# Patient Record
Sex: FEMALE | Race: WHITE | NOT HISPANIC OR LATINO | Employment: OTHER | URBAN - METROPOLITAN AREA
[De-identification: names, ages, dates, MRNs, and addresses within clinical notes are randomized per-mention and may not be internally consistent; named-entity substitution may affect disease eponyms.]

---

## 2018-03-28 ENCOUNTER — HOSPITAL ENCOUNTER (EMERGENCY)
Facility: MEDICAL CENTER | Age: 68
End: 2018-03-29
Attending: EMERGENCY MEDICINE
Payer: MEDICARE

## 2018-03-28 DIAGNOSIS — R11.2 NON-INTRACTABLE VOMITING WITH NAUSEA, UNSPECIFIED VOMITING TYPE: ICD-10-CM

## 2018-03-28 DIAGNOSIS — R10.84 GENERALIZED ABDOMINAL PAIN: ICD-10-CM

## 2018-03-28 DIAGNOSIS — R51.9 ACUTE NONINTRACTABLE HEADACHE, UNSPECIFIED HEADACHE TYPE: ICD-10-CM

## 2018-03-28 PROCEDURE — 99285 EMERGENCY DEPT VISIT HI MDM: CPT

## 2018-03-29 ENCOUNTER — APPOINTMENT (OUTPATIENT)
Dept: RADIOLOGY | Facility: MEDICAL CENTER | Age: 68
End: 2018-03-29
Attending: EMERGENCY MEDICINE
Payer: MEDICARE

## 2018-03-29 VITALS
HEART RATE: 78 BPM | SYSTOLIC BLOOD PRESSURE: 144 MMHG | HEIGHT: 62 IN | OXYGEN SATURATION: 96 % | WEIGHT: 165 LBS | RESPIRATION RATE: 18 BRPM | BODY MASS INDEX: 30.36 KG/M2 | DIASTOLIC BLOOD PRESSURE: 76 MMHG | TEMPERATURE: 97.1 F

## 2018-03-29 LAB
ALBUMIN SERPL BCP-MCNC: 3.5 G/DL (ref 3.2–4.9)
ALBUMIN/GLOB SERPL: 1.5 G/DL
ALP SERPL-CCNC: 92 U/L (ref 30–99)
ALT SERPL-CCNC: 19 U/L (ref 2–50)
ANION GAP SERPL CALC-SCNC: 7 MMOL/L (ref 0–11.9)
APPEARANCE UR: CLEAR
AST SERPL-CCNC: 21 U/L (ref 12–45)
BACTERIA #/AREA URNS HPF: NEGATIVE /HPF
BASOPHILS # BLD AUTO: 0.3 % (ref 0–1.8)
BASOPHILS # BLD: 0.01 K/UL (ref 0–0.12)
BILIRUB SERPL-MCNC: 0.4 MG/DL (ref 0.1–1.5)
BILIRUB UR QL STRIP.AUTO: NEGATIVE
BUN SERPL-MCNC: 15 MG/DL (ref 8–22)
CALCIUM SERPL-MCNC: 8.5 MG/DL (ref 8.5–10.5)
CHLORIDE SERPL-SCNC: 109 MMOL/L (ref 96–112)
CO2 SERPL-SCNC: 23 MMOL/L (ref 20–33)
COLOR UR: YELLOW
CREAT SERPL-MCNC: 0.52 MG/DL (ref 0.5–1.4)
CULTURE IF INDICATED INDCX: YES UA CULTURE
EKG IMPRESSION: NORMAL
EOSINOPHIL # BLD AUTO: 0.04 K/UL (ref 0–0.51)
EOSINOPHIL NFR BLD: 1 % (ref 0–6.9)
EPI CELLS #/AREA URNS HPF: ABNORMAL /HPF
ERYTHROCYTE [DISTWIDTH] IN BLOOD BY AUTOMATED COUNT: 42.6 FL (ref 35.9–50)
ERYTHROCYTE [SEDIMENTATION RATE] IN BLOOD BY WESTERGREN METHOD: 12 MM/HOUR (ref 0–30)
GLOBULIN SER CALC-MCNC: 2.4 G/DL (ref 1.9–3.5)
GLUCOSE SERPL-MCNC: 117 MG/DL (ref 65–99)
GLUCOSE UR STRIP.AUTO-MCNC: NEGATIVE MG/DL
HCT VFR BLD AUTO: 39.6 % (ref 37–47)
HGB BLD-MCNC: 13.1 G/DL (ref 12–16)
HYALINE CASTS #/AREA URNS LPF: ABNORMAL /LPF
IMM GRANULOCYTES # BLD AUTO: 0.01 K/UL (ref 0–0.11)
IMM GRANULOCYTES NFR BLD AUTO: 0.3 % (ref 0–0.9)
KETONES UR STRIP.AUTO-MCNC: ABNORMAL MG/DL
LEUKOCYTE ESTERASE UR QL STRIP.AUTO: ABNORMAL
LIPASE SERPL-CCNC: 10 U/L (ref 11–82)
LYMPHOCYTES # BLD AUTO: 0.49 K/UL (ref 1–4.8)
LYMPHOCYTES NFR BLD: 12.6 % (ref 22–41)
MCH RBC QN AUTO: 30.5 PG (ref 27–33)
MCHC RBC AUTO-ENTMCNC: 33.1 G/DL (ref 33.6–35)
MCV RBC AUTO: 92.1 FL (ref 81.4–97.8)
MICRO URNS: ABNORMAL
MONOCYTES # BLD AUTO: 0.33 K/UL (ref 0–0.85)
MONOCYTES NFR BLD AUTO: 8.5 % (ref 0–13.4)
NEUTROPHILS # BLD AUTO: 3.02 K/UL (ref 2–7.15)
NEUTROPHILS NFR BLD: 77.3 % (ref 44–72)
NITRITE UR QL STRIP.AUTO: NEGATIVE
NRBC # BLD AUTO: 0 K/UL
NRBC BLD-RTO: 0 /100 WBC
PH UR STRIP.AUTO: 7 [PH]
PLATELET # BLD AUTO: 187 K/UL (ref 164–446)
PMV BLD AUTO: 9.9 FL (ref 9–12.9)
POTASSIUM SERPL-SCNC: 3 MMOL/L (ref 3.6–5.5)
PROT SERPL-MCNC: 5.9 G/DL (ref 6–8.2)
PROT UR QL STRIP: NEGATIVE MG/DL
RBC # BLD AUTO: 4.3 M/UL (ref 4.2–5.4)
RBC # URNS HPF: ABNORMAL /HPF
RBC UR QL AUTO: NEGATIVE
SODIUM SERPL-SCNC: 139 MMOL/L (ref 135–145)
SP GR UR STRIP.AUTO: 1.03
TROPONIN I SERPL-MCNC: <0.01 NG/ML (ref 0–0.04)
UROBILINOGEN UR STRIP.AUTO-MCNC: 1 MG/DL
WBC # BLD AUTO: 3.9 K/UL (ref 4.8–10.8)
WBC #/AREA URNS HPF: ABNORMAL /HPF

## 2018-03-29 PROCEDURE — 700111 HCHG RX REV CODE 636 W/ 250 OVERRIDE (IP): Performed by: EMERGENCY MEDICINE

## 2018-03-29 PROCEDURE — 93005 ELECTROCARDIOGRAM TRACING: CPT | Performed by: EMERGENCY MEDICINE

## 2018-03-29 PROCEDURE — 83690 ASSAY OF LIPASE: CPT

## 2018-03-29 PROCEDURE — 70450 CT HEAD/BRAIN W/O DYE: CPT

## 2018-03-29 PROCEDURE — 96374 THER/PROPH/DIAG INJ IV PUSH: CPT | Mod: XU

## 2018-03-29 PROCEDURE — 96375 TX/PRO/DX INJ NEW DRUG ADDON: CPT

## 2018-03-29 PROCEDURE — 81001 URINALYSIS AUTO W/SCOPE: CPT

## 2018-03-29 PROCEDURE — 74177 CT ABD & PELVIS W/CONTRAST: CPT

## 2018-03-29 PROCEDURE — 84484 ASSAY OF TROPONIN QUANT: CPT

## 2018-03-29 PROCEDURE — 85025 COMPLETE CBC W/AUTO DIFF WBC: CPT

## 2018-03-29 PROCEDURE — 85652 RBC SED RATE AUTOMATED: CPT

## 2018-03-29 PROCEDURE — 700117 HCHG RX CONTRAST REV CODE 255: Performed by: EMERGENCY MEDICINE

## 2018-03-29 PROCEDURE — 80053 COMPREHEN METABOLIC PANEL: CPT

## 2018-03-29 PROCEDURE — 87086 URINE CULTURE/COLONY COUNT: CPT

## 2018-03-29 RX ORDER — ONDANSETRON 4 MG/1
4 TABLET, ORALLY DISINTEGRATING ORAL EVERY 6 HOURS PRN
Qty: 10 TAB | Refills: 0 | Status: SHIPPED | OUTPATIENT
Start: 2018-03-29 | End: 2018-05-11

## 2018-03-29 RX ORDER — MORPHINE SULFATE 10 MG/ML
5 INJECTION, SOLUTION INTRAMUSCULAR; INTRAVENOUS ONCE
Status: COMPLETED | OUTPATIENT
Start: 2018-03-29 | End: 2018-03-29

## 2018-03-29 RX ORDER — ONDANSETRON 2 MG/ML
4 INJECTION INTRAMUSCULAR; INTRAVENOUS ONCE
Status: COMPLETED | OUTPATIENT
Start: 2018-03-29 | End: 2018-03-29

## 2018-03-29 RX ORDER — SUMATRIPTAN 25 MG/1
25-100 TABLET, FILM COATED ORAL
Qty: 10 TAB | Refills: 0 | Status: SHIPPED | OUTPATIENT
Start: 2018-03-29 | End: 2018-05-11

## 2018-03-29 RX ADMIN — IOHEXOL 100 ML: 350 INJECTION, SOLUTION INTRAVENOUS at 01:34

## 2018-03-29 RX ADMIN — MORPHINE SULFATE 5 MG: 10 INJECTION INTRAVENOUS at 00:37

## 2018-03-29 RX ADMIN — ONDANSETRON 4 MG: 2 INJECTION, SOLUTION INTRAMUSCULAR; INTRAVENOUS at 00:37

## 2018-03-29 ASSESSMENT — PAIN SCALES - GENERAL: PAINLEVEL_OUTOF10: 7

## 2018-03-29 NOTE — ED PROVIDER NOTES
"ED Provider Note    CHIEF COMPLAINT  Chief Complaint   Patient presents with   • Abdominal Pain   • N/V   • Jaw Pain   • Head Ache        HPI    Primary care provider: No primary care provider on file.   History obtained from: Patient and her family  History limited by: None     Denia Deluca is a 67 y.o. female who presents to the ED by EMS complaining of severe sharp right sided headache for about a day and a half. She reports history of severe migraines in the past but has not had any since menopause. She reports that the headache is also causing right ear pain and jaw pain. She also reports some sensitivity to light. She reports pain across the middle portion of her abdomen and vomits after eating. She describes her emesis as \"a little bit of bile.\" She has tried taking Aleve and ibuprofen without any improvement. She denies any fever/diarrhea/dysuria or pain anywhere else. No focal weakness/visual change/sensory change. She reports that she has been a little bit constipated because they moved here from [place] and drove 16 hours.      REVIEW OF SYSTEMS  Please see HPI for pertinent positives/negatives.  All other systems reviewed and are negative.     PAST MEDICAL HISTORY  No past medical history on file.     SURGICAL HISTORY  No past surgical history on file.     SOCIAL HISTORY  Social History     Social History Main Topics   • Smoking status: Not on file   • Smokeless tobacco: Not on file   • Alcohol use Not on file   • Drug use: Unknown   • Sexual activity: Not on file        FAMILY HISTORY  No family history on file.     CURRENT MEDICATIONS  Home Medications    **Home medications have not yet been reviewed for this encounter**          ALLERGIES  No Known Allergies     PHYSICAL EXAM  VITAL SIGNS: /76   Pulse 78   Temp 36.2 °C (97.1 °F)   Resp 18   Ht 1.575 m (5' 2\")   Wt 74.8 kg (165 lb)   SpO2 96%   BMI 30.18 kg/m²  @HILLARY[[id number]::@     Pulse ox interpretation: 96% I interpret this pulse ox " as normal     Constitutional: Well developed, well nourished, alert in mild discomfort, nontoxic appearance    HENT: No external signs of trauma, normocephalic, bilateral external ears normal, unable to visualize right TM due to cerumen, left EAC with small amount of cerumen, oropharynx moist and clear, nose normal    Eyes: PERRL, EOMI, vision and visual fields are grossly intact, conjunctiva without erythema, no discharge, no icterus    Neck: Soft and supple, trachea midline, no stridor, no tenderness, no LAD, no JVD, good ROM    Cardiovascular: Regular rate and rhythm, no murmurs/rubs/gallops, strong distal pulses and good perfusion    Thorax & Lungs: No respiratory distress, CTAB  Abdomen: Soft, diffuse tenderness to palpation with guarding, nondistended, no rebound, normal BS    Back: No CVAT    Extremities: No cyanosis, no edema, no gross deformity, good ROM, no tenderness, intact distal pulses with brisk cap refill    Skin: Warm, dry, no pallor/cyanosis, no rash noted    Lymphatic: No lymphadenopathy noted    Neuro: Alert and oriented to person, place, and time.  GCS 15.  CN II-XII grossly intact.  Normal speech.  Equal strength bilateral UE/LE.  Sensation intact to touch.  No cerebellar signs.  Psychiatric: Cooperative, flat affect, normal judgement, appropriate for clinical situation          DIAGNOSTIC STUDIES / PROCEDURES    EKG  12 Lead EKG obtained at 0015 and interpreted by me:   Rate: 91   Rhythm: Sinus rhythm   Ectopy: None  Intervals: Normal   Axis: Normal   Q Waves: None   ST segments: Normal  T Waves: Normal    Clinical Impression: Sinus rhythm without acute ST-T wave changes       LABS  All labs reviewed by me.     Results for orders placed or performed during the hospital encounter of 03/28/18   Troponin   Result Value Ref Range    Troponin I <0.01 0.00 - 0.04 ng/mL   CBC with Differential   Result Value Ref Range    WBC 3.9 (L) 4.8 - 10.8 K/uL    RBC 4.30 4.20 - 5.40 M/uL    Hemoglobin 13.1 12.0  - 16.0 g/dL    Hematocrit 39.6 37.0 - 47.0 %    MCV 92.1 81.4 - 97.8 fL    MCH 30.5 27.0 - 33.0 pg    MCHC 33.1 (L) 33.6 - 35.0 g/dL    RDW 42.6 35.9 - 50.0 fL    Platelet Count 187 164 - 446 K/uL    MPV 9.9 9.0 - 12.9 fL    Neutrophils-Polys 77.30 (H) 44.00 - 72.00 %    Lymphocytes 12.60 (L) 22.00 - 41.00 %    Monocytes 8.50 0.00 - 13.40 %    Eosinophils 1.00 0.00 - 6.90 %    Basophils 0.30 0.00 - 1.80 %    Immature Granulocytes 0.30 0.00 - 0.90 %    Nucleated RBC 0.00 /100 WBC    Neutrophils (Absolute) 3.02 2.00 - 7.15 K/uL    Lymphs (Absolute) 0.49 (L) 1.00 - 4.80 K/uL    Monos (Absolute) 0.33 0.00 - 0.85 K/uL    Eos (Absolute) 0.04 0.00 - 0.51 K/uL    Baso (Absolute) 0.01 0.00 - 0.12 K/uL    Immature Granulocytes (abs) 0.01 0.00 - 0.11 K/uL    NRBC (Absolute) 0.00 K/uL   Complete Metabolic Panel (CMP)   Result Value Ref Range    Sodium 139 135 - 145 mmol/L    Potassium 3.0 (L) 3.6 - 5.5 mmol/L    Chloride 109 96 - 112 mmol/L    Co2 23 20 - 33 mmol/L    Anion Gap 7.0 0.0 - 11.9    Glucose 117 (H) 65 - 99 mg/dL    Bun 15 8 - 22 mg/dL    Creatinine 0.52 0.50 - 1.40 mg/dL    Calcium 8.5 8.5 - 10.5 mg/dL    AST(SGOT) 21 12 - 45 U/L    ALT(SGPT) 19 2 - 50 U/L    Alkaline Phosphatase 92 30 - 99 U/L    Total Bilirubin 0.4 0.1 - 1.5 mg/dL    Albumin 3.5 3.2 - 4.9 g/dL    Total Protein 5.9 (L) 6.0 - 8.2 g/dL    Globulin 2.4 1.9 - 3.5 g/dL    A-G Ratio 1.5 g/dL   Lipase   Result Value Ref Range    Lipase 10 (L) 11 - 82 U/L   WESTERGREN SED RATE   Result Value Ref Range    Sed Rate Westergren 12 0 - 30 mm/hour   ESTIMATED GFR   Result Value Ref Range    GFR If African American >60 >60 mL/min/1.73 m 2    GFR If Non African American >60 >60 mL/min/1.73 m 2   URINALYSIS CULTURE, IF INDICATED   Result Value Ref Range    Color Yellow     Character Clear     Specific Gravity 1.035 <1.035    Ph 7.0 5.0 - 8.0    Glucose Negative Negative mg/dL    Ketones Trace (A) Negative mg/dL    Protein Negative Negative mg/dL    Bilirubin  Negative Negative    Urobilinogen, Urine 1.0 Negative    Nitrite Negative Negative    Leukocyte Esterase Trace (A) Negative    Occult Blood Negative Negative    Micro Urine Req Microscopic     Culture Indicated Yes UA Culture   URINE MICROSCOPIC (W/UA)   Result Value Ref Range    WBC 2-5 /hpf    RBC 2-5 (A) /hpf    Bacteria Negative None /hpf    Epithelial Cells Few /hpf    Hyaline Cast 0-2 /lpf   EKG (ER)   Result Value Ref Range    Report       [facility] Emergency Dept.    Test Date:  2018  Pt Name:    GILBERTO MÉNDEZ             Department: ER  MRN:        [id number]                      Room:        04  Gender:     Female                       Technician: [id number]  :        1950                   Requested By:ER TRIAGE PROTOCOL  Order #:    [id number]                    Reading MD:    Measurements  Intervals                                Axis  Rate:       91                           P:          21  TN:         160                          QRS:        -1  QRSD:       100                          T:          24  QT:         380  QTc:        468    Interpretive Statements  SINUS RHYTHM  RSR' IN V1 OR V2, RIGHT VCD OR RVH  BASELINE WANDER IN LEAD(S) V6  No previous ECG available for comparison          RADIOLOGY  The radiologist's interpretation of all radiological studies have been reviewed by me.     CT-ABDOMEN-PELVIS WITH   Final Result      1.  Dilation of pancreatic duct, of uncertain etiology.  No gross evidence for pancreatic mass or pancreatitis.   2.  Normal appendix.   3.  Increased colonic stool.   4.  No mesenteric inflammatory process.   5.  Mildly distended fluid-filled esophagus, possibly indicating reflux.      CT-HEAD W/O   Final Result      1.  Mild atrophy and white matter changes.   2.  No acute intracranial hemorrhage or territorial infarct.                COURSE & MEDICAL DECISION MAKING  Nursing notes, VS, PMSFHx reviewed in chart.       Differential  diagnoses considered include but are not limited to: Tension/migraine/cluster HA, intracranial hemorrhage/SAH, aneurysm, dissection, temporal arteritis, intracranial HTN, central venous thrombosis, tumor/cancer, appendicitis, AMI, AAA, bowel perforation/obstruction, ileus, cholecystitis/ascending cholangitis, gallstone/biliary colic, diverticulitis, mesenteric ischemia, pancreatitis, PUD, gastritis, GERD, KS/renal colic, UTI/pyelo, gastroenteritis, colitis, constipation, hernia       Patient presents with her family to the ED with above complaint. EKG without evidence of acute ischemic changes. CT head and CT abdomen/pelvis with findings as above. Labs were fairly unremarkable except for mild leukopenia and hypokalemia. Patient was given Zofran and morphine for her symptoms. Findings discussed with the patient and her . Patient reports feeling better and is noted now in no acute distress and nontoxic in appearance. No signs of acute abdomen on recheck. No focal neurological findings. She tolerated oral fluids without any further vomiting. Patient reports that she used to get severe migraines about twice a week and was previously on Imitrex but hasn't had one since menopause. I will prescribe Imitrex for her as well as Zofran to use as needed. Patient has upcoming appointment with her new doctor in [facility]. She was given return to ED precautions. She was noted to have mildly elevated blood pressure and will need outpatient follow-up for further management. Patient and her  verbalized understanding and agreed with plan of care with no further questions or concerns.      The patient is referred to a primary physician for blood pressure management, diabetic screening, and for all other preventative health concerns.       FINAL IMPRESSION  1. Acute nonintractable headache, unspecified headache type    2. Non-intractable vomiting with nausea, unspecified vomiting type    3. Generalized abdominal pain            DISPOSITION  Patient will be discharged home in stable condition.       FOLLOW UP  Please follow up with your doctor    Schedule an appointment as soon as possible for a visit today      [facility], Emergency Dept  [address]  [place] [address] [phone]    If symptoms worsen         OUTPATIENT MEDICATIONS  Discharge Medication List as of 3/29/2018  3:44 AM      START taking these medications    Details   ondansetron (ZOFRAN ODT) 4 MG TABLET DISPERSIBLE Take 1 Tab by mouth every 6 hours as needed., Disp-10 Tab, R-0, Print Rx Paper      SUMAtriptan (IMITREX) 25 MG Tab tablet Take 1-4 Tabs by mouth Once PRN for Migraine for up to 1 dose., Disp-10 Tab, R-0, Print Rx Paper                Electronically signed by: Marshall Powers, 3/29/2018 12:20 AM      Portions of this record were made with voice recognition software.  Despite my review, spelling/grammar/context errors may still remain.  Interpretation of this chart should be taken in this context.

## 2018-03-29 NOTE — ED NOTES
Pt educated on discharge instructions and rx. Pt verbalized understanding. Pt given glass of water to go. Pt and  ambulated to lobby with steady gait.

## 2018-03-29 NOTE — ED NOTES
Pt notified of needing urine sample. Pt states unable to urinate at this time. Pt to CT at this time.

## 2018-03-29 NOTE — DISCHARGE INSTRUCTIONS
Abdominal Pain (Nonspecific)  Your exam might not show the exact reason you have abdominal pain. Since there are many different causes of abdominal pain, another checkup and more tests may be needed. It is very important to follow up for lasting (persistent) or worsening symptoms. A possible cause of abdominal pain in any person who still has his or her appendix is acute appendicitis. Appendicitis is often hard to diagnose. Normal blood tests, urine tests, ultrasound, and CT scans do not completely rule out early appendicitis or other causes of abdominal pain. Sometimes, only the changes that happen over time will allow appendicitis and other causes of abdominal pain to be determined. Other potential problems that may require surgery may also take time to become more apparent. Because of this, it is important that you follow all of the instructions below.  HOME CARE INSTRUCTIONS   · Rest as much as possible.   · Do not eat solid food until your pain is gone.   · While adults or children have pain: A diet of water, weak decaffeinated tea, broth or bouillon, gelatin, oral rehydration solutions (ORS), frozen ice pops, or ice chips may be helpful.   · When pain is gone in adults or children: Start a light diet (dry toast, crackers, applesauce, or white rice). Increase the diet slowly as long as it does not bother you. Eat no dairy products (including cheese and eggs) and no spicy, fatty, fried, or high-fiber foods.   · Use no alcohol, caffeine, or cigarettes.   · Take your regular medicines unless your caregiver told you not to.   · Take any prescribed medicine as directed.   · Only take over-the-counter or prescription medicines for pain, discomfort, or fever as directed by your caregiver. Do not give aspirin to children.   If your caregiver has given you a follow-up appointment, it is very important to keep that appointment. Not keeping the appointment could result in a permanent injury and/or lasting (chronic) pain  and/or disability. If there is any problem keeping the appointment, you must call to reschedule.   SEEK IMMEDIATE MEDICAL CARE IF:   · Your pain is not gone in 24 hours.   · Your pain becomes worse, changes location, or feels different.   · You or your child has an oral temperature above 102° F (38.9° C), not controlled by medicine.   · Your baby is older than 3 months with a rectal temperature of 102° F (38.9° C) or higher.   · Your baby is 3 months old or younger with a rectal temperature of 100.4° F (38° C) or higher.   · You have shaking chills.   · You keep throwing up (vomiting) or cannot drink liquids.   · There is blood in your vomit or you see blood in your bowel movements.   · Your bowel movements become dark or black.   · You have frequent bowel movements.   · Your bowel movements stop (become blocked) or you cannot pass gas.   · You have bloody, frequent, or painful urination.   · You have yellow discoloration in the skin or whites of the eyes.   · Your stomach becomes bloated or bigger.   · You have dizziness or fainting.   · You have chest or back pain.   MAKE SURE YOU:   · Understand these instructions.   · Will watch your condition.   · Will get help right away if you are not doing well or get worse.   Document Released: 12/18/2006 Document Revised: 03/11/2013 Document Reviewed: 11/15/2010  ExitCare® Patient Information ©2013 [employer].    General Headache Without Cause  A headache is pain or discomfort felt around the head or neck area. The specific cause of a headache may not be found. There are many causes and types of headaches. A few common ones are:  · Tension headaches.  · Migraine headaches.  · Cluster headaches.  · Chronic daily headaches.  Follow these instructions at home:  Watch your condition for any changes. Take these steps to help with your condition:  Managing pain  · Take over-the-counter and prescription medicines only as told by your health care provider.  · Lie down in a  dark, quiet room when you have a headache.  · If directed, apply ice to the head and neck area:  ¨ Put ice in a plastic bag.  ¨ Place a towel between your skin and the bag.  ¨ Leave the ice on for 20 minutes, 2-3 times per day.  · Use a heating pad or hot shower to apply heat to the head and neck area as told by your health care provider.  · Keep lights dim if bright lights bother you or make your headaches worse.  Eating and drinking  · Eat meals on a regular schedule.  · Limit alcohol use.  · Decrease the amount of caffeine you drink, or stop drinking caffeine.  General instructions  · Keep all follow-up visits as told by your health care provider. This is important.  · Keep a headache journal to help find out what may trigger your headaches. For example, write down:  ¨ What you eat and drink.  ¨ How much sleep you get.  ¨ Any change to your diet or medicines.  · Try massage or other relaxation techniques.  · Limit stress.  · Sit up straight, and do not tense your muscles.  · Do not use tobacco products, including cigarettes, chewing tobacco, or e-cigarettes. If you need help quitting, ask your health care provider.  · Exercise regularly as told by your health care provider.  · Sleep on a regular schedule. Get 7-9 hours of sleep, or the amount recommended by your health care provider.  Contact a health care provider if:  · Your symptoms are not helped by medicine.  · You have a headache that is different from the usual headache.  · You have nausea or you vomit.  · You have a fever.  Get help right away if:  · Your headache becomes severe.  · You have repeated vomiting.  · You have a stiff neck.  · You have a loss of vision.  · You have problems with speech.  · You have pain in the eye or ear.  · You have muscular weakness or loss of muscle control.  · You lose your balance or have trouble walking.  · You feel faint or pass out.  · You have confusion.  This information is not intended to replace advice given to you  by your health care provider. Make sure you discuss any questions you have with your health care provider.  Document Released: 12/18/2006 Document Revised: 05/25/2017 Document Reviewed: 04/11/2016  Elsevier Interactive Patient Education © 2017 [employer]

## 2018-03-31 LAB
BACTERIA UR CULT: NORMAL
SIGNIFICANT IND 70042: NORMAL
SITE SITE: NORMAL
SOURCE SOURCE: NORMAL

## 2018-04-16 ENCOUNTER — TELEPHONE (OUTPATIENT)
Dept: MEDICAL GROUP | Facility: PHYSICIAN GROUP | Age: 68
End: 2018-04-16

## 2018-04-16 NOTE — TELEPHONE ENCOUNTER
Patient called stating you were recommended by Jaye Campbell and is wondering if you would accept her as a patient?

## 2018-04-17 NOTE — TELEPHONE ENCOUNTER
Phone Number Called: [phone] (home)     Message: LVM informing patient  will accept her as a patient and she may call us back to schedule a new patient appointment.     Left Message for patient to call back: yes

## 2018-05-11 ENCOUNTER — OFFICE VISIT (OUTPATIENT)
Dept: MEDICAL GROUP | Facility: PHYSICIAN GROUP | Age: 68
End: 2018-05-11
Payer: MEDICARE

## 2018-05-11 VITALS
BODY MASS INDEX: 28.53 KG/M2 | SYSTOLIC BLOOD PRESSURE: 124 MMHG | HEART RATE: 75 BPM | HEIGHT: 63 IN | RESPIRATION RATE: 16 BRPM | TEMPERATURE: 98.6 F | OXYGEN SATURATION: 96 % | DIASTOLIC BLOOD PRESSURE: 70 MMHG | WEIGHT: 161 LBS

## 2018-05-11 DIAGNOSIS — F90.1 ATTENTION DEFICIT HYPERACTIVITY DISORDER (ADHD), PREDOMINANTLY HYPERACTIVE TYPE: ICD-10-CM

## 2018-05-11 DIAGNOSIS — N39.46 MIXED STRESS AND URGE URINARY INCONTINENCE: ICD-10-CM

## 2018-05-11 DIAGNOSIS — Z91.030 BEE ALLERGY STATUS: ICD-10-CM

## 2018-05-11 DIAGNOSIS — Z13.1 SCREENING FOR DIABETES MELLITUS: ICD-10-CM

## 2018-05-11 DIAGNOSIS — J45.20 MILD INTERMITTENT ASTHMA WITHOUT COMPLICATION: ICD-10-CM

## 2018-05-11 DIAGNOSIS — Z13.220 SCREENING FOR LIPID DISORDERS: ICD-10-CM

## 2018-05-11 PROCEDURE — 99204 OFFICE O/P NEW MOD 45 MIN: CPT | Performed by: FAMILY MEDICINE

## 2018-05-11 RX ORDER — ALBUTEROL SULFATE 90 UG/1
2 AEROSOL, METERED RESPIRATORY (INHALATION) EVERY 6 HOURS PRN
Qty: 8.5 G | Refills: 3 | Status: SHIPPED
Start: 2018-05-11 | End: 2020-02-03 | Stop reason: SDUPTHER

## 2018-05-11 RX ORDER — OXYBUTYNIN CHLORIDE 5 MG/1
5 TABLET ORAL 3 TIMES DAILY
Qty: 90 TAB | Refills: 3 | Status: SHIPPED | OUTPATIENT
Start: 2018-05-11 | End: 2019-06-09 | Stop reason: SDUPTHER

## 2018-05-11 RX ORDER — ATOMOXETINE 60 MG/1
60 CAPSULE ORAL DAILY
COMMUNITY
End: 2018-05-11 | Stop reason: SDUPTHER

## 2018-05-11 RX ORDER — OXYBUTYNIN CHLORIDE 5 MG/1
5 TABLET ORAL 3 TIMES DAILY
COMMUNITY
End: 2018-05-11 | Stop reason: SDUPTHER

## 2018-05-11 RX ORDER — ATOMOXETINE 60 MG/1
60 CAPSULE ORAL DAILY
Qty: 90 CAP | Refills: 3 | Status: SHIPPED | OUTPATIENT
Start: 2018-05-11 | End: 2018-09-26

## 2018-05-11 RX ORDER — ESCITALOPRAM OXALATE 10 MG/1
10 TABLET ORAL DAILY
COMMUNITY
End: 2018-12-14

## 2018-05-11 ASSESSMENT — PATIENT HEALTH QUESTIONNAIRE - PHQ9: CLINICAL INTERPRETATION OF PHQ2 SCORE: 0

## 2018-05-11 NOTE — LETTER
[facility]  Sharifa Gayle M.D.  [address]  [place] [address]  Fax: [phone]   Authorization for Release/Disclosure of   Protected Health Information   Name: DENIA MÉNDEZ : 1950 SSN: [id number]   [facility] [place] [address] Phone:    [phone] (home)    I authorize the entity listed below to release/disclose the PHI below to:   [facility]/Sharifa Gayle M.D. and Sharifa Gayle M.D.   Provider or Entity Name:  Raymundo Solano    Address   City, State, Zip   Phone:      Fax:[phone]     Reason for request: continuity of care   Information to be released:    [  ] LAST COLONOSCOPY,  including any PATH REPORT and follow-up  [  ] LAST FIT/COLOGUARD RESULT [  ] LAST DEXA  [  ] LAST MAMMOGRAM  [  ] LAST PAP  [  ] LAST LABS [  ] RETINA EXAM REPORT  [  ] IMMUNIZATION RECORDS  [  x] Release all info      [  ] Check here and initial the line next to each item to release ALL health information INCLUDING  _____ Care and treatment for drug and / or alcohol abuse  _____ HIV testing, infection status, or AIDS  _____ Genetic Testing    DATES OF SERVICE OR TIME PERIOD TO BE DISCLOSED: _____________  I understand and acknowledge that:  * This Authorization may be revoked at any time by you in writing, except if your health information has already been used or disclosed.  * Your health information that will be used or disclosed as a result of you signing this authorization could be re-disclosed by the recipient. If this occurs, your re-disclosed health information may no longer be protected by State or Federal laws.  * You may refuse to sign this Authorization. Your refusal will not affect your ability to obtain treatment.  * This Authorization becomes effective upon signing and will  on (date) __________.      If no date is indicated, this Authorization will  one (1) year from the signature date.    Name: Denia Méndez    Signature:   Date:     2018            PLEASE FAX REQUESTED RECORDS BACK TO: [phone]

## 2018-05-11 NOTE — ASSESSMENT & PLAN NOTE
This is a chronic and stable issue for the patient. She has had ADHD for most of her life. She is currently taking Strattera 60mg 5x/week and Lexapro 10mg 2x/week. This combination has worked well for her. She tells me that her focus and concentration are improved. She denies adverse effects. Has seen psychiatry in the past.

## 2018-05-11 NOTE — LETTER
[facility]  Sharifa Gayle M.D.  [address]  [place] [address]  Fax: [phone]   Authorization for Release/Disclosure of   Protected Health Information   Name: DENIA MÉNDEZ : 1950 SSN: [id number]   Address: [place] [address] Phone:    [phone] (home)    I authorize the entity listed below to release/disclose the PHI below to:   [facility]/Sharifa Gayle M.D. and Sharifa Gayle M.D.   Provider or Entity Name:  Dr. Maria Reyes    Address   City, State, Zip   Phone:      Fax:  [phone]   Reason for request: continuity of care   Information to be released:    [  ] LAST COLONOSCOPY,  including any PATH REPORT and follow-up  [  ] LAST FIT/COLOGUARD RESULT [  ] LAST DEXA  [  ] LAST MAMMOGRAM  [  ] LAST PAP  [  ] LAST LABS [  ] RETINA EXAM REPORT  [  ] IMMUNIZATION RECORDS  [  ] Release all info      [  ] Check here and initial the line next to each item to release ALL health information INCLUDING  _____ Care and treatment for drug and / or alcohol abuse  _____ HIV testing, infection status, or AIDS  _____ Genetic Testing    DATES OF SERVICE OR TIME PERIOD TO BE DISCLOSED: _____________  I understand and acknowledge that:  * This Authorization may be revoked at any time by you in writing, except if your health information has already been used or disclosed.  * Your health information that will be used or disclosed as a result of you signing this authorization could be re-disclosed by the recipient. If this occurs, your re-disclosed health information may no longer be protected by State or Federal laws.  * You may refuse to sign this Authorization. Your refusal will not affect your ability to obtain treatment.  * This Authorization becomes effective upon signing and will  on (date) __________.      If no date is indicated, this Authorization will  one (1) year from the signature date.    Name: Denia Méndez    Signature:   Date:     2018       PLEASE FAX  REQUESTED RECORDS BACK TO: [phone]

## 2018-05-12 NOTE — ASSESSMENT & PLAN NOTE
Patient recently had a severe reaction after being stung by a bee. She had difficulty breathing and throat swelling. Has seen an allergist and has an Epi-Pen to use as needed.

## 2018-05-12 NOTE — ASSESSMENT & PLAN NOTE
Patient has had asthma since childhood. She tells me that she hates to admit she has asthma. She has never needed to take a daily inhaled medication. She is using a rescue inhaler 1-2 a week as needed. Denies cough, shortness of breath or wheezing.

## 2018-05-12 NOTE — PROGRESS NOTES
Denia Deluca is a 67 y.o. female here to establish care and discuss ADHD and asthma.    HPI:  Denia is a pleasant 67-year-old female here to establish care. She and her  recently moved here from [place]. 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    Attention deficit hyperactivity disorder (ADHD), predominantly hyperactive type  This is a chronic and stable issue for the patient. She has had ADHD for most of her life. She is currently taking Strattera 60mg 5x/week and Lexapro 10mg 2x/week. This combination has worked well for her. She tells me that her focus and concentration are improved. She denies adverse effects. Has seen psychiatry in the past.    Mild intermittent asthma without complication  Patient has had asthma since childhood. She tells me that she hates to admit she has asthma. She has never needed to take a daily inhaled medication. She is using a rescue inhaler 1-2 a week as needed. Denies cough, shortness of breath or wheezing.    Mixed stress and urge urinary incontinence  Chronic in nature. Taking oxybutynin 5mg daily. No adverse effects.    Bee allergy status  Patient recently had a severe reaction after being stung by a bee. She had difficulty breathing and throat swelling. Has seen an allergist and has an Epi-Pen to use as needed.    Current medicines (including changes today)  Current Outpatient Prescriptions   Medication Sig Dispense Refill   • escitalopram (LEXAPRO) 10 MG Tab Take 10 mg by mouth every day.     • atomoxetine (STRATTERA) 60 MG capsule Take 1 Cap by mouth every day. 90 Cap 3   • oxybutynin (DITROPAN) 5 MG Tab Take 1 Tab by mouth 3 times a day. 90 Tab 3   • albuterol 108 (90 Base) MCG/ACT Aero Soln inhalation aerosol Inhale 2 Puffs by mouth every 6 hours as needed for Shortness of Breath. 8.5 g 3     No current facility-administered medications for this visit.      She  has a past medical history of ADHD; Basal cell carcinoma (BCC); Depression; and Fracture.  She  has a past surgical  "history that includes open reduction and abdominal hysterectomy total.  Social History   Substance Use Topics   • Smoking status: Never Smoker   • Smokeless tobacco: Never Used   • Alcohol use Yes      Comment: \"maybe 3 drinks a year\"     Social History     Social History Narrative   • No narrative on file     Family History   Problem Relation Age of Onset   • Cancer Sister      Breast cancer     Family Status   Relation Status   • Sister Alive     ROS  Constitutional: Negative for fever, chills and malaise/fatigue.   HENT: Negative for congestion.    Eyes: Negative for pain.   Respiratory: Negative for cough and shortness of breath.    Cardiovascular: Negative for leg swelling.   Gastrointestinal: Negative for nausea, vomiting, abdominal pain and diarrhea.   Genitourinary: Negative for dysuria and hematuria.   Skin: Negative for rash.   Neurological: Negative for dizziness, focal weakness and headaches.   Endo/Heme/Allergies: Does not bruise/bleed easily.   Psychiatric/Behavioral: Negative for depression.  The patient is not nervous/anxious.       Objective:     Physical Exam:  Blood pressure 124/70, pulse 75, temperature 37 °C (98.6 °F), resp. rate 16, height 1.6 m (5' 3\"), weight 73 kg (161 lb), SpO2 96 %, not currently breastfeeding. Body mass index is 28.52 kg/m².  Constitutional: Alert, no distress.  Skin: Warm, dry, good turgor, no rashes in visible areas.  Eye: Equal, round and reactive, conjunctiva clear, lids normal.  ENMT: Lips without lesions, good dentition, oropharynx clear.  Neck: Trachea midline, no masses, no thyromegaly. No cervical or supraclavicular lymphadenopathy.  Respiratory: Unlabored respiratory effort, lungs clear to auscultation, no wheezes, no ronchi.  Cardiovascular: Normal S1, S2, no murmur, no edema.  Abdomen: Soft, non-tender, no masses, no hepatosplenomegaly.  Psych: Alert and oriented x3, normal affect and mood.    Assessment and Plan:     1. Attention deficit hyperactivity " disorder (ADHD), predominantly hyperactive type  Focus and concentration improved with current medications. Patient denies adverse effects. Will continue current regimen and monitor.  - atomoxetine (STRATTERA) 60 MG capsule; Take 1 Cap by mouth every day.  Dispense: 90 Cap; Refill: 3    2. Mixed stress and urge urinary incontinence  Chronic and stable. Continue current medication and monitor.  - oxybutynin (DITROPAN) 5 MG Tab; Take 1 Tab by mouth 3 times a day.  Dispense: 90 Tab; Refill: 3    3. Mild intermittent asthma without complication  Chronic and stable. Continue use of rescue inhaler as needed and monitor.  - albuterol 108 (90 Base) MCG/ACT Aero Soln inhalation aerosol; Inhale 2 Puffs by mouth every 6 hours as needed for Shortness of Breath.  Dispense: 8.5 g; Refill: 3    4. Bee allergy status  Stable. Patient has Epi-Pen for emergencies.  - albuterol 108 (90 Base) MCG/ACT Aero Soln inhalation aerosol; Inhale 2 Puffs by mouth every 6 hours as needed for Shortness of Breath.  Dispense: 8.5 g; Refill: 3    5. Screening for diabetes mellitus  Fasting glucose ordered.  - COMP METABOLIC PANEL; Future    6. Screening for lipid disorders  Fasting cholesterol ordered.  - LIPID PROFILE; Future    Records requested from previous PCP.  Followup: Return in about 6 months (around 11/11/2018) for Medicare AW with health .         PLEASE NOTE: This dictation was created using voice recognition software. I have made every reasonable attempt to correct obvious errors, but I expect that there are errors of grammar and possibly content that I did not discover before finalizing the note.

## 2018-08-28 ENCOUNTER — OFFICE VISIT (OUTPATIENT)
Dept: MEDICAL GROUP | Facility: PHYSICIAN GROUP | Age: 68
End: 2018-08-28
Payer: MEDICARE

## 2018-08-28 ENCOUNTER — HOSPITAL ENCOUNTER (OUTPATIENT)
Facility: MEDICAL CENTER | Age: 68
End: 2018-08-28
Attending: FAMILY MEDICINE
Payer: MEDICARE

## 2018-08-28 VITALS
OXYGEN SATURATION: 94 % | BODY MASS INDEX: 29.14 KG/M2 | HEIGHT: 63 IN | TEMPERATURE: 97.7 F | SYSTOLIC BLOOD PRESSURE: 120 MMHG | DIASTOLIC BLOOD PRESSURE: 70 MMHG | WEIGHT: 164.46 LBS | HEART RATE: 104 BPM

## 2018-08-28 DIAGNOSIS — N39.0 URINARY TRACT INFECTION WITH HEMATURIA, SITE UNSPECIFIED: ICD-10-CM

## 2018-08-28 DIAGNOSIS — R31.9 URINARY TRACT INFECTION WITH HEMATURIA, SITE UNSPECIFIED: ICD-10-CM

## 2018-08-28 DIAGNOSIS — R00.0 TACHYCARDIA: ICD-10-CM

## 2018-08-28 DIAGNOSIS — F90.1 ATTENTION DEFICIT HYPERACTIVITY DISORDER (ADHD), PREDOMINANTLY HYPERACTIVE TYPE: ICD-10-CM

## 2018-08-28 LAB
APPEARANCE UR: NORMAL
BILIRUB UR STRIP-MCNC: NORMAL MG/DL
COLOR UR AUTO: NORMAL
GLUCOSE UR STRIP.AUTO-MCNC: NORMAL MG/DL
KETONES UR STRIP.AUTO-MCNC: NORMAL MG/DL
LEUKOCYTE ESTERASE UR QL STRIP.AUTO: NORMAL
NITRITE UR QL STRIP.AUTO: NORMAL
PH UR STRIP.AUTO: 7 [PH] (ref 5–8)
PROT UR QL STRIP: 30 MG/DL
RBC UR QL AUTO: NORMAL
SP GR UR STRIP.AUTO: 1.01
UROBILINOGEN UR STRIP-MCNC: NORMAL MG/DL

## 2018-08-28 PROCEDURE — 81002 URINALYSIS NONAUTO W/O SCOPE: CPT | Performed by: FAMILY MEDICINE

## 2018-08-28 PROCEDURE — 99214 OFFICE O/P EST MOD 30 MIN: CPT | Performed by: FAMILY MEDICINE

## 2018-08-28 PROCEDURE — 87086 URINE CULTURE/COLONY COUNT: CPT

## 2018-08-28 PROCEDURE — 99000 SPECIMEN HANDLING OFFICE-LAB: CPT | Performed by: FAMILY MEDICINE

## 2018-08-28 RX ORDER — CEFDINIR 300 MG/1
300 CAPSULE ORAL 2 TIMES DAILY
Qty: 10 CAP | Refills: 0 | Status: SHIPPED | OUTPATIENT
Start: 2018-08-28 | End: 2018-08-31

## 2018-08-28 RX ORDER — ATOMOXETINE 40 MG/1
40 CAPSULE ORAL DAILY
Qty: 30 CAP | Refills: 0 | Status: SHIPPED | OUTPATIENT
Start: 2018-08-28 | End: 2018-09-26 | Stop reason: SDUPTHER

## 2018-08-30 ENCOUNTER — TELEPHONE (OUTPATIENT)
Dept: MEDICAL GROUP | Facility: PHYSICIAN GROUP | Age: 68
End: 2018-08-30

## 2018-08-30 LAB
BACTERIA UR CULT: NORMAL
SIGNIFICANT IND 70042: NORMAL
SITE SITE: NORMAL
SOURCE SOURCE: NORMAL

## 2018-08-30 NOTE — TELEPHONE ENCOUNTER
VOICEMAIL  1. Caller Name: Denia                     Call Back Number: [phone] (home)     2. Message: Patient LVM requesting results of urine culture.  States she is having increased vaginal pain and does not feel the prescribed medication is working.  Patient seen by  on 8/28/18.      3. Patient approves office to leave a detailed voicemail/Utterzhart message: yes      Routing to  for results.  Any recommendations for continued pain?

## 2018-08-30 NOTE — TELEPHONE ENCOUNTER
Spoke with patient and let them know Amelia Arriaga M.D.'s message.  Appointment scheduled with PCP for tomorrow 8/31/18 @ 8:20 AM for further work up.  Patient will take OTC pain reliever today and discuss with PCP tomorrow.

## 2018-08-31 ENCOUNTER — OFFICE VISIT (OUTPATIENT)
Dept: MEDICAL GROUP | Facility: PHYSICIAN GROUP | Age: 68
End: 2018-08-31
Payer: MEDICARE

## 2018-08-31 VITALS
DIASTOLIC BLOOD PRESSURE: 80 MMHG | OXYGEN SATURATION: 95 % | HEART RATE: 100 BPM | RESPIRATION RATE: 16 BRPM | SYSTOLIC BLOOD PRESSURE: 118 MMHG | TEMPERATURE: 98.6 F | BODY MASS INDEX: 28.87 KG/M2 | WEIGHT: 163 LBS

## 2018-08-31 DIAGNOSIS — R10.84 GENERALIZED ABDOMINAL PAIN: ICD-10-CM

## 2018-08-31 DIAGNOSIS — R01.1 MURMUR: ICD-10-CM

## 2018-08-31 DIAGNOSIS — R00.0 TACHYCARDIA: ICD-10-CM

## 2018-08-31 DIAGNOSIS — Z90.710 S/P HYSTERECTOMY: ICD-10-CM

## 2018-08-31 DIAGNOSIS — R10.2 PELVIC PAIN: ICD-10-CM

## 2018-08-31 PROCEDURE — 99214 OFFICE O/P EST MOD 30 MIN: CPT | Performed by: FAMILY MEDICINE

## 2018-08-31 ASSESSMENT — PAIN SCALES - GENERAL: PAINLEVEL: NO PAIN

## 2018-08-31 NOTE — PROGRESS NOTES
"Subjective:   Denia Deluca is a 67 y.o. female here today for abdominal pain.    For the last 2 months, patient has had intermittent episodes of lower abdominal pain.  At first, she says that the pain started down near her bladder.  She is also noticed that she will have pain that seems to start in her upper right quadrant, the upper middle portion of her abdomen and this will then spread down to her lower abdomen.  She describes it as a \"sharp\" sensation.  A couple of weeks ago, she was straining to have a bowel movement.  She described her stool being \"sticky\" and \"pasty.\"  She suddenly felt a sharp pain in the \"front of her vagina.\"  The abdominal pain is associated with nausea.  She denies any fevers, chills, blood in urine or stool.  She has had a workup for hematuria in [place] in the past.  Her pain is worsening and the episodes are now occurring more frequently.    She was seen by another physician in our office and was prescribed antibiotics for a possible urinary tract infection.  The culture returned and was negative.  Her symptoms have not been improving.    For her ADHD, she tells me that her concentration and focus have improved with Strattera.  Her dose was reduced from 60-40 mg at her last visit due to tachycardia.  Her pulse is 100 today.  She denies any chest pain, palpitations or shortness of breath.  She says that her pulse has been running high for some time now.  She wonders if this is just her.  She has not had any cardiac testing.    Current medicines (including changes today)  Current Outpatient Prescriptions   Medication Sig Dispense Refill   • atomoxetine (STRATTERA) 40 MG capsule Take 1 Cap by mouth every day. 30 Cap 0   • cefdinir (OMNICEF) 300 MG Cap Take 1 Cap by mouth 2 times a day. 10 Cap 0   • escitalopram (LEXAPRO) 10 MG Tab Take 10 mg by mouth every day.     • atomoxetine (STRATTERA) 60 MG capsule Take 1 Cap by mouth every day. 90 Cap 3   • oxybutynin (DITROPAN) 5 MG Tab " Take 1 Tab by mouth 3 times a day. 90 Tab 3   • albuterol 108 (90 Base) MCG/ACT Aero Soln inhalation aerosol Inhale 2 Puffs by mouth every 6 hours as needed for Shortness of Breath. 8.5 g 3     No current facility-administered medications for this visit.      She  has a past medical history of ADHD; Basal cell carcinoma (BCC); Depression; and Fracture.    ROS   No chest pain, no shortness of breath, no abdominal pain.     Objective:     Physical Exam:  Blood pressure 118/80, pulse 100, temperature 37 °C (98.6 °F), resp. rate 16, weight 73.9 kg (163 lb), SpO2 95 %, not currently breastfeeding. Body mass index is 28.87 kg/m².   Constitutional: Alert, no distress, non-toxic appearance.  Skin: Warm, dry, good turgor, no rashes in visible areas.  Eye: Equal, round and reactive, conjunctiva clear, lids normal.  ENMT: Lips without lesions, good dentition, moist mucous membranes.  Neck: Trachea midline, no masses, no thyromegaly. No cervical or supraclavicular lymphadenopathy.  Respiratory: Unlabored respiratory effort, lungs clear to auscultation, no wheezes, no rhonchi.  Cardiovascular: Normal S1, S2, soft systolic murmur, no edema.  Abdomen: Soft, tender to light and deep palpation in RUQ, epigastric area and lower abdomen, no masses, non-distended, no hepatosplenomegaly.  No pain with hip maneuvers or heel tapping.  Psych: Alert and oriented x3, normal affect and mood.    Assessment and Plan:     1. Generalized abdominal pain  2. Pelvic pain  3. S/P hysterectomy  Worsening pain with increased frequency.  Etiology is unclear at this time.  I am concerned that patient may have peptic ulcer disease, gallbladder disease, ovarian pathology.  Will order CT scan to further evaluate.  Patient was also encouraged to keep a food journal and to eliminate any triggers.  Ear precautions reviewed.  She will have close follow-up.  - CT-ABDOMEN WITH & W/O, PELVIS WITH; Future  - CBC WITH DIFFERENTIAL; Future  - COMP METABOLIC PANEL;  Future    4. Tachycardia  5. Murmur  No significant change with reduction of Strattera dose.  Patient has a soft systolic murmur on exam which is new.  Labwork and echo ordered.  - ECHOCARDIOGRAM COMP W/O CONT; Future  - TSH; Future    Followup: Return in about 3 weeks (around 9/21/2018).         PLEASE NOTE: This dictation was created using voice recognition software. I have made every reasonable attempt to correct obvious errors, but I expect that there are errors of grammar and possibly content that I did not discover before finalizing the note.

## 2018-09-05 ENCOUNTER — HOSPITAL ENCOUNTER (OUTPATIENT)
Dept: LAB | Facility: MEDICAL CENTER | Age: 68
End: 2018-09-05
Attending: FAMILY MEDICINE
Payer: MEDICARE

## 2018-09-05 DIAGNOSIS — R10.84 GENERALIZED ABDOMINAL PAIN: ICD-10-CM

## 2018-09-05 DIAGNOSIS — R10.2 PELVIC PAIN: ICD-10-CM

## 2018-09-05 DIAGNOSIS — R00.0 TACHYCARDIA: ICD-10-CM

## 2018-09-05 LAB
ALBUMIN SERPL BCP-MCNC: 4.3 G/DL (ref 3.2–4.9)
ALBUMIN/GLOB SERPL: 1.6 G/DL
ALP SERPL-CCNC: 111 U/L (ref 30–99)
ALT SERPL-CCNC: 15 U/L (ref 2–50)
ANION GAP SERPL CALC-SCNC: 6 MMOL/L (ref 0–11.9)
AST SERPL-CCNC: 18 U/L (ref 12–45)
BASOPHILS # BLD AUTO: 0.9 % (ref 0–1.8)
BASOPHILS # BLD: 0.04 K/UL (ref 0–0.12)
BILIRUB SERPL-MCNC: 0.6 MG/DL (ref 0.1–1.5)
BUN SERPL-MCNC: 17 MG/DL (ref 8–22)
CALCIUM SERPL-MCNC: 10.2 MG/DL (ref 8.5–10.5)
CHLORIDE SERPL-SCNC: 104 MMOL/L (ref 96–112)
CO2 SERPL-SCNC: 29 MMOL/L (ref 20–33)
CREAT SERPL-MCNC: 0.82 MG/DL (ref 0.5–1.4)
EOSINOPHIL # BLD AUTO: 0.18 K/UL (ref 0–0.51)
EOSINOPHIL NFR BLD: 3.9 % (ref 0–6.9)
ERYTHROCYTE [DISTWIDTH] IN BLOOD BY AUTOMATED COUNT: 45.3 FL (ref 35.9–50)
GLOBULIN SER CALC-MCNC: 2.7 G/DL (ref 1.9–3.5)
GLUCOSE SERPL-MCNC: 88 MG/DL (ref 65–99)
HCT VFR BLD AUTO: 43.8 % (ref 37–47)
HGB BLD-MCNC: 13.8 G/DL (ref 12–16)
IMM GRANULOCYTES # BLD AUTO: 0.01 K/UL (ref 0–0.11)
IMM GRANULOCYTES NFR BLD AUTO: 0.2 % (ref 0–0.9)
LYMPHOCYTES # BLD AUTO: 1.82 K/UL (ref 1–4.8)
LYMPHOCYTES NFR BLD: 39.1 % (ref 22–41)
MCH RBC QN AUTO: 29.6 PG (ref 27–33)
MCHC RBC AUTO-ENTMCNC: 31.5 G/DL (ref 33.6–35)
MCV RBC AUTO: 94 FL (ref 81.4–97.8)
MONOCYTES # BLD AUTO: 0.41 K/UL (ref 0–0.85)
MONOCYTES NFR BLD AUTO: 8.8 % (ref 0–13.4)
NEUTROPHILS # BLD AUTO: 2.19 K/UL (ref 2–7.15)
NEUTROPHILS NFR BLD: 47.1 % (ref 44–72)
NRBC # BLD AUTO: 0 K/UL
NRBC BLD-RTO: 0 /100 WBC
PLATELET # BLD AUTO: 250 K/UL (ref 164–446)
PMV BLD AUTO: 10.8 FL (ref 9–12.9)
POTASSIUM SERPL-SCNC: 3.4 MMOL/L (ref 3.6–5.5)
PROT SERPL-MCNC: 7 G/DL (ref 6–8.2)
RBC # BLD AUTO: 4.66 M/UL (ref 4.2–5.4)
SODIUM SERPL-SCNC: 139 MMOL/L (ref 135–145)
TSH SERPL DL<=0.005 MIU/L-ACNC: 2.24 UIU/ML (ref 0.38–5.33)
WBC # BLD AUTO: 4.7 K/UL (ref 4.8–10.8)

## 2018-09-05 PROCEDURE — 84443 ASSAY THYROID STIM HORMONE: CPT

## 2018-09-05 PROCEDURE — 36415 COLL VENOUS BLD VENIPUNCTURE: CPT

## 2018-09-05 PROCEDURE — 85025 COMPLETE CBC W/AUTO DIFF WBC: CPT

## 2018-09-05 PROCEDURE — 80053 COMPREHEN METABOLIC PANEL: CPT

## 2018-09-10 ENCOUNTER — HOSPITAL ENCOUNTER (OUTPATIENT)
Dept: RADIOLOGY | Facility: MEDICAL CENTER | Age: 68
End: 2018-09-10
Attending: FAMILY MEDICINE
Payer: MEDICARE

## 2018-09-10 DIAGNOSIS — R10.84 GENERALIZED ABDOMINAL PAIN: ICD-10-CM

## 2018-09-10 DIAGNOSIS — R10.2 PELVIC PAIN: ICD-10-CM

## 2018-09-10 DIAGNOSIS — Z90.710 S/P HYSTERECTOMY: ICD-10-CM

## 2018-09-10 PROCEDURE — 74177 CT ABD & PELVIS W/CONTRAST: CPT

## 2018-09-10 PROCEDURE — 700117 HCHG RX CONTRAST REV CODE 255: Performed by: FAMILY MEDICINE

## 2018-09-10 RX ADMIN — IOHEXOL 50 ML: 240 INJECTION, SOLUTION INTRATHECAL; INTRAVASCULAR; INTRAVENOUS; ORAL at 14:51

## 2018-09-10 RX ADMIN — IOHEXOL 100 ML: 350 INJECTION, SOLUTION INTRAVENOUS at 14:50

## 2018-09-12 ENCOUNTER — HOSPITAL ENCOUNTER (OUTPATIENT)
Dept: CARDIOLOGY | Facility: MEDICAL CENTER | Age: 68
End: 2018-09-12
Attending: FAMILY MEDICINE
Payer: MEDICARE

## 2018-09-12 DIAGNOSIS — R01.1 MURMUR: ICD-10-CM

## 2018-09-12 DIAGNOSIS — R00.0 TACHYCARDIA: ICD-10-CM

## 2018-09-12 LAB
LV EJECT FRACT  99904: 60
LV EJECT FRACT MOD 2C 99903: 63.3
LV EJECT FRACT MOD 4C 99902: 61.2
LV EJECT FRACT MOD BP 99901: 62.68

## 2018-09-12 PROCEDURE — 93306 TTE W/DOPPLER COMPLETE: CPT

## 2018-09-26 ENCOUNTER — OFFICE VISIT (OUTPATIENT)
Dept: MEDICAL GROUP | Facility: PHYSICIAN GROUP | Age: 68
End: 2018-09-26
Payer: MEDICARE

## 2018-09-26 VITALS
WEIGHT: 163 LBS | BODY MASS INDEX: 28.88 KG/M2 | HEART RATE: 105 BPM | SYSTOLIC BLOOD PRESSURE: 130 MMHG | TEMPERATURE: 99.1 F | HEIGHT: 63 IN | RESPIRATION RATE: 16 BRPM | OXYGEN SATURATION: 96 % | DIASTOLIC BLOOD PRESSURE: 82 MMHG

## 2018-09-26 DIAGNOSIS — F90.1 ATTENTION DEFICIT HYPERACTIVITY DISORDER (ADHD), PREDOMINANTLY HYPERACTIVE TYPE: ICD-10-CM

## 2018-09-26 DIAGNOSIS — R00.0 TACHYCARDIA: ICD-10-CM

## 2018-09-26 DIAGNOSIS — I51.7 RIGHT VENTRICULAR HYPERTROPHY: ICD-10-CM

## 2018-09-26 DIAGNOSIS — N20.0 NEPHROLITHIASIS: ICD-10-CM

## 2018-09-26 DIAGNOSIS — E78.00 ELEVATED LDL CHOLESTEROL LEVEL: ICD-10-CM

## 2018-09-26 PROCEDURE — 99214 OFFICE O/P EST MOD 30 MIN: CPT | Performed by: FAMILY MEDICINE

## 2018-09-26 RX ORDER — ATOMOXETINE 40 MG/1
40 CAPSULE ORAL DAILY
Qty: 90 CAP | Refills: 1 | Status: SHIPPED | OUTPATIENT
Start: 2018-09-26 | End: 2019-11-02 | Stop reason: SDUPTHER

## 2018-09-26 RX ORDER — ATOMOXETINE 40 MG/1
40 CAPSULE ORAL DAILY
Qty: 30 CAP | Refills: 2 | Status: SHIPPED | OUTPATIENT
Start: 2018-09-26 | End: 2018-09-26 | Stop reason: SDUPTHER

## 2018-09-26 NOTE — ASSESSMENT & PLAN NOTE
Patient tells me that her abdominal pain has changed.  Currently, she has some discomfort in her bladder area.  She feels that she has to urinate more frequently.  We did review her CT scan which showed 3 kidney stones.  She believes that she may have passed 1 or 2 recently.  She does have some blood in her urine.  She denies any fevers, nausea, vomiting or flank pain.  She wants to know what she can do to prevent kidney stones.

## 2018-09-26 NOTE — PATIENT INSTRUCTIONS
Dietary Guidelines to Help Prevent Kidney Stones  Your risk of kidney stones can be decreased by adjusting the foods you eat. The most important thing you can do is drink enough fluid. You should drink enough fluid to keep your urine clear or pale yellow. The following guidelines provide specific information for the type of kidney stone you have had.  GUIDELINES ACCORDING TO TYPE OF KIDNEY STONE  Calcium Oxalate Kidney Stones  · Reduce the amount of salt you eat. Foods that have a lot of salt cause your body to release excess calcium into your urine. The excess calcium can combine with a substance called oxalate to form kidney stones.  · Reduce the amount of animal protein you eat if the amount you eat is excessive. Animal protein causes your body to release excess calcium into your urine. Ask your dietitian how much protein from animal sources you should be eating.  · Avoid foods that are high in oxalates. If you take vitamins, they should have less than 500 mg of vitamin C. Your body turns vitamin C into oxalates. You do not need to avoid fruits and vegetables high in vitamin C.  Calcium Phosphate Kidney Stones  · Reduce the amount of salt you eat to help prevent the release of excess calcium into your urine.  · Reduce the amount of animal protein you eat if the amount you eat is excessive. Animal protein causes your body to release excess calcium into your urine. Ask your dietitian how much protein from animal sources you should be eating.  · Get enough calcium from food or take a calcium supplement (ask your dietitian for recommendations). Food sources of calcium that do not increase your risk of kidney stones include:  ¨ Broccoli.  ¨ Dairy products, such as cheese and yogurt.  ¨ Pudding.  Uric Acid Kidney Stones  · Do not have more than 6 oz of animal protein per day.  FOOD SOURCES  Animal Protein Sources  · Meat (all types).  · Poultry.  · Eggs.  · Fish, seafood.  Foods High in Salt  · Salt seasonings.  · Soy  sauce.  · Teriyaki sauce.  · Cured and processed meats.  · Salted crackers and snack foods.  · Fast food.  · Canned soups and most canned foods.  Foods High in Oxalates  · Grains:  ¨ Amaranth.  ¨ Barley.  ¨ Grits.  ¨ Wheat germ.  ¨ Bran.  ¨ Buckwheat flour.  ¨ All bran cereals.  ¨ Pretzels.  ¨ Whole wheat bread.  · Vegetables:  ¨ Beans (wax).  ¨ Beets and beet greens.  ¨ Clive greens.  ¨ Eggplant.  ¨ Escarole.  ¨ Leeks.  ¨ Okra.  ¨ Parsley.  ¨ Rutabagas.  ¨ Spinach.  ¨ Swiss chard.  ¨ Tomato paste.  ¨ Fried potatoes.  ¨ Sweet potatoes.  · Fruits:  ¨ Red currants.  ¨ Figs.  ¨ Kiwi.  ¨ Rhubarb.  · Meat and Other Protein Sources:  ¨ Beans (dried).  ¨ Soy burgers and other soybean products.  ¨ Miso.  ¨ Nuts (peanuts, almonds, pecans, cashews, hazelnuts).  ¨ Nut butters.  ¨ Sesame seeds and tahini (paste made of sesame seeds).  ¨ Poppy seeds.  · Beverages:  ¨ Chocolate drink mixes.  ¨ Soy milk.  ¨ Instant iced tea.  ¨ Juices made from high-oxalate fruits or vegetables.  · Other:  ¨ Carob.  ¨ Chocolate.  ¨ Fruitcake.  ¨ Marmalades.     This information is not intended to replace advice given to you by your health care provider. Make sure you discuss any questions you have with your health care provider.     Document Released: 04/13/2012 Document Revised: 12/23/2014 Document Reviewed: 11/14/2014  Elsevier Interactive Patient Education ©2016 [employer]

## 2018-09-26 NOTE — PROGRESS NOTES
"Subjective:   Denia Deluca is a 68 y.o. female here today for follow-up tachycardia, abdominal pain, ADHD and results.  She is accompanied by her , Hamzah.    Tachycardia  Patient continues to be tachycardic today.  She denies heart palpitations, chest pain or shortness of breath.  We reviewed her echocardiogram which did show mild right ventricular hypertrophy as well as increased right ventricular pressures.  When I asked her if she had any concerns for sleep apnea, she told me that she \"gets up often at night to pee.\"  Her  does report that she snores, but they recently bought a bed that elevates her head to help with this.  He has not noticed any apneic episodes.    Yovani tells me that she did see a cardiologist approximately 15 years ago and had a thorough workup including stress test which was unremarkable.    Attention deficit hyperactivity disorder (ADHD), predominantly hyperactive type  Patient reports that her ADHD symptoms have worsened with the decrease in Strattera from 60 mg to 40 mg daily.  Her  tells me that she will often forget to take the medication.  She continues to take Lexapro 10 mg 2 times a week.  In the past, the combination of Strattera 60 mg and Lexapro 10 mg has worked well for her.  However, due to her persistent tachycardia, the medication was reduced to 40mg    She tells me that she \"cannot stop talking.\"  Her  states that her ADHD has worsened since the change.     She has seen a psychiatrist in the past.  She has struggled with difficulty focusing and concentrating for most of her life.    Nephrolithiasis  Patient tells me that her abdominal pain has changed.  Currently, she has some discomfort in her bladder area.  She feels that she has to urinate more frequently.  We did review her CT scan which showed 3 kidney stones.  She believes that she may have passed 1 or 2 recently.  She does have some blood in her urine.  She denies any fevers, nausea, " "vomiting or flank pain.  She wants to know what she can do to prevent kidney stones.     Current medicines (including changes today)  Current Outpatient Prescriptions   Medication Sig Dispense Refill   • atomoxetine (STRATTERA) 40 MG capsule Take 1 Cap by mouth every day. 90 Cap 1   • escitalopram (LEXAPRO) 10 MG Tab Take 10 mg by mouth every day.     • oxybutynin (DITROPAN) 5 MG Tab Take 1 Tab by mouth 3 times a day. 90 Tab 3   • albuterol 108 (90 Base) MCG/ACT Aero Soln inhalation aerosol Inhale 2 Puffs by mouth every 6 hours as needed for Shortness of Breath. 8.5 g 3     No current facility-administered medications for this visit.      She  has a past medical history of ADHD; Basal cell carcinoma (BCC); Depression; and Fracture.    ROS   No fevers, no chills, no nausea, no chest pain, no shortness of breath.     Objective:     Physical Exam:  Blood pressure 130/82, pulse (!) 105, temperature 37.3 °C (99.1 °F), temperature source Temporal, resp. rate 16, height 1.6 m (5' 3\"), weight 73.9 kg (163 lb), SpO2 96 %, not currently breastfeeding. Body mass index is 28.87 kg/m².   Constitutional: Alert, non-toxic appearance.  Skin: Warm, dry, good turgor, no rashes in visible areas.  Eye: Equal, round and reactive, conjunctiva clear, lids normal.  ENMT: Lips without lesions, good dentition, moist mucous membranes.  Neck: Trachea midline, no masses, no thyromegaly. No cervical or supraclavicular lymphadenopathy.  Respiratory: Unlabored respiratory effort, lungs clear to auscultation, no wheezes, no rhonchi.  Cardiovascular: Tachycardic, no edema.  Abdomen: Soft, tender to palpation in suprapubic area, no masses, no CVAT.  Psych: Alert and oriented x3, normal affect and mood.    Assessment and Plan:     1. Tachycardia  2. Right ventricular hypertrophy  Stable.  Echocardiogram does show right ventricular hypertrophy and elevated right ventricular pressures.  I am concerned for obstructive sleep apnea and will refer her for a " sleep study.  Other than her Strattera and possible sleep apnea, I am not certain what the cause of her tachycardia may be.  Thyroid studies normal.  Will refer to cardiology for evaluation.  - REFERRAL TO CARDIOLOGY  - REFERRAL TO SLEEP STUDIES    3. Attention deficit hyperactivity disorder (ADHD), predominantly hyperactive type  Worsening with decrease in Strattera dose.  Will keep at the same dose while we investigate her tachycardia further.  Patient may need to see psychiatry for medication management.  - atomoxetine (STRATTERA) 40 MG capsule; Take 1 Cap by mouth every day.  Dispense: 90 Cap; Refill: 1    4. Nephrolithiasis  Noted on CT scan.  Recommend supportive care and increase hydration.  Patient will attempt to collect a stone for analysis.  - KIDNEY STONE ANALYSIS; Future    5. Elevated LDL cholesterol level  Lipid panel ordered.  - LIPID PROFILE; Future    Followup: Return in about 2 months (around 11/26/2018) for f/u cardiology appt, adhd, short.         PLEASE NOTE: This dictation was created using voice recognition software. I have made every reasonable attempt to correct obvious errors, but I expect that there are errors of grammar and possibly content that I did not discover before finalizing the note.

## 2018-09-26 NOTE — ASSESSMENT & PLAN NOTE
"Patient continues to be tachycardic today.  She denies heart palpitations, chest pain or shortness of breath.  We reviewed her echocardiogram which did show mild right ventricular hypertrophy as well as increased right ventricular pressures.  When I asked her if she had any concerns for sleep apnea, she told me that she \"gets up often at night to pee.\"  Her  does report that she snores, but they recently bought a bed that elevates her head to help with this.  He has not noticed any apneic episodes.    Yovani tells me that she did see a cardiologist approximately 15 years ago and had a thorough workup including stress test which was unremarkable.  "

## 2018-09-26 NOTE — ASSESSMENT & PLAN NOTE
"Patient reports that her ADHD symptoms have worsened with the decrease in Strattera from 60 mg to 40 mg daily.  Her  tells me that she will often forget to take the medication.  She continues to take Lexapro 10 mg 2 times a week.  In the past, the combination of Strattera 60 mg and Lexapro 10 mg has worked well for her.  However, due to her persistent tachycardia, the medication was reduced to 40mg    She tells me that she \"cannot stop talking.\"  Her  states that her ADHD has worsened since the change.     She has seen a psychiatrist in the past.  She has struggled with difficulty focusing and concentrating for most of her life.  "

## 2018-10-01 ENCOUNTER — TELEPHONE (OUTPATIENT)
Dept: MEDICAL GROUP | Facility: PHYSICIAN GROUP | Age: 68
End: 2018-10-01

## 2018-10-01 DIAGNOSIS — N20.0 NEPHROLITHIASIS: ICD-10-CM

## 2018-10-01 RX ORDER — CYCLOBENZAPRINE HCL 5 MG
5-10 TABLET ORAL 3 TIMES DAILY PRN
Qty: 30 TAB | Refills: 0 | Status: SHIPPED | OUTPATIENT
Start: 2018-10-01 | End: 2018-11-09

## 2018-10-01 RX ORDER — TAMSULOSIN HYDROCHLORIDE 0.4 MG/1
0.4 CAPSULE ORAL DAILY
Qty: 20 CAP | Refills: 0 | Status: SHIPPED | OUTPATIENT
Start: 2018-10-01 | End: 2018-11-09

## 2018-10-01 NOTE — TELEPHONE ENCOUNTER
VOICEMAIL  1. Caller Name: Denia Deluca                        Call Back Number: [phone] (home)       2. Message: Pt was seen last week for possible kidney stones.  told pt to call if she needed pain meds. Pt is asking for them at this time.     3. Patient approves office to leave a detailed voicemail/MyChart message: yes

## 2018-10-02 NOTE — TELEPHONE ENCOUNTER
Phone Number Called: [phone] (home)     Message: LVM to call back.     Left Message for patient to call back: yes

## 2018-10-02 NOTE — TELEPHONE ENCOUNTER
I will send pt tamsulosin, only until stone pass, and muscle relaxant. If not helping she needs to call for reevaluation, or emergency room, when they can do CT scan or give her stronger pain meds  Thank you,  Sapphire Encinas M.D.

## 2018-10-19 ENCOUNTER — NON-PROVIDER VISIT (OUTPATIENT)
Dept: MEDICAL GROUP | Facility: PHYSICIAN GROUP | Age: 68
End: 2018-10-19
Payer: MEDICARE

## 2018-10-19 DIAGNOSIS — Z23 NEED FOR VACCINATION: ICD-10-CM

## 2018-10-19 PROCEDURE — G0008 ADMIN INFLUENZA VIRUS VAC: HCPCS | Performed by: FAMILY MEDICINE

## 2018-10-19 PROCEDURE — 90662 IIV NO PRSV INCREASED AG IM: CPT | Performed by: FAMILY MEDICINE

## 2018-10-19 NOTE — PROGRESS NOTES
"Denia Deluca is a 68 y.o. female here for a non-provider visit for:   FLU    Reason for immunization: Annual Flu Vaccine  Immunization records indicate need for vaccine: Yes, confirmed with Epic  Minimum interval has been met for this vaccine: Yes  ABN completed: Yes    Order and dose verified by:   VIS Dated   was given to patient: Yes  All IAC Questionnaire questions were answered \"No.\"    Patient tolerated injection and no adverse effects were observed or reported: Yes    Pt scheduled for next dose in series: No  "

## 2018-10-29 ENCOUNTER — TELEPHONE (OUTPATIENT)
Dept: MEDICAL GROUP | Facility: PHYSICIAN GROUP | Age: 68
End: 2018-10-29

## 2018-10-29 ENCOUNTER — HOSPITAL ENCOUNTER (OUTPATIENT)
Dept: LAB | Facility: MEDICAL CENTER | Age: 68
End: 2018-10-29
Attending: FAMILY MEDICINE
Payer: MEDICARE

## 2018-10-29 DIAGNOSIS — E78.00 ELEVATED LDL CHOLESTEROL LEVEL: ICD-10-CM

## 2018-10-29 LAB
CHOLEST SERPL-MCNC: 211 MG/DL (ref 100–199)
FASTING STATUS PATIENT QL REPORTED: NORMAL
HDLC SERPL-MCNC: 58 MG/DL
LDLC SERPL CALC-MCNC: 128 MG/DL
TRIGL SERPL-MCNC: 125 MG/DL (ref 0–149)

## 2018-10-29 PROCEDURE — 80061 LIPID PANEL: CPT

## 2018-10-29 PROCEDURE — 36415 COLL VENOUS BLD VENIPUNCTURE: CPT

## 2018-10-29 NOTE — TELEPHONE ENCOUNTER
Left message for patient to call back regarding pre-visit planning. Please transfer call to Brunilda at [phone].      Spoke with pt she stated that she would be at her AWV. Pt was short on the phone so did not push her to do the PVP portion.

## 2018-10-31 ENCOUNTER — HOSPITAL ENCOUNTER (OUTPATIENT)
Dept: RADIOLOGY | Facility: MEDICAL CENTER | Age: 68
End: 2018-10-31
Attending: FAMILY MEDICINE
Payer: MEDICARE

## 2018-10-31 DIAGNOSIS — Z12.31 SCREENING MAMMOGRAM, ENCOUNTER FOR: ICD-10-CM

## 2018-10-31 PROCEDURE — 77067 SCR MAMMO BI INCL CAD: CPT

## 2018-11-09 ENCOUNTER — OFFICE VISIT (OUTPATIENT)
Dept: MEDICAL GROUP | Facility: PHYSICIAN GROUP | Age: 68
End: 2018-11-09
Payer: MEDICARE

## 2018-11-09 VITALS
DIASTOLIC BLOOD PRESSURE: 72 MMHG | WEIGHT: 163 LBS | OXYGEN SATURATION: 97 % | SYSTOLIC BLOOD PRESSURE: 142 MMHG | HEIGHT: 63 IN | TEMPERATURE: 98.6 F | BODY MASS INDEX: 28.88 KG/M2 | RESPIRATION RATE: 16 BRPM | HEART RATE: 96 BPM

## 2018-11-09 DIAGNOSIS — Z91.030 BEE ALLERGY STATUS: ICD-10-CM

## 2018-11-09 DIAGNOSIS — I51.7 RIGHT VENTRICULAR HYPERTROPHY: ICD-10-CM

## 2018-11-09 DIAGNOSIS — E78.00 PURE HYPERCHOLESTEROLEMIA: ICD-10-CM

## 2018-11-09 DIAGNOSIS — R00.0 TACHYCARDIA: ICD-10-CM

## 2018-11-09 DIAGNOSIS — J45.20 MILD INTERMITTENT ASTHMA WITHOUT COMPLICATION: ICD-10-CM

## 2018-11-09 DIAGNOSIS — F90.1 ATTENTION DEFICIT HYPERACTIVITY DISORDER (ADHD), PREDOMINANTLY HYPERACTIVE TYPE: ICD-10-CM

## 2018-11-09 DIAGNOSIS — N39.46 MIXED STRESS AND URGE URINARY INCONTINENCE: ICD-10-CM

## 2018-11-09 DIAGNOSIS — Z00.00 MEDICARE ANNUAL WELLNESS VISIT, INITIAL: ICD-10-CM

## 2018-11-09 PROBLEM — R10.2 PELVIC PAIN: Status: RESOLVED | Noted: 2018-08-31 | Resolved: 2018-11-09

## 2018-11-09 PROBLEM — N20.0 NEPHROLITHIASIS: Status: RESOLVED | Noted: 2018-09-26 | Resolved: 2018-11-09

## 2018-11-09 PROCEDURE — G0438 PPPS, INITIAL VISIT: HCPCS | Performed by: FAMILY MEDICINE

## 2018-11-09 ASSESSMENT — PATIENT HEALTH QUESTIONNAIRE - PHQ9
5. POOR APPETITE OR OVEREATING: 0 - NOT AT ALL
CLINICAL INTERPRETATION OF PHQ2 SCORE: 3
SUM OF ALL RESPONSES TO PHQ QUESTIONS 1-9: 9

## 2018-11-09 ASSESSMENT — PAIN SCALES - GENERAL: PAINLEVEL: NO PAIN

## 2018-11-09 ASSESSMENT — ACTIVITIES OF DAILY LIVING (ADL): BATHING_REQUIRES_ASSISTANCE: 0

## 2018-11-09 ASSESSMENT — ENCOUNTER SYMPTOMS: GENERAL WELL-BEING: GOOD

## 2018-11-09 NOTE — PROGRESS NOTES
"Chief Complaint   Patient presents with   • Annual Wellness Visit     HPI:  Denia \"Yovani\" is a 68 y.o. here for Medicare Annual Wellness Visit.  She is accompanied by her , Hamzah.    Yovani will be seeing a cardiologist in December.  Her  has noticed a change in her attention span and mood since her Strattera was lowered.  Yovani has forgotten to take the medication on a few days.  Yovani tells me that this time of the year is hard for her as the holidays make her miss her  daughter even more.    Patient Active Problem List    Diagnosis Date Noted   • Pure hypercholesterolemia 2018   • Tachycardia 2018   • Right ventricular hypertrophy 2018   • Attention deficit hyperactivity disorder (ADHD), predominantly hyperactive type 2018   • Bee allergy status 2018   • Mixed stress and urge urinary incontinence 2018   • Mild intermittent asthma without complication 2018     Current Outpatient Prescriptions   Medication Sig Dispense Refill   • atomoxetine (STRATTERA) 40 MG capsule Take 1 Cap by mouth every day. 90 Cap 1   • cyclobenzaprine (FLEXERIL) 5 MG tablet Take 1-2 Tabs by mouth 3 times a day as needed. (Patient not taking: Reported on 2018) 30 Tab 0   • tamsulosin (FLOMAX) 0.4 MG capsule Take 1 Cap by mouth every day. (Patient not taking: Reported on 2018) 20 Cap 0   • escitalopram (LEXAPRO) 10 MG Tab Take 10 mg by mouth every day.     • oxybutynin (DITROPAN) 5 MG Tab Take 1 Tab by mouth 3 times a day. 90 Tab 3   • albuterol 108 (90 Base) MCG/ACT Aero Soln inhalation aerosol Inhale 2 Puffs by mouth every 6 hours as needed for Shortness of Breath. 8.5 g 3     No current facility-administered medications for this visit.       Patient is taking medications as noted in medication list.  Current supplements as per medication list.     Allergies: Bee venom    Current social contact/activities: Spends time with , daughter, and grandson, quilt group, " volunteering twice a week.      Is patient current with immunizations? No, due for SHINGRIX (Shingles). Patient is interested in receiving NONE today.    She  reports that she has never smoked. She has never used smokeless tobacco. She reports that she drinks alcohol. She reports that she does not use drugs.  Counseling given: Not Answered    DPA/Advanced directive: Patient does not have an Advanced Directive.  A packet and workshop information was given on Advanced Directives.    ROS:    Gait: Uses no assistive device   Ostomy: No   Other tubes: No   Amputations: No   Chronic oxygen use No   Last eye exam 2016   Wears hearing aids: No   : Reports urinary leakage during the last 6 months that has somewhat interfered with their daily activities or sleep.    Depression Screening  Little interest or pleasure in doing things?  0 - not at all  Feeling down, depressed, or hopeless? 3 - nearly every day  Trouble falling or staying asleep, or sleeping too much?  3 - nearly every day  Feeling tired or having little energy?  0 - not at all  Poor appetite or overeating?  0 - not at all  Feeling bad about yourself - or that you are a failure or have let yourself or your family down? 2 - more than half the days  Trouble concentrating on things, such as reading the newspaper or watching television? 0 - not at all  Moving or speaking so slowly that other people could have noticed.  Or the opposite - being so fidgety or restless that you have been moving around a lot more than usual?  0 - not at all  Thoughts that you would be better off dead, or of hurting yourself?  1 - several days  Patient Health Questionnaire Score: 9    If depressive symptoms identified deferred to follow up visit unless specifically addressed in assessment and plan.    Interpretation of PHQ-9 Total Score   Score Severity   1-4 No Depression   5-9 Mild Depression   10-14 Moderate Depression   15-19 Moderately Severe Depression   20-27 Severe  Depression    Screening for Cognitive Impairment  Three Minute Recall (leader, season, table)  1/3    Draw clock face with all 12 numbers and set the hands to show 10 past 11.  Yes 5/5  If cognitive concerns identified, deferred for follow up unless specifically addressed in assessment and plan.    Fall Risk Assessment  Has the patient had two or more falls in the last year or any fall with injury in the last year?  No  If fall risk identified, deferred for follow up unless specifically addressed in assessment and plan.    Safety Assessment  Throw rugs on floor.  No  Handrails on all stairs.  Yes  Good lighting in all hallways.  Yes  Difficulty hearing.  No  Patient counseled about all safety risks that were identified.    Functional Assessment ADLs  Are there any barriers preventing you from cooking for yourself or meeting nutritional needs?  No.    Are there any barriers preventing you from driving safely or obtaining transportation?  No.    Are there any barriers preventing you from using a telephone or calling for help?  No.    Are there any barriers preventing you from shopping?  No.    Are there any barriers preventing you from taking care of your own finances?  No.    Are there any barriers preventing you from managing your medications?    No.    Are there any barriers preventing you from showering, bathing or dressing yourself?  No.    Are you currently engaging in any exercise or physical activity?  Yes.  Gym 2 days a week   What is your perception of your health?  Good.    Health Maintenance Summary                Annual Wellness Visit Overdue 1950     BONE DENSITY Overdue 9/7/2015     IMM ZOSTER VACCINES Postponed 11/18/2018 Originally 9/7/2000. System: vaccine not available, other system reasons    PAP SMEAR Postponed 9/5/2019 Originally 9/7/1971. Patient Refused    COLONOSCOPY Postponed 9/11/2019 Originally 9/7/2000. Patient Refused    MAMMOGRAM Next Due 10/31/2019      Done 10/31/2018 MA-SCREENING  "MAMMO BILAT W/TOMOSYNTHESIS W/CAD    IMM DTaP/Tdap/Td Vaccine Next Due 1/1/2021      Done 1/1/2011 Imm Admin: Tdap Vaccine        Patient Care Team:  Sharifa Gayle M.D. as PCP - General (Family Medicine)  Maria R Reyes, M.D. as Consulting Physician (Allergy)    Social History   Substance Use Topics   • Smoking status: Never Smoker   • Smokeless tobacco: Never Used   • Alcohol use Yes      Comment: \"maybe 3 drinks a year\"     Family History   Problem Relation Age of Onset   • Cancer Sister         Breast cancer     She  has a past medical history of ADHD; Basal cell carcinoma (BCC); Depression; and Fracture.   Past Surgical History:   Procedure Laterality Date   • ABDOMINAL HYSTERECTOMY TOTAL      age 38 for \"pre-cancer\", ovaries not removed   • OPEN REDUCTION       Exam:   Blood pressure 142/72, pulse 96, temperature 37 °C (98.6 °F), temperature source Temporal, resp. rate 16, height 1.6 m (5' 3\"), weight 73.9 kg (163 lb), SpO2 97 %, not currently breastfeeding. Body mass index is 28.87 kg/m².    Hearing good.    Dentition good.  Alert, oriented in no acute distress.  Eye contact is good, speech goal directed, affect calm. Patient gets upset with  at times during the visit.    Assessment and Plan. The following treatment and monitoring plan is recommended:    1. Medicare annual wellness visit, initial  HRA reviewed.  Cognitive testing is abnormal.  Unclear if this is secondary to the change in Strattera, depression or early dementia.  Will continue to monitor closely on a clinical basis.   2. Tachycardia  Stable.  Patient will be establishing with cardiology soon.   3. Right ventricular hypertrophy  See above.   4. Pure hypercholesterolemia  Worsening on recent labs.  Her 10-year ASCVD risk is elevated at 7.9%.  Discussed statin, but she would like a trial of lifestyle modifications.  Will plan to recheck in 6 months.   5. Attention deficit hyperactivity disorder (ADHD), predominantly hyperactive type  " Stable.  Will keep dose of Strattera the same while evaluating tachycardia further.   6. Mild intermittent asthma without complication  Stable.  Continue use of rescue inhaler as needed.   7. Mixed stress and urge urinary incontinence  Stable and well-controlled.  Continue oxybutynin and monitor.   8. Bee allergy status  Improving.  Following with allergy for desensitization therapy.     Services suggested: No services needed at this time  Health Care Screening recommendations as per orders if indicated.  Referrals offered: PT/OT/Nutrition counseling/Behavioral Health/Smoking cessation as per orders if indicated.    Discussion today about general wellness and lifestyle habits:    · Prevent falls and reduce trip hazards; Cautioned about securing or removing rugs.  · Have a working fire alarm and carbon monoxide detector;   · Engage in regular physical activity and social activities     Follow-up: Return in about 2 months (around 1/9/2019) for f/u from cardiologist visit, short.

## 2018-11-15 ENCOUNTER — HOSPITAL ENCOUNTER (OUTPATIENT)
Dept: RADIOLOGY | Facility: MEDICAL CENTER | Age: 68
End: 2018-11-15

## 2018-12-05 ENCOUNTER — TELEPHONE (OUTPATIENT)
Dept: CARDIOLOGY | Facility: MEDICAL CENTER | Age: 68
End: 2018-12-05

## 2018-12-05 NOTE — TELEPHONE ENCOUNTER
Spoke to patient regarding records for NP appointment w/. Patient reports she hasn't been seen by a cardiologist before, recent records in chart.

## 2018-12-14 ENCOUNTER — OFFICE VISIT (OUTPATIENT)
Dept: CARDIOLOGY | Facility: MEDICAL CENTER | Age: 68
End: 2018-12-14
Payer: MEDICARE

## 2018-12-14 VITALS
WEIGHT: 163 LBS | HEIGHT: 63 IN | DIASTOLIC BLOOD PRESSURE: 76 MMHG | SYSTOLIC BLOOD PRESSURE: 110 MMHG | HEART RATE: 104 BPM | OXYGEN SATURATION: 94 % | BODY MASS INDEX: 28.88 KG/M2

## 2018-12-14 DIAGNOSIS — R00.0 TACHYCARDIA: ICD-10-CM

## 2018-12-14 PROBLEM — I51.7 RIGHT VENTRICULAR HYPERTROPHY: Status: RESOLVED | Noted: 2018-09-26 | Resolved: 2018-12-14

## 2018-12-14 LAB — EKG IMPRESSION: NORMAL

## 2018-12-14 PROCEDURE — 93000 ELECTROCARDIOGRAM COMPLETE: CPT | Performed by: INTERNAL MEDICINE

## 2018-12-14 PROCEDURE — 99205 OFFICE O/P NEW HI 60 MIN: CPT | Performed by: INTERNAL MEDICINE

## 2018-12-14 ASSESSMENT — ENCOUNTER SYMPTOMS
SHORTNESS OF BREATH: 0
ORTHOPNEA: 0
DEPRESSION: 0
FALLS: 0
LOSS OF CONSCIOUSNESS: 0
PND: 0
ABDOMINAL PAIN: 0
PALPITATIONS: 1
DIZZINESS: 1

## 2018-12-14 NOTE — PROGRESS NOTES
"Chief Complaint   Patient presents with   • Tachycardia     NP       Subjective:   Denia Deluca is a 68 y.o. female referred to cardiology clinic for management of tachycardia and right ventricular hypertrophy.    Patient has been very healthy for most of her life except ADHD for which she has been on a stable regimen for about 20 years.    She was previously seen in [facility] where she was told that her heart rate is always elevated.  She was never put on any medications.  She denies excessive caffeine intake.  No alcohol intake.    She was referred for an echocardiogram by her primary care physician which showed concern for right ventricular dilatation.    Patient has been very healthy all her life.  Denies any anginal symptoms.  Occasionally feels palpitations associated with dizziness which can occur without any clear triggers, sometimes at rest or sometimes with exertion.  Symptoms usually resolve after she sits down and relaxes.  Sometimes she can have palpitations on a daily basis.  Denies any syncopal episodes.    Referring physician: Sharifa Gayle M.D.    Past Medical History:   Diagnosis Date   • ADHD    • Basal cell carcinoma (BCC)    • Depression    • Fracture     Multiple fractures     Past Surgical History:   Procedure Laterality Date   • ABDOMINAL HYSTERECTOMY TOTAL      age 38 for \"pre-cancer\", ovaries not removed   • OPEN REDUCTION       Family History   Problem Relation Age of Onset   • Cancer Sister         Breast cancer     Social History     Social History   • Marital status:      Spouse name: N/A   • Number of children: N/A   • Years of education: N/A     Occupational History   • Not on file.     Social History Main Topics   • Smoking status: Never Smoker   • Smokeless tobacco: Never Used   • Alcohol use Yes      Comment: \"maybe 3 drinks a year\"   • Drug use: No   • Sexual activity: Not on file     Other Topics Concern   • Not on file     Social History Narrative   • No " "narrative on file     Allergies   Allergen Reactions   • Bee Venom Anaphylaxis     Will stop heart      Outpatient Encounter Prescriptions as of 12/14/2018   Medication Sig Dispense Refill   • atomoxetine (STRATTERA) 40 MG capsule Take 1 Cap by mouth every day. 90 Cap 1   • oxybutynin (DITROPAN) 5 MG Tab Take 1 Tab by mouth 3 times a day. 90 Tab 3   • albuterol 108 (90 Base) MCG/ACT Aero Soln inhalation aerosol Inhale 2 Puffs by mouth every 6 hours as needed for Shortness of Breath. 8.5 g 3   • [DISCONTINUED] escitalopram (LEXAPRO) 10 MG Tab Take 10 mg by mouth every day.       No facility-administered encounter medications on file as of 12/14/2018.      Review of Systems   Constitutional: Negative for malaise/fatigue.   Respiratory: Negative for shortness of breath.    Cardiovascular: Positive for palpitations. Negative for chest pain, orthopnea, leg swelling and PND.   Gastrointestinal: Negative for abdominal pain.   Musculoskeletal: Negative for falls.   Neurological: Positive for dizziness. Negative for loss of consciousness.   Psychiatric/Behavioral: Negative for depression.   All other systems reviewed and are negative.       Objective:   /76 (BP Location: Left arm, Patient Position: Sitting, BP Cuff Size: Adult)   Pulse (!) 104   Ht 1.6 m (5' 3\")   Wt 73.9 kg (163 lb)   SpO2 94%   BMI 28.87 kg/m²     Physical Exam   Constitutional: She is oriented to person, place, and time. She appears well-developed and well-nourished. No distress.   HENT:   Head: Normocephalic and atraumatic.   Eyes: Conjunctivae are normal. No scleral icterus.   Neck: Normal range of motion. Neck supple.   Cardiovascular: Normal rate, regular rhythm and normal heart sounds.  Exam reveals no gallop and no friction rub.    No murmur heard.  Pulmonary/Chest: Effort normal and breath sounds normal. No respiratory distress. She has no wheezes. She has no rales.   Abdominal: Soft. She exhibits no distension. There is no tenderness. "   Musculoskeletal: She exhibits no edema.   Neurological: She is alert and oriented to person, place, and time.   Skin: Skin is warm and dry. She is not diaphoretic.   Psychiatric: She has a normal mood and affect. Her behavior is normal.   Nursing note and vitals reviewed.    Labs performed in September 2018 were reviewed and showed normal hemoglobin.  Normal creatinine.  .    EKG performed today was personally reviewed and per my interpretation shows sinus rhythm at 89 bpm.  Normal EKG.    Echocardiogram performed September 2018 was personally reviewed and per my interpretation shows normal LV systolic function.  No major valvular pathology noted.  RVSP 23 mmHg.  Mild dilatation of the right ventricle, mostly localized to the mid segment of the free wall.  Normal systolic function.    Assessment:     1. Tachycardia  EKG    Holter Monitor / Event Recorder       Medical Decision Making:  Today's Assessment / Status / Plan:     Tachycardia: Suspect likely sinus tachycardia and possibly secondary to her ADHD medication.  I would refer her for a Ziopatch monitor for further evaluation.  No changes in medications for now.  Thyroid levels were normal.    Concern for right ventricular dilatation: I personally reviewed the echocardiogram.  Patient has a mild focal dilatation in the mid segment of the free wall.  Her RV function is normal.  There is no evidence of RV wall thinning.  Focal dilatation could raise concern for ARVD, however it is unlikely to be first diagnosed in a patient in her late 60s.  Also she has no family members with history of sudden death or arrhythmias.  Most of her family members lived into their 90s without any cardiac history.  I do not see a clear indication for proceeding with a cardiac MRI at this time.  Her RV function and right-sided pressures are completely normal.  I would recommend following up with a Ziopatch monitor.  If she has any arrhythmias, especially ventricular, will  consider a cardiac MRI.  Otherwise no further workup for now.    Return to clinic in 3 months or earlier if needed.    Thank you for allowing me to participate in the care of this patient. Please do not hesitate to contact me with any questions.    Tawnya Cee MD  Cardiologist  [facility]      PLEASE NOTE: This dictation was created using voice recognition software.

## 2018-12-14 NOTE — LETTER
"     [facility]   [address] [place] [address] [address]  [place] [address]  Phone: [phone]  Fax: [phone]              Denia Deluca  1950    Encounter Date: 12/14/2018    Tawnya Cee M.D.          PROGRESS NOTE:  Chief Complaint   Patient presents with   • Tachycardia     NP       Subjective:   Denia Deluca is a 68 y.o. female referred to cardiology clinic for management of tachycardia and right ventricular hypertrophy.    Patient has been very healthy for most of her life except ADHD for which she has been on a stable regimen for about 20 years.    She was previously seen in [facility] where she was told that her heart rate is always elevated.  She was never put on any medications.  She denies excessive caffeine intake.  No alcohol intake.    She was referred for an echocardiogram by her primary care physician which showed concern for right ventricular dilatation.    Patient has been very healthy all her life.  Denies any anginal symptoms.  Occasionally feels palpitations associated with dizziness which can occur without any clear triggers, sometimes at rest or sometimes with exertion.  Symptoms usually resolve after she sits down and relaxes.  Sometimes she can have palpitations on a daily basis.  Denies any syncopal episodes.    Referring physician: Sharifa Gayle M.D.    Past Medical History:   Diagnosis Date   • ADHD    • Basal cell carcinoma (BCC)    • Depression    • Fracture     Multiple fractures     Past Surgical History:   Procedure Laterality Date   • ABDOMINAL HYSTERECTOMY TOTAL      age 38 for \"pre-cancer\", ovaries not removed   • OPEN REDUCTION       Family History   Problem Relation Age of Onset   • Cancer Sister         Breast cancer     Social History     Social History   • Marital status:      Spouse name: N/A   • Number of children: N/A   • Years of education: N/A     Occupational History   • Not on file.     Social History " "Main Topics   • Smoking status: Never Smoker   • Smokeless tobacco: Never Used   • Alcohol use Yes      Comment: \"maybe 3 drinks a year\"   • Drug use: No   • Sexual activity: Not on file     Other Topics Concern   • Not on file     Social History Narrative   • No narrative on file     Allergies   Allergen Reactions   • Bee Venom Anaphylaxis     Will stop heart      Outpatient Encounter Prescriptions as of 12/14/2018   Medication Sig Dispense Refill   • atomoxetine (STRATTERA) 40 MG capsule Take 1 Cap by mouth every day. 90 Cap 1   • oxybutynin (DITROPAN) 5 MG Tab Take 1 Tab by mouth 3 times a day. 90 Tab 3   • albuterol 108 (90 Base) MCG/ACT Aero Soln inhalation aerosol Inhale 2 Puffs by mouth every 6 hours as needed for Shortness of Breath. 8.5 g 3   • [DISCONTINUED] escitalopram (LEXAPRO) 10 MG Tab Take 10 mg by mouth every day.       No facility-administered encounter medications on file as of 12/14/2018.      Review of Systems   Constitutional: Negative for malaise/fatigue.   Respiratory: Negative for shortness of breath.    Cardiovascular: Positive for palpitations. Negative for chest pain, orthopnea, leg swelling and PND.   Gastrointestinal: Negative for abdominal pain.   Musculoskeletal: Negative for falls.   Neurological: Positive for dizziness. Negative for loss of consciousness.   Psychiatric/Behavioral: Negative for depression.   All other systems reviewed and are negative.       Objective:   /76 (BP Location: Left arm, Patient Position: Sitting, BP Cuff Size: Adult)   Pulse (!) 104   Ht 1.6 m (5' 3\")   Wt 73.9 kg (163 lb)   SpO2 94%   BMI 28.87 kg/m²      Physical Exam   Constitutional: She is oriented to person, place, and time. She appears well-developed and well-nourished. No distress.   HENT:   Head: Normocephalic and atraumatic.   Eyes: Conjunctivae are normal. No scleral icterus.   Neck: Normal range of motion. Neck supple.   Cardiovascular: Normal rate, regular rhythm and normal heart " sounds.  Exam reveals no gallop and no friction rub.    No murmur heard.  Pulmonary/Chest: Effort normal and breath sounds normal. No respiratory distress. She has no wheezes. She has no rales.   Abdominal: Soft. She exhibits no distension. There is no tenderness.   Musculoskeletal: She exhibits no edema.   Neurological: She is alert and oriented to person, place, and time.   Skin: Skin is warm and dry. She is not diaphoretic.   Psychiatric: She has a normal mood and affect. Her behavior is normal.   Nursing note and vitals reviewed.    Labs performed in September 2018 were reviewed and showed normal hemoglobin.  Normal creatinine.  .    EKG performed today was personally reviewed and per my interpretation shows sinus rhythm at 89 bpm.  Normal EKG.    Echocardiogram performed September 2018 was personally reviewed and per my interpretation shows normal LV systolic function.  No major valvular pathology noted.  RVSP 23 mmHg.  Mild dilatation of the right ventricle, mostly localized to the mid segment of the free wall.  Normal systolic function.    Assessment:     1. Tachycardia  EKG    Holter Monitor / Event Recorder       Medical Decision Making:  Today's Assessment / Status / Plan:     Tachycardia: Suspect likely sinus tachycardia and possibly secondary to her ADHD medication.  I would refer her for a Ziopatch monitor for further evaluation.  No changes in medications for now.  Thyroid levels were normal.    Concern for right ventricular dilatation: I personally reviewed the echocardiogram.  Patient has a mild focal dilatation in the mid segment of the free wall.  Her RV function is normal.  There is no evidence of RV wall thinning.  Focal dilatation could raise concern for ARVD, however it is unlikely to be first diagnosed in a patient in her late 60s.  Also she has no family members with history of sudden death or arrhythmias.  Most of her family members lived into their 90s without any cardiac history.  I  do not see a clear indication for proceeding with a cardiac MRI at this time.  Her RV function and right-sided pressures are completely normal.  I would recommend following up with a Ziopatch monitor.  If she has any arrhythmias, especially ventricular, will consider a cardiac MRI.  Otherwise no further workup for now.    Return to clinic in 3 months or earlier if needed.    Thank you for allowing me to participate in the care of this patient. Please do not hesitate to contact me with any questions.    Tawnya Cee MD  Cardiologist  [facility]      PLEASE NOTE: This dictation was created using voice recognition software.           Sharifa Gayle M.D.  [address]  [place] [id number]  VIA In Basket

## 2018-12-19 ENCOUNTER — TELEPHONE (OUTPATIENT)
Dept: CARDIOLOGY | Facility: MEDICAL CENTER | Age: 68
End: 2018-12-19

## 2018-12-19 ENCOUNTER — NON-PROVIDER VISIT (OUTPATIENT)
Dept: CARDIOLOGY | Facility: MEDICAL CENTER | Age: 68
End: 2018-12-19
Payer: MEDICARE

## 2018-12-19 DIAGNOSIS — R00.0 TACHYCARDIA: ICD-10-CM

## 2019-01-02 ENCOUNTER — PATIENT MESSAGE (OUTPATIENT)
Dept: MEDICAL GROUP | Facility: PHYSICIAN GROUP | Age: 69
End: 2019-01-02

## 2019-01-02 DIAGNOSIS — M25.512 ACUTE PAIN OF LEFT SHOULDER: ICD-10-CM

## 2019-01-08 ENCOUNTER — TELEPHONE (OUTPATIENT)
Dept: MEDICAL GROUP | Facility: PHYSICIAN GROUP | Age: 69
End: 2019-01-08

## 2019-01-08 DIAGNOSIS — R00.0 TACHYCARDIA: ICD-10-CM

## 2019-01-08 NOTE — TELEPHONE ENCOUNTER
ESTABLISHED PATIENT PRE-VISIT PLANNING     Patient was NOT contacted to complete PVP.     Note: Patient will not be contacted if there is no indication to call.     1.  Reviewed notes from the last few office visits within the medical group: Yes    2.  If any orders were placed at last visit or intended to be done for this visit (i.e. 6 mos follow-up), do we have Results/Consult Notes?        •  Labs - Labs were not ordered at last office visit.   Note: If patient appointment is for lab review and patient did not complete labs, check with provider if OK to reschedule patient until labs completed.       •  Imaging - Imaging ordered, completed and results are in chart.       •  Referrals - Referral ordered, patient has NOT been seen.    3. Is this appointment scheduled as a Hospital Follow-Up? No    4.  Immunizations were updated in Epic using WebIZ?: Epic matches WebIZ       •  Web Iz Recommendations: TD and SHINGRIX (Shingles)    5.  Patient is due for the following Health Maintenance Topics:   Health Maintenance Due   Topic Date Due   • IMM ZOSTER VACCINES (1 of 2) 09/07/2000   • BONE DENSITY  09/07/2015       - Patient has completed FLU, PNEUMOVAX (PPSV23), PREVNAR (PCV13)  and TDAP Immunization(s) per WebIZ. Chart has been updated.    6. Orders for overdue Health Maintenance topics pended in Pre-Charting? YES    7.  AHA (MDX) form printed for Provider? YES    8.  Patient was informed to arrive 15 min prior to their scheduled appointment and bring in their medication bottles.

## 2019-01-09 ENCOUNTER — OFFICE VISIT (OUTPATIENT)
Dept: MEDICAL GROUP | Facility: PHYSICIAN GROUP | Age: 69
End: 2019-01-09
Payer: MEDICARE

## 2019-01-09 VITALS
HEIGHT: 63 IN | TEMPERATURE: 96.9 F | RESPIRATION RATE: 14 BRPM | WEIGHT: 167 LBS | DIASTOLIC BLOOD PRESSURE: 70 MMHG | SYSTOLIC BLOOD PRESSURE: 112 MMHG | HEART RATE: 92 BPM | OXYGEN SATURATION: 95 % | BODY MASS INDEX: 29.59 KG/M2

## 2019-01-09 DIAGNOSIS — R00.0 TACHYCARDIA: ICD-10-CM

## 2019-01-09 DIAGNOSIS — Z85.828 HISTORY OF BASAL CELL CARCINOMA OF SKIN: ICD-10-CM

## 2019-01-09 DIAGNOSIS — L82.0 SEBORRHEIC KERATOSES, INFLAMED: ICD-10-CM

## 2019-01-09 DIAGNOSIS — F90.1 ATTENTION DEFICIT HYPERACTIVITY DISORDER (ADHD), PREDOMINANTLY HYPERACTIVE TYPE: ICD-10-CM

## 2019-01-09 DIAGNOSIS — Z12.11 SCREENING FOR COLON CANCER: ICD-10-CM

## 2019-01-09 DIAGNOSIS — L57.0 ACTINIC KERATOSIS: ICD-10-CM

## 2019-01-09 DIAGNOSIS — Z78.0 POSTMENOPAUSAL: ICD-10-CM

## 2019-01-09 PROCEDURE — 17110 DESTRUCTION B9 LES UP TO 14: CPT | Performed by: FAMILY MEDICINE

## 2019-01-09 PROCEDURE — 17000 DESTRUCT PREMALG LESION: CPT | Mod: 59 | Performed by: FAMILY MEDICINE

## 2019-01-09 PROCEDURE — 99213 OFFICE O/P EST LOW 20 MIN: CPT | Mod: 25 | Performed by: FAMILY MEDICINE

## 2019-01-09 PROCEDURE — 8041 PR SCP AHA: Performed by: FAMILY MEDICINE

## 2019-01-09 RX ORDER — ATOMOXETINE 40 MG/1
CAPSULE ORAL
COMMUNITY
Start: 2018-10-22 | End: 2019-01-09

## 2019-01-09 ASSESSMENT — PATIENT HEALTH QUESTIONNAIRE - PHQ9: CLINICAL INTERPRETATION OF PHQ2 SCORE: 0

## 2019-01-10 NOTE — PROGRESS NOTES
"Subjective:   Denia Deluca is a 68 y.o. female here today for follow-up tachycardia, ADHD and skin concerns.  She is unaccompanied for today's visit.    Patient reports that she is feeling \"well\" today.  She states that she has adjusted to the decreased dose of Strattera.  We decreased her dose from 60mg to 40mg several months ago due to persistent tachycardia.  Patient remains asymptomatic.  She does admit that she doesn't feel her heart \"thumping\" as much as she did last year.  Denies lightheadedness, dizziness, shortness of breath or palpitations.  She had a Ziopatch recently and will see Sleep Medicine next month.    She would like me to check a few moles.  They feel \"scratchy.\"  She has had a basal cell carcinoma removed from her face in the past.  She also asks me to look at her back as she cannot see it.    We reviewed her healthcare maintenance and she is due for colon cancer screening and a DEXA.    Annual Health Assessment Questions:    1.  Are you currently engaging in any exercise or physical activity? Yes    2.  How would you describe your mood or emotional well-being today? good    3.  Have you had any falls in the last year? No    4.  Have you noticed any problems with your balance or had difficulty walking? No    5.  In the last six months have you experienced any leakage of urine? No    6. DPA/Advanced Directive: Patient does not have an Advanced Directive.  A packet and workshop information was given on Advanced Directives.    Current medicines (including changes today)  Current Outpatient Prescriptions   Medication Sig Dispense Refill   • atomoxetine (STRATTERA) 40 MG capsule Take 1 Cap by mouth every day. 90 Cap 1   • oxybutynin (DITROPAN) 5 MG Tab Take 1 Tab by mouth 3 times a day. 90 Tab 3   • albuterol 108 (90 Base) MCG/ACT Aero Soln inhalation aerosol Inhale 2 Puffs by mouth every 6 hours as needed for Shortness of Breath. (Patient not taking: Reported on 1/9/2019) 8.5 g 3     No " "current facility-administered medications for this visit.      She  has a past medical history of ADHD; Basal cell carcinoma (BCC); Depression; and Fracture.    ROS   No chest pain, no shortness of breath, no abdominal pain.     Objective:     Physical Exam:  Blood pressure 112/70, pulse 92, temperature 36.1 °C (96.9 °F), resp. rate 14, height 1.6 m (5' 3\"), weight 75.8 kg (167 lb), SpO2 95 %, not currently breastfeeding. Body mass index is 29.58 kg/m².   Constitutional: Alert, no distress.  Skin: Warm, dry, good turgor, no rashes in visible areas. There is a small elevated, hypopigmented and hyperkeratotic papule on patient's right forehead.  On her back, there are two elevated hyperpigmented and hyperkeratotic papules.  There are also two skin tags.  Eye: Equal, round and reactive, conjunctiva clear, lids normal.  ENMT: Lips without lesions, good dentition, oropharynx clear.  Neck: Trachea midline, no masses, no thyromegaly. No cervical or supraclavicular lymphadenopathy.  Respiratory: Unlabored respiratory effort, lungs clear to auscultation, no wheezes, no rhonchi.  Cardiovascular: Tachycardic, no murmur, no edema.  Abdomen: Soft, non-tender, no masses, no hepatosplenomegaly.  Psych: Alert and oriented x3, normal affect and mood.    Assessment and Plan:     1. Tachycardia  Slightly improved.  We reviewed the preliminary data from her Ziopatch which is overall reassuring.  She has close follow-up with cardiology to review the results in depth.  Appointment with Sleep Medicine is next month.    2. Attention deficit hyperactivity disorder (ADHD), predominantly hyperactive type  Stable on lower-dose of Strattera.  Will continue to monitor.    3. History of basal cell carcinoma of skin  4. Actinic keratosis  5. Seborrheic keratoses, inflamed  Skin lesion on forehead is consistent with actinic keratosis.  Those on her back are consistent with seborrheic keratoses.  The lesions are bothersome to her.  Treated with " cryotherapy today.  See procedure note below.    CRYOTHERAPY:  Discussed the pre-cancerous and benign nature of these lesions.  After discussing risks and benefits, verbal consent was obtained and cryotherapy performed using liquid nitrogen, freeze-thaw-freeze technique x 3.  Patient tolerated the procedure well.  Aftercare as well as potential for blistering was discussed.  I explained that the procedure may need to be repeated if there is not complete resolution.  May also need to consider referral to dermatology.    6. Postmenopausal  DEXA ordered.  - DS-BONE DENSITY STUDY (DEXA); Future    7. Screening for colon cancer  FIT ordered.  - OCCULT BLOOD FECES IMMUNOASSAY; Future    Followup: Return in about 4 months (around 5/9/2019) for f/u ADHD, short.         PLEASE NOTE: This dictation was created using voice recognition software. I have made every reasonable attempt to correct obvious errors, but I expect that there are errors of grammar and possibly content that I did not discover before finalizing the note.

## 2019-01-11 ENCOUNTER — HOSPITAL ENCOUNTER (OUTPATIENT)
Facility: MEDICAL CENTER | Age: 69
End: 2019-01-11
Attending: FAMILY MEDICINE
Payer: MEDICARE

## 2019-01-11 ENCOUNTER — HOSPITAL ENCOUNTER (OUTPATIENT)
Dept: RADIOLOGY | Facility: MEDICAL CENTER | Age: 69
End: 2019-01-11
Attending: FAMILY MEDICINE
Payer: MEDICARE

## 2019-01-11 ENCOUNTER — TELEPHONE (OUTPATIENT)
Dept: MEDICAL GROUP | Facility: PHYSICIAN GROUP | Age: 69
End: 2019-01-11

## 2019-01-11 DIAGNOSIS — Z78.0 POSTMENOPAUSAL: ICD-10-CM

## 2019-01-11 DIAGNOSIS — M25.512 ACUTE PAIN OF LEFT SHOULDER: ICD-10-CM

## 2019-01-11 PROCEDURE — 0296T PR EXT ECG > 48HR TO 21 DAY RCRD W/CONECT INTL RCRD: CPT | Performed by: INTERNAL MEDICINE

## 2019-01-11 PROCEDURE — 77080 DXA BONE DENSITY AXIAL: CPT

## 2019-01-11 PROCEDURE — 82274 ASSAY TEST FOR BLOOD FECAL: CPT

## 2019-01-11 PROCEDURE — 0298T PR EXT ECG > 48HR TO 21 DAY REVIEW AND INTERPRETATN: CPT | Performed by: INTERNAL MEDICINE

## 2019-01-16 DIAGNOSIS — Z12.11 SCREENING FOR COLON CANCER: ICD-10-CM

## 2019-01-17 ENCOUNTER — TELEPHONE (OUTPATIENT)
Dept: CARDIOLOGY | Facility: MEDICAL CENTER | Age: 69
End: 2019-01-17

## 2019-01-17 LAB — HEMOCCULT STL QL IA: NEGATIVE

## 2019-01-17 NOTE — TELEPHONE ENCOUNTER
Records request sent to [facility] in [place] fax#: [phone] P#:[phone]. Release of information  Form sent scan.

## 2019-02-05 ENCOUNTER — SLEEP CENTER VISIT (OUTPATIENT)
Dept: SLEEP MEDICINE | Facility: MEDICAL CENTER | Age: 69
End: 2019-02-05
Payer: MEDICARE

## 2019-02-05 VITALS
SYSTOLIC BLOOD PRESSURE: 128 MMHG | WEIGHT: 167 LBS | OXYGEN SATURATION: 94 % | DIASTOLIC BLOOD PRESSURE: 80 MMHG | BODY MASS INDEX: 29.59 KG/M2 | HEIGHT: 63 IN | RESPIRATION RATE: 16 BRPM | HEART RATE: 86 BPM

## 2019-02-05 DIAGNOSIS — R06.83 SNORING: ICD-10-CM

## 2019-02-05 DIAGNOSIS — G47.30 SLEEP APNEA, UNSPECIFIED TYPE: ICD-10-CM

## 2019-02-05 DIAGNOSIS — J45.20 MILD INTERMITTENT ASTHMA WITHOUT COMPLICATION: ICD-10-CM

## 2019-02-05 PROCEDURE — 99204 OFFICE O/P NEW MOD 45 MIN: CPT | Performed by: INTERNAL MEDICINE

## 2019-02-07 ENCOUNTER — HOME STUDY (OUTPATIENT)
Dept: SLEEP MEDICINE | Facility: MEDICAL CENTER | Age: 69
End: 2019-02-07
Attending: INTERNAL MEDICINE
Payer: MEDICARE

## 2019-02-07 DIAGNOSIS — G47.30 SLEEP APNEA, UNSPECIFIED TYPE: ICD-10-CM

## 2019-02-07 PROCEDURE — G0399 HOME SLEEP TEST/TYPE 3 PORTA: HCPCS | Performed by: INTERNAL MEDICINE

## 2019-02-08 ENCOUNTER — SLEEP CENTER VISIT (OUTPATIENT)
Dept: SLEEP MEDICINE | Facility: MEDICAL CENTER | Age: 69
End: 2019-02-08
Payer: MEDICARE

## 2019-02-08 VITALS
HEART RATE: 99 BPM | OXYGEN SATURATION: 90 % | BODY MASS INDEX: 29.77 KG/M2 | SYSTOLIC BLOOD PRESSURE: 132 MMHG | WEIGHT: 168 LBS | RESPIRATION RATE: 16 BRPM | HEIGHT: 63 IN | DIASTOLIC BLOOD PRESSURE: 80 MMHG

## 2019-02-08 DIAGNOSIS — R06.83 SNORING: ICD-10-CM

## 2019-02-08 DIAGNOSIS — G47.33 OBSTRUCTIVE SLEEP APNEA SYNDROME: ICD-10-CM

## 2019-02-08 DIAGNOSIS — J45.20 MILD INTERMITTENT ASTHMA WITHOUT COMPLICATION: ICD-10-CM

## 2019-02-08 PROCEDURE — 99214 OFFICE O/P EST MOD 30 MIN: CPT | Performed by: INTERNAL MEDICINE

## 2019-02-08 RX ORDER — EPINEPHRINE 0.3 MG/.3ML
INJECTION SUBCUTANEOUS
COMMUNITY
Start: 2019-01-30 | End: 2021-01-26 | Stop reason: SDUPTHER

## 2019-02-08 NOTE — PROCEDURES
Interpretation:    Ms. Deluca presented with snoring, witnessed nocturnal apnea and daytime fatigue.  She does not have medical or sleep diagnoses that would contraindicate a home sleep test.    600 minutes of data are available for review and the information appears to be of good quality for analysis.    The respiratory event index is 28 events per hour, primarily consisting of obstructive apnea with some hypopnea episodes and a few central apneas as well.  The lowest arterial oxygen saturation is 79% on room air with an oxygen desaturation index of 32.3 events per hour.  The heart rate varies from  bpm.  Significant snoring is noted.    Assessment:  Moderate to severe obstructive sleep apnea hypopnea with a respiratory event index of 28 events per hour and a lowest arterial oxygen saturation of 79% on room air.    Recommendations:  CPAP therapy, either guided by a titration polysomnogram or initiated through the use of auto titrating CPAP. Alternative treatments for sleep-disordered breathing include reconstructive otolaryngologic surgery, dental appliances and weight loss. If any these latter treatment options are selected, a follow-up polysomnogram is suggested to assess the efficacy of therapy. Behavioral measures including avoidance of sedatives, alcohol and supine body position may be of additional benefit. Patients with severe sleep apnea are typically advised not to drive a motor vehicle or operate hazardous machinery until their daytime somnolence has been corrected.

## 2019-02-08 NOTE — PROGRESS NOTES
CC: Snoring and witnessed nocturnal apnea, suspected obstructive sleep apnea hypopnea syndrome.    HPI:   Ms. Deluca is a 68-year-old woman referred by Dr. Sharifa Gayle to assist in the evaluation and management of suspected sleep disordered breathing.    She was evaluated recently for persistent tachycardia.  An echocardiogram on September 12, 2018 demonstrated preserved left ventricular systolic function but mild right ventricular enlargement with normal systolic function with an estimated ejection fraction of 60% and an estimated right ventricular systolic pressure of 25 mmHg.  In the course of her evaluation, symptoms suggesting sleep disordered breathing were identified, prompting the current consultation.    She has a regular sleep schedule, as confirmed by the sleep diary, with bedtime at about 9:30 PM and she falls asleep within about 5 minutes.  She awakens 2-4 times on an average night and arises at about 8 AM without fatigue, grogginess or regular morning headaches.  Her  reports her loud snoring and also soft short pauses in breathing during sleep.  She is fatigued during the day.  She does not regularly doses off and appropriately but does nap when the opportunity presents.  Her [place] sleepiness score is 3 points but does not seem to capture the degree of fatigue that she describes.  She drinks carbonated beverages moderately and does not use substances to induce sleep or to maintain wakefulness.  She does not have symptoms suggesting parasomnia or restless leg syndrome.  She has not previously been evaluated for sleep issues.  She notes that her sister does have sleep apnea treated with nocturnal CPAP.    She does have a history of attention deficit hyperactivity disorder, treated with Strattera.  She also has a history of mild intermittent asthma without current symptoms.  She has an albuterol metered-dose inhaler which she uses rarely.        Patient Active Problem List    Diagnosis Date  "Noted   • Pure hypercholesterolemia 11/09/2018   • Tachycardia 09/26/2018   • Attention deficit hyperactivity disorder (ADHD), predominantly hyperactive type 05/11/2018   • Bee allergy status 05/11/2018   • Mixed stress and urge urinary incontinence 05/11/2018   • Mild intermittent asthma without complication 05/11/2018       Past Medical History:   Diagnosis Date   • ADHD    • Allergic rhinitis    • Asthma    • Back pain    • Basal cell carcinoma (BCC)    • Chickenpox    • Depression    • Fracture     Multiple fractures   • Influenza    • Kidney stone    • Nasal drainage    • Pneumonia        Past Surgical History:   Procedure Laterality Date   • ABDOMINAL HYSTERECTOMY TOTAL      age 38 for \"pre-cancer\", ovaries not removed   • CARPAL TUNNEL RELEASE     • HYSTERECTOMY LAPAROSCOPY     • OPEN REDUCTION     • PB REMV 2ND CATARACT,CORN-SCLER SECTN         Family History   Problem Relation Age of Onset   • Cancer Sister         Breast cancer   • Cancer Mother    • Cancer Father        Social History     Social History   • Marital status:      Spouse name: N/A   • Number of children: N/A   • Years of education: N/A     Occupational History   • Not on file.     Social History Main Topics   • Smoking status: Never Smoker   • Smokeless tobacco: Never Used   • Alcohol use Yes      Comment: \"maybe 3 drinks a year\"   • Drug use: No   • Sexual activity: Not on file     Other Topics Concern   • Not on file     Social History Narrative   • No narrative on file       Current Outpatient Prescriptions   Medication Sig Dispense Refill   • atomoxetine (STRATTERA) 40 MG capsule Take 1 Cap by mouth every day. 90 Cap 1   • oxybutynin (DITROPAN) 5 MG Tab Take 1 Tab by mouth 3 times a day. 90 Tab 3   • albuterol 108 (90 Base) MCG/ACT Aero Soln inhalation aerosol Inhale 2 Puffs by mouth every 6 hours as needed for Shortness of Breath. (Patient not taking: Reported on 1/9/2019) 8.5 g 3     No current facility-administered medications " "for this visit.     \"CURRENT RX\"      Allergies: Bee venom      ROS  Positive for the sleep and respiratory issues reviewed above.  She reports some visual loss without diplopia.  She has no ongoing problems with tinnitus or rhinitis, chest pain or palpitations, cough or dyspnea.  She has occasional heartburn without abdominal pain or melena.  All other aspects of the CPT review of systems process are negative as documented in the attached self history form.      Physical Exam:   /80 (BP Location: Right arm, Patient Position: Sitting, BP Cuff Size: Large adult)   Pulse 86   Resp 16   Ht 1.6 m (5' 3\")   Wt 75.8 kg (167 lb)   SpO2 94%   BMI 29.58 kg/m²    Head and neck examination demonstrates no mucosal lesion, purulent drainage or evident polyps. The pharynx is benign with a Mallampati III presentation. The neck is supple without thyromegaly. On chest examination there are symmetrical bilateral breath sounds without rales, wheezing or consolidation. On cardiac examination, the apical impulse and heart sounds are normal and the rhythm is regular. There is no murmur, gallop or rub and no jugular venous distention. The abdomen is soft with active bowel sounds and no palpable hepatosplenomegaly, mass, guarding or rebound. The extremities show no clubbing, cyanosis or edema and no signs of deep venous thrombosis. There is no warmth, redness, tenderness or palpable venous cord in the calves. The skin is clear, warm and dry. There is no unusual peripheral lymphadenopathy. Peripheral pulses are palpable in all 4 extremities. On neurologic examination, cranial nerve function is intact, motor tone is symmetrical, and the patient is alert, oriented and responsive.       Problems:  1. Sleep apnea, unspecified type  She presents with snoring, witnessed nocturnal apnea and significant daytime fatigue.  I suspect that any daytime somnolence she might have reduced with her use of Strattera for ADHD.  She has a crowded " posterior pharyngeal airway.  The clinical probability of sleep apnea hypopnea syndrome is significant. Accurate diagnosis and effective treatment are required not only to improve levels of daytime alertness but also to reduce the cardiac and neurologic risks associated with untreated sleep apnea. We have discussed diagnostic options including in-laboratory, attended polysomnography and home sleep testing. We have also discussed treatment options including airway pressurization, reconstructive otolaryngologic surgery, dental appliances and weight management.    2. Snoring    3. Mild intermittent asthma without complication  Currently asymptomatic with only rare use of the rescue inhaler.  A controller agent does not seem required at this point.      Plan:   Home sleep test.    Return visit afterwards to discuss test results and treatment options.    We appreciate the opportunity to assist in her care.

## 2019-02-09 NOTE — PROGRESS NOTES
CC: Snoring and witnessed nocturnal apnea, suspected obstructive sleep apnea hypopnea syndrome.     HPI:   Ms. Deluca is a 68-year-old woman referred by Dr. Sharifa Gayle to assist in the evaluation and management of suspected sleep disordered breathing.  She was initially evaluated on February 5, 2019 returns now to review the results of a sleep test.     She was evaluated recently for persistent tachycardia.  An echocardiogram on September 12, 2018 demonstrated preserved left ventricular systolic function but mild right ventricular enlargement with normal systolic function with an estimated ejection fraction of 60% and an estimated right ventricular systolic pressure of 25 mmHg.  In the course of her evaluation, symptoms suggesting sleep disordered breathing were identified, prompting the current consultation.     She has a regular sleep schedule, as confirmed by the sleep diary, with bedtime at about 9:30 PM and she falls asleep within about 5 minutes.  She awakens 2-4 times on an average night and arises at about 8 AM without fatigue, grogginess or regular morning headaches.  Her  reports her loud snoring and also soft short pauses in breathing during sleep.  She is fatigued during the day.  She does not regularly doses off and appropriately but does nap when the opportunity presents.  Her [place] sleepiness score is 3 points but does not seem to capture the degree of fatigue that she describes.  She drinks carbonated beverages moderately and does not use substances to induce sleep or to maintain wakefulness.  She does not have symptoms suggesting parasomnia or restless leg syndrome.  She has not previously been evaluated for sleep issues.  She notes that her sister does have sleep apnea treated with nocturnal CPAP.     She does have a history of attention deficit hyperactivity disorder, treated with Strattera.  She also has a history of mild intermittent asthma without current symptoms.  She has an  "albuterol metered-dose inhaler which she uses rarely.    The home sleep test dated February 7, 2019 is reviewed with the patient.  It demonstrates an apnea hypopnea index of 28 events per hour, including obstructive apnea events with some hypopnea and occasional central apneas as well.  The lowest arterial oxygen saturation was 79% on room air she spent 101 minutes with a saturation below 90%.        Patient Active Problem List    Diagnosis Date Noted   • Pure hypercholesterolemia 11/09/2018   • Tachycardia 09/26/2018   • Attention deficit hyperactivity disorder (ADHD), predominantly hyperactive type 05/11/2018   • Bee allergy status 05/11/2018   • Mixed stress and urge urinary incontinence 05/11/2018   • Mild intermittent asthma without complication 05/11/2018       Past Medical History:   Diagnosis Date   • ADHD    • Allergic rhinitis    • Asthma    • Back pain    • Basal cell carcinoma (BCC)    • Chickenpox    • Depression    • Fracture     Multiple fractures   • Influenza    • Kidney stone    • Nasal drainage    • Pneumonia        Past Surgical History:   Procedure Laterality Date   • ABDOMINAL HYSTERECTOMY TOTAL      age 38 for \"pre-cancer\", ovaries not removed   • CARPAL TUNNEL RELEASE     • HYSTERECTOMY LAPAROSCOPY     • OPEN REDUCTION     • PB REMV 2ND CATARACT,CORN-SCLER SECTN         Family History   Problem Relation Age of Onset   • Cancer Sister         Breast cancer   • Cancer Mother    • Cancer Father        Social History     Social History   • Marital status:      Spouse name: N/A   • Number of children: N/A   • Years of education: N/A     Occupational History   • Not on file.     Social History Main Topics   • Smoking status: Never Smoker   • Smokeless tobacco: Never Used   • Alcohol use Yes      Comment: \"maybe 3 drinks a year\"   • Drug use: No   • Sexual activity: Not on file     Other Topics Concern   • Not on file     Social History Narrative   • No narrative on file       Current " "Outpatient Prescriptions   Medication Sig Dispense Refill   • atomoxetine (STRATTERA) 40 MG capsule Take 1 Cap by mouth every day. 90 Cap 1   • oxybutynin (DITROPAN) 5 MG Tab Take 1 Tab by mouth 3 times a day. 90 Tab 3   • EPINEPHrine (EPIPEN) 0.3 MG/0.3ML Solution Auto-injector solution for injection      • albuterol 108 (90 Base) MCG/ACT Aero Soln inhalation aerosol Inhale 2 Puffs by mouth every 6 hours as needed for Shortness of Breath. (Patient not taking: Reported on 1/9/2019) 8.5 g 3     No current facility-administered medications for this visit.     \"CURRENT RX\"      Allergies: Bee venom      ROS  Unchanged from prior and positive for the sleep and respiratory issues reviewed above.  She reports some visual loss without diplopia.  She has no ongoing problems with tinnitus or rhinitis, chest pain or palpitations, cough or dyspnea.  She has occasional heartburn without abdominal pain or melena.  All other aspects of the CPT review of systems process are negative as documented in the attached self history form.      Physical Exam:   /80 (BP Location: Right arm, Patient Position: Sitting, BP Cuff Size: Adult)   Pulse 99   Resp 16   Ht 1.6 m (5' 3\")   Wt 76.2 kg (168 lb)   SpO2 90%   BMI 29.76 kg/m²    Head and neck examination demonstrates no mucosal lesion, purulent drainage or evident polyps. The pharynx is benign with a Mallampati I presentation. The neck is supple without thyromegaly. On chest examination there are symmetrical bilateral breath sounds without rales, wheezing or consolidation. On cardiac examination, the apical impulse and heart sounds are normal and the rhythm is regular. There is no murmur, gallop or rub and no jugular venous distention. The abdomen is soft with active bowel sounds and no palpable hepatosplenomegaly, mass, guarding or rebound. The extremities show no clubbing, cyanosis or edema and no signs of deep venous thrombosis. There is no warmth, redness, tenderness or " palpable venous cord in the calves. The skin is clear, warm and dry. There is no unusual peripheral lymphadenopathy. Peripheral pulses are palpable in all 4 extremities. On neurologic examination, cranial nerve function is intact, motor tone is symmetrical, and the patient is alert, oriented and responsive.       Problems:  1. Obstructive sleep apnea syndrome  She presents with snoring, witnessed nocturnal apnea and significant daytime fatigue.  I suspect that any daytime somnolence she might have reduced with her use of Strattera for ADHD.  The home sleep test demonstrates moderate to severe obstructive sleep apnea hypopnea with an apnea hypopnea index of 28 events per hour and a lowest arterial oxygen saturation of 79% on room air.  Effective treatment is required not only to improve levels of daytime alertness but also to reduce the cardiac and neurologic risks associated with untreated sleep apnea hypopnea.  We have again discussed treatment options including overpressurization, otolaryngologic surgery, dental appliances and weight loss I think that CPAP is the best initial choice.  We have talked about initiating that treatment through a titration polysomnogram with the use of auto titrating CPAP.    2. Snoring    3. Mild intermittent asthma without complication  Currently asymptomatic with only rare use of the rescue inhaler.  A controller agent does not seem required at this point      Plan:   1.  Mask fitting today.  She did well with Dreamware using small cushion and small frame    2.  Initiate auto titrating CPAP at 5-15 cm of water pressure.  We discussed acclimating to the CPAP with proper mask fit, effective humidification and consistent use.    3.  Return visit here about 2 months after starting therapy, bringing the CPAP data recording chip with her.  She may drop into the technician clinic at any time for assistance.    We appreciate the opportunity to assist in her care.  Return in about 3 months  (around 5/8/2019).

## 2019-02-21 ENCOUNTER — PATIENT MESSAGE (OUTPATIENT)
Dept: MEDICAL GROUP | Facility: PHYSICIAN GROUP | Age: 69
End: 2019-02-21

## 2019-02-21 DIAGNOSIS — N20.0 NEPHROLITHIASIS: ICD-10-CM

## 2019-02-21 RX ORDER — CYCLOBENZAPRINE HCL 5 MG
5-10 TABLET ORAL 3 TIMES DAILY PRN
Qty: 30 TAB | Refills: 0 | Status: SHIPPED | OUTPATIENT
Start: 2019-02-21 | End: 2019-02-28

## 2019-02-21 RX ORDER — TAMSULOSIN HYDROCHLORIDE 0.4 MG/1
0.4 CAPSULE ORAL
Qty: 7 CAP | Refills: 0 | Status: SHIPPED | OUTPATIENT
Start: 2019-02-21 | End: 2019-02-28

## 2019-03-29 ENCOUNTER — OFFICE VISIT (OUTPATIENT)
Dept: MEDICAL GROUP | Facility: PHYSICIAN GROUP | Age: 69
End: 2019-03-29
Payer: MEDICARE

## 2019-03-29 VITALS
TEMPERATURE: 97.1 F | HEART RATE: 100 BPM | RESPIRATION RATE: 20 BRPM | OXYGEN SATURATION: 96 % | SYSTOLIC BLOOD PRESSURE: 120 MMHG | WEIGHT: 163 LBS | BODY MASS INDEX: 28.88 KG/M2 | HEIGHT: 63 IN | DIASTOLIC BLOOD PRESSURE: 72 MMHG

## 2019-03-29 DIAGNOSIS — J45.20 MILD INTERMITTENT ASTHMA WITHOUT COMPLICATION: ICD-10-CM

## 2019-03-29 DIAGNOSIS — R00.0 TACHYCARDIA: ICD-10-CM

## 2019-03-29 DIAGNOSIS — R05.9 COUGH: ICD-10-CM

## 2019-03-29 PROCEDURE — 99213 OFFICE O/P EST LOW 20 MIN: CPT | Performed by: INTERNAL MEDICINE

## 2019-03-29 NOTE — PROGRESS NOTES
Subjective:   Denia Deluca is a 68 y.o. female here today for cough, allergies, tachycardia     68-year-old female past medical history of ADHD, asthma, urinary incontinence, hypercholesterolemia, chronic sinus tachycardia presented complaining of a cough. She has chronic allergies. Possible she has allergies to pollen, bees. She had developed asthma sometimes from allergies. She uses albuterol prn. 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 She believes that she might allergic to mice dust too. While she was working, she started to cough, unproductive, no wheezing. Went home, her heart rate was 140. She layed down for hours. Today she feels much better, no cough. . She tells me that she has chronic sinus tachycardia .she has seen all specialist for that, cardiology, pulmonology, and it is benign sinus tachycardia. She denies chest pain, leg swelling. She is here just to make sure she is not developing bronchitis     Current medicines (including changes today)  Current Outpatient Prescriptions   Medication Sig Dispense Refill   • EPINEPHrine (EPIPEN) 0.3 MG/0.3ML Solution Auto-injector solution for injection      • atomoxetine (STRATTERA) 40 MG capsule Take 1 Cap by mouth every day. 90 Cap 1   • oxybutynin (DITROPAN) 5 MG Tab Take 1 Tab by mouth 3 times a day. 90 Tab 3   • albuterol 108 (90 Base) MCG/ACT Aero Soln inhalation aerosol Inhale 2 Puffs by mouth every 6 hours as needed for Shortness of Breath. 8.5 g 3     No current facility-administered medications for this visit.      She  has a past medical history of ADHD; Allergic rhinitis; Asthma; Back pain; Basal cell carcinoma (BCC); Chickenpox; Depression; Fracture; Influenza; Kidney stone; Nasal drainage; and Pneumonia.    Current Outpatient Prescriptions   Medication Sig Dispense Refill   • EPINEPHrine (EPIPEN) 0.3 MG/0.3ML Solution Auto-injector solution for  "injection      • atomoxetine (STRATTERA) 40 MG capsule Take 1 Cap by mouth every day. 90 Cap 1   • oxybutynin (DITROPAN) 5 MG Tab Take 1 Tab by mouth 3 times a day. 90 Tab 3   • albuterol 108 (90 Base) MCG/ACT Aero Soln inhalation aerosol Inhale 2 Puffs by mouth every 6 hours as needed for Shortness of Breath. 8.5 g 3     No current facility-administered medications for this visit.        Allergies as of 03/29/2019 - Reviewed 03/29/2019   Allergen Reaction Noted   • Bee venom Anaphylaxis 05/11/2018       Social History     Social History   • Marital status:      Spouse name: N/A   • Number of children: N/A   • Years of education: N/A     Occupational History   • Not on file.     Social History Main Topics   • Smoking status: Never Smoker   • Smokeless tobacco: Never Used   • Alcohol use Yes      Comment: \"maybe 3 drinks a year\"   • Drug use: No   • Sexual activity: Not on file     Other Topics Concern   • Not on file     Social History Narrative   • No narrative on file        Family History   Problem Relation Age of Onset   • Cancer Sister         Breast cancer   • Cancer Mother    • Cancer Father        Past Surgical History:   Procedure Laterality Date   • ABDOMINAL HYSTERECTOMY TOTAL      age 38 for \"pre-cancer\", ovaries not removed   • CARPAL TUNNEL RELEASE     • HYSTERECTOMY LAPAROSCOPY     • OPEN REDUCTION     • PB REMV 2ND CATARACT,CORN-SCLER SECTN         ROS   All systems reviewed are negative except for HPI       Objective:     Blood pressure 120/72, pulse 100, temperature 36.2 °C (97.1 °F), temperature source Temporal, resp. rate 20, height 1.6 m (5' 3\"), weight 73.9 kg (163 lb), SpO2 96 %, not currently breastfeeding. Body mass index is 28.87 kg/m².   Physical Exam:  Constitutional: Alert, no distress.  Skin: Warm, dry, good turgor, no rashes in visible areas.  Eye: Equal, round and reactive, conjunctiva clear, lids normal.  ENMT: Lips without lesions, good dentition, oropharynx clear.  Neck: " Trachea midline, no masses, no thyromegaly. No cervical or supraclavicular lymphadenopathy  Respiratory: Unlabored respiratory effort, lungs clear to auscultation, no wheezes, no ronchi.  Cardiovascular: Normal S1, S2, no murmur, no edema.  Abdomen: Soft, non-tender, no masses, no hepatosplenomegaly.  Psych: Alert and oriented x3, normal affect and mood.        Assessment and Plan:   The following treatment plan was discussed    1. Tachycardia  Chronic, bening condition. I did explain to pt that part of her tachycardia is also strattera. She is working with psychologist and lowering the dose. I did advise to continue working on weaning. Stable today .regular.     2. Cough  Possible reactive cough, from allergies. She is doing better today. Lungs are cleared, no need for antibiotic, I did advise to use albuterol if cough is worse, I did advise to follow up if she is feeling sicker, fever.     3. Mild intermittent asthma without complication  Stable, continue albuterol prn       Followup: Return if symptoms worsen or fail to improve.

## 2019-05-09 PROBLEM — G47.33 OBSTRUCTIVE SLEEP APNEA: Status: ACTIVE | Noted: 2019-05-09

## 2019-05-10 ENCOUNTER — OFFICE VISIT (OUTPATIENT)
Dept: MEDICAL GROUP | Facility: PHYSICIAN GROUP | Age: 69
End: 2019-05-10
Payer: MEDICARE

## 2019-05-10 VITALS
DIASTOLIC BLOOD PRESSURE: 74 MMHG | WEIGHT: 166 LBS | HEIGHT: 63 IN | HEART RATE: 78 BPM | SYSTOLIC BLOOD PRESSURE: 132 MMHG | BODY MASS INDEX: 29.41 KG/M2 | TEMPERATURE: 97.6 F | OXYGEN SATURATION: 96 % | RESPIRATION RATE: 14 BRPM

## 2019-05-10 DIAGNOSIS — G47.33 OBSTRUCTIVE SLEEP APNEA: ICD-10-CM

## 2019-05-10 DIAGNOSIS — R00.0 TACHYCARDIA: ICD-10-CM

## 2019-05-10 DIAGNOSIS — Z13.1 SCREENING FOR DIABETES MELLITUS: ICD-10-CM

## 2019-05-10 DIAGNOSIS — F90.1 ATTENTION DEFICIT HYPERACTIVITY DISORDER (ADHD), PREDOMINANTLY HYPERACTIVE TYPE: ICD-10-CM

## 2019-05-10 DIAGNOSIS — H35.372 EPIRETINAL MEMBRANE (ERM) OF LEFT EYE: ICD-10-CM

## 2019-05-10 DIAGNOSIS — E78.00 PURE HYPERCHOLESTEROLEMIA: ICD-10-CM

## 2019-05-10 PROCEDURE — 99214 OFFICE O/P EST MOD 30 MIN: CPT | Performed by: FAMILY MEDICINE

## 2019-05-10 NOTE — PROGRESS NOTES
Subjective:   Denia Deluca is a 68 y.o. female here today for follow-up tachycardia, ADHD, and macular pucker of the left eye. She is unaccompanied for today's visit.     Attention deficit hyperactivity disorder (ADHD), predominantly hyperactive type  This is chronic and stable. She states that she feels well on her decreased dose of Strattera 40 mg. It had been decreased when she had persistent tachycardia. Patient reports that her family members think she is very hyper although her behavior appears stable in office today. She does not want to go back up to the 60mg dose at this time.    Obstructive sleep apnea  Tachycardia  The patient has recently been diagnosed with obstructive sleep apnea following a sleep study, and has begun use of a CPAP machine. She reports that the CPAP machine has allowed her to sleep well at night. She has also noticed a significant improvement in her tachycardia since using the CPAP machine. Patient adds that she suffers from seasonal allergies, and currently meets with an allergist regularly. She does not currently use nasal sprays or saline rinses prior to using her CPAP. Denies any lightheadedness, dizziness or shortness of breath.     Epiretinal membrane (ERM) of left eye  This is a chronic and stable issue for the patient. She reports that this developed following multiple cataract surgeries in the past. Patient would like to meet with an ophthalmologist at this time to have her eyes examined. She is not interested in surgery at this time.    Current medicines (including changes today)  Current Outpatient Prescriptions   Medication Sig Dispense Refill   • atomoxetine (STRATTERA) 40 MG capsule Take 1 Cap by mouth every day. 90 Cap 1   • oxybutynin (DITROPAN) 5 MG Tab Take 1 Tab by mouth 3 times a day. 90 Tab 3   • EPINEPHrine (EPIPEN) 0.3 MG/0.3ML Solution Auto-injector solution for injection      • albuterol 108 (90 Base) MCG/ACT Aero Soln inhalation aerosol Inhale 2 Puffs  "by mouth every 6 hours as needed for Shortness of Breath. 8.5 g 3     No current facility-administered medications for this visit.      She  has a past medical history of ADHD; Allergic rhinitis; Asthma; Back pain; Basal cell carcinoma (BCC); Chickenpox; Depression; Fracture; Influenza; Kidney stone; Nasal drainage; and Pneumonia.    ROS   No chest pain, no shortness of breath, no abdominal pain, no lightheadedness, no dizziness.     Objective:     Physical Exam:  /74   Pulse 78   Temp 36.4 °C (97.6 °F) (Temporal)   Resp 14   Ht 1.6 m (5' 3\")   Wt 75.3 kg (166 lb)   SpO2 96%  Body mass index is 29.41 kg/m².   Constitutional: Alert, no distress.  Skin: Warm, dry, good turgor, no rashes in visible areas.  Eye: Equal, round and reactive, conjunctiva clear, lids normal.  ENMT: Lips without lesions, good dentition, oropharynx clear.  Neck: Trachea midline, no masses, no thyromegaly.   Respiratory: Unlabored respiratory effort, lungs clear to auscultation, no wheezes, no rhonchi.  Cardiovascular: Normal S1, split S2, no murmur, no edema.  Psych: Alert and oriented x3, normal affect and mood.    Assessment and Plan:     1. Attention deficit hyperactivity disorder (ADHD), predominantly hyperactive type  Stable on Strattera 40 mg daily. She is tolerating the lower dose and pulse is in normal range today. Will continue to monitor.     2. Obstructive sleep apnea  This is a new problem for the patient. Continue use of CPAP machine. Will continue to monitor.     3. Tachycardia  Significantly improved with use of CPAP. Heart rate is 74 today. Continue to follow up with cardiology.     4. Epiretinal membrane (ERM) of left eye  Chronic and stable. Referral given to ophthalmology.   - REFERRAL TO OPHTHALMOLOGY    5. Pure hypercholesterolemia  Chronic and stable. Not currently taking a statin. Follow up labs ordered.   - Lipid Profile; Future    6. Screening for diabetes mellitus  Fasting glucose ordered.  - Comp " Metabolic Panel; Future    Followup: Return in about 6 months (around 11/10/2019) for AWV.          Aly COLE (Scribe), am scribing for, and in the presence of, Sharifa Gayle MD    Electronically signed by: Aly Grewal (Scribe), 5/10/2019    Sharifa COLE MD personally performed the services described in this documentation, as scribed by Aly Grewal in my presence, and it is both accurate and complete.

## 2019-06-04 ENCOUNTER — SLEEP CENTER VISIT (OUTPATIENT)
Dept: SLEEP MEDICINE | Facility: MEDICAL CENTER | Age: 69
End: 2019-06-04
Payer: MEDICARE

## 2019-06-04 VITALS
HEART RATE: 93 BPM | HEIGHT: 63 IN | BODY MASS INDEX: 29.77 KG/M2 | RESPIRATION RATE: 16 BRPM | OXYGEN SATURATION: 94 % | DIASTOLIC BLOOD PRESSURE: 72 MMHG | SYSTOLIC BLOOD PRESSURE: 114 MMHG | WEIGHT: 168 LBS

## 2019-06-04 DIAGNOSIS — J45.20 MILD INTERMITTENT ASTHMA WITHOUT COMPLICATION: ICD-10-CM

## 2019-06-04 DIAGNOSIS — G47.33 OBSTRUCTIVE SLEEP APNEA SYNDROME: ICD-10-CM

## 2019-06-04 DIAGNOSIS — R06.83 SNORING: ICD-10-CM

## 2019-06-04 PROCEDURE — 99214 OFFICE O/P EST MOD 30 MIN: CPT | Performed by: INTERNAL MEDICINE

## 2019-06-04 NOTE — PROGRESS NOTES
CC: Sleep apnea hypopnea syndrome.    HPI:   Ms. Deluca is a 68-year-old woman referred by Dr. Sharifa Gayle to assist in the evaluation and management of suspected sleep disordered breathing.  She was initially evaluated on February 5, 2019 last seen on February 8, 2019.     She was evaluated recently for persistent tachycardia.  An echocardiogram on September 12, 2018 demonstrated preserved left ventricular systolic function but mild right ventricular enlargement with normal systolic function with an estimated ejection fraction of 60% and an estimated right ventricular systolic pressure of 25 mmHg.  In the course of her evaluation, symptoms suggesting sleep disordered breathing were identified, prompting the current consultation.     She has a regular sleep schedule, as confirmed by the sleep diary, with bedtime at about 9:30 PM and she falls asleep within about 5 minutes.  She awakens 2-4 times on an average night and arises at about 8 AM without fatigue, grogginess or regular morning headaches.  Her  reports her loud snoring and also soft short pauses in breathing during sleep.  She is fatigued during the day.  She does not regularly doses off and appropriately but does nap when the opportunity presents.  Her Boyden sleepiness score is 3 points but does not seem to capture the degree of fatigue that she describes.  She drinks carbonated beverages moderately and does not use substances to induce sleep or to maintain wakefulness.  She does not have symptoms suggesting parasomnia or restless leg syndrome.  She has not previously been evaluated for sleep issues.  She notes that her sister does have sleep apnea treated with nocturnal CPAP.     She does have a history of attention deficit hyperactivity disorder, treated with Strattera.  She also has a history of mild intermittent asthma without current symptoms.  She has an albuterol metered-dose inhaler which she uses rarely.     The home sleep test dated  "February 7, 2019 demonstrated an apnea hypopnea index of 28 events per hour, including obstructive apnea events with some hypopnea and occasional central apneas as well.  The lowest arterial oxygen saturation was 79% on room air she spent 101 minutes with a saturation below 90%.    She was started on auto titrating CPAP with a pressure range of 5 to 15 cm of water.  She has acclimated well to the CPAP and is using it each night.  She does not put it on the first 2 hours of sleep until a puppy who sleeps in the bedroom is put to bed for the night.  She is not having unusual problems with mask fit, leakage or airway dryness.  She is noticed a marked improvement in daytime alertness and is not napping or falling asleep inappropriately.    The CPAP data recording information is reviewed with the patient.  It demonstrates use in each of the last 30 days with an average daily usage of just under 6 hours.  The mean pressure is 7.7 cm of water with a 90% maximum of 9.6.  Leak is negligible and the estimated residual apnea hypopnea index is 6.9 events per hour.        Patient Active Problem List    Diagnosis Date Noted   • Epiretinal membrane (ERM) of left eye 05/10/2019   • Obstructive sleep apnea 05/09/2019   • Pure hypercholesterolemia 11/09/2018   • Tachycardia 09/26/2018   • Attention deficit hyperactivity disorder (ADHD), predominantly hyperactive type 05/11/2018   • Bee allergy status 05/11/2018   • Mixed stress and urge urinary incontinence 05/11/2018   • Mild intermittent asthma without complication 05/11/2018       Past Medical History:   Diagnosis Date   • ADHD    • Allergic rhinitis    • Asthma    • Back pain    • Basal cell carcinoma (BCC)    • Chickenpox    • Depression    • Fracture     Multiple fractures   • Influenza    • Kidney stone    • Nasal drainage    • Pneumonia        Past Surgical History:   Procedure Laterality Date   • ABDOMINAL HYSTERECTOMY TOTAL      age 38 for \"pre-cancer\", ovaries not removed " "  • CARPAL TUNNEL RELEASE     • HYSTERECTOMY LAPAROSCOPY     • OPEN REDUCTION     • PB REMV 2ND CATARACT,CORN-SCLER SECTN         Family History   Problem Relation Age of Onset   • Cancer Sister         Breast cancer   • Cancer Mother    • Cancer Father        Social History     Social History   • Marital status:      Spouse name: N/A   • Number of children: N/A   • Years of education: N/A     Occupational History   • Not on file.     Social History Main Topics   • Smoking status: Never Smoker   • Smokeless tobacco: Never Used   • Alcohol use Yes      Comment: \"maybe 3 drinks a year\"   • Drug use: No   • Sexual activity: Not on file     Other Topics Concern   • Not on file     Social History Narrative   • No narrative on file       Current Outpatient Prescriptions   Medication Sig Dispense Refill   • atomoxetine (STRATTERA) 40 MG capsule Take 1 Cap by mouth every day. 90 Cap 1   • oxybutynin (DITROPAN) 5 MG Tab Take 1 Tab by mouth 3 times a day. 90 Tab 3   • EPINEPHrine (EPIPEN) 0.3 MG/0.3ML Solution Auto-injector solution for injection      • albuterol 108 (90 Base) MCG/ACT Aero Soln inhalation aerosol Inhale 2 Puffs by mouth every 6 hours as needed for Shortness of Breath. 8.5 g 3     No current facility-administered medications for this visit.     \"CURRENT RX\"      Allergies: Bee venom      ROS  Unchanged from prior and positive for the sleep and respiratory issues reviewed above.  She reports some visual loss without diplopia.  She has no ongoing problems with tinnitus or rhinitis, chest pain or palpitations, cough or dyspnea.  She has occasional heartburn without abdominal pain or melena.  All other aspects of the CPT review of systems process are negative      Physical Exam:   /72 (BP Location: Right arm, Patient Position: Sitting, BP Cuff Size: Adult)   Pulse 93   Resp 16   Ht 1.6 m (5' 3\")   Wt 76.2 kg (168 lb)   SpO2 94%   BMI 29.76 kg/m²    Head and neck examination demonstrates no " mucosal lesion, purulent drainage or evident polyps. The pharynx is benign with a Mallampati II presentation. The neck is supple without thyromegaly. On chest examination there are symmetrical bilateral breath sounds without rales, wheezing or consolidation. On cardiac examination, the apical impulse and heart sounds are normal and the rhythm is regular. There is no murmur, gallop or rub and no jugular venous distention. The abdomen is soft with active bowel sounds and no palpable hepatosplenomegaly, mass, guarding or rebound. The extremities show no clubbing, cyanosis or edema and no signs of deep venous thrombosis. There is no warmth, redness, tenderness or palpable venous cord in the calves. The skin is clear, warm and dry. There is no unusual peripheral lymphadenopathy. Peripheral pulses are palpable in all 4 extremities. On neurologic examination, cranial nerve function is intact, motor tone is symmetrical, and the patient is alert, oriented and responsive.       Problems:  1. Obstructive sleep apnea syndrome  She has significant sleep apnea hypopnea syndrome with an apnea hypopnea index of 28 events per hour and a lowest arterial oxygen saturation of 70%.  She is doing well on auto titrating CPAP.  She is using machine nightly, as confirmed by the data recording chip but only for an average of about 6 hours each night.  She is enjoyed a marked improvement in daytime alertness and her residual apnea hypopnea index is reasonably low.    2. Snoring  Resolved on CPAP.    3. Mild intermittent asthma without complication  Currently asymptomatic with only rare use of the rescue inhaler.  A controller agent does not seem required at this point.      Plan:   1.  Continue auto titrating CPAP at 5 to 15 cm of water using a small Dreamware nasal cushion.  We have talked about the goal of at least 7.5 hours on treatment nightly.  She will be leaving for 2 weeks in [place] soon and encouraged her to take the machine with her  although she may not need to bring the humidifier.    2.  Return visit here in about 6 months, sooner if new problems develop.    We appreciate the opportunity to assist in her care.

## 2019-06-09 DIAGNOSIS — N39.46 MIXED STRESS AND URGE URINARY INCONTINENCE: ICD-10-CM

## 2019-06-10 RX ORDER — OXYBUTYNIN CHLORIDE 5 MG/1
TABLET ORAL
Qty: 270 TAB | Refills: 3 | Status: SHIPPED | OUTPATIENT
Start: 2019-06-10 | End: 2020-10-07

## 2019-07-29 ENCOUNTER — OFFICE VISIT (OUTPATIENT)
Dept: CARDIOLOGY | Facility: MEDICAL CENTER | Age: 69
End: 2019-07-29
Payer: MEDICARE

## 2019-07-29 VITALS
HEART RATE: 82 BPM | OXYGEN SATURATION: 97 % | BODY MASS INDEX: 29.23 KG/M2 | WEIGHT: 165 LBS | DIASTOLIC BLOOD PRESSURE: 70 MMHG | SYSTOLIC BLOOD PRESSURE: 130 MMHG | HEIGHT: 63 IN

## 2019-07-29 DIAGNOSIS — R00.0 TACHYCARDIA: ICD-10-CM

## 2019-07-29 DIAGNOSIS — F41.9 ANXIOUS MOOD: ICD-10-CM

## 2019-07-29 DIAGNOSIS — Z13.220 SCREENING FOR CHOLESTEROL LEVEL: ICD-10-CM

## 2019-07-29 DIAGNOSIS — R42 DIZZINESS: ICD-10-CM

## 2019-07-29 DIAGNOSIS — R00.2 PALPITATIONS: ICD-10-CM

## 2019-07-29 PROCEDURE — 99215 OFFICE O/P EST HI 40 MIN: CPT | Performed by: INTERNAL MEDICINE

## 2019-07-29 ASSESSMENT — ENCOUNTER SYMPTOMS
FALLS: 0
PND: 0
LOSS OF CONSCIOUSNESS: 0
DIZZINESS: 1
ORTHOPNEA: 0
PALPITATIONS: 1
ABDOMINAL PAIN: 0
DEPRESSION: 0
SHORTNESS OF BREATH: 0

## 2019-07-29 NOTE — PROGRESS NOTES
"Chief Complaint   Patient presents with   • Tachycardia       Subjective:   Denia Deluca is a 68-year-old female presenting to clinic for follow-up on tachycardia and right ventricular hypertrophy.    Patient is tearful today and states that minimal exertion like walking or working in the yard will increase her heart rate into the 160s beats per minute.  She reports having associated dizziness.    She had a bee sting 3 years ago which caused anaphylaxis.  She worries about having cardiac arrest with any form of exercise.  Reports that her heart rate is usually in the 70s in the morning in the evenings.    She is also been diagnosed with obstructive sleep apnea and is currently on CPAP.    She is working with a  at [employer].    Past Medical History:   Diagnosis Date   • ADHD    • Allergic rhinitis    • Asthma    • Back pain    • Basal cell carcinoma (BCC)    • Chickenpox    • Depression    • Fracture     Multiple fractures   • Influenza    • Kidney stone    • Nasal drainage    • Pneumonia      Past Surgical History:   Procedure Laterality Date   • ABDOMINAL HYSTERECTOMY TOTAL      age 38 for \"pre-cancer\", ovaries not removed   • CARPAL TUNNEL RELEASE     • HYSTERECTOMY LAPAROSCOPY     • OPEN REDUCTION     • PB REMV 2ND CATARACT,CORN-SCLER SECTN       Family History   Problem Relation Age of Onset   • Cancer Sister         Breast cancer   • Cancer Mother    • Cancer Father      Social History     Social History   • Marital status:      Spouse name: N/A   • Number of children: N/A   • Years of education: N/A     Occupational History   • Not on file.     Social History Main Topics   • Smoking status: Never Smoker   • Smokeless tobacco: Never Used   • Alcohol use Yes      Comment: \"maybe 3 drinks a year\"   • Drug use: No   • Sexual activity: Not on file     Other Topics Concern   • Not on file     Social History Narrative   • No narrative on file     Allergies   Allergen Reactions   • Bee " "Venom Anaphylaxis     Will stop heart      Outpatient Encounter Prescriptions as of 7/29/2019   Medication Sig Dispense Refill   • oxybutynin (DITROPAN) 5 MG Tab TAKE ONE TABLET BY MOUTH THREE TIMES DAILY  270 Tab 3   • atomoxetine (STRATTERA) 40 MG capsule Take 1 Cap by mouth every day. 90 Cap 1   • EPINEPHrine (EPIPEN) 0.3 MG/0.3ML Solution Auto-injector solution for injection      • albuterol 108 (90 Base) MCG/ACT Aero Soln inhalation aerosol Inhale 2 Puffs by mouth every 6 hours as needed for Shortness of Breath. 8.5 g 3     No facility-administered encounter medications on file as of 7/29/2019.      Review of Systems   Constitutional: Negative for malaise/fatigue.   Respiratory: Negative for shortness of breath.    Cardiovascular: Positive for palpitations. Negative for chest pain, orthopnea, leg swelling and PND.   Gastrointestinal: Negative for abdominal pain.   Musculoskeletal: Negative for falls.   Neurological: Positive for dizziness. Negative for loss of consciousness.   Psychiatric/Behavioral: Negative for depression.   All other systems reviewed and are negative.       Objective:   /70 (BP Location: Left arm, Patient Position: Sitting, BP Cuff Size: Adult)   Pulse 82   Ht 1.6 m (5' 3\")   Wt 74.8 kg (165 lb)   SpO2 97%   BMI 29.23 kg/m²     Physical Exam   Constitutional: She is oriented to person, place, and time. She appears well-developed and well-nourished. No distress.   HENT:   Head: Normocephalic and atraumatic.   Eyes: Conjunctivae are normal. No scleral icterus.   Neck: Normal range of motion. Neck supple.   Cardiovascular: Normal rate, regular rhythm and normal heart sounds.  Exam reveals no gallop and no friction rub.    No murmur heard.  Pulmonary/Chest: Effort normal and breath sounds normal. No respiratory distress. She has no wheezes. She has no rales.   Abdominal: Soft. She exhibits no distension. There is no tenderness.   Musculoskeletal: She exhibits no edema.   Neurological: " She is alert and oriented to person, place, and time.   Skin: Skin is warm and dry. She is not diaphoretic.   Psychiatric: She has a normal mood and affect. Her behavior is normal.   Nursing note and vitals reviewed.    Echocardiogram performed September 2018 w showed normal LV systolic function.  No major valvular pathology noted.  RVSP 23 mmHg.  Mild dilatation of the right ventricle, mostly localized to the mid segment of the free wall.  Normal systolic function.    Assessment:     1. Tachycardia  EC-ECHOCARDIOGRAM COMPLETE W/O CONT    Holter Monitor / Event Recorder    REFERRAL TO PSYCHIATRY    Basic Metabolic Panel    CBC WITHOUT DIFFERENTIAL    TSH    FREE THYROXINE   2. Palpitations  EC-ECHOCARDIOGRAM COMPLETE W/O CONT    Holter Monitor / Event Recorder    REFERRAL TO PSYCHIATRY   3. Dizziness  EC-ECHOCARDIOGRAM COMPLETE W/O CONT    REFERRAL TO PSYCHIATRY   4. Anxious mood  REFERRAL TO PSYCHIATRY   5. Screening for cholesterol level  Lipid Profile       Medical Decision Making:  Today's Assessment / Status / Plan:     Patient is extremely tearful and worries about having cardiac arrest with minimal exercise.  Her tachycardia could be anxiety driven but arrhythmia cannot be ruled out.  She will be referred for a Ziopatch monitor for further evaluation.  Basic labs have been ordered today as well.  If patient only has sinus tachycardia on her Ziopatch, we can consider starting low-dose of metoprolol.    On her last echocardiogram she was found to have a mild right ventricular dilatation that was primarily focal.  She will be referred for repeat echocardiogram today to reevaluate her right ventricle size and right ventricular systolic pressure.    Patient is clearly very anxious about her history of anaphylactic shock.  I think she is unable to cope with this diagnosis.  I have offered her evaluation at the psychiatry clinic which she is interested but she would like to undergo a cardiac work-up first.  Referral  placed today.    Total 40 minutes face-to-face time spent with patient, with greater than 50% of the total time discussing patient's issues and symptoms as listed above in assessment and plan, as well as managing coordination of care for future evaluation and treatment. Most of the time was spent discussing patient's anxiety and symptoms and plan work-up as discussed above.      Return to clinic in 3 months or earlier if needed.    Thank you for allowing me to participate in the care of this patient. Please do not hesitate to contact me with any questions.    Tawnya Cee MD  Cardiologist  [facility]      PLEASE NOTE: This dictation was created using voice recognition software.

## 2019-08-05 ENCOUNTER — PATIENT MESSAGE (OUTPATIENT)
Dept: MEDICAL GROUP | Facility: PHYSICIAN GROUP | Age: 69
End: 2019-08-05

## 2019-08-05 DIAGNOSIS — F41.9 ANXIETY: ICD-10-CM

## 2019-08-05 DIAGNOSIS — F90.1 ATTENTION DEFICIT HYPERACTIVITY DISORDER (ADHD), PREDOMINANTLY HYPERACTIVE TYPE: ICD-10-CM

## 2019-08-09 ENCOUNTER — HOSPITAL ENCOUNTER (OUTPATIENT)
Dept: CARDIOLOGY | Facility: MEDICAL CENTER | Age: 69
End: 2019-08-09
Attending: INTERNAL MEDICINE
Payer: MEDICARE

## 2019-08-09 DIAGNOSIS — R00.2 PALPITATIONS: ICD-10-CM

## 2019-08-09 DIAGNOSIS — R00.0 TACHYCARDIA: ICD-10-CM

## 2019-08-09 DIAGNOSIS — R42 DIZZINESS: ICD-10-CM

## 2019-08-09 LAB
LV EJECT FRACT  99904: 65
LV EJECT FRACT MOD 2C 99903: 63.96
LV EJECT FRACT MOD 4C 99902: 67.16
LV EJECT FRACT MOD BP 99901: 65.05

## 2019-08-09 PROCEDURE — 93306 TTE W/DOPPLER COMPLETE: CPT

## 2019-08-09 PROCEDURE — 93306 TTE W/DOPPLER COMPLETE: CPT | Mod: 26 | Performed by: INTERNAL MEDICINE

## 2019-08-12 ENCOUNTER — TELEPHONE (OUTPATIENT)
Dept: CARDIOLOGY | Facility: MEDICAL CENTER | Age: 69
End: 2019-08-12

## 2019-08-12 ENCOUNTER — NON-PROVIDER VISIT (OUTPATIENT)
Dept: CARDIOLOGY | Facility: MEDICAL CENTER | Age: 69
End: 2019-08-12
Payer: MEDICARE

## 2019-08-12 DIAGNOSIS — I47.20 VENTRICULAR TACHYARRHYTHMIA (HCC): ICD-10-CM

## 2019-08-12 DIAGNOSIS — R00.2 PALPITATIONS: ICD-10-CM

## 2019-08-12 DIAGNOSIS — R00.0 TACHYCARDIA: ICD-10-CM

## 2019-08-27 PROCEDURE — 0296T PR EXT ECG > 48HR TO 21 DAY RCRD W/CONECT INTL RCRD: CPT | Performed by: INTERNAL MEDICINE

## 2019-08-27 PROCEDURE — 0298T PR EXT ECG > 48HR TO 21 DAY REVIEW AND INTERPRETATN: CPT | Performed by: INTERNAL MEDICINE

## 2019-08-29 ENCOUNTER — PATIENT MESSAGE (OUTPATIENT)
Dept: MEDICAL GROUP | Facility: PHYSICIAN GROUP | Age: 69
End: 2019-08-29

## 2019-08-29 DIAGNOSIS — R00.2 PALPITATIONS: ICD-10-CM

## 2019-08-29 DIAGNOSIS — R00.0 TACHYCARDIA: ICD-10-CM

## 2019-09-03 ENCOUNTER — HOSPITAL ENCOUNTER (OUTPATIENT)
Dept: RADIOLOGY | Facility: MEDICAL CENTER | Age: 69
End: 2019-09-03
Attending: INTERNAL MEDICINE
Payer: MEDICARE

## 2019-09-03 DIAGNOSIS — I47.10 SVT (SUPRAVENTRICULAR TACHYCARDIA): ICD-10-CM

## 2019-09-03 DIAGNOSIS — R00.2 PALPITATIONS: ICD-10-CM

## 2019-09-03 PROCEDURE — 93018 CV STRESS TEST I&R ONLY: CPT | Performed by: INTERNAL MEDICINE

## 2019-09-03 PROCEDURE — A9502 TC99M TETROFOSMIN: HCPCS

## 2019-09-03 PROCEDURE — 78452 HT MUSCLE IMAGE SPECT MULT: CPT | Mod: 26 | Performed by: INTERNAL MEDICINE

## 2019-09-03 NOTE — PROGRESS NOTES
Patient presents to NM suite for cardiac stress test with MPI. Nursing goals identified: knowledge deficit, potential for anxiety r/t stress test, potential for compromised cardiac output. Care plan includes educating patient, reassurance and access to ACLS cart/team. Labs and ECG reviewed. No caffeine and NPO confirmed. Resting images attained and patient prepped for treadmill stress study. Total exercise time: 6:00, Max HR: 152, METs: 7.3  Patient reported these symptoms: SOB INCREASED FROM BASELINE, SOME CHEST PRESSURE/TIGHTNESS- NO PAIN, OCCASIONAL PVC NOTED AT THE END OF EXERCISE AND INTO RECOVERY PERIOD. Water provided. Symptoms resolved.

## 2019-10-29 ENCOUNTER — HOSPITAL ENCOUNTER (OUTPATIENT)
Dept: LAB | Facility: MEDICAL CENTER | Age: 69
End: 2019-10-29
Attending: FAMILY MEDICINE
Payer: MEDICARE

## 2019-10-29 ENCOUNTER — HOSPITAL ENCOUNTER (OUTPATIENT)
Dept: LAB | Facility: MEDICAL CENTER | Age: 69
End: 2019-10-29
Attending: INTERNAL MEDICINE
Payer: MEDICARE

## 2019-10-29 DIAGNOSIS — E78.00 PURE HYPERCHOLESTEROLEMIA: ICD-10-CM

## 2019-10-29 DIAGNOSIS — Z13.1 SCREENING FOR DIABETES MELLITUS: ICD-10-CM

## 2019-10-29 DIAGNOSIS — Z13.220 SCREENING FOR CHOLESTEROL LEVEL: ICD-10-CM

## 2019-10-29 DIAGNOSIS — R00.0 TACHYCARDIA: ICD-10-CM

## 2019-10-29 LAB
ALBUMIN SERPL BCP-MCNC: 4.5 G/DL (ref 3.2–4.9)
ALBUMIN/GLOB SERPL: 1.6 G/DL
ALP SERPL-CCNC: 116 U/L (ref 30–99)
ALT SERPL-CCNC: 16 U/L (ref 2–50)
ANION GAP SERPL CALC-SCNC: 7 MMOL/L (ref 0–11.9)
ANION GAP SERPL CALC-SCNC: 8 MMOL/L (ref 0–11.9)
AST SERPL-CCNC: 20 U/L (ref 12–45)
BILIRUB SERPL-MCNC: 0.5 MG/DL (ref 0.1–1.5)
BUN SERPL-MCNC: 18 MG/DL (ref 8–22)
BUN SERPL-MCNC: 19 MG/DL (ref 8–22)
CALCIUM SERPL-MCNC: 9.7 MG/DL (ref 8.5–10.5)
CALCIUM SERPL-MCNC: 9.7 MG/DL (ref 8.5–10.5)
CHLORIDE SERPL-SCNC: 104 MMOL/L (ref 96–112)
CHLORIDE SERPL-SCNC: 104 MMOL/L (ref 96–112)
CHOLEST SERPL-MCNC: 207 MG/DL (ref 100–199)
CHOLEST SERPL-MCNC: 218 MG/DL (ref 100–199)
CO2 SERPL-SCNC: 28 MMOL/L (ref 20–33)
CO2 SERPL-SCNC: 28 MMOL/L (ref 20–33)
CREAT SERPL-MCNC: 0.74 MG/DL (ref 0.5–1.4)
CREAT SERPL-MCNC: 0.75 MG/DL (ref 0.5–1.4)
ERYTHROCYTE [DISTWIDTH] IN BLOOD BY AUTOMATED COUNT: 46.4 FL (ref 35.9–50)
GLOBULIN SER CALC-MCNC: 2.8 G/DL (ref 1.9–3.5)
GLUCOSE SERPL-MCNC: 74 MG/DL (ref 65–99)
GLUCOSE SERPL-MCNC: 76 MG/DL (ref 65–99)
HCT VFR BLD AUTO: 44.9 % (ref 37–47)
HDLC SERPL-MCNC: 60 MG/DL
HDLC SERPL-MCNC: 61 MG/DL
HGB BLD-MCNC: 14 G/DL (ref 12–16)
LDLC SERPL CALC-MCNC: 122 MG/DL
LDLC SERPL CALC-MCNC: 134 MG/DL
MCH RBC QN AUTO: 30.2 PG (ref 27–33)
MCHC RBC AUTO-ENTMCNC: 31.2 G/DL (ref 33.6–35)
MCV RBC AUTO: 96.8 FL (ref 81.4–97.8)
PLATELET # BLD AUTO: 300 K/UL (ref 164–446)
PMV BLD AUTO: 10.1 FL (ref 9–12.9)
POTASSIUM SERPL-SCNC: 3.8 MMOL/L (ref 3.6–5.5)
POTASSIUM SERPL-SCNC: 3.8 MMOL/L (ref 3.6–5.5)
PROT SERPL-MCNC: 7.3 G/DL (ref 6–8.2)
RBC # BLD AUTO: 4.64 M/UL (ref 4.2–5.4)
SODIUM SERPL-SCNC: 139 MMOL/L (ref 135–145)
SODIUM SERPL-SCNC: 140 MMOL/L (ref 135–145)
T4 FREE SERPL-MCNC: 0.77 NG/DL (ref 0.53–1.43)
TRIGL SERPL-MCNC: 120 MG/DL (ref 0–149)
TRIGL SERPL-MCNC: 122 MG/DL (ref 0–149)
TSH SERPL DL<=0.005 MIU/L-ACNC: 2.76 UIU/ML (ref 0.38–5.33)
WBC # BLD AUTO: 4.9 K/UL (ref 4.8–10.8)

## 2019-10-29 PROCEDURE — 36415 COLL VENOUS BLD VENIPUNCTURE: CPT

## 2019-10-29 PROCEDURE — 80053 COMPREHEN METABOLIC PANEL: CPT

## 2019-10-29 PROCEDURE — 80061 LIPID PANEL: CPT

## 2019-10-29 PROCEDURE — 80061 LIPID PANEL: CPT | Mod: 91

## 2019-10-29 PROCEDURE — 80048 BASIC METABOLIC PNL TOTAL CA: CPT

## 2019-10-29 PROCEDURE — 84443 ASSAY THYROID STIM HORMONE: CPT

## 2019-10-29 PROCEDURE — 85027 COMPLETE CBC AUTOMATED: CPT

## 2019-10-29 PROCEDURE — 84439 ASSAY OF FREE THYROXINE: CPT

## 2019-11-02 DIAGNOSIS — F90.1 ATTENTION DEFICIT HYPERACTIVITY DISORDER (ADHD), PREDOMINANTLY HYPERACTIVE TYPE: ICD-10-CM

## 2019-11-04 ENCOUNTER — OFFICE VISIT (OUTPATIENT)
Dept: MEDICAL GROUP | Facility: PHYSICIAN GROUP | Age: 69
End: 2019-11-04
Payer: MEDICARE

## 2019-11-04 ENCOUNTER — OFFICE VISIT (OUTPATIENT)
Dept: BEHAVIORAL HEALTH | Facility: CLINIC | Age: 69
End: 2019-11-04
Payer: MEDICARE

## 2019-11-04 ENCOUNTER — HOSPITAL ENCOUNTER (OUTPATIENT)
Facility: MEDICAL CENTER | Age: 69
End: 2019-11-04
Attending: FAMILY MEDICINE
Payer: MEDICARE

## 2019-11-04 VITALS
WEIGHT: 160 LBS | OXYGEN SATURATION: 94 % | RESPIRATION RATE: 16 BRPM | SYSTOLIC BLOOD PRESSURE: 116 MMHG | BODY MASS INDEX: 28.35 KG/M2 | HEIGHT: 63 IN | TEMPERATURE: 97.7 F | DIASTOLIC BLOOD PRESSURE: 76 MMHG | HEART RATE: 74 BPM

## 2019-11-04 DIAGNOSIS — F43.10 PTSD (POST-TRAUMATIC STRESS DISORDER): ICD-10-CM

## 2019-11-04 DIAGNOSIS — Z23 NEED FOR VACCINATION: ICD-10-CM

## 2019-11-04 DIAGNOSIS — Z00.00 MEDICARE ANNUAL WELLNESS VISIT, SUBSEQUENT: ICD-10-CM

## 2019-11-04 DIAGNOSIS — F43.23 ADJUSTMENT DISORDER WITH MIXED ANXIETY AND DEPRESSED MOOD: ICD-10-CM

## 2019-11-04 DIAGNOSIS — F90.1 ATTENTION DEFICIT HYPERACTIVITY DISORDER (ADHD), PREDOMINANTLY HYPERACTIVE TYPE: ICD-10-CM

## 2019-11-04 DIAGNOSIS — Z12.11 SCREENING FOR COLON CANCER: ICD-10-CM

## 2019-11-04 DIAGNOSIS — E78.00 PURE HYPERCHOLESTEROLEMIA: ICD-10-CM

## 2019-11-04 DIAGNOSIS — G47.33 OBSTRUCTIVE SLEEP APNEA: ICD-10-CM

## 2019-11-04 DIAGNOSIS — Z63.4 BEREAVEMENT: ICD-10-CM

## 2019-11-04 DIAGNOSIS — Z85.828 HISTORY OF NONMELANOMA SKIN CANCER: ICD-10-CM

## 2019-11-04 DIAGNOSIS — J45.20 MILD INTERMITTENT ASTHMA WITHOUT COMPLICATION: ICD-10-CM

## 2019-11-04 PROBLEM — R00.0 TACHYCARDIA: Status: RESOLVED | Noted: 2018-09-26 | Resolved: 2019-11-04

## 2019-11-04 PROCEDURE — G0439 PPPS, SUBSEQ VISIT: HCPCS | Mod: 25 | Performed by: FAMILY MEDICINE

## 2019-11-04 PROCEDURE — 90662 IIV NO PRSV INCREASED AG IM: CPT | Performed by: FAMILY MEDICINE

## 2019-11-04 PROCEDURE — 90791 PSYCH DIAGNOSTIC EVALUATION: CPT | Performed by: MARRIAGE & FAMILY THERAPIST

## 2019-11-04 PROCEDURE — G0008 ADMIN INFLUENZA VIRUS VAC: HCPCS | Performed by: FAMILY MEDICINE

## 2019-11-04 PROCEDURE — 82274 ASSAY TEST FOR BLOOD FECAL: CPT

## 2019-11-04 RX ORDER — ATOMOXETINE 60 MG/1
60 CAPSULE ORAL DAILY
Qty: 90 CAP | Refills: 1 | Status: SHIPPED
Start: 2019-11-04 | End: 2019-12-16

## 2019-11-04 RX ORDER — ATOMOXETINE 40 MG/1
CAPSULE ORAL
Qty: 90 CAP | Refills: 1 | Status: SHIPPED | OUTPATIENT
Start: 2019-11-04 | End: 2019-11-04

## 2019-11-04 SDOH — SOCIAL STABILITY - SOCIAL INSECURITY: DISSAPEARANCE AND DEATH OF FAMILY MEMBER: Z63.4

## 2019-11-04 ASSESSMENT — PATIENT HEALTH QUESTIONNAIRE - PHQ9
CLINICAL INTERPRETATION OF PHQ2 SCORE: 3
SUM OF ALL RESPONSES TO PHQ QUESTIONS 1-9: 5
5. POOR APPETITE OR OVEREATING: 0 - NOT AT ALL

## 2019-11-04 ASSESSMENT — ACTIVITIES OF DAILY LIVING (ADL): BATHING_REQUIRES_ASSISTANCE: 0

## 2019-11-04 ASSESSMENT — ENCOUNTER SYMPTOMS: GENERAL WELL-BEING: GOOD

## 2019-11-04 NOTE — PROGRESS NOTES
Chief Complaint   Patient presents with   • Annual Exam     AWV     HPI:  Denia Deluca is a 69 y.o. here for Medicare Annual Wellness Visit.  She mentions that she started therapy today and thinks it will be helpful.  She has been taking an older prescription for Strattera 60mg and would like to go back to the higher dose.    Patient Active Problem List    Diagnosis Date Noted   • PTSD (post-traumatic stress disorder) 11/04/2019   • Bereavement 11/04/2019   • Epiretinal membrane (ERM) of left eye 05/10/2019   • Obstructive sleep apnea 05/09/2019   • Pure hypercholesterolemia 11/09/2018   • Attention deficit hyperactivity disorder (ADHD), predominantly hyperactive type 05/11/2018   • Bee allergy status 05/11/2018   • Mixed stress and urge urinary incontinence 05/11/2018   • Mild intermittent asthma without complication 05/11/2018     Current Outpatient Medications   Medication Sig Dispense Refill   • atomoxetine (STRATTERA) 60 MG capsule Take 1 Cap by mouth every day. 90 Cap 1   • metoprolol (LOPRESSOR) 25 MG Tab Take 1 Tab by mouth 2 times a day. 90 Tab 3   • oxybutynin (DITROPAN) 5 MG Tab TAKE ONE TABLET BY MOUTH THREE TIMES DAILY  270 Tab 3   • EPINEPHrine (EPIPEN) 0.3 MG/0.3ML Solution Auto-injector solution for injection      • albuterol 108 (90 Base) MCG/ACT Aero Soln inhalation aerosol Inhale 2 Puffs by mouth every 6 hours as needed for Shortness of Breath. 8.5 g 3     No current facility-administered medications for this visit.           Current supplements as per medication list.     Allergies: Bee venom    Current social contact/activities: Spending time with family and friends, traveling.  She and her  recently bought a travel trailer.     She  reports that she has never smoked. She has never used smokeless tobacco. She reports current alcohol use. She reports that she does not use drugs.  Counseling given: Not Answered    DPA/Advanced Directive:  Patient has Advanced Directive on file.      ROS:    Gait: Uses no assistive device  Ostomy: No  Other tubes: No  Amputations: No  Chronic oxygen use: No  Last eye exam: 9/19  Wears hearing aids: No   : Denies any urinary leakage during the last 6 months    Screening:    Depression Screening  Little interest or pleasure in doing things?  1 - several days  Feeling down, depressed , or hopeless? 2 - more than half the days  Trouble falling or staying asleep, or sleeping too much?  0 - not at all  Feeling tired or having little energy?  0 - not at all  Poor appetite or overeating?  0 - not at all  Feeling bad about yourself - or that you are a failure or have let yourself or your family down? 1 - several days  Trouble concentrating on things, such as reading the newspaper or watching television? 0 - not at all  Moving or speaking so slowly that other people could have noticed.  Or the opposite - being so fidgety or restless that you have been moving around a lot more than usual?  0 - not at all  Thoughts that you would be better off dead, or of hurting yourself?  1 - several days  Patient Health Questionnaire Score: 5    If depressive symptoms identified deferred to follow up visit unless specifically addressed in assessment and plan.    Interpretation of PHQ-9 Total Score   Score Severity   1-4 No Depression   5-9 Mild Depression   10-14 Moderate Depression   15-19 Moderately Severe Depression   20-27 Severe Depression    Screening for Cognitive Impairment  Three Minute Recall (village, kitchen, baby) 3/3    Lance clock face with all 12 numbers and set the hands to show 10 past 10.  Yes    Cognitive concerns identified deferred for follow up unless specifically addressed in assessment and plan.    Fall Risk Assessment  Has the patient had two or more falls in the last year or any fall with injury in the last year?  No    Safety Assessment  Throw rugs on floor.  Yes  Handrails on all stairs.  Yes  Good lighting in all hallways.  Yes  Difficulty hearing.   No  Patient counseled about all safety risks that were identified.    Functional Assessment ADLs  Are there any barriers preventing you from cooking for yourself or meeting nutritional needs?  No.    Are there any barriers preventing you from driving safely or obtaining transportation?  No.    Are there any barriers preventing you from using a telephone or calling for help?  No.    Are there any barriers preventing you from shopping?  No.    Are there any barriers preventing you from taking care of your own finances?  No.    Are there any barriers preventing you from managing your medications?  No.    Are there any barriers preventing you from showering, bathing or dressing yourself? No.    Are you currently engaging in any exercise or physical activity?  Yes.     What is your perception of your health?  Good.    Health Maintenance Summary                HEPATITIS C SCREENING Overdue 1950     COLONOSCOPY Overdue 9/7/2000     IMM INFLUENZA Overdue 9/1/2019      Done 10/19/2018 Imm Admin: Influenza Vaccine Adult HD     Patient has more history with this topic...    MAMMOGRAM Overdue 10/31/2019      Done 10/31/2018 MA-SCREENING MAMMO BILAT W/TOMOSYNTHESIS W/CAD    IMM ZOSTER VACCINES Postponed 4/4/2020 Originally 9/7/2000. System: vaccine not available, other system reasons    Annual Wellness Visit Next Due 11/10/2019      Done 11/9/2018 Visit Dx: Medicare annual wellness visit, initial    IMM DTaP/Tdap/Td Vaccine Next Due 1/1/2021      Done 1/1/2011 Imm Admin: Tdap Vaccine    BONE DENSITY Next Due 1/11/2024      Done 1/11/2019 DS-BONE DENSITY STUDY (DEXA)        Patient Care Team:  Sharifa Gayle M.D. as PCP - General (Family Medicine)  Maria R Reyes, M.D. as Consulting Physician (Allergy)  Tawnya Cee M.D. as Consulting Physician (Cardiology)  Alfonso Denton M.D. as Consulting Physician (Pulmonary Medicine)    Social History     Tobacco Use   • Smoking status: Never Smoker   • Smokeless tobacco: Never Used  "  Substance Use Topics   • Alcohol use: Yes     Comment: \"maybe 3 drinks a year\"   • Drug use: No     Family History   Problem Relation Age of Onset   • Cancer Sister         Breast cancer   • Cancer Mother    • Cancer Father      She  has a past medical history of ADHD, Allergic rhinitis, Asthma, Back pain, Basal cell carcinoma (BCC), Chickenpox, Depression, Fracture, Influenza, Kidney stone, Nasal drainage, and Pneumonia.   Past Surgical History:   Procedure Laterality Date   • ABDOMINAL HYSTERECTOMY TOTAL      age 38 for \"pre-cancer\", ovaries not removed   • CARPAL TUNNEL RELEASE     • HYSTERECTOMY LAPAROSCOPY     • OPEN REDUCTION     • PB REMV 2ND CATARACT,CORN-SCLER SECTN       Exam:   /76   Pulse 74   Temp 36.5 °C (97.7 °F)   Resp 16   Ht 1.6 m (5' 3\")   Wt 72.6 kg (160 lb)   SpO2 94%  Body mass index is 28.34 kg/m².    Hearing excellent.    Dentition good.  Alert, oriented in no acute distress.  Eye contact is good, speech goal directed but pressured at times, affect calm.    Assessment and Plan. The following treatment and monitoring plan is recommended:    1. Medicare annual wellness visit, subsequent  HRA reviewed and appropriate.  Patient's previous medical history, healthcare maintenance and immunization status reviewed.  See discussion of anticipatory guidance and individual problems below.  Patient will return annually for Medicare annual well visit.    2. Attention deficit hyperactivity disorder (ADHD), predominantly hyperactive type  Chronic and not well controlled.  Increase Strattera to 60mg now that patient's tachycardia is being treated and is resolved.  Continue to follow with therapist.  atomoxetine (STRATTERA) 60 MG capsule   3. Adjustment disorder with mixed anxiety and depressed mood  Relatively new issue, not well controlled.  Established with therapist today.  Denies crisis when clarifying PHQ-9 screening.  Will continue to monitor.   4. Obstructive sleep apnea  Chronic and " stable.  Continue CPAP and follow-up with Sleep Medicine.   5. Pure hypercholesterolemia  Well controlled.  Labs as indicated.  Continue statin medication and lifestyle modifications.  Continue to monitor.   6. Mild intermittent asthma without complication  Chronic and stable.  Continue current medications and monitor.   7. History of nonmelanoma skin cancer  Chronic and stable.  Follows with dermatology for yearly skin checks.   8. Need for vaccination  INFLUENZA VACCINE, HIGH DOSE (65+ ONLY)   9. Screening for colon cancer  OCCULT BLOOD FECES IMMUNOASSAY     Services suggested: No services needed at this time  Health Care Screening: Age-appropriate preventive services recommended by USPTF and ACIP covered by Medicare were discussed today. Services ordered if indicated and agreed upon by the patient.  Referrals offered: Community-based lifestyle interventions to reduce health risks and promote self-management and wellness, fall prevention, nutrition, physical activity, tobacco-use cessation, weight loss, and mental health services as per orders if indicated.    Discussion today about general wellness and lifestyle habits:    · Prevent falls and reduce trip hazards; Cautioned about securing or removing rugs.  · Have a working fire alarm and carbon monoxide detector;   · Engage in regular physical activity and social activities     Follow-up: Return in about 6 months (around 5/4/2020) for f/u mood, short.
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
## 2019-11-04 NOTE — LETTER
[facility]  Sharifa Gayle M.D.  [address]  [place] [address]  Fax: [phone]   Authorization for Release/Disclosure of   Protected Health Information   Name: DENIA CHUNG MÉNDEZ : 1950 SSN: [id number]   Address: [place] [address] Phone:    [phone] (home)    I authorize the entity listed below to release/disclose the PHI below to:   [facility]/Sharifa Gayle M.D. and Sharifa Gayle M.D.   Provider or Entity Name:  [facility]   Address   [facility], [place], [place] Phone:      Fax:     Reason for request: continuity of care   Information to be released:    [ x ] LAST COLONOSCOPY,  including any PATH REPORT and follow-up  [  ] LAST FIT/COLOGUARD RESULT [  ] LAST DEXA  [  ] LAST MAMMOGRAM  [  ] LAST PAP  [  ] LAST LABS [  ] RETINA EXAM REPORT  [  ] IMMUNIZATION RECORDS  [  ] Release all info      [  ] Check here and initial the line next to each item to release ALL health information INCLUDING  _____ Care and treatment for drug and / or alcohol abuse  _____ HIV testing, infection status, or AIDS  _____ Genetic Testing    DATES OF SERVICE OR TIME PERIOD TO BE DISCLOSED: _____________  I understand and acknowledge that:  * This Authorization may be revoked at any time by you in writing, except if your health information has already been used or disclosed.  * Your health information that will be used or disclosed as a result of you signing this authorization could be re-disclosed by the recipient. If this occurs, your re-disclosed health information may no longer be protected by State or Federal laws.  * You may refuse to sign this Authorization. Your refusal will not affect your ability to obtain treatment.  * This Authorization becomes effective upon signing and will  on (date) __________.      If no date is indicated, this Authorization will  one (1) year from the signature date.    Name: Denia Méndez    Signature:   Date:          11/4/2019       PLEASE FAX REQUESTED RECORDS BACK TO: [phone]

## 2019-11-07 DIAGNOSIS — Z12.11 SCREENING FOR COLON CANCER: ICD-10-CM

## 2019-11-07 DIAGNOSIS — R00.0 TACHYCARDIA: ICD-10-CM

## 2019-11-07 DIAGNOSIS — R00.2 PALPITATIONS: ICD-10-CM

## 2019-11-07 LAB — HEMOCCULT STL QL IA: NEGATIVE

## 2019-11-15 ENCOUNTER — OFFICE VISIT (OUTPATIENT)
Dept: CARDIOLOGY | Facility: MEDICAL CENTER | Age: 69
End: 2019-11-15
Payer: MEDICARE

## 2019-11-15 VITALS
SYSTOLIC BLOOD PRESSURE: 122 MMHG | DIASTOLIC BLOOD PRESSURE: 70 MMHG | HEIGHT: 63 IN | HEART RATE: 76 BPM | BODY MASS INDEX: 28.35 KG/M2 | OXYGEN SATURATION: 94 % | WEIGHT: 160 LBS

## 2019-11-15 DIAGNOSIS — G47.33 OSA (OBSTRUCTIVE SLEEP APNEA): ICD-10-CM

## 2019-11-15 DIAGNOSIS — R00.0 TACHYCARDIA: ICD-10-CM

## 2019-11-15 DIAGNOSIS — R00.2 PALPITATIONS: ICD-10-CM

## 2019-11-15 DIAGNOSIS — Z79.899 ENCOUNTER FOR LONG-TERM (CURRENT) USE OF HIGH-RISK MEDICATION: ICD-10-CM

## 2019-11-15 PROCEDURE — 99214 OFFICE O/P EST MOD 30 MIN: CPT | Performed by: INTERNAL MEDICINE

## 2019-11-15 RX ORDER — ATOMOXETINE 40 MG/1
CAPSULE ORAL
COMMUNITY
Start: 2019-11-04 | End: 2019-11-15

## 2019-11-15 ASSESSMENT — ENCOUNTER SYMPTOMS
LOSS OF CONSCIOUSNESS: 0
PALPITATIONS: 1
DEPRESSION: 0
FALLS: 0
PND: 0
ORTHOPNEA: 0
ABDOMINAL PAIN: 0
DIZZINESS: 0
SHORTNESS OF BREATH: 0
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
## 2019-12-05 ENCOUNTER — OFFICE VISIT (OUTPATIENT)
Dept: BEHAVIORAL HEALTH | Facility: CLINIC | Age: 69
End: 2019-12-05
Payer: MEDICARE

## 2019-12-05 DIAGNOSIS — F90.1 ATTENTION DEFICIT HYPERACTIVITY DISORDER (ADHD), PREDOMINANTLY HYPERACTIVE TYPE: ICD-10-CM

## 2019-12-05 DIAGNOSIS — F43.10 PTSD (POST-TRAUMATIC STRESS DISORDER): ICD-10-CM

## 2019-12-05 PROCEDURE — 90834 PSYTX W PT 45 MINUTES: CPT | Performed by: MARRIAGE & FAMILY THERAPIST
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
## 2019-12-09 ENCOUNTER — TELEPHONE (OUTPATIENT)
Dept: CARDIOLOGY | Facility: MEDICAL CENTER | Age: 69
End: 2019-12-09

## 2019-12-09 NOTE — TELEPHONE ENCOUNTER
Amanda Do from [employer] pharmacist is calling because they received the same prescription for metoprolol (LOPRESSOR) 25 MG but different dosage and they need clarification on which is the correct dosage to give to pt. He can be reached at [phone], per Amanda you can speak to any pharmacist as he will be leaving. Thank you.

## 2019-12-10 ENCOUNTER — SLEEP CENTER VISIT (OUTPATIENT)
Dept: SLEEP MEDICINE | Facility: MEDICAL CENTER | Age: 69
End: 2019-12-10
Payer: MEDICARE

## 2019-12-10 VITALS
DIASTOLIC BLOOD PRESSURE: 70 MMHG | RESPIRATION RATE: 16 BRPM | BODY MASS INDEX: 29.06 KG/M2 | HEART RATE: 74 BPM | SYSTOLIC BLOOD PRESSURE: 132 MMHG | WEIGHT: 164 LBS | OXYGEN SATURATION: 95 % | HEIGHT: 63 IN

## 2019-12-10 DIAGNOSIS — J45.20 MILD INTERMITTENT ASTHMA WITHOUT COMPLICATION: ICD-10-CM

## 2019-12-10 DIAGNOSIS — G47.33 OBSTRUCTIVE SLEEP APNEA SYNDROME: ICD-10-CM

## 2019-12-10 DIAGNOSIS — R06.83 SNORING: ICD-10-CM

## 2019-12-10 PROCEDURE — 99214 OFFICE O/P EST MOD 30 MIN: CPT | Performed by: INTERNAL MEDICINE

## 2019-12-10 NOTE — PROGRESS NOTES
CC: Sleep apnea hypopnea syndrome.     HPI:   Ms. Deluca is a 68-year-old woman referred by Dr. Sharifa Gayle to assist in the evaluation and management of suspected sleep disordered breathing.  She was initially evaluated on February 5, 2019 last seen on June 4, 2019.      She was evaluated last year for persistent tachycardia.  An echocardiogram on September 12, 2018 demonstrated preserved left ventricular systolic function but mild right ventricular enlargement with normal systolic function with an estimated ejection fraction of 60% and an estimated right ventricular systolic pressure of 25 mmHg.  In the course of her evaluation, symptoms suggesting sleep disordered breathing were identified, prompting consultation here.     She has a regular sleep schedule, as confirmed by the sleep diary, with bedtime at about 9:30 PM and she falls asleep within about 5 minutes.  She awakens 2-4 times on an average night and arises at about 8 AM without fatigue, grogginess or regular morning headaches.  Her  reports her loud snoring and also soft short pauses in breathing during sleep.  She is fatigued during the day.  She does not regularly doses off and appropriately but does nap when the opportunity presents.  Her [place] sleepiness score is 3 points but does not seem to capture the degree of fatigue that she describes.  She drinks carbonated beverages moderately and does not use substances to induce sleep or to maintain wakefulness.  She does not have symptoms suggesting parasomnia or restless leg syndrome.  She has not previously been evaluated for sleep issues.  She notes that her sister does have sleep apnea treated with nocturnal CPAP.     She does have a history of attention deficit hyperactivity disorder, treated with Strattera.  She also has a history of mild intermittent asthma without current symptoms.  She has an albuterol metered-dose inhaler which she uses rarely.     The home sleep test dated February  7, 2019 demonstrated an apnea hypopnea index of 28 events per hour, including obstructive apnea events with some hypopnea and occasional central apneas as well.  The lowest arterial oxygen saturation was 79% on room air she spent 101 minutes with a saturation below 90%.     She was started on auto titrating CPAP with a pressure range of 5 to 15 cm of water.  She has acclimated well to the CPAP and is using it each night.  She does not put it on the first 2 hours of sleep until a puppy who sleeps in the bedroom is put to bed for the night.  She is not having unusual problems with mask fit, leakage or airway dryness.  She has normal levels of daytime alertness and is not napping or falling asleep inappropriately.     The CPAP data recording information is reviewed with the patient.  It demonstrates use on 29 of the last 30 days with an average daily usage of 8 hours and 14 minutes.  The mean pressure is 8.1 cm of water with a 90% maximum of 10.3.  Leak is negligible and the estimated residual apnea hypopnea index is 6.7 events per hour.        Patient Active Problem List    Diagnosis Date Noted   • PTSD (post-traumatic stress disorder) 11/04/2019   • Bereavement 11/04/2019   • Epiretinal membrane (ERM) of left eye 05/10/2019   • Obstructive sleep apnea 05/09/2019   • Pure hypercholesterolemia 11/09/2018   • Attention deficit hyperactivity disorder (ADHD), predominantly hyperactive type 05/11/2018   • Bee allergy status 05/11/2018   • Mixed stress and urge urinary incontinence 05/11/2018   • Mild intermittent asthma without complication 05/11/2018       Past Medical History:   Diagnosis Date   • ADHD    • Allergic rhinitis    • Asthma    • Back pain    • Basal cell carcinoma (BCC)    • Chickenpox    • Depression    • Fracture     Multiple fractures   • Influenza    • Kidney stone    • Nasal drainage    • Pneumonia        Past Surgical History:   Procedure Laterality Date   • ABDOMINAL HYSTERECTOMY TOTAL      age 38 for  "\"pre-cancer\", ovaries not removed   • CARPAL TUNNEL RELEASE     • HYSTERECTOMY LAPAROSCOPY     • OPEN REDUCTION     • PB REMV 2ND CATARACT,CORN-SCLER SECTN         Family History   Problem Relation Age of Onset   • Cancer Sister         Breast cancer   • Cancer Mother    • Cancer Father        Social History     Socioeconomic History   • Marital status:      Spouse name: Not on file   • Number of children: Not on file   • Years of education: Not on file   • Highest education level: Not on file   Occupational History   • Not on file   Social Needs   • Financial resource strain: Not on file   • Food insecurity:     Worry: Not on file     Inability: Not on file   • Transportation needs:     Medical: Not on file     Non-medical: Not on file   Tobacco Use   • Smoking status: Never Smoker   • Smokeless tobacco: Never Used   Substance and Sexual Activity   • Alcohol use: Yes     Comment: \"maybe 3 drinks a year\"   • Drug use: No   • Sexual activity: Not on file   Lifestyle   • Physical activity:     Days per week: Not on file     Minutes per session: Not on file   • Stress: Not on file   Relationships   • Social connections:     Talks on phone: Not on file     Gets together: Not on file     Attends [employer] service: Not on file     Active member of club or organization: Not on file     Attends meetings of clubs or organizations: Not on file     Relationship status: Not on file   • Intimate partner violence:     Fear of current or ex partner: Not on file     Emotionally abused: Not on file     Physically abused: Not on file     Forced sexual activity: Not on file   Other Topics Concern   • Not on file   Social History Narrative   • Not on file       Current Outpatient Medications   Medication Sig Dispense Refill   • metoprolol (LOPRESSOR) 25 MG Tab Take 0.5 Tabs by mouth 2 times a day. 100 Tab 3   • atomoxetine (STRATTERA) 60 MG capsule Take 1 Cap by mouth every day. 90 Cap 1   • oxybutynin (DITROPAN) 5 MG Tab TAKE " "ONE TABLET BY MOUTH THREE TIMES DAILY  (Patient taking differently: Take 5 mg by mouth every day.) 270 Tab 3   • EPINEPHrine (EPIPEN) 0.3 MG/0.3ML Solution Auto-injector solution for injection      • albuterol 108 (90 Base) MCG/ACT Aero Soln inhalation aerosol Inhale 2 Puffs by mouth every 6 hours as needed for Shortness of Breath. 8.5 g 3     No current facility-administered medications for this visit.     \"CURRENT RX\"      Allergies: Bee venom      ROS  Unchanged from prior and positive for the sleep and respiratory issues reviewed above. She reports some visual loss without diplopia.  She has no ongoing problems with tinnitus or rhinitis, chest pain or palpitations, cough or dyspnea.  She has occasional heartburn without abdominal pain or melena.  All other aspects of the CPT review of systems process are negative.      Physical Exam:   /70 (BP Location: Left arm, Patient Position: Sitting, BP Cuff Size: Adult)   Pulse 74   Resp 16   Ht 1.6 m (5' 3\")   Wt 74.4 kg (164 lb)   SpO2 95%   BMI 29.05 kg/m²    Head and neck examination demonstrates no mucosal lesion, purulent drainage or evident polyps. The pharynx is benign with a Mallampati II presentation. The neck is supple without thyromegaly. On chest examination there are symmetrical bilateral breath sounds without rales, wheezing or consolidation. On cardiac examination, the apical impulse and heart sounds are normal and the rhythm is regular. There is no murmur, gallop or rub and no jugular venous distention. The abdomen is soft with active bowel sounds and no palpable hepatosplenomegaly, mass, guarding or rebound. The extremities show no clubbing, cyanosis or edema and no signs of deep venous thrombosis. There is no warmth, redness, tenderness or palpable venous cord in the calves. The skin is clear, warm and dry. There is no unusual peripheral lymphadenopathy. Peripheral pulses are palpable in all 4 extremities. On neurologic examination, cranial " nerve function is intact, motor tone is symmetrical, and the patient is alert, oriented and responsive.       Problems:  1. Obstructive sleep apnea syndrome  She has significant sleep apnea hypopnea syndrome with an apnea hypopnea index of 28 events per hour and a lowest arterial oxygen saturation of 70%.  She is doing well on auto titrating CPAP.  She is using machine nightly, as confirmed by the data recording chip but only for an average of about 6 hours each night.  She enjoys normal levels of daytime alertness and her residual apnea hypopnea index is reasonably low.  She is clearly benefiting from therapy.    2. Snoring  Resolved on CPAP.    3. Mild intermittent asthma without complication  Currently asymptomatic with only rare use of the rescue inhaler.  A controller agent does not seem required at this point.      Plan:   1.  Continue auto titrating CPAP at 5 to 15 cm of water using a small Dreamware nasal cushion.  We have talked about the goal of at least 7.5 hours on treatment nightly.      2.  Return visit here in a year, sooner if new problems develop.     We appreciate the opportunity to assist in her care.

## 2019-12-12 DIAGNOSIS — R00.0 TACHYCARDIA: ICD-10-CM

## 2019-12-12 DIAGNOSIS — R00.2 PALPITATIONS: ICD-10-CM

## 2019-12-16 ENCOUNTER — HOSPITAL ENCOUNTER (OUTPATIENT)
Facility: MEDICAL CENTER | Age: 69
End: 2019-12-16
Attending: FAMILY MEDICINE
Payer: MEDICARE

## 2019-12-16 ENCOUNTER — OFFICE VISIT (OUTPATIENT)
Dept: MEDICAL GROUP | Facility: PHYSICIAN GROUP | Age: 69
End: 2019-12-16
Payer: MEDICARE

## 2019-12-16 VITALS
RESPIRATION RATE: 14 BRPM | HEIGHT: 63 IN | DIASTOLIC BLOOD PRESSURE: 80 MMHG | TEMPERATURE: 97.5 F | WEIGHT: 162 LBS | SYSTOLIC BLOOD PRESSURE: 130 MMHG | OXYGEN SATURATION: 97 % | HEART RATE: 88 BPM | BODY MASS INDEX: 28.7 KG/M2

## 2019-12-16 DIAGNOSIS — F33.1 MODERATE EPISODE OF RECURRENT MAJOR DEPRESSIVE DISORDER (HCC): ICD-10-CM

## 2019-12-16 DIAGNOSIS — F90.1 ATTENTION DEFICIT HYPERACTIVITY DISORDER (ADHD), PREDOMINANTLY HYPERACTIVE TYPE: ICD-10-CM

## 2019-12-16 DIAGNOSIS — D48.9 NEOPLASM OF UNCERTAIN BEHAVIOR: ICD-10-CM

## 2019-12-16 DIAGNOSIS — Z85.828 HISTORY OF BASAL CELL CANCER: ICD-10-CM

## 2019-12-16 DIAGNOSIS — F43.10 PTSD (POST-TRAUMATIC STRESS DISORDER): ICD-10-CM

## 2019-12-16 LAB — PATHOLOGY CONSULT NOTE: NORMAL

## 2019-12-16 PROCEDURE — 99214 OFFICE O/P EST MOD 30 MIN: CPT | Mod: 25 | Performed by: FAMILY MEDICINE

## 2019-12-16 PROCEDURE — 88305 TISSUE EXAM BY PATHOLOGIST: CPT

## 2019-12-16 PROCEDURE — 11102 TANGNTL BX SKIN SINGLE LES: CPT | Performed by: FAMILY MEDICINE

## 2019-12-16 RX ORDER — ATOMOXETINE 40 MG/1
40 CAPSULE ORAL DAILY
Qty: 1 CAP | Refills: 0
Start: 2019-12-16 | End: 2020-04-02 | Stop reason: SDUPTHER

## 2019-12-16 RX ORDER — ESCITALOPRAM OXALATE 10 MG/1
10 TABLET ORAL DAILY
Qty: 30 TAB | Refills: 2 | Status: SHIPPED
Start: 2019-12-16 | End: 2019-12-17 | Stop reason: SDUPTHER

## 2019-12-16 NOTE — PROGRESS NOTES
"Subjective:   Denia Deluca is a 69 y.o. female here today for a skin lesion, follow-up ADHD and depression. She is unaccompanied for today's visit.     Neoplasm of uncertain behavior  History of basal cell cancer  This is a relatively new problem to me. Patient has a skin lesion on the right side of her chest that appears different to the others. We have treated this spot with cryotherapy and it has not changed. If anything, patient feels it is getting larger. She does have a history of basal cell carcinoma and worries this lesion may be cancerous as well.     Attention deficit hyperactivity disorder (ADHD), predominantly hyperactive type  This is an ongoing issue for Yovani. At a prior appointment this year, we had increased her Strattera back to 60mg. She had tachycardia again and she decreased her Strattera back to 40mg. She is feeling better on this and having the same improvement in focus and concentration. She has noticed that she feels more sad.     PTSD (post-traumatic stress disorder)  Moderate episode of recurrent major depressive disorder (HCC)  This is a worsening problem. Patient states she is \"not stable\" and is having more depressive episodes lately. She states that she is having more difficulty handling problems in her life and she has been crying more recently. She has not taken Lexapro recently but inquires about being placed on it again. She reports some \"self-anger,\" but denies suicidal and homicidal ideation. She has not been to therapy recently because she does not think it is helpful. She has a strong support system in her sisters but does not spend time with many friends. Patient reports a family history of psychiatric disorders.    Current medicines (including changes today)  Current Outpatient Medications   Medication Sig Dispense Refill   • atomoxetine (STRATTERA) 40 MG capsule Take 1 Cap by mouth every day. 1 Cap 0   • escitalopram (LEXAPRO) 10 MG Tab Take 1 Tab by mouth every day. " "30 Tab 2   • metoprolol (LOPRESSOR) 25 MG Tab Take 1 Tab by mouth 2 times a day. 100 Tab 3   • oxybutynin (DITROPAN) 5 MG Tab TAKE ONE TABLET BY MOUTH THREE TIMES DAILY  (Patient taking differently: Take 5 mg by mouth every day.) 270 Tab 3   • EPINEPHrine (EPIPEN) 0.3 MG/0.3ML Solution Auto-injector solution for injection      • albuterol 108 (90 Base) MCG/ACT Aero Soln inhalation aerosol Inhale 2 Puffs by mouth every 6 hours as needed for Shortness of Breath. 8.5 g 3     No current facility-administered medications for this visit.      She  has a past medical history of ADHD, Allergic rhinitis, Asthma, Back pain, Basal cell carcinoma (BCC), Chickenpox, Depression, Fracture, Influenza, Kidney stone, Nasal drainage, and Pneumonia.    ROS   No chest pain, no shortness of breath, no abdominal pain.     Objective:     Physical Exam:  /80 (BP Location: Left arm, Patient Position: Sitting, BP Cuff Size: Adult)   Pulse 88   Temp 36.4 °C (97.5 °F) (Temporal)   Resp 14   Ht 1.6 m (5' 3\")   Wt 73.5 kg (162 lb)   SpO2 97%  Body mass index is 28.7 kg/m².   Constitutional: Alert, no distress, well-groomed.  Skin: Warm, dry, good turgor. On right side of patients chest there is a hyperkeratotic, erythematous, and elevated skin lesion that is 5 x 5 x 4 mm.  Eye: Equal, round and reactive, conjunctiva clear, lids normal.  ENMT: Lips without lesions, good dentition, moist mucous membranes.  Neck: Trachea midline, no masses, no thyromegaly.  Respiratory: Unlabored respiratory effort, no cough.  Abdomen: Soft, no gross masses.  MSK: Normal gait, moves all extremities.  Neuro: Grossly non-focal. No cranial nerve deficit. Strength and sensation intact.   Psych: Alert and oriented x3, normal affect and mood. Teary-eyed at times.    Assessment and Plan:     1. Attention deficit hyperactivity disorder (ADHD), predominantly hyperactive type  This is a chronic problem that is stable on the lower dose. Patient continues to take " Strattera for her ADHD without side effects and good results. We will continue the current plan of care and monitor closely. Patient was given the opportunity to ask questions.  - atomoxetine (STRATTERA) 40 MG capsule; Take 1 Cap by mouth every day.  Dispense: 1 Cap; Refill: 0    2. PTSD (post-traumatic stress disorder)  3. Moderate episode of recurrent major depressive disorder (HCC)  Worsening and not well-controlled. Denies crisis. Start Lexapro. Discussed following up in 3 months to ensure the medication is working.  - escitalopram (LEXAPRO) 10 MG Tab; Take 1 Tab by mouth every day.  Dispense: 30 Tab; Refill: 2    4. Neoplasm of uncertain behavior  5. History of basal cell cancer  Evolving lesion in a patient with history of basal cell carcinoma. I am concerned for skin cancer now. The lesion is also bothersome to the patient. We discussed moving forward with shave biopsy and she agrees.    SHAVE BIOPSY PROCEDURE NOTE:    Location of lesion: right chest  Reason for removal: evolving lesion, r/o skin cancer    Discussed with the patient the risks, benefits and alternatives to excision of the lesion.  Informed consent was obtained.  The area was alcohol swabbed and 1 cc of 1% lidocaine with epinephrine was administered.  The area was then prepped and draped in the usual sterile fashion.  A shave biopsy was used to excise the lesion.  The excision was approximately 1 cm by 1 cm.  The specimen was placed in a pathology jar and sent to the lab.  Hemostasis was obtained with gentle pressure and chemical-based cautery.  Antibiotic ointment and bandage was applied.  The patient was given wound care instructions and follow-up precautions.    - Consent for Amb Surgery/Procedure  - Pathology Specimen; Future    Followup: Return in about 3 months (around 3/16/2020).         IAydin (Scribe), am scribing for, and in the presence of, Sharifa Gayle MD    Electronically signed by: Aydin Butt (Scribe),  12/16/2019    ISharifa MD personally performed the services described in this documentation, as scribed by Aydin Butt in my presence, and it is both accurate and complete.     done

## 2019-12-17 DIAGNOSIS — F33.1 MODERATE EPISODE OF RECURRENT MAJOR DEPRESSIVE DISORDER (HCC): ICD-10-CM

## 2019-12-17 RX ORDER — ESCITALOPRAM OXALATE 10 MG/1
10 TABLET ORAL DAILY
Qty: 90 TAB | Refills: 1 | Status: SHIPPED | OUTPATIENT
Start: 2019-12-17 | End: 2020-01-02 | Stop reason: SDUPTHER

## 2019-12-17 NOTE — TELEPHONE ENCOUNTER
----- Message from Denia Deluca sent at 12/17/2019  2:44 PM PST -----  Regarding: Prescription Question  Contact: [phone]  I filled my generic lexapro 30 days..      so now      can you add this to prescriptions at [facility] mail order pharmacy. Thank you

## 2019-12-19 ENCOUNTER — OFFICE VISIT (OUTPATIENT)
Dept: BEHAVIORAL HEALTH | Facility: CLINIC | Age: 69
End: 2019-12-19
Payer: MEDICARE

## 2019-12-19 DIAGNOSIS — Z62.820 PARENT-CHILD RELATIONSHIP PROBLEM: ICD-10-CM

## 2019-12-19 DIAGNOSIS — F43.10 PTSD (POST-TRAUMATIC STRESS DISORDER): ICD-10-CM

## 2019-12-19 PROCEDURE — 90834 PSYTX W PT 45 MINUTES: CPT | Performed by: MARRIAGE & FAMILY THERAPIST

## 2019-12-19 NOTE — BH THERAPY
" [facility]  Therapy Progress Note    Patient Name: Denia Deluca  Patient MRN: [id number]  Today's Date: 12/19/2019     Type of session:Individual psychotherapy  Length of session: 45 minutes  Persons in attendance:Patient    Subjective/New Info: pt is stressed about her family get together this holiday    Objective/Observations:   Participation: Active verbal participation   Grooming: Casual   Cognition: Alert   Eye contact: Good   Mood: Anxious   Affect: Anxious and Angry   Thought process: Circumstantial   Speech: Hypertalkative   Other:     Diagnoses: No diagnosis found.     Current risk:   SUICIDE: Not applicable   Homicide: Not applicable   Self-harm: Not applicable   Relapse: Not applicable   Other:    Safety Plan reviewed? Not Indicated   If evidence of imminent risk is present, intervention/plan:     Therapeutic Intervention(s): Clarify:  Clarify feelings and Clarify thoughts, Cognitive modification, Positive behavior reinforced, Self-care skills, Stressors assessed and Supportive psychotherapy    Treatment Goal(s)/Objective(s) addressed: id pt stressors including family get together, clarified pt is anxious about he get together, using cognitive mod helped pt with tools for coping, reinforced pt's positive coping skills, encouraged cont self care and provided support for pt    Progress toward Treatment Goals: Mild improvement    Plan:  - \"Homework\" recommendation: focus on 2 positive things about each guest    ELKIN Banks  12/19/2019                                     "

## 2019-12-20 ENCOUNTER — TELEPHONE (OUTPATIENT)
Dept: MEDICAL GROUP | Facility: PHYSICIAN GROUP | Age: 69
End: 2019-12-20

## 2019-12-20 NOTE — TELEPHONE ENCOUNTER
Phone Number Called: [phone]    Call outcome: spoke to patient regarding message below    Message: Spoke with Pt a  Colonoscopy is not possible due to Twisted Bowels. Pt states she will call and schedule her Mammo

## 2020-01-02 ENCOUNTER — OFFICE VISIT (OUTPATIENT)
Dept: BEHAVIORAL HEALTH | Facility: CLINIC | Age: 70
End: 2020-01-02
Payer: MEDICARE

## 2020-01-02 DIAGNOSIS — F43.10 PTSD (POST-TRAUMATIC STRESS DISORDER): ICD-10-CM

## 2020-01-02 DIAGNOSIS — F33.1 MODERATE EPISODE OF RECURRENT MAJOR DEPRESSIVE DISORDER (HCC): ICD-10-CM

## 2020-01-02 DIAGNOSIS — Z62.820 PARENT-CHILD RELATIONSHIP PROBLEM: ICD-10-CM

## 2020-01-02 PROCEDURE — 90834 PSYTX W PT 45 MINUTES: CPT | Performed by: MARRIAGE & FAMILY THERAPIST

## 2020-01-02 NOTE — BH THERAPY
" [facility]  Therapy Progress Note    Patient Name: Denia Deluca  Patient MRN: [id number]  Today's Date: 1/2/2020     Type of session:Individual psychotherapy  Length of session: 45 minutes  Persons in attendance:Patient    Subjective/New Info: pt had a good holiday worst fears did not come true    Objective/Observations:   Participation: Active verbal participation   Grooming: Neat   Cognition: Fully Oriented   Eye contact: Good   Mood: Anxious   Affect: Constricted   Thought process: Goal-directed   Speech: Rate within normal limits and Volume within normal limits   Other:     Diagnoses: No diagnosis found.     Current risk:   SUICIDE: Not applicable   Homicide: Not applicable   Self-harm: Not applicable   Relapse: Not applicable   Other:    Safety Plan reviewed? No   If evidence of imminent risk is present, intervention/plan:     Therapeutic Intervention(s): Clarify:  Clarify feelings and Clarify thoughts, Cognitive modification, Positive behavior reinforced, Self-care skills, Stressors assessed and Supportive psychotherapy    Treatment Goal(s)/Objective(s) addressed: id pt stressors including holidays with her adopted daughter, ex H and children, clarified pt was relieved things went well, using cognitive mod helped pt reinforce her good choices during the visit, reinforced positive decision making, encouraged cont self care and provided support for pt     Progress toward Treatment Goals: Mild improvement    Plan:  - \"Homework\" recommendation: cont self care, exercise and socialization in sewing group    ELKIN Banks  1/2/2020                                     "

## 2020-01-03 RX ORDER — ESCITALOPRAM OXALATE 10 MG/1
10 TABLET ORAL DAILY
Qty: 90 TAB | Refills: 1 | Status: SHIPPED | OUTPATIENT
Start: 2020-01-03 | End: 2020-04-02 | Stop reason: SDUPTHER

## 2020-01-28 ENCOUNTER — PATIENT OUTREACH (OUTPATIENT)
Dept: HEALTH INFORMATION MANAGEMENT | Facility: OTHER | Age: 70
End: 2020-01-28

## 2020-01-28 NOTE — PROGRESS NOTES
Outcome: Call back at a later time     Please transfer to Patient Outreach Team at [phone] when patient returns call.    [facility] Verified: yes    Attempt # 1

## 2020-01-30 ENCOUNTER — OFFICE VISIT (OUTPATIENT)
Dept: BEHAVIORAL HEALTH | Facility: CLINIC | Age: 70
End: 2020-01-30
Payer: MEDICARE

## 2020-01-30 DIAGNOSIS — F43.10 PTSD (POST-TRAUMATIC STRESS DISORDER): ICD-10-CM

## 2020-01-30 PROCEDURE — 90834 PSYTX W PT 45 MINUTES: CPT | Performed by: MARRIAGE & FAMILY THERAPIST

## 2020-01-30 NOTE — BH THERAPY
[facility]  Therapy Progress Note    Patient Name: Denia Deluca  Patient MRN: [id number]  Today's Date: 1/30/2020     Type of session:Individual psychotherapy  Length of session: 45 minutes  Persons in attendance:Patient    Subjective/New Info: pt is worried about her H because unknown to him (and pt) he had a small heart attack and was finding it hard to breathe    Objective/Observations:   Participation: Active verbal participation   Grooming: Casual   Cognition: Alert   Eye contact: Good   Mood: Anxious   Affect: Anxious   Thought process: Tangential   Speech: Hypertalkative   Other:     Diagnoses: No diagnosis found.     Current risk:   SUICIDE: Not applicable   Homicide: Not applicable   Self-harm: Not applicable   Relapse: Not applicable   Other:    Safety Plan reviewed? Not Indicated   If evidence of imminent risk is present, intervention/plan:     Therapeutic Intervention(s): Clarify:  Clarify feelings and Clarify thoughts, Positive behavior reinforced, Self-care skills, Stressors assessed and Supportive psychotherapy    Treatment Goal(s)/Objective(s) addressed: id pt stressors including H had an undetected mild heart attack, clarified pt is terrified of losing her H, reinforced pt's positive supportive  Reaction to her H's heart attack, encouraged cont self care and provided support for pt    Progress toward Treatment Goals: Mild improvement    Plan:return for 1:1  - Next appointment scheduled:  3/2/2020    ELKIN Banks  1/30/2020

## 2020-01-31 ENCOUNTER — OFFICE VISIT (OUTPATIENT)
Dept: CARDIOLOGY | Facility: MEDICAL CENTER | Age: 70
End: 2020-01-31
Payer: MEDICARE

## 2020-01-31 VITALS
BODY MASS INDEX: 28.95 KG/M2 | DIASTOLIC BLOOD PRESSURE: 70 MMHG | HEART RATE: 84 BPM | WEIGHT: 163.4 LBS | OXYGEN SATURATION: 97 % | HEIGHT: 63 IN | SYSTOLIC BLOOD PRESSURE: 126 MMHG

## 2020-01-31 DIAGNOSIS — G47.33 OBSTRUCTIVE SLEEP APNEA: ICD-10-CM

## 2020-01-31 DIAGNOSIS — R00.2 PALPITATIONS: ICD-10-CM

## 2020-01-31 DIAGNOSIS — E78.00 PURE HYPERCHOLESTEROLEMIA: ICD-10-CM

## 2020-01-31 PROCEDURE — 99214 OFFICE O/P EST MOD 30 MIN: CPT | Performed by: INTERNAL MEDICINE

## 2020-01-31 ASSESSMENT — ENCOUNTER SYMPTOMS
DIZZINESS: 0
PALPITATIONS: 0
FALLS: 0
DEPRESSION: 0
LOSS OF CONSCIOUSNESS: 0
ORTHOPNEA: 0
PND: 0
SHORTNESS OF BREATH: 0
ABDOMINAL PAIN: 0

## 2020-01-31 NOTE — LETTER
"     [facility]   [address] [place] [address] [address]  [place] [place] [address]  Phone: [phone]  Fax: [phone]              Denia Deluca  1950    Encounter Date: 1/31/2020    Tawnya Cee M.D.          PROGRESS NOTE:  Chief Complaint   Patient presents with   • Palpitations   • Tachycardia       Subjective:   Denia Deluca is a 69-year-old female presenting to clinic for follow-up on palpitations and tachycardia.    Patient is currently being evaluated for allergy shots as she is allergic to bee venom.  Her allergist is requesting her to be off of the metoprolol.  On further clarification patient only needs to be off of metoprolol for 24 hours before her allergy shot which will be administered about 8 times a year to ensure that her EpiPen is effective should it be needed after the allergy shot.    Patient reports feeling really well since being on the metoprolol 12.5 mg twice daily.  She has not had any recurrent palpitations.  She has been using her CPAP on a daily basis.    She has tried to limit her red meat intake.    Past Medical History:   Diagnosis Date   • ADHD    • Allergic rhinitis    • Asthma    • Back pain    • Basal cell carcinoma (BCC)    • Chickenpox    • Depression    • Fracture     Multiple fractures   • Influenza    • Kidney stone    • Nasal drainage    • Pneumonia      Past Surgical History:   Procedure Laterality Date   • ABDOMINAL HYSTERECTOMY TOTAL      age 38 for \"pre-cancer\", ovaries not removed   • CARPAL TUNNEL RELEASE     • HYSTERECTOMY LAPAROSCOPY     • OPEN REDUCTION     • PB REMV 2ND CATARACT,CORN-SCLER SECTN       Family History   Problem Relation Age of Onset   • Cancer Sister         Breast cancer   • Cancer Mother    • Cancer Father      Social History     Socioeconomic History   • Marital status:      Spouse name: Not on file   • Number of children: Not on file   • Years of education: Not on file   • Highest education " "level: Not on file   Occupational History   • Not on file   Social Needs   • Financial resource strain: Not on file   • Food insecurity:     Worry: Not on file     Inability: Not on file   • Transportation needs:     Medical: Not on file     Non-medical: Not on file   Tobacco Use   • Smoking status: Never Smoker   • Smokeless tobacco: Never Used   Substance and Sexual Activity   • Alcohol use: Yes     Comment: \"maybe 3 drinks a year\"   • Drug use: No   • Sexual activity: Not on file   Lifestyle   • Physical activity:     Days per week: Not on file     Minutes per session: Not on file   • Stress: Not on file   Relationships   • Social connections:     Talks on phone: Not on file     Gets together: Not on file     Attends [employer] service: Not on file     Active member of club or organization: Not on file     Attends meetings of clubs or organizations: Not on file     Relationship status: Not on file   • Intimate partner violence:     Fear of current or ex partner: Not on file     Emotionally abused: Not on file     Physically abused: Not on file     Forced sexual activity: Not on file   Other Topics Concern   • Not on file   Social History Narrative   • Not on file     Allergies   Allergen Reactions   • Bee Venom Anaphylaxis     Will stop heart      Outpatient Encounter Medications as of 1/31/2020   Medication Sig Dispense Refill   • escitalopram (LEXAPRO) 10 MG Tab Take 1 Tab by mouth every day. 90 Tab 1   • atomoxetine (STRATTERA) 40 MG capsule Take 1 Cap by mouth every day. 1 Cap 0   • metoprolol (LOPRESSOR) 25 MG Tab Take 12.5 mg by mouth 2 times a day. 100 Tab 3   • oxybutynin (DITROPAN) 5 MG Tab TAKE ONE TABLET BY MOUTH THREE TIMES DAILY  (Patient taking differently: Take 5 mg by mouth every day.) 270 Tab 3   • EPINEPHrine (EPIPEN) 0.3 MG/0.3ML Solution Auto-injector solution for injection      • albuterol 108 (90 Base) MCG/ACT Aero Soln inhalation aerosol Inhale 2 Puffs by mouth every 6 hours as needed for " "Shortness of Breath. 8.5 g 3     No facility-administered encounter medications on file as of 1/31/2020.      Review of Systems   Constitutional: Negative for malaise/fatigue.   Respiratory: Negative for shortness of breath.    Cardiovascular: Negative for chest pain, palpitations, orthopnea, leg swelling and PND.   Gastrointestinal: Negative for abdominal pain.   Musculoskeletal: Negative for falls.   Neurological: Negative for dizziness and loss of consciousness.   Psychiatric/Behavioral: Negative for depression.   All other systems reviewed and are negative.       Objective:   /70 (BP Location: Left arm, Patient Position: Sitting, BP Cuff Size: Adult)   Pulse 84   Ht 1.6 m (5' 3\")   Wt 74.1 kg (163 lb 6.4 oz)   SpO2 97%   BMI 28.95 kg/m²      Physical Exam   Constitutional: She is oriented to person, place, and time. She appears well-developed and well-nourished. No distress.   HENT:   Head: Normocephalic and atraumatic.   Eyes: Conjunctivae are normal. No scleral icterus.   Neck: Normal range of motion. Neck supple.   Cardiovascular: Normal rate, regular rhythm and normal heart sounds. Exam reveals no gallop and no friction rub.   No murmur heard.  Pulmonary/Chest: Effort normal and breath sounds normal. No respiratory distress. She has no wheezes. She has no rales.   Abdominal: Soft. She exhibits no distension. There is no tenderness.   Musculoskeletal:         General: No edema.   Neurological: She is alert and oriented to person, place, and time.   Skin: Skin is warm and dry. She is not diaphoretic.   Psychiatric: She has a normal mood and affect. Her behavior is normal.   Nursing note and vitals reviewed.    Ziopatch monitor performed August 2019 showed sinus rhythm.  Nonsustained ventricular tachycardia lasting 13 seconds with an average heart rate of 121 bpm.    Echocardiogram performed August 2019 showed normal LV systolic function.    Myocardial perfusion study performed September 2019 showed " no fixed or reversible defects.    Assessment:     1. Palpitations     2. Obstructive sleep apnea     3. Pure hypercholesterolemia  Lipid Profile       Medical Decision Making:  Today's Assessment / Status / Plan:     Patient's palpitations have improved with metoprolol 12.5 mg twice daily.  She has eliminated caffeine from her regimen as well.    In regards to her allergy shots, she can stop her metoprolol for 24 hours before.  This has been given to her in the letter.  Should her allergist have any further concerns, they can contact us.    She has been working closely with her psychiatrist to help manage her longstanding anxiety.  She is on Lexapro.    Lipid panel ordered today.  Dietary modification discussed with the patient.    Total 26 minutes face-to-face time spent with patient, with greater than 50% of the total time discussing patient's issues and symptoms as listed above in assessment and plan, as well as managing coordination of care for future evaluation and treatment. Most of the time was spent discussing her palpitations and concerns with metoprolol and coming up with the plan as above.     Return to clinic in 6 months to 1 year or earlier if needed.    Thank you for allowing me to participate in the care of this patient. Please do not hesitate to contact me with any questions.    Tawnya Cee MD  Cardiologist  [facility]      PLEASE NOTE: This dictation was created using voice recognition software.           Sharifa Gayle M.D.  [id number] [place] [id number]  VIA In Basket

## 2020-01-31 NOTE — PROGRESS NOTES
"Chief Complaint   Patient presents with   • Palpitations   • Tachycardia       Subjective:   Denia Deluca is a 69-year-old female presenting to clinic for follow-up on palpitations and tachycardia.    Patient is currently being evaluated for allergy shots as she is allergic to bee venom.  Her allergist is requesting her to be off of the metoprolol.  On further clarification patient only needs to be off of metoprolol for 24 hours before her allergy shot which will be administered about 8 times a year to ensure that her EpiPen is effective should it be needed after the allergy shot.    Patient reports feeling really well since being on the metoprolol 12.5 mg twice daily.  She has not had any recurrent palpitations.  She has been using her CPAP on a daily basis.    She has tried to limit her red meat intake.    Past Medical History:   Diagnosis Date   • ADHD    • Allergic rhinitis    • Asthma    • Back pain    • Basal cell carcinoma (BCC)    • Chickenpox    • Depression    • Fracture     Multiple fractures   • Influenza    • Kidney stone    • Nasal drainage    • Pneumonia      Past Surgical History:   Procedure Laterality Date   • ABDOMINAL HYSTERECTOMY TOTAL      age 38 for \"pre-cancer\", ovaries not removed   • CARPAL TUNNEL RELEASE     • HYSTERECTOMY LAPAROSCOPY     • OPEN REDUCTION     • PB REMV 2ND CATARACT,CORN-SCLER SECTN       Family History   Problem Relation Age of Onset   • Cancer Sister         Breast cancer   • Cancer Mother    • Cancer Father      Social History     Socioeconomic History   • Marital status:      Spouse name: Not on file   • Number of children: Not on file   • Years of education: Not on file   • Highest education level: Not on file   Occupational History   • Not on file   Social Needs   • Financial resource strain: Not on file   • Food insecurity:     Worry: Not on file     Inability: Not on file   • Transportation needs:     Medical: Not on file     Non-medical: Not on file " "  Tobacco Use   • Smoking status: Never Smoker   • Smokeless tobacco: Never Used   Substance and Sexual Activity   • Alcohol use: Yes     Comment: \"maybe 3 drinks a year\"   • Drug use: No   • Sexual activity: Not on file   Lifestyle   • Physical activity:     Days per week: Not on file     Minutes per session: Not on file   • Stress: Not on file   Relationships   • Social connections:     Talks on phone: Not on file     Gets together: Not on file     Attends Confucianist service: Not on file     Active member of club or organization: Not on file     Attends meetings of clubs or organizations: Not on file     Relationship status: Not on file   • Intimate partner violence:     Fear of current or ex partner: Not on file     Emotionally abused: Not on file     Physically abused: Not on file     Forced sexual activity: Not on file   Other Topics Concern   • Not on file   Social History Narrative   • Not on file     Allergies   Allergen Reactions   • Bee Venom Anaphylaxis     Will stop heart      Outpatient Encounter Medications as of 1/31/2020   Medication Sig Dispense Refill   • escitalopram (LEXAPRO) 10 MG Tab Take 1 Tab by mouth every day. 90 Tab 1   • atomoxetine (STRATTERA) 40 MG capsule Take 1 Cap by mouth every day. 1 Cap 0   • metoprolol (LOPRESSOR) 25 MG Tab Take 12.5 mg by mouth 2 times a day. 100 Tab 3   • oxybutynin (DITROPAN) 5 MG Tab TAKE ONE TABLET BY MOUTH THREE TIMES DAILY  (Patient taking differently: Take 5 mg by mouth every day.) 270 Tab 3   • EPINEPHrine (EPIPEN) 0.3 MG/0.3ML Solution Auto-injector solution for injection      • albuterol 108 (90 Base) MCG/ACT Aero Soln inhalation aerosol Inhale 2 Puffs by mouth every 6 hours as needed for Shortness of Breath. 8.5 g 3     No facility-administered encounter medications on file as of 1/31/2020.      Review of Systems   Constitutional: Negative for malaise/fatigue.   Respiratory: Negative for shortness of breath.    Cardiovascular: Negative for chest pain, " "palpitations, orthopnea, leg swelling and PND.   Gastrointestinal: Negative for abdominal pain.   Musculoskeletal: Negative for falls.   Neurological: Negative for dizziness and loss of consciousness.   Psychiatric/Behavioral: Negative for depression.   All other systems reviewed and are negative.       Objective:   /70 (BP Location: Left arm, Patient Position: Sitting, BP Cuff Size: Adult)   Pulse 84   Ht 1.6 m (5' 3\")   Wt 74.1 kg (163 lb 6.4 oz)   SpO2 97%   BMI 28.95 kg/m²     Physical Exam   Constitutional: She is oriented to person, place, and time. She appears well-developed and well-nourished. No distress.   HENT:   Head: Normocephalic and atraumatic.   Eyes: Conjunctivae are normal. No scleral icterus.   Neck: Normal range of motion. Neck supple.   Cardiovascular: Normal rate, regular rhythm and normal heart sounds. Exam reveals no gallop and no friction rub.   No murmur heard.  Pulmonary/Chest: Effort normal and breath sounds normal. No respiratory distress. She has no wheezes. She has no rales.   Abdominal: Soft. She exhibits no distension. There is no tenderness.   Musculoskeletal:         General: No edema.   Neurological: She is alert and oriented to person, place, and time.   Skin: Skin is warm and dry. She is not diaphoretic.   Psychiatric: She has a normal mood and affect. Her behavior is normal.   Nursing note and vitals reviewed.    Ziopatch monitor performed August 2019 showed sinus rhythm.  Nonsustained ventricular tachycardia lasting 13 seconds with an average heart rate of 121 bpm.    Echocardiogram performed August 2019 showed normal LV systolic function.    Myocardial perfusion study performed September 2019 showed no fixed or reversible defects.    Assessment:     1. Palpitations     2. Obstructive sleep apnea     3. Pure hypercholesterolemia  Lipid Profile       Medical Decision Making:  Today's Assessment / Status / Plan:     Patient's palpitations have improved with metoprolol " 12.5 mg twice daily.  She has eliminated caffeine from her regimen as well.    In regards to her allergy shots, she can stop her metoprolol for 24 hours before.  This has been given to her in the letter.  Should her allergist have any further concerns, they can contact us.    She has been working closely with her psychiatrist to help manage her longstanding anxiety.  She is on Lexapro.    Lipid panel ordered today.  Dietary modification discussed with the patient.    Total 26 minutes face-to-face time spent with patient, with greater than 50% of the total time discussing patient's issues and symptoms as listed above in assessment and plan, as well as managing coordination of care for future evaluation and treatment. Most of the time was spent discussing her palpitations and concerns with metoprolol and coming up with the plan as above.     Return to clinic in 6 months to 1 year or earlier if needed.    Thank you for allowing me to participate in the care of this patient. Please do not hesitate to contact me with any questions.    Tawnya Cee MD  Cardiologist  [facility]      PLEASE NOTE: This dictation was created using voice recognition software.

## 2020-02-03 DIAGNOSIS — J45.20 MILD INTERMITTENT ASTHMA WITHOUT COMPLICATION: ICD-10-CM

## 2020-02-03 DIAGNOSIS — Z91.030 BEE ALLERGY STATUS: ICD-10-CM

## 2020-02-03 RX ORDER — ALBUTEROL SULFATE 90 UG/1
2 AEROSOL, METERED RESPIRATORY (INHALATION) EVERY 6 HOURS PRN
Qty: 8.5 G | Refills: 11 | Status: SHIPPED | OUTPATIENT
Start: 2020-02-03 | End: 2022-10-18 | Stop reason: SDUPTHER

## 2020-02-03 NOTE — PROGRESS NOTES
Outcome: No answer/ no VM     Please transfer to Patient Outreach Team at [phone] when patient returns call.    [facility] Verified: yes    Attempt # 2

## 2020-02-04 ENCOUNTER — HOSPITAL ENCOUNTER (OUTPATIENT)
Dept: LAB | Facility: MEDICAL CENTER | Age: 70
End: 2020-02-04
Attending: INTERNAL MEDICINE
Payer: MEDICARE

## 2020-02-04 DIAGNOSIS — E78.00 PURE HYPERCHOLESTEROLEMIA: ICD-10-CM

## 2020-02-04 LAB
CHOLEST SERPL-MCNC: 203 MG/DL (ref 100–199)
HDLC SERPL-MCNC: 60 MG/DL
LDLC SERPL CALC-MCNC: 120 MG/DL
TRIGL SERPL-MCNC: 113 MG/DL (ref 0–149)

## 2020-02-04 PROCEDURE — 36415 COLL VENOUS BLD VENIPUNCTURE: CPT

## 2020-02-04 PROCEDURE — 80061 LIPID PANEL: CPT

## 2020-02-06 ENCOUNTER — HOSPITAL ENCOUNTER (OUTPATIENT)
Dept: RADIOLOGY | Facility: MEDICAL CENTER | Age: 70
End: 2020-02-06
Attending: FAMILY MEDICINE
Payer: MEDICARE

## 2020-02-06 DIAGNOSIS — Z12.31 VISIT FOR SCREENING MAMMOGRAM: ICD-10-CM

## 2020-02-06 PROCEDURE — 77067 SCR MAMMO BI INCL CAD: CPT

## 2020-03-02 ENCOUNTER — OFFICE VISIT (OUTPATIENT)
Dept: MEDICAL GROUP | Facility: PHYSICIAN GROUP | Age: 70
End: 2020-03-02
Payer: MEDICARE

## 2020-03-02 VITALS
BODY MASS INDEX: 29.41 KG/M2 | RESPIRATION RATE: 16 BRPM | WEIGHT: 166 LBS | TEMPERATURE: 98 F | HEIGHT: 63 IN | DIASTOLIC BLOOD PRESSURE: 66 MMHG | SYSTOLIC BLOOD PRESSURE: 122 MMHG | OXYGEN SATURATION: 95 % | HEART RATE: 89 BPM

## 2020-03-02 DIAGNOSIS — F33.42 RECURRENT MAJOR DEPRESSIVE DISORDER, IN FULL REMISSION (HCC): ICD-10-CM

## 2020-03-02 DIAGNOSIS — R00.0 TACHYCARDIA: ICD-10-CM

## 2020-03-02 DIAGNOSIS — F90.1 ATTENTION DEFICIT HYPERACTIVITY DISORDER (ADHD), PREDOMINANTLY HYPERACTIVE TYPE: ICD-10-CM

## 2020-03-02 DIAGNOSIS — G47.33 OBSTRUCTIVE SLEEP APNEA: ICD-10-CM

## 2020-03-02 PROBLEM — Z63.4 BEREAVEMENT: Status: RESOLVED | Noted: 2019-11-04 | Resolved: 2020-03-02

## 2020-03-02 PROCEDURE — 8041 PR SCP AHA: Performed by: FAMILY MEDICINE

## 2020-03-02 PROCEDURE — 99214 OFFICE O/P EST MOD 30 MIN: CPT | Performed by: FAMILY MEDICINE

## 2020-03-02 ASSESSMENT — FIBROSIS 4 INDEX: FIB4 SCORE: 1.15

## 2020-03-02 NOTE — PROGRESS NOTES
"Subjective:   Denia Deluca is a 69 y.o. female here today for the follow up ADHD, depression and tachycardia.    Attention deficit hyperactivity disorder (ADHD), predominantly hyperactive type  Chronic in nature and stable. Patient continues to take Strattera 40 mg once daily. States she is doing well overall on this dosage. She mentions that she feels her body has become used to the lower dose, as she does not seem to tolerate the higher doses like before. No reports of medication side effects or associated symptoms regarding ADHD. She does have some \"stickier\" bowel movements that sometimes do not completely evacuate.    Recurrent major depressive disorder, in full remission (HCC)  At the last visit, we restarted patient on Lexapro 10 mg once daily due to worsening PTSD. Patient states the depressed mood has significantly improved since restarting Lexapro. She would like to continue the current dosage. She mentions that she has had increased body odor lately, although unclear if this is a medication side effect. No SI/HI.    Tachycardia  Chronic in nature and stable. She continues to take the same dosage of metoprolol. Patient states at her last visit with Dr. Cee (cardiology), she was told that she was stable and was advised to return one year later. She does not report any new associated symptoms or concerns regarding this condition.    Obstructive sleep apnea  Chronic in nature and stable. Patient states when she last saw Dr. Denton (pulmonary sleep), she was told that she was stable with current management with her CPAP and was advised to follow up one year later. She does not report any new associated symptoms or concerns regarding this condition.    Current medicines (including changes today)  Current Outpatient Medications   Medication Sig Dispense Refill   • albuterol 108 (90 Base) MCG/ACT Aero Soln inhalation aerosol Inhale 2 Puffs by mouth every 6 hours as needed for Shortness of Breath. 8.5 " "g 11   • escitalopram (LEXAPRO) 10 MG Tab Take 1 Tab by mouth every day. 90 Tab 1   • atomoxetine (STRATTERA) 40 MG capsule Take 1 Cap by mouth every day. 1 Cap 0   • metoprolol (LOPRESSOR) 25 MG Tab Take 12.5 mg by mouth 2 times a day. 100 Tab 3   • oxybutynin (DITROPAN) 5 MG Tab TAKE ONE TABLET BY MOUTH THREE TIMES DAILY  (Patient taking differently: Take 5 mg by mouth every day.) 270 Tab 3   • EPINEPHrine (EPIPEN) 0.3 MG/0.3ML Solution Auto-injector solution for injection        No current facility-administered medications for this visit.      She  has a past medical history of ADHD, Allergic rhinitis, Asthma, Back pain, Basal cell carcinoma (BCC), Chickenpox, Depression, Fracture, Influenza, Kidney stone, Nasal drainage, and Pneumonia.    ROS   No chest pain, no shortness of breath, no abdominal pain.     Objective:     Physical Exam:  /66   Pulse 89   Temp 36.7 °C (98 °F)   Resp 16   Ht 1.6 m (5' 3\")   Wt 75.3 kg (166 lb)   SpO2 95%  Body mass index is 29.41 kg/m².   Constitutional: Alert, no distress, well-groomed.  Skin: Warm, dry, good turgor, no rashes in visible areas.  Eye: Equal, round and reactive, conjunctiva clear, lids normal.  ENMT: Lips without lesions, good dentition, moist mucous membranes.  Neck: Trachea midline, no masses, no thyromegaly.  Respiratory: Unlabored respiratory effort, no cough.  Abdomen: Soft, no gross masses.  MSK: Normal gait, moves all extremities.  Neuro: Grossly non-focal. No cranial nerve deficit. Strength and sensation intact.   Psych: Alert and oriented x3, normal affect and mood.     Assessment and Plan:     1. Attention deficit hyperactivity disorder (ADHD), predominantly hyperactive type  - Patient has been stable with current management. We will make no changes for now. She will continue current dosage of Strattera.     2. Recurrent major depressive disorder, in full remission (HCC)  - Significantly improved since being put back on Lexapro. She will " continue current dosage of Lexapro. We did discuss possibility of 6-month course. She has done this in the past with reoccurrence of symptoms and feels she may be better to stay on it. Will discuss at visit in November.    3. Tachycardia  - Patient has been stable with current management. We will make no changes for now. Patient will continue current plan of care and follow ups with Dr. Cee (cardiology).     4. Obstructive sleep apnea  - Patient has been stable with current management. We will make no changes for now. Patient will continue current plan of care and follow ups with Dr. Denton (pulmonary sleep).     Followup: Return in about 8 months (around 11/6/2020) for AWV.          IUday (Scribe), am scribing for, and in the presence of, Sharifa Gayle MD    Electronically signed by: Uday Ma (Scribe), 3/2/2020    Sharifa COLE MD personally performed the services described in this documentation, as scribed by Uday Ma in my presence, and it is both accurate and complete.

## 2020-04-02 DIAGNOSIS — F33.1 MODERATE EPISODE OF RECURRENT MAJOR DEPRESSIVE DISORDER (HCC): ICD-10-CM

## 2020-04-02 DIAGNOSIS — F90.1 ATTENTION DEFICIT HYPERACTIVITY DISORDER (ADHD), PREDOMINANTLY HYPERACTIVE TYPE: ICD-10-CM

## 2020-04-02 RX ORDER — ESCITALOPRAM OXALATE 10 MG/1
10 TABLET ORAL DAILY
Qty: 90 TAB | Refills: 0 | Status: SHIPPED | OUTPATIENT
Start: 2020-04-02 | End: 2020-09-28 | Stop reason: SDUPTHER

## 2020-04-02 RX ORDER — ATOMOXETINE 40 MG/1
40 CAPSULE ORAL DAILY
Qty: 1 CAP | Refills: 0 | Status: SHIPPED | OUTPATIENT
Start: 2020-04-02 | End: 2020-04-10 | Stop reason: SDUPTHER

## 2020-04-10 DIAGNOSIS — F90.1 ATTENTION DEFICIT HYPERACTIVITY DISORDER (ADHD), PREDOMINANTLY HYPERACTIVE TYPE: ICD-10-CM

## 2020-04-10 RX ORDER — ATOMOXETINE 40 MG/1
40 CAPSULE ORAL DAILY
Qty: 100 CAP | Refills: 3 | Status: SHIPPED | OUTPATIENT
Start: 2020-04-10 | End: 2020-09-28 | Stop reason: SDUPTHER

## 2020-04-13 DIAGNOSIS — R00.0 TACHYCARDIA: ICD-10-CM

## 2020-04-13 DIAGNOSIS — R00.2 PALPITATIONS: ICD-10-CM

## 2020-04-21 ENCOUNTER — APPOINTMENT (OUTPATIENT)
Dept: BEHAVIORAL HEALTH | Facility: CLINIC | Age: 70
End: 2020-04-21
Payer: MEDICARE

## 2020-05-26 DIAGNOSIS — R00.0 TACHYCARDIA: ICD-10-CM

## 2020-05-26 DIAGNOSIS — R00.2 PALPITATIONS: ICD-10-CM

## 2020-06-10 ENCOUNTER — HOSPITAL ENCOUNTER (OUTPATIENT)
Facility: MEDICAL CENTER | Age: 70
End: 2020-06-10
Payer: MEDICARE

## 2020-06-10 PROCEDURE — 82274 ASSAY TEST FOR BLOOD FECAL: CPT

## 2020-06-19 DIAGNOSIS — F33.1 MODERATE EPISODE OF RECURRENT MAJOR DEPRESSIVE DISORDER (HCC): ICD-10-CM

## 2020-06-19 LAB — HEMOCCULT STL QL IA: NEGATIVE

## 2020-07-09 DIAGNOSIS — F33.1 MODERATE EPISODE OF RECURRENT MAJOR DEPRESSIVE DISORDER (HCC): ICD-10-CM

## 2020-09-09 ENCOUNTER — PATIENT MESSAGE (OUTPATIENT)
Dept: CARDIOLOGY | Facility: MEDICAL CENTER | Age: 70
End: 2020-09-09

## 2020-09-09 DIAGNOSIS — R00.2 PALPITATIONS: ICD-10-CM

## 2020-09-09 DIAGNOSIS — R00.0 TACHYCARDIA: ICD-10-CM

## 2020-09-18 NOTE — PATIENT COMMUNICATION
Tawnya Cee M.D.  You 52 minutes ago (3:47 PM)     It is probably due to lack of exercise and possibly dehydration.  She should remember to stay hydrated.  We can increase her metoprolol to 25 mg twice daily.   If she starts having any associated palpitations, she should let us know.

## 2020-09-28 DIAGNOSIS — F33.1 MODERATE EPISODE OF RECURRENT MAJOR DEPRESSIVE DISORDER (HCC): ICD-10-CM

## 2020-09-28 DIAGNOSIS — F90.1 ATTENTION DEFICIT HYPERACTIVITY DISORDER (ADHD), PREDOMINANTLY HYPERACTIVE TYPE: ICD-10-CM

## 2020-09-28 RX ORDER — ESCITALOPRAM OXALATE 10 MG/1
10 TABLET ORAL DAILY
Qty: 90 TAB | Refills: 3 | Status: SHIPPED | OUTPATIENT
Start: 2020-09-28 | End: 2021-04-19

## 2020-09-28 RX ORDER — ATOMOXETINE 40 MG/1
40 CAPSULE ORAL DAILY
Qty: 90 CAP | Refills: 1 | Status: SHIPPED | OUTPATIENT
Start: 2020-09-28 | End: 2021-03-09

## 2020-10-02 ENCOUNTER — PATIENT MESSAGE (OUTPATIENT)
Dept: MEDICAL GROUP | Facility: PHYSICIAN GROUP | Age: 70
End: 2020-10-02

## 2020-10-02 DIAGNOSIS — F90.1 ATTENTION DEFICIT HYPERACTIVITY DISORDER (ADHD), PREDOMINANTLY HYPERACTIVE TYPE: ICD-10-CM

## 2020-10-02 DIAGNOSIS — Z11.59 ENCOUNTER FOR HEPATITIS C SCREENING TEST FOR LOW RISK PATIENT: ICD-10-CM

## 2020-10-02 DIAGNOSIS — E78.00 PURE HYPERCHOLESTEROLEMIA: ICD-10-CM

## 2020-10-02 DIAGNOSIS — R19.5 LOOSE STOOLS: ICD-10-CM

## 2020-10-02 DIAGNOSIS — E61.1 LOW IRON: ICD-10-CM

## 2020-10-04 NOTE — TELEPHONE ENCOUNTER
From: Denia Deluca  To: Sharifa Gayle M.D.  Sent: 10/2/2020 6:23 PM PDT  Subject: Non-Urgent Medical Question    I have not been able to donate blood due to slightly low iron. But their standards are high.. can we check that when I get pre appointment bloodwork. I often have loose stools a few times a day  Can I make appointment for flue shot? Shingles shot  I have had basal cell twice removed from forehead 20 and 10 years ago.. skin does not bleed like then but it gets crusty thick So I scrape it often. For a few months now

## 2020-10-07 DIAGNOSIS — N39.46 MIXED STRESS AND URGE URINARY INCONTINENCE: ICD-10-CM

## 2020-10-07 RX ORDER — OXYBUTYNIN CHLORIDE 5 MG/1
TABLET ORAL
Qty: 270 TAB | Refills: 3 | Status: SHIPPED
Start: 2020-10-07 | End: 2021-04-19 | Stop reason: DRUGHIGH

## 2020-10-26 ENCOUNTER — NON-PROVIDER VISIT (OUTPATIENT)
Dept: MEDICAL GROUP | Facility: PHYSICIAN GROUP | Age: 70
End: 2020-10-26
Payer: MEDICARE

## 2020-10-26 DIAGNOSIS — Z23 NEED FOR VACCINATION: ICD-10-CM

## 2020-10-26 PROCEDURE — 90662 IIV NO PRSV INCREASED AG IM: CPT | Performed by: FAMILY MEDICINE

## 2020-10-26 PROCEDURE — G0008 ADMIN INFLUENZA VIRUS VAC: HCPCS | Performed by: FAMILY MEDICINE

## 2020-10-26 NOTE — PROGRESS NOTES
"Yovani Deluca is a 70 y.o. female here for a non-provider visit for:   FLU    Reason for immunization: Annual Flu Vaccine  Immunization records indicate need for vaccine: Yes, confirmed with Epic  Minimum interval has been met for this vaccine: Yes  ABN completed: No    Order and dose verified by: Clara Dunham MA  VIS Dated  8/15/19  was given to patient: Yes  All IAC Questionnaire questions were answered \"No.\"    Patient tolerated injection and no adverse effects were observed or reported: Yes    Pt scheduled for next dose in series: No  "

## 2020-10-27 ENCOUNTER — HOSPITAL ENCOUNTER (OUTPATIENT)
Dept: LAB | Facility: MEDICAL CENTER | Age: 70
End: 2020-10-27
Attending: FAMILY MEDICINE
Payer: MEDICARE

## 2020-10-27 DIAGNOSIS — E61.1 LOW IRON: ICD-10-CM

## 2020-10-27 DIAGNOSIS — E78.00 PURE HYPERCHOLESTEROLEMIA: ICD-10-CM

## 2020-10-27 DIAGNOSIS — Z11.59 ENCOUNTER FOR HEPATITIS C SCREENING TEST FOR LOW RISK PATIENT: ICD-10-CM

## 2020-10-27 DIAGNOSIS — R19.5 LOOSE STOOLS: ICD-10-CM

## 2020-10-27 DIAGNOSIS — F90.1 ATTENTION DEFICIT HYPERACTIVITY DISORDER (ADHD), PREDOMINANTLY HYPERACTIVE TYPE: ICD-10-CM

## 2020-10-27 LAB
ALBUMIN SERPL BCP-MCNC: 4.7 G/DL (ref 3.2–4.9)
ALBUMIN/GLOB SERPL: 1.7 G/DL
ALP SERPL-CCNC: 135 U/L (ref 30–99)
ALT SERPL-CCNC: 16 U/L (ref 2–50)
ANION GAP SERPL CALC-SCNC: 8 MMOL/L (ref 7–16)
AST SERPL-CCNC: 16 U/L (ref 12–45)
BASOPHILS # BLD AUTO: 0.5 % (ref 0–1.8)
BASOPHILS # BLD: 0.03 K/UL (ref 0–0.12)
BILIRUB SERPL-MCNC: 0.5 MG/DL (ref 0.1–1.5)
BUN SERPL-MCNC: 14 MG/DL (ref 8–22)
CALCIUM SERPL-MCNC: 10.1 MG/DL (ref 8.5–10.5)
CHLORIDE SERPL-SCNC: 102 MMOL/L (ref 96–112)
CO2 SERPL-SCNC: 29 MMOL/L (ref 20–33)
CREAT SERPL-MCNC: 0.68 MG/DL (ref 0.5–1.4)
EOSINOPHIL # BLD AUTO: 0.24 K/UL (ref 0–0.51)
EOSINOPHIL NFR BLD: 4.3 % (ref 0–6.9)
ERYTHROCYTE [DISTWIDTH] IN BLOOD BY AUTOMATED COUNT: 46.7 FL (ref 35.9–50)
GLOBULIN SER CALC-MCNC: 2.8 G/DL (ref 1.9–3.5)
GLUCOSE SERPL-MCNC: 92 MG/DL (ref 65–99)
HCT VFR BLD AUTO: 48 % (ref 37–47)
HCV AB SER QL: NORMAL
HGB BLD-MCNC: 15 G/DL (ref 12–16)
IMM GRANULOCYTES # BLD AUTO: 0.01 K/UL (ref 0–0.11)
IMM GRANULOCYTES NFR BLD AUTO: 0.2 % (ref 0–0.9)
IRON SERPL-MCNC: 77 UG/DL (ref 40–170)
LYMPHOCYTES # BLD AUTO: 1.61 K/UL (ref 1–4.8)
LYMPHOCYTES NFR BLD: 28.9 % (ref 22–41)
MCH RBC QN AUTO: 29.7 PG (ref 27–33)
MCHC RBC AUTO-ENTMCNC: 31.3 G/DL (ref 33.6–35)
MCV RBC AUTO: 95 FL (ref 81.4–97.8)
MONOCYTES # BLD AUTO: 0.41 K/UL (ref 0–0.85)
MONOCYTES NFR BLD AUTO: 7.4 % (ref 0–13.4)
NEUTROPHILS # BLD AUTO: 3.27 K/UL (ref 2–7.15)
NEUTROPHILS NFR BLD: 58.7 % (ref 44–72)
NRBC # BLD AUTO: 0 K/UL
NRBC BLD-RTO: 0 /100 WBC
PLATELET # BLD AUTO: 256 K/UL (ref 164–446)
PMV BLD AUTO: 10.5 FL (ref 9–12.9)
POTASSIUM SERPL-SCNC: 3.8 MMOL/L (ref 3.6–5.5)
PROT SERPL-MCNC: 7.5 G/DL (ref 6–8.2)
RBC # BLD AUTO: 5.05 M/UL (ref 4.2–5.4)
SODIUM SERPL-SCNC: 139 MMOL/L (ref 135–145)
TSH SERPL DL<=0.005 MIU/L-ACNC: 2.85 UIU/ML (ref 0.38–5.33)
WBC # BLD AUTO: 5.6 K/UL (ref 4.8–10.8)

## 2020-10-27 PROCEDURE — 83540 ASSAY OF IRON: CPT

## 2020-10-27 PROCEDURE — 84443 ASSAY THYROID STIM HORMONE: CPT

## 2020-10-27 PROCEDURE — 36415 COLL VENOUS BLD VENIPUNCTURE: CPT

## 2020-10-27 PROCEDURE — 85025 COMPLETE CBC W/AUTO DIFF WBC: CPT

## 2020-10-27 PROCEDURE — G0472 HEP C SCREEN HIGH RISK/OTHER: HCPCS

## 2020-10-27 PROCEDURE — 80053 COMPREHEN METABOLIC PANEL: CPT

## 2020-10-29 ENCOUNTER — PATIENT MESSAGE (OUTPATIENT)
Dept: MEDICAL GROUP | Facility: PHYSICIAN GROUP | Age: 70
End: 2020-10-29

## 2020-11-04 ENCOUNTER — OFFICE VISIT (OUTPATIENT)
Dept: CARDIOLOGY | Facility: MEDICAL CENTER | Age: 70
End: 2020-11-04
Payer: MEDICARE

## 2020-11-04 VITALS
BODY MASS INDEX: 30.51 KG/M2 | HEIGHT: 63 IN | OXYGEN SATURATION: 99 % | HEART RATE: 83 BPM | WEIGHT: 172.2 LBS | SYSTOLIC BLOOD PRESSURE: 126 MMHG | DIASTOLIC BLOOD PRESSURE: 78 MMHG

## 2020-11-04 DIAGNOSIS — R00.2 PALPITATIONS: ICD-10-CM

## 2020-11-04 DIAGNOSIS — E78.00 PURE HYPERCHOLESTEROLEMIA: ICD-10-CM

## 2020-11-04 DIAGNOSIS — Z79.899 ENCOUNTER FOR LONG-TERM (CURRENT) USE OF HIGH-RISK MEDICATION: ICD-10-CM

## 2020-11-04 DIAGNOSIS — R00.0 TACHYCARDIA: ICD-10-CM

## 2020-11-04 DIAGNOSIS — G47.33 OBSTRUCTIVE SLEEP APNEA: ICD-10-CM

## 2020-11-04 PROCEDURE — 99214 OFFICE O/P EST MOD 30 MIN: CPT | Performed by: INTERNAL MEDICINE

## 2020-11-04 ASSESSMENT — ENCOUNTER SYMPTOMS
DIZZINESS: 0
PND: 0
ABDOMINAL PAIN: 0
DEPRESSION: 0
FALLS: 0
ORTHOPNEA: 0
SHORTNESS OF BREATH: 0
LOSS OF CONSCIOUSNESS: 0
PALPITATIONS: 0

## 2020-11-04 ASSESSMENT — FIBROSIS 4 INDEX: FIB4 SCORE: 1.09375
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
## 2020-11-09 ENCOUNTER — OFFICE VISIT (OUTPATIENT)
Dept: MEDICAL GROUP | Facility: PHYSICIAN GROUP | Age: 70
End: 2020-11-09
Payer: MEDICARE

## 2020-11-09 VITALS
OXYGEN SATURATION: 94 % | RESPIRATION RATE: 14 BRPM | BODY MASS INDEX: 30.3 KG/M2 | HEART RATE: 71 BPM | WEIGHT: 171 LBS | DIASTOLIC BLOOD PRESSURE: 72 MMHG | HEIGHT: 63 IN | SYSTOLIC BLOOD PRESSURE: 122 MMHG | TEMPERATURE: 98.3 F

## 2020-11-09 DIAGNOSIS — G47.33 OBSTRUCTIVE SLEEP APNEA: ICD-10-CM

## 2020-11-09 DIAGNOSIS — F90.1 ATTENTION DEFICIT HYPERACTIVITY DISORDER (ADHD), PREDOMINANTLY HYPERACTIVE TYPE: ICD-10-CM

## 2020-11-09 DIAGNOSIS — F33.42 RECURRENT MAJOR DEPRESSIVE DISORDER, IN FULL REMISSION (HCC): ICD-10-CM

## 2020-11-09 DIAGNOSIS — J45.20 MILD INTERMITTENT ASTHMA WITHOUT COMPLICATION: ICD-10-CM

## 2020-11-09 DIAGNOSIS — E78.00 PURE HYPERCHOLESTEROLEMIA: ICD-10-CM

## 2020-11-09 DIAGNOSIS — Z00.00 MEDICARE ANNUAL WELLNESS VISIT, SUBSEQUENT: ICD-10-CM

## 2020-11-09 DIAGNOSIS — Z85.828 HISTORY OF BASAL CELL CARCINOMA (BCC): ICD-10-CM

## 2020-11-09 DIAGNOSIS — F43.10 POSTTRAUMATIC STRESS DISORDER: ICD-10-CM

## 2020-11-09 PROCEDURE — G0439 PPPS, SUBSEQ VISIT: HCPCS | Performed by: FAMILY MEDICINE

## 2020-11-09 ASSESSMENT — ACTIVITIES OF DAILY LIVING (ADL): BATHING_REQUIRES_ASSISTANCE: 0

## 2020-11-09 ASSESSMENT — PATIENT HEALTH QUESTIONNAIRE - PHQ9: CLINICAL INTERPRETATION OF PHQ2 SCORE: 0

## 2020-11-09 ASSESSMENT — FIBROSIS 4 INDEX: FIB4 SCORE: 1.09375

## 2020-11-09 ASSESSMENT — ENCOUNTER SYMPTOMS: GENERAL WELL-BEING: GOOD

## 2020-11-09 NOTE — PROGRESS NOTES
Chief Complaint   Patient presents with   • Annual Exam     AWV     HPI:  Denia Deluca is a 70 y.o. here for Medicare Annual Wellness Visit.     Patient Active Problem List    Diagnosis Date Noted   • Recurrent major depressive disorder, in full remission (HCC) 03/02/2020   • PTSD (post-traumatic stress disorder) 11/04/2019   • Epiretinal membrane (ERM) of left eye 05/10/2019   • Obstructive sleep apnea 05/09/2019   • Pure hypercholesterolemia 11/09/2018   • Tachycardia 09/26/2018   • Attention deficit hyperactivity disorder (ADHD), predominantly hyperactive type 05/11/2018   • Bee allergy status 05/11/2018   • Mixed stress and urge urinary incontinence 05/11/2018   • Mild intermittent asthma without complication 05/11/2018     Current Outpatient Medications   Medication Sig Dispense Refill   • metoprolol (LOPRESSOR) 25 MG Tab Take 0.5 Tabs by mouth 2 times a day. 100 Tab 3   • oxybutynin (DITROPAN) 5 MG Tab TAKE 1 TABLET BY MOUTH 3 TIMES DAILY 270 Tab 3   • atomoxetine (STRATTERA) 40 MG capsule Take 1 Cap by mouth every day. 90 Cap 1   • escitalopram (LEXAPRO) 10 MG Tab Take 1 Tab by mouth every day. 90 Tab 3   • albuterol 108 (90 Base) MCG/ACT Aero Soln inhalation aerosol Inhale 2 Puffs by mouth every 6 hours as needed for Shortness of Breath. 8.5 g 11   • EPINEPHrine (EPIPEN) 0.3 MG/0.3ML Solution Auto-injector solution for injection        No current facility-administered medications for this visit.           Current supplements as per medication list.     Allergies: Bee venom    Current social contact/activities: Spending time with family and friends, sewing     She  reports that she has never smoked. She has never used smokeless tobacco. She reports current alcohol use. She reports that she does not use drugs.  Counseling given: Not Answered    DPA/Advanced Directive:  Patient has Advanced Directive on file.     ROS:    Gait: Uses no assistive device  Ostomy: No  Other tubes: No  Amputations:  No  Chronic oxygen use: No  Last eye exam: 2019  Wears hearing aids: No   : Reports urinary leakage during the last 6 months that has not interfered at all with their daily activities or sleep.    Screening:    Depression Screening  Little interest or pleasure in doing things?  0 - not at all  Feeling down, depressed , or hopeless? 0 - not at all  Patient Health Questionnaire Score: 0     If depressive symptoms identified deferred to follow up visit unless specifically addressed in assessment and plan.    Interpretation of PHQ-9 Total Score   Score Severity   1-4 No Depression   5-9 Mild Depression   10-14 Moderate Depression   15-19 Moderately Severe Depression   20-27 Severe Depression    Screening for Cognitive Impairment  Three Minute Recall (river, nation, finger) 3/3    Lance clock face with all 12 numbers and set the hands to show 10 past 11.  Yes Numbers were on the outside of the clock  Cognitive concerns identified deferred for follow up unless specifically addressed in assessment and plan.    Fall Risk Assessment  Has the patient had two or more falls in the last year or any fall with injury in the last year?  No    Safety Assessment  Throw rugs on floor.  Yes  Handrails on all stairs.  Yes  Good lighting in all hallways.  Yes  Difficulty hearing.  No  Patient counseled about all safety risks that were identified.    Functional Assessment ADLs  Are there any barriers preventing you from cooking for yourself or meeting nutritional needs?  No.    Are there any barriers preventing you from driving safely or obtaining transportation?  No.    Are there any barriers preventing you from using a telephone or calling for help?  No.    Are there any barriers preventing you from shopping?  No.    Are there any barriers preventing you from taking care of your own finances?  No.    Are there any barriers preventing you from managing your medications?  No.    Are there any barriers preventing you from showering,  "bathing or dressing yourself?  No.    Are you currently engaging in any exercise or physical activity?  No.     What is your perception of your health?  Good.    Health Maintenance Summary                IMM ZOSTER VACCINES Overdue 9/7/2000     IMM DTaP/Tdap/Td Vaccine Next Due 1/1/2021      Done 1/1/2011 Imm Admin: Tdap Vaccine    MAMMOGRAM Next Due 2/6/2021      Done 2/6/2020 MA-SCREENING MAMMO BILAT W/TOMOSYNTHESIS W/CAD     Patient has more history with this topic...    Annual Wellness Visit Next Due 11/10/2021      Done 11/9/2020 Visit Dx: Medicare annual wellness visit, subsequent     Patient has more history with this topic...    BONE DENSITY Next Due 1/11/2024      Done 1/11/2019 DS-BONE DENSITY STUDY (DEXA)        Patient Care Team:  Sharifa Gayle M.D. as PCP - General (Family Medicine)  Maria R Reyes, M.D. as Consulting Physician (Allergy)  Tawnya Cee M.D. as Consulting Physician (Cardiology)  Alfonso Denton M.D. as Consulting Physician (Pulmonary Medicine)  Gustavo Villegas Ass't as      Social History     Tobacco Use   • Smoking status: Never Smoker   • Smokeless tobacco: Never Used   Substance Use Topics   • Alcohol use: Yes     Comment: \"maybe 3 drinks a year\"   • Drug use: No     Family History   Problem Relation Age of Onset   • Cancer Sister         Breast cancer   • Cancer Mother    • Cancer Father      She  has a past medical history of ADHD, Allergic rhinitis, Asthma, Back pain, Basal cell carcinoma (BCC), Chickenpox, Depression, Fracture, Influenza, Kidney stone, Nasal drainage, and Pneumonia.   Past Surgical History:   Procedure Laterality Date   • ABDOMINAL HYSTERECTOMY TOTAL      age 38 for \"pre-cancer\", ovaries not removed   • CARPAL TUNNEL RELEASE     • HYSTERECTOMY LAPAROSCOPY     • OPEN REDUCTION     • PB REMV 2ND CATARACT,CORN-SCLER SECTN       Exam:   /72 (BP Location: Left arm, Patient Position: Sitting, BP Cuff Size: Adult)   Pulse 71   Temp 36.8 " "°C (98.3 °F)   Resp 14   Ht 1.6 m (5' 3\")   Wt 77.6 kg (171 lb)   SpO2 94%  Body mass index is 30.29 kg/m².    Hearing good.    Dentition good  Alert, oriented in no acute distress.  Eye contact is good, speech goal directed, affect calm    Assessment and Plan. The following treatment and monitoring plan is recommended:      1. Medicare annual wellness visit, subsequent  HRA reviewed and appropriate.  Patient's previous medical history, healthcare maintenance and immunization status reviewed.  See discussion of anticipatory guidance and individual problems below.  Patient will return annually for Medicare annual well visit.     2. Recurrent major depressive disorder, in full remission (HCC)  3. Attention deficit hyperactivity disorder (ADHD), predominantly hyperactive type  4. PTSD (post-traumatic stress disorder)  Patient is feeling well on current medications.  Will continue.  Denies any suicidal or homicidal ideation.  Emphasized importance of healthy diet and exercise.  Discussed that should the patient have any symptoms they should call suicide prevention hotline or report to the emergency room immediately.    5. Pure hypercholesterolemia  Chronic and stable.  Managing with her own efforts.    6. Mild intermittent asthma without complication  Chronic and stable.  Continue current medications.  Hold off on daily ICS.    7. Obstructive sleep apnea  Following with sleep medicine.  On CPAP.    8. History of basal cell carcinoma (BCC)  Patient mentions that she has history of basal cell carcinoma excision on her forehead.  She does not currently follow with a dermatologist, but we discussed starting this next year, hopefully when COVID-19 pandemic is winding down.    Services suggested: No services needed at this time  Health Care Screening: Age-appropriate preventive services recommended by USPTF and ACIP covered by Medicare were discussed today. Services ordered if indicated and agreed upon by the " patient.  Referrals offered: Community-based lifestyle interventions to reduce health risks and promote self-management and wellness, fall prevention, nutrition, physical activity, tobacco-use cessation, weight loss, and mental health services as per orders if indicated.    Discussion today about general wellness and lifestyle habits:    · Prevent falls and reduce trip hazards; Cautioned about securing or removing rugs.  · Have a working fire alarm and carbon monoxide detector;   · Engage in regular physical activity and social activities     Follow-up: Return in about 6 months (around 5/9/2021) for check-in, short.

## 2020-11-23 RX ORDER — ESCITALOPRAM OXALATE 10 MG/1
10 TABLET ORAL DAILY
Qty: 90 TAB | Refills: 0 | OUTPATIENT
Start: 2020-11-23

## 2020-11-30 RX ORDER — ESCITALOPRAM OXALATE 10 MG/1
10 TABLET ORAL DAILY
Qty: 90 TAB | Refills: 0 | OUTPATIENT
Start: 2020-11-30

## 2020-12-16 DIAGNOSIS — Z23 NEED FOR VACCINATION: ICD-10-CM

## 2020-12-17 ENCOUNTER — APPOINTMENT (OUTPATIENT)
Dept: MEDICAL GROUP | Facility: PHYSICIAN GROUP | Age: 70
End: 2020-12-17
Payer: MEDICARE

## 2020-12-18 ENCOUNTER — OFFICE VISIT (OUTPATIENT)
Dept: SLEEP MEDICINE | Facility: MEDICAL CENTER | Age: 70
End: 2020-12-18
Payer: MEDICARE

## 2020-12-18 VITALS
HEIGHT: 63 IN | OXYGEN SATURATION: 97 % | BODY MASS INDEX: 31.01 KG/M2 | SYSTOLIC BLOOD PRESSURE: 132 MMHG | HEART RATE: 90 BPM | DIASTOLIC BLOOD PRESSURE: 80 MMHG | WEIGHT: 175 LBS

## 2020-12-18 DIAGNOSIS — G47.33 OBSTRUCTIVE SLEEP APNEA: ICD-10-CM

## 2020-12-18 PROCEDURE — 99213 OFFICE O/P EST LOW 20 MIN: CPT | Performed by: NURSE PRACTITIONER

## 2020-12-18 ASSESSMENT — FIBROSIS 4 INDEX: FIB4 SCORE: 1.09375

## 2020-12-18 NOTE — PROGRESS NOTES
Chief Complaint   Patient presents with   • Follow-Up     YAZAN-Last seen 12/10/2019       HPI:      Mrs. Deluca is a 71 y/o female patient who is in today for annual YAZAN f/u. PMH includes ADHD, tachycardia, asthma, PTSD, depression.     HSS from 2/7/19 indicated moderate to severe obstructive sleep apnea hypopnea with a respiratory event index of 28 events per hour and a lowest arterial oxygen saturation of 79% on room air.    Compliance report from 11/18/20-12/17/20 was downloaded and reviewed with the patient which showed autoCPAP 5-15 cmH2O, 100% compliance, 7 hrs 41 min use, AHI of 9.1.  Reviewing compliance data from August through October showed auto CPAP 5-15 cmH2O, consistent compliance use and persistently elevated AHI around 9.  She is tolerating the pressure and mask well. She goes to bed at 10 pm and wakes up at 7 am. She denies morning headache or snoring.  She reports that since October she has struggled with sleep however last night she finally was able to sleep a lot better after her  adjusted with sounds like the humidifier setting and not the actual CPAP pressure.  She has not been as active but hopes to resume at least walking.  Prior to COVID-19 she would go to the gym pretty regularly.  She denies any new health problems or medications.      ROS:  Constitutional: Denies fevers, Denies weight changes  Eyes: Denies changes in vision, no eye pain  Ears/Nose/Throat/Mouth: Denies nasal congestion or sore throat   Cardiovascular: Denies chest pain or palpitations   Respiratory: Denies shortness of breath , Denies cough  Gastrointestinal/Hepatic: Denies abdominal pain, nausea, vomiting,   Skin/Breast: Denies rash,   Neurological: Denies headache, confusion,   Psychiatric: denies mood disorder   Sleep: denies snoring       Past Medical History:   Diagnosis Date   • ADHD    • Allergic rhinitis    • Asthma    • Back pain    • Basal cell carcinoma (BCC)    • Chickenpox    • Depression    •  "Fracture     Multiple fractures   • Influenza    • Kidney stone    • Nasal drainage    • Pneumonia        Past Surgical History:   Procedure Laterality Date   • ABDOMINAL HYSTERECTOMY TOTAL      age 38 for \"pre-cancer\", ovaries not removed   • CARPAL TUNNEL RELEASE     • HYSTERECTOMY LAPAROSCOPY     • OPEN REDUCTION     • PB REMV 2ND CATARACT,CORN-SCLER SECTN         Family History   Problem Relation Age of Onset   • Cancer Sister         Breast cancer   • Cancer Mother    • Cancer Father        Social History     Socioeconomic History   • Marital status:      Spouse name: Not on file   • Number of children: Not on file   • Years of education: Not on file   • Highest education level: Not on file   Occupational History   • Not on file   Social Needs   • Financial resource strain: Not on file   • Food insecurity     Worry: Not on file     Inability: Not on file   • Transportation needs     Medical: Not on file     Non-medical: Not on file   Tobacco Use   • Smoking status: Never Smoker   • Smokeless tobacco: Never Used   Substance and Sexual Activity   • Alcohol use: Yes     Comment: \"maybe 3 drinks a year\"   • Drug use: No   • Sexual activity: Not on file   Lifestyle   • Physical activity     Days per week: Not on file     Minutes per session: Not on file   • Stress: Not on file   Relationships   • Social connections     Talks on phone: Not on file     Gets together: Not on file     Attends Jew service: Not on file     Active member of club or organization: Not on file     Attends meetings of clubs or organizations: Not on file     Relationship status: Not on file   • Intimate partner violence     Fear of current or ex partner: Not on file     Emotionally abused: Not on file     Physically abused: Not on file     Forced sexual activity: Not on file   Other Topics Concern   • Not on file   Social History Narrative   • Not on file       Allergies as of 12/18/2020 - Reviewed 12/18/2020   Allergen Reaction " Noted   • Bee venom Anaphylaxis 05/11/2018        Vitals:  Vitals:    12/18/20 0753   BP: 132/80   Pulse: 90   SpO2: 97%       Current medications as of today   Current Outpatient Medications   Medication Sig Dispense Refill   • metoprolol (LOPRESSOR) 25 MG Tab Take 0.5 Tabs by mouth 2 times a day. 100 Tab 3   • oxybutynin (DITROPAN) 5 MG Tab TAKE 1 TABLET BY MOUTH 3 TIMES DAILY 270 Tab 3   • atomoxetine (STRATTERA) 40 MG capsule Take 1 Cap by mouth every day. 90 Cap 1   • escitalopram (LEXAPRO) 10 MG Tab Take 1 Tab by mouth every day. 90 Tab 3   • albuterol 108 (90 Base) MCG/ACT Aero Soln inhalation aerosol Inhale 2 Puffs by mouth every 6 hours as needed for Shortness of Breath. 8.5 g 11   • EPINEPHrine (EPIPEN) 0.3 MG/0.3ML Solution Auto-injector solution for injection        No current facility-administered medications for this visit.          Physical Exam:   Appearance: Well developed, well nourished, no acute distress  Eyes: PERRL, EOM intact, sclera white, conjunctiva moist  Ears: no lesions or deformities  Hearing: grossly intact  Nose: no lesions or deformities  Respiratory effort: no intercostal retractions or use of accessory muscles  Lung auscultation: no rales, rhonchi or wheezes  Heart auscultation: no murmur rub or gallop, RRR  Extremities: no cyanosis or edema  Abdomen: soft   Gait and Station: normal  Digits and nails: no clubbing, cyanosis, petechiae or nodes.  Cranial nerves: grossly intact  Skin: no visible rashes, lesions or ulcers noted  Orientation: Oriented to time, person and place  Mood and affect: mood and affect appropriate, normal interaction with examiner  Judgement: Intact    Assessment:  1. Obstructive sleep apnea     2. BMI 31.0-31.9,adult           Plan  Discussed the cardiovascular and neuropsychiatric risks of untreated YAZAN; including but not limited to: HTN, DM, MI, ASCVD, CVA, CHF, traffic accidents.     1. Compliance report from 11/18/20-12/17/20 was downloaded and reviewed  with the patient which showed autoCPAP 5-15 cmH2O, 100% compliance, 7 hrs 41 min use, AHI of 9.1.  Reviewing compliance data from August through October showed auto CPAP 5-15 cmH2O, consistent compliance use and persistently elevated AHI around 9. Continue autoCPAP. Patient is compliant and benefiting from autoCPAP therapy for management of YAZAN.   2. DME order ([facility]) for mask (MOC) and supplies was provided today. Continue to clean mask and supplies weekly with soap and water, and change supplies per insurance guidelines.   3. DME order to increase autoCPAP to 10-15 cmH2O was provided today due to persistently elevated AHI.   4. Encouraged a healthy diet and exercise to help with weight loss and management.    5. Follow up with the appropriate healthcare practitioners for all other medical problems and issues.  6. Sleep hygiene discussed.    7. F/u in 3 month.       SCARLETT Thomas.      This dictation was created using voice recognition software. The accuracy of the dictation is limited to the abilities of the software. I expect there may be some errors of grammar and possibly content.

## 2020-12-31 ENCOUNTER — NON-PROVIDER VISIT (OUTPATIENT)
Dept: MEDICAL GROUP | Facility: PHYSICIAN GROUP | Age: 70
End: 2020-12-31
Payer: MEDICARE

## 2020-12-31 PROCEDURE — 90471 IMMUNIZATION ADMIN: CPT | Performed by: FAMILY MEDICINE

## 2020-12-31 PROCEDURE — 90750 HZV VACC RECOMBINANT IM: CPT | Performed by: FAMILY MEDICINE

## 2020-12-31 NOTE — NON-PROVIDER
"Yovani Deluca is a 70 y.o. female here for a non-provider visit for:   SHINGRIX (Shingles)    Reason for immunization: Overdue/Provider Recommended  Immunization records indicate need for vaccine: Yes, confirmed with Epic  Minimum interval has been met for this vaccine: Yes  ABN completed: Yes    Order and dose verified by: Clara Dunham  VIS was given to patient: Yes  All IAC Questionnaire questions were answered \"No.\"    Patient tolerated injection and no adverse effects were observed or reported: Yes    Pt scheduled for next dose in series: No, pt will call to schedule     "

## 2021-01-16 DIAGNOSIS — Z23 NEED FOR VACCINATION: ICD-10-CM

## 2021-01-25 ENCOUNTER — PATIENT MESSAGE (OUTPATIENT)
Dept: MEDICAL GROUP | Facility: PHYSICIAN GROUP | Age: 71
End: 2021-01-25

## 2021-01-26 ENCOUNTER — OFFICE VISIT (OUTPATIENT)
Dept: DERMATOLOGY | Facility: IMAGING CENTER | Age: 71
End: 2021-01-26
Payer: MEDICARE

## 2021-01-26 VITALS — HEIGHT: 63 IN | TEMPERATURE: 98.1 F | BODY MASS INDEX: 30.65 KG/M2 | WEIGHT: 173 LBS

## 2021-01-26 DIAGNOSIS — D22.9 MULTIPLE NEVI: ICD-10-CM

## 2021-01-26 DIAGNOSIS — D18.01 CHERRY ANGIOMA: ICD-10-CM

## 2021-01-26 DIAGNOSIS — R20.8 OTHER DISTURBANCES OF SKIN SENSATION: ICD-10-CM

## 2021-01-26 DIAGNOSIS — Z12.83 SKIN CANCER SCREENING: ICD-10-CM

## 2021-01-26 DIAGNOSIS — L82.0 INFLAMED SEBORRHEIC KERATOSIS: ICD-10-CM

## 2021-01-26 DIAGNOSIS — L82.1 SEBORRHEIC KERATOSES: ICD-10-CM

## 2021-01-26 DIAGNOSIS — L81.4 LENTIGINES: ICD-10-CM

## 2021-01-26 DIAGNOSIS — Z85.828 HISTORY OF BASAL CELL CANCER: ICD-10-CM

## 2021-01-26 PROCEDURE — 17110 DESTRUCTION B9 LES UP TO 14: CPT | Performed by: NURSE PRACTITIONER

## 2021-01-26 PROCEDURE — 99213 OFFICE O/P EST LOW 20 MIN: CPT | Mod: 25 | Performed by: NURSE PRACTITIONER

## 2021-01-26 RX ORDER — EPINEPHRINE 0.3 MG/.3ML
0.3 INJECTION SUBCUTANEOUS ONCE
Qty: 1 EACH | Refills: 3 | Status: SHIPPED | OUTPATIENT
Start: 2021-01-26 | End: 2021-01-26

## 2021-01-26 ASSESSMENT — FIBROSIS 4 INDEX: FIB4 SCORE: 1.09375

## 2021-01-26 NOTE — PROGRESS NOTES
"DERMATOLOGY NOTE  NEW VISIT       Chief complaint: Establish Care (STEVE)     HPI/location: along hairline  Time present: summer 2020  Painful lesion: No  Itching lesion: Yes  Enlarging lesion: No  Anything make it better or worse? No      History of skin cancer: Yes, Details: BCC nose and lip 2015, 2016, BCC forehead 2010  History of precancers/actinic keratoses: Yes, Details: see above  History of biopsies:Yes, Details: yes, see above  History of blistering/severe sunburns:Yes, Details: young adult  Family history of skin cancer:Yes, Details: mother- SCC, BCC; father - BCC  Family history of atypical moles:No    Past Medical History:   Diagnosis Date   • ADHD    • Allergic rhinitis    • Asthma    • Back pain    • Basal cell carcinoma (BCC)    • Chickenpox    • Depression    • Fracture     Multiple fractures   • Influenza    • Kidney stone    • Nasal drainage    • Pneumonia         Family History   Problem Relation Age of Onset   • Cancer Sister         Breast cancer   • Cancer Mother    • Cancer Father         Social History     Socioeconomic History   • Marital status:      Spouse name: Not on file   • Number of children: Not on file   • Years of education: Not on file   • Highest education level: Not on file   Occupational History   • Not on file   Social Needs   • Financial resource strain: Not on file   • Food insecurity     Worry: Not on file     Inability: Not on file   • Transportation needs     Medical: Not on file     Non-medical: Not on file   Tobacco Use   • Smoking status: Never Smoker   • Smokeless tobacco: Never Used   Substance and Sexual Activity   • Alcohol use: Yes     Comment: \"maybe 3 drinks a year\"   • Drug use: No   • Sexual activity: Not on file   Lifestyle   • Physical activity     Days per week: Not on file     Minutes per session: Not on file   • Stress: Not on file   Relationships   • Social connections     Talks on phone: Not on file     Gets together: Not on file     Attends " Bahai service: Not on file     Active member of club or organization: Not on file     Attends meetings of clubs or organizations: Not on file     Relationship status: Not on file   • Intimate partner violence     Fear of current or ex partner: Not on file     Emotionally abused: Not on file     Physically abused: Not on file     Forced sexual activity: Not on file   Other Topics Concern   • Not on file   Social History Narrative   • Not on file        Allergies   Allergen Reactions   • Bee Venom Anaphylaxis     Will stop heart         MEDICATIONS:  Medications relevant to specialty reviewed.    Current Outpatient Medications:   •  EPINEPHrine (EPIPEN) 0.3 MG/0.3ML Solution Auto-injector solution for injection, Inject 0.3 mL into the shoulder, thigh, or buttocks one time for 1 dose., Disp: 1 Each, Rfl: 3  •  metoprolol (LOPRESSOR) 25 MG Tab, Take 0.5 Tabs by mouth 2 times a day., Disp: 100 Tab, Rfl: 3  •  oxybutynin (DITROPAN) 5 MG Tab, TAKE 1 TABLET BY MOUTH 3 TIMES DAILY, Disp: 270 Tab, Rfl: 3  •  atomoxetine (STRATTERA) 40 MG capsule, Take 1 Cap by mouth every day., Disp: 90 Cap, Rfl: 1  •  escitalopram (LEXAPRO) 10 MG Tab, Take 1 Tab by mouth every day., Disp: 90 Tab, Rfl: 3  •  albuterol 108 (90 Base) MCG/ACT Aero Soln inhalation aerosol, Inhale 2 Puffs by mouth every 6 hours as needed for Shortness of Breath., Disp: 8.5 g, Rfl: 11     REVIEW OF SYSTEMS:   Positive for skin (see HPI)  Negative for fevers and chills  All other systems reviewed and are negative.     EXAM:   -Several scattered 1-3mm bright red macules and thin papules on the Lt scalp & trunk  -Several skin-colored & tan stuck-on waxy papules scattered on the hairline and trunk  -Well-healed cicatrix at BCC excision at hairline: no reoccurrence noted   -sun exposed skin of trunk and b/l upper and lower extremities with scattered clinically benign light brown reticulated macules all of which were morphologically similar and none of which were  suspicious for skin cancer today on exam  -Multiple medium brown skin-colored macules papules scattered over the trunk >> extremities. All with benign-appearing pigment network patterns on dermoscopy      A total body skin exam was performed excluding the genitals per patient preference and including the following areas: head (including face), neck, chest, abdomen, groin/buttocks, back, bilateral upper extremities, and bilateral lower extremities with the following pertinent findings listed below and/or in assessment/plan.       IMPRESSION / PLAN:    1. Inflamed seborrheic keratosis+pruritis  CRYOTHERAPY:  Risks (including, but not limited to: hypo or hyperpigmentation, redness, blister, blood blister, recurrence, need for further treatment, infection, scar) and benefits of cryotherapy discussed. Patient verbally agreed to proceed with treatment. 2 cryotherapy freeze thaw cycles of 10 seconds were applied to 1 lesion on hairline with cryac. Patient tolerated procedure well. Aftercare instructions given.      2. Other disturbances of skin sensation  R/t isk. See plan above    3. Multiple nevi  - Benign-appearing nature of lesions discussed. Advised to return to clinic for any new or concerning changes.  - ABCDE's of melanoma discussed      4. Lentigines  - Discussed benign nature of lesions, related to sun exposure  - Discussed sun protection, hand out provided      5. Cherry angioma  - Benign-appearing nature of lesions discussed. Advised to return to clinic for any new or concerning changes.      6. Seborrheic keratoses  - Benign-appearing nature of lesions discussed. Advised to return to clinic for any new or concerning changes.      7. History of basal cell cancer  Skin education as outlined below    8. Skin cancer screening  Skin cancer education  - discussed importance of sun protective clothing, eyewear  - discussed importance of daily use of broad spectrum sunscreen with SPF 30 or greater, as well as need for  reapplication ~every 2 hours when exposed to UVR  - discussed importance of regular self-exams, ideally once per month, every 12 months exams in clinic  - ABCDE's of melanoma discussed  - patient to bring any new or concerning lesions to my attention  - Patient educational handout provided and reviewed with patient         Return to clinic in: Return for STEVE . and as needed for any new or changing skin lesions.

## 2021-01-26 NOTE — TELEPHONE ENCOUNTER
From: Denia Deluca  To: Sharifa Gayle M.D.  Sent: 1/25/2021 8:03 PM PST  Subject: Prescription Question    I know Dr. Gayle is still out but I need a refill for my EpiPen sent to [facility]    Thank you

## 2021-01-27 ENCOUNTER — IMMUNIZATION (OUTPATIENT)
Dept: FAMILY PLANNING/WOMEN'S HEALTH CLINIC | Facility: IMMUNIZATION CENTER | Age: 71
End: 2021-01-27
Attending: INTERNAL MEDICINE
Payer: MEDICARE

## 2021-01-27 DIAGNOSIS — Z23 ENCOUNTER FOR VACCINATION: Primary | ICD-10-CM

## 2021-01-27 DIAGNOSIS — Z23 NEED FOR VACCINATION: ICD-10-CM

## 2021-01-27 PROCEDURE — 0001A PFIZER SARS-COV-2 VACCINE: CPT

## 2021-01-27 PROCEDURE — 91300 PFIZER SARS-COV-2 VACCINE: CPT

## 2021-02-02 ENCOUNTER — APPOINTMENT (OUTPATIENT)
Dept: MEDICAL GROUP | Facility: PHYSICIAN GROUP | Age: 71
End: 2021-02-02
Payer: MEDICARE

## 2021-02-18 ENCOUNTER — IMMUNIZATION (OUTPATIENT)
Dept: FAMILY PLANNING/WOMEN'S HEALTH CLINIC | Facility: IMMUNIZATION CENTER | Age: 71
End: 2021-02-18
Attending: INTERNAL MEDICINE
Payer: MEDICARE

## 2021-02-18 DIAGNOSIS — Z23 ENCOUNTER FOR VACCINATION: Primary | ICD-10-CM

## 2021-02-18 PROCEDURE — 0002A PFIZER SARS-COV-2 VACCINE: CPT | Performed by: INTERNAL MEDICINE

## 2021-02-18 PROCEDURE — 91300 PFIZER SARS-COV-2 VACCINE: CPT | Performed by: INTERNAL MEDICINE

## 2021-02-23 ENCOUNTER — PATIENT MESSAGE (OUTPATIENT)
Dept: CARDIOLOGY | Facility: MEDICAL CENTER | Age: 71
End: 2021-02-23

## 2021-02-23 ENCOUNTER — TELEPHONE (OUTPATIENT)
Dept: MEDICAL GROUP | Facility: PHYSICIAN GROUP | Age: 71
End: 2021-02-23

## 2021-02-23 NOTE — TELEPHONE ENCOUNTER
She has tachycardia secondary to her obstructive sleep apnea.  Her tachycardia has significantly improved with her use of CPAP machine.  This is stable problem and she may donate blood.  -Sharifa Gayle M.D.

## 2021-02-23 NOTE — TELEPHONE ENCOUNTER
"VOICEMAIL  1. Caller Name: Denia Deluca  Call Back Number: [phone] (home)     2. Message: Patient presented to blood bank to donate blood however they need to know the \"specific type of tachycardia\" patient has otherwise she can not donate blood.     3. Patient approves office to leave a detailed voicemail/MyChart message: yes    "

## 2021-02-23 NOTE — TELEPHONE ENCOUNTER
Phone Number Called: [phone] (home)       Call outcome: Spoke to patient regarding message below.    Message: Pt notified of Dr. Gayle's message. Pt verbalized understanding and encouraged to call office back with any additional questions or concerns.

## 2021-03-01 NOTE — PATIENT COMMUNICATION
Tawnya Cee M.D.  You 10 minutes ago (11:15 AM)     Agree. Sinus tach.        You  Tawnya Cee M.D. 6 days ago     To Dr. Cee - unsure if pt has a specific tachycardia. I can advise per last OV, sinus tachycardia and palpitations? Just wanted to confirm. Thank you!

## 2021-03-08 ENCOUNTER — NON-PROVIDER VISIT (OUTPATIENT)
Dept: MEDICAL GROUP | Facility: PHYSICIAN GROUP | Age: 71
End: 2021-03-08
Payer: MEDICARE

## 2021-03-08 DIAGNOSIS — Z23 NEED FOR VACCINATION: ICD-10-CM

## 2021-03-08 PROCEDURE — 90471 IMMUNIZATION ADMIN: CPT | Performed by: FAMILY MEDICINE

## 2021-03-08 PROCEDURE — 90472 IMMUNIZATION ADMIN EACH ADD: CPT | Performed by: FAMILY MEDICINE

## 2021-03-08 PROCEDURE — 99999 PR NO CHARGE: CPT | Performed by: FAMILY MEDICINE

## 2021-03-08 PROCEDURE — 90715 TDAP VACCINE 7 YRS/> IM: CPT | Performed by: FAMILY MEDICINE

## 2021-03-08 PROCEDURE — 90750 HZV VACC RECOMBINANT IM: CPT | Performed by: FAMILY MEDICINE

## 2021-03-08 RX ORDER — EPINEPHRINE 0.3 MG/.3ML
0.3 INJECTION SUBCUTANEOUS
COMMUNITY
Start: 2021-01-26

## 2021-03-08 NOTE — PROGRESS NOTES
"Yovani Deluca is a 70 y.o. female here for a non-provider visit for:   TDAP    Reason for immunization: Overdue/Provider Recommended  Immunization records indicate need for vaccine: Yes, confirmed with Epic  Minimum interval has been met for this vaccine: Yes  ABN completed: No    Order and dose verified by: Sharifa Gayle MD   VIS Dated 4/1/20  was given to patient: Yes  All IAC Questionnaire questions were answered \"No.\"    Patient tolerated injection and no adverse effects were observed or reported: No    Pt scheduled for next dose in series: No    Yovani Deluca is a 70 y.o. female here for a non-provider visit for:   SHINGRIX (Shingles)    Reason for immunization: Overdue/Provider Recommended  Immunization records indicate need for vaccine: Yes, confirmed with Epic  Minimum interval has been met for this vaccine: Yes  ABN completed: No    Order and dose verified by: Sharifa Gayle MD   VIS Dated 10/30/19  was given to patient: Yes  All IAC Questionnaire questions were answered \"No.\"    Patient tolerated injection and no adverse effects were observed or reported: Yes    Pt scheduled for next dose in series: No    "

## 2021-03-09 DIAGNOSIS — F90.1 ATTENTION DEFICIT HYPERACTIVITY DISORDER (ADHD), PREDOMINANTLY HYPERACTIVE TYPE: ICD-10-CM

## 2021-03-09 RX ORDER — ATOMOXETINE 40 MG/1
CAPSULE ORAL
Qty: 90 CAPSULE | Refills: 1 | Status: SHIPPED | OUTPATIENT
Start: 2021-03-09 | End: 2021-06-10 | Stop reason: SDUPTHER

## 2021-03-09 NOTE — PROGRESS NOTES
CC: Follow-up for YAZAN on AutoPap    HPI:  Denia Deluca is a 70 y.o.female  kindly referred by Sharifa Gayle M.D. and last seen 12/18/2024 and KARIE of 28/berna saturation 79%.  When last seen the patient was on auto titrating CPAP with excellent compliance with a residual apnea-hypopnea index of 9.1.  Her AutoPap was empirically changed to 10 to 15 cm H2O.    Today, 3/10/2021, her 30-day compliance is 100% for 8 hours and 39 minutes.  She has an average residual estimated apnea-hypopnea index of 4.8 on auto titrating CPAP 10-15 cm water.    The patient reports improved symptoms using positive airway pressure.  Has experienced no significant problems with mask fit, mask leak, pressure tolerance, excessive airway dryness, nasal congestion, aerophagia, or chest pain.    Parents lived into their 90's, father was an anesthesiologist.    Significant comorbidities modifying factors include depression, PTSD, pure hypercholesterolemia, attention deficit disorder, mild intermittent asthma, and mixed stress and urge urinary incontinence.    Patient Active Problem List    Diagnosis Date Noted   • Recurrent major depressive disorder, in full remission (HCC) 03/02/2020   • PTSD (post-traumatic stress disorder) 11/04/2019   • Epiretinal membrane (ERM) of left eye 05/10/2019   • Obstructive sleep apnea 05/09/2019   • Pure hypercholesterolemia 11/09/2018   • Tachycardia 09/26/2018   • Attention deficit hyperactivity disorder (ADHD), predominantly hyperactive type 05/11/2018   • Bee allergy status 05/11/2018   • Mixed stress and urge urinary incontinence 05/11/2018   • Mild intermittent asthma without complication 05/11/2018       Past Medical History:   Diagnosis Date   • ADHD    • Allergic rhinitis    • Asthma    • Back pain    • Basal cell carcinoma (BCC)    • Chickenpox    • Depression    • Fracture     Multiple fractures   • Influenza    • Kidney stone    • Nasal drainage    • Pneumonia         Past Surgical History:  "  Procedure Laterality Date   • ABDOMINAL HYSTERECTOMY TOTAL      age 38 for \"pre-cancer\", ovaries not removed   • CARPAL TUNNEL RELEASE     • HYSTERECTOMY LAPAROSCOPY     • OPEN REDUCTION     • PB REMV 2ND CATARACT,CORN-SCLER SECTN         Family History   Problem Relation Age of Onset   • Cancer Sister         Breast cancer   • Cancer Mother    • Cancer Father        Social History     Socioeconomic History   • Marital status:      Spouse name: Not on file   • Number of children: Not on file   • Years of education: Not on file   • Highest education level: Not on file   Occupational History   • Not on file   Tobacco Use   • Smoking status: Never Smoker   • Smokeless tobacco: Never Used   Substance and Sexual Activity   • Alcohol use: Yes     Comment: \"maybe 3 drinks a year\"   • Drug use: No   • Sexual activity: Not on file   Other Topics Concern   • Not on file   Social History Narrative   • Not on file     Social Determinants of Health     Financial Resource Strain:    • Difficulty of Paying Living Expenses:    Food Insecurity:    • Worried About Running Out of Food in the Last Year:    • Ran Out of Food in the Last Year:    Transportation Needs:    • Lack of Transportation (Medical):    • Lack of Transportation (Non-Medical):    Physical Activity:    • Days of Exercise per Week:    • Minutes of Exercise per Session:    Stress:    • Feeling of Stress :    Social Connections:    • Frequency of Communication with Friends and Family:    • Frequency of Social Gatherings with Friends and Family:    • Attends Church Services:    • Active Member of Clubs or Organizations:    • Attends Club or Organization Meetings:    • Marital Status:    Intimate Partner Violence:    • Fear of Current or Ex-Partner:    • Emotionally Abused:    • Physically Abused:    • Sexually Abused:        Current Outpatient Medications   Medication Sig Dispense Refill   • atomoxetine (STRATTERA) 40 MG capsule TAKE ONE CAPSULE BY MOUTH " "EVERY DAY 90 capsule 1   • EPINEPHrine (EPIPEN) 0.3 MG/0.3ML Solution Auto-injector solution for injection      • metoprolol (LOPRESSOR) 25 MG Tab Take 0.5 Tabs by mouth 2 times a day. 100 Tab 3   • oxybutynin (DITROPAN) 5 MG Tab TAKE 1 TABLET BY MOUTH 3 TIMES DAILY 270 Tab 3   • escitalopram (LEXAPRO) 10 MG Tab Take 1 Tab by mouth every day. 90 Tab 3   • albuterol 108 (90 Base) MCG/ACT Aero Soln inhalation aerosol Inhale 2 Puffs by mouth every 6 hours as needed for Shortness of Breath. 8.5 g 11     No current facility-administered medications for this visit.    \"CURRENT RX\"    ALLERGIES: Bee venom    ROS    Constitutional: Denies fever, chills, sweats,  weight loss, fatigue  Cardiovascular: Denies chest pain, tightness, palpitations, swelling in legs/feet, fainting, difficulty breathing when lying down but gets better when sitting up.   Respiratory: Denies shortness of breath, cough, sputum, wheezing, painful breathing, coughing up blood.   Sleep: per HPI  Gastrointestinal: Denies  difficulty swallowing, nausea, abdominal pain, diarrhea, constipation, heartburn.  Musculoskeletal: Denies painful joints, sore muscles, back pain.        PHYSICAL EXAM      There were no vitals taken for this visit.  Appearance: Well-nourished, well-developed, no acute distress  Eyes:  PERRLA, EOMI  ENMT: masked  Neck: Supple, trachea midline, no masses  Respiratory effort:  No intercostal retractions or use of accessory muscles  Lung auscultation:  No wheezes rhonchi rubs or rales  Cardiac: No murmurs, rubs, or gallops; regular rhythm, normal rate; no edema  Abdomen:  No tenderness, no organomegaly.  Musculoskeletal:  Grossly normal; gait and station normal; digits and nails normal  Skin:  No rashes, petechiae, cyanosis  Neurologic: without focal signs; oriented to person, time, place, and purpose; judgement intact  Psychiatric:  No depression, anxiety, agitation      Medical Decision Making       The medical record was reviewed in its " entirety including the referral notes, records from primary care, consultants notes, hospital records, lab, imaging, microbiology, immunology, and immunizations.  Care gaps identified and reviewed with the patient.    Diagnostic and titration nocturnal polysomnogram's, home sleep apnea tests, continuous nocturnal oximetry results, multiple sleep latency tests, and compliance reports reviewed.  1. Obstructive sleep apnea  - DME Mask and Supplies    2. PTSD (post-traumatic stress disorder)    3. Pure hypercholesterolemia    4. Mild intermittent asthma without complication    5. Attention deficit hyperactivity disorder (ADHD), predominantly hyperactive type    1. Obstructive sleep apnea    - DME Mask and Supplies    2. PTSD (post-traumatic stress disorder)      3. Pure hypercholesterolemia      4. Mild intermittent asthma without complication      5. Attention deficit hyperactivity disorder (ADHD), predominantly hyperactive type      PLAN:     Has been advised to continue the current AutoPap 10-15 cm water, clean equipment frequently, and get new mask and supplies as allowed by insurance and DME. Advised about potential problems including nasal obstruction/stuffiness, mask leak or discomfort , frequent awakenings, chill or dryness of the upper airway, noise, inconvenience, and lack of benefit. Recommend an earlier appointment, if significant treatment barriers develop.    The risks of untreated sleep apnea were discussed with the patient at length. Patients with YAZAN are at increased risk of cardiovascular disease including coronary artery disease, systemic arterial hypertension, pulmonary arterial hypertension, cardiac arrythmias, and stroke. YAZAN patients have an increased risk of motor vehicle accidents, type 2 diabetes, chronic kidney disease, and non-alcoholic liver disease. The patient was advised to avoid driving a motor vehicle when drowsy.    Positive airway pressure will favorably impact many of the adverse  conditions and effects provoked by YAZAN.    Have advised the patient to follow up with the appropriate healthcare practitioners for all other medical problems and issues.    Mask wearing, handwashing, and social distancing protocols reviewed and encouraged.    Return in about 1 year (around 3/10/2022).  Total time 30 minutes

## 2021-03-10 ENCOUNTER — OFFICE VISIT (OUTPATIENT)
Dept: SLEEP MEDICINE | Facility: MEDICAL CENTER | Age: 71
End: 2021-03-10
Payer: MEDICARE

## 2021-03-10 VITALS
OXYGEN SATURATION: 94 % | HEIGHT: 63 IN | WEIGHT: 170 LBS | SYSTOLIC BLOOD PRESSURE: 110 MMHG | RESPIRATION RATE: 16 BRPM | HEART RATE: 89 BPM | BODY MASS INDEX: 30.12 KG/M2 | DIASTOLIC BLOOD PRESSURE: 70 MMHG

## 2021-03-10 DIAGNOSIS — E78.00 PURE HYPERCHOLESTEROLEMIA: ICD-10-CM

## 2021-03-10 DIAGNOSIS — G47.33 OBSTRUCTIVE SLEEP APNEA: ICD-10-CM

## 2021-03-10 DIAGNOSIS — J45.20 MILD INTERMITTENT ASTHMA WITHOUT COMPLICATION: ICD-10-CM

## 2021-03-10 DIAGNOSIS — F90.1 ATTENTION DEFICIT HYPERACTIVITY DISORDER (ADHD), PREDOMINANTLY HYPERACTIVE TYPE: ICD-10-CM

## 2021-03-10 DIAGNOSIS — F43.10 POSTTRAUMATIC STRESS DISORDER: ICD-10-CM

## 2021-03-10 PROCEDURE — 99214 OFFICE O/P EST MOD 30 MIN: CPT | Performed by: INTERNAL MEDICINE

## 2021-03-10 ASSESSMENT — FIBROSIS 4 INDEX: FIB4 SCORE: 1.09375

## 2021-04-02 ENCOUNTER — HOSPITAL ENCOUNTER (OUTPATIENT)
Dept: RADIOLOGY | Facility: MEDICAL CENTER | Age: 71
End: 2021-04-02
Attending: FAMILY MEDICINE
Payer: MEDICARE

## 2021-04-02 DIAGNOSIS — Z12.31 VISIT FOR SCREENING MAMMOGRAM: ICD-10-CM

## 2021-04-02 PROCEDURE — 77063 BREAST TOMOSYNTHESIS BI: CPT

## 2021-04-15 ENCOUNTER — TELEPHONE (OUTPATIENT)
Dept: MEDICAL GROUP | Facility: PHYSICIAN GROUP | Age: 71
End: 2021-04-15

## 2021-04-19 ENCOUNTER — OFFICE VISIT (OUTPATIENT)
Dept: MEDICAL GROUP | Facility: PHYSICIAN GROUP | Age: 71
End: 2021-04-19
Payer: MEDICARE

## 2021-04-19 VITALS
WEIGHT: 178 LBS | DIASTOLIC BLOOD PRESSURE: 60 MMHG | HEIGHT: 63 IN | SYSTOLIC BLOOD PRESSURE: 112 MMHG | OXYGEN SATURATION: 94 % | BODY MASS INDEX: 31.54 KG/M2 | TEMPERATURE: 98.6 F | HEART RATE: 84 BPM | RESPIRATION RATE: 15 BRPM

## 2021-04-19 DIAGNOSIS — G47.33 OBSTRUCTIVE SLEEP APNEA: ICD-10-CM

## 2021-04-19 DIAGNOSIS — E78.00 PURE HYPERCHOLESTEROLEMIA: ICD-10-CM

## 2021-04-19 DIAGNOSIS — R42 DIZZY: ICD-10-CM

## 2021-04-19 DIAGNOSIS — Z12.11 COLON CANCER SCREENING: ICD-10-CM

## 2021-04-19 DIAGNOSIS — F90.1 ATTENTION DEFICIT HYPERACTIVITY DISORDER (ADHD), PREDOMINANTLY HYPERACTIVE TYPE: ICD-10-CM

## 2021-04-19 DIAGNOSIS — N39.46 MIXED STRESS AND URGE URINARY INCONTINENCE: ICD-10-CM

## 2021-04-19 DIAGNOSIS — I35.8 AORTIC VALVE SCLEROSIS: ICD-10-CM

## 2021-04-19 DIAGNOSIS — R00.0 TACHYCARDIA: ICD-10-CM

## 2021-04-19 DIAGNOSIS — R79.9 ABNORMAL FINDING OF BLOOD CHEMISTRY, UNSPECIFIED: ICD-10-CM

## 2021-04-19 DIAGNOSIS — F33.42 RECURRENT MAJOR DEPRESSIVE DISORDER, IN FULL REMISSION (HCC): ICD-10-CM

## 2021-04-19 DIAGNOSIS — K30 INDIGESTION: ICD-10-CM

## 2021-04-19 DIAGNOSIS — Z91.030 BEE ALLERGY STATUS: ICD-10-CM

## 2021-04-19 DIAGNOSIS — J45.20 MILD INTERMITTENT ASTHMA WITHOUT COMPLICATION: ICD-10-CM

## 2021-04-19 PROBLEM — I47.20 VENTRICULAR TACHYCARDIA (HCC): Status: ACTIVE | Noted: 2021-04-19

## 2021-04-19 PROCEDURE — 99214 OFFICE O/P EST MOD 30 MIN: CPT | Performed by: FAMILY MEDICINE

## 2021-04-19 RX ORDER — FAMOTIDINE 20 MG/1
20 TABLET, FILM COATED ORAL 2 TIMES DAILY
Qty: 180 TABLET | Refills: 2 | Status: SHIPPED | OUTPATIENT
Start: 2021-04-19 | End: 2022-05-04

## 2021-04-19 RX ORDER — OXYBUTYNIN CHLORIDE 10 MG/1
10 TABLET, EXTENDED RELEASE ORAL DAILY
Qty: 100 TABLET | Refills: 2 | Status: SHIPPED
Start: 2021-04-19 | End: 2021-07-20 | Stop reason: ALTCHOICE

## 2021-04-19 ASSESSMENT — FIBROSIS 4 INDEX: FIB4 SCORE: 1.09375

## 2021-04-19 NOTE — PROGRESS NOTES
Subjective:     CC:    Chief Complaint   Patient presents with   • Gastrophageal Reflux     [id number] Taking rolaid which helps instantly. Sweets trigger it.       HISTORY OF THE PRESENT ILLNESS: Patient is a 70 y.o. female. This pleasant patient is here today to establish care and discuss issues as below. Her prior PCP was Dr. Gayle.    hct 48 10/20. Neg FIT 6/20  Problem   Indigestion    Onset around 1/21, notes she was much less active over the winter, has gained around 10lb,  sxs worse after eating sweets and drinking soda. Sometimes triggered by bending over. On occasion won't eat anything and might vomit, eg q 6 wks. Takes rolaid prn and it works well. Will try rx pepcid.      Dizzy    Notes this when she feels her heart rate is high, eg when gardening might be 150 and take hours to come down. More often since donated blood 3/1. At this time had a resting HR of 75, now is 64. Thinks it is stablizing now, eg might have  at rest. Up to 160 when walking but doesn't exercise regularly now. Last saw cardiology 11/20.      8/19 Ziopatch monitor was reviewed. Summary is noted below. For detailed results, please refer to the media tab.     Baseline rhythm is sinus with an average rate of 86 bpm.   Non-sustained VT lasting approximately 13 seconds, average rate 121 bpm.     Aortic Valve Sclerosis    Stable, continue current plan of care       Nsvt (Nonsustained Ventricular Tachycardia) (Hcc)    Per 11/15/19 ziopatch, nonsustained ventricular tachycardia lasting 13 seconds w/ avg HR of 121 BPM. Stable, continue current plan of care  Will re-check ziopatch, she defers f/u at this time w/ cardiology     Recurrent Major Depressive Disorder, in Full Remission (Hcc)    Has been cutting back on her lexapro 10mg /d to only taking about 4x/wk. Mood has been good. Only gets down on herself bc doesn't walk enough. 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 Ok to trial off the lexapro.      Obstructive Sleep  Apnea    Uses cpap nightly.     Pure Hypercholesterolemia    2/20, .   She wouldn't want to take a statin. Her  eats a lot of red meat and potatoes. She loves salmon and vegetables but doesn't prioritize them enough, also knows she needs to walk more.   Neg stress and echo 9/19.      Tachycardia    Has had this for some time, attributes it to her long h/o untreated YAZAN. Low dose lopressor helps w/ this. Also uses CPAP now.      Attention Deficit Hyperactivity Disorder (Adhd), Predominantly Hyperactive Type    She has a h/o ADHD, is on strattera 40mg, doesn't take she needs it daily. She reduced her dose from 60mg.      Bee Allergy Status    Has a shot for this every 6-8wks. Plans to have testing for a banana allergy bc her diarrhea has resolved since this.      Mixed Stress and Urge Urinary Incontinence    Uses ditropan, is a tid med but only takes it in the am. Would be open to change to a once daily dosing. Has some accidents first thing in the am. Drinks a lot of fluids in the evening rather than earlier in the day. We discussed fluids earlier in the day.      Mild Intermittent Asthma Without Complication    Albuterol prn - uses it 3x/yr at the most. Stable, continue current plan of care           Allergies: Bee venom    Current Outpatient Medications Ordered in Epic   Medication Sig Dispense Refill   • famotidine (PEPCID) 20 MG Tab Take 1 tablet by mouth 2 times a day. For indigestion 180 tablet 2   • oxybutynin SR (DITROPAN-XL) 10 MG CR tablet Take 1 tablet by mouth every day. 100 tablet 2   • atomoxetine (STRATTERA) 40 MG capsule TAKE ONE CAPSULE BY MOUTH EVERY DAY 90 capsule 1   • EPINEPHrine (EPIPEN) 0.3 MG/0.3ML Solution Auto-injector solution for injection      • metoprolol (LOPRESSOR) 25 MG Tab Take 0.5 Tabs by mouth 2 times a day. 100 Tab 3   • albuterol 108 (90 Base) MCG/ACT Aero Soln inhalation aerosol Inhale 2 Puffs by mouth every 6 hours as needed for Shortness of Breath. 8.5 g 11  "    No current [facility]-ordered facility-administered medications on file.       Past Medical History:   Diagnosis Date   • ADHD    • Allergic rhinitis    • Asthma    • Back pain    • Basal cell carcinoma (BCC)    • Chickenpox    • Depression    • Fracture     Multiple fractures   • Influenza    • Kidney stone    • Nasal drainage    • Pneumonia        Past Surgical History:   Procedure Laterality Date   • ABDOMINAL HYSTERECTOMY TOTAL      age 38 for \"pre-cancer\", ovaries not removed   • CARPAL TUNNEL RELEASE     • HYSTERECTOMY LAPAROSCOPY     • OPEN REDUCTION     • PB REMV 2ND CATARACT,CORN-SCLER SECTN         Social History     Tobacco Use   • Smoking status: Never Smoker   • Smokeless tobacco: Never Used   Substance Use Topics   • Alcohol use: Yes     Comment: \"maybe 3 drinks a year\"   • Drug use: No       Social History     Social History Narrative    , has grown children. She is retired, had worked for a [employer] company in the past then as .        Family History   Problem Relation Age of Onset   • Cancer Sister         Breast cancer   • Cancer Mother    • Cancer Father        Health Maintenance: Completed    ROS:   See HPI  Gen: no fevers/chills, no nightsweats, no changes in weight  Eyes: no changes in vision, no dry eyes  ENT: no sore throat, no dysphagia, no hearing loss, no bloody nose  Pulm: no sob, no cough, no wheezing  CV: no chest pain, no palpitations, no syncope  GI: + indigestion + vomiting, no diarrhea/constipation, no abdominal pain, no blood in stool  : no dysuria, no incontinence  MSk: no myalgias, no weakness, no falls  Skin: no rash  Neuro: no headaches, no numbness/tingling, some dizziness at times when feels tachycardic.  Heme/Lymph: no easy bruising  Psych: no depression, no anxiety, no hallucinations, no insomnia, no memory concerns        Objective:     Exam:   /60 (BP Location: Left arm, Patient Position: Sitting, BP Cuff Size: Adult)   Pulse 84   Temp 37 °C " "(98.6 °F) (Temporal)   Resp 15   Ht 1.6 m (5' 3\")   Wt 80.7 kg (178 lb)   SpO2 94%   BMI 31.53 kg/m²   Body mass index is 31.53 kg/m².  Wt Readings from Last 4 Encounters:   04/19/21 80.7 kg (178 lb)   03/10/21 77.1 kg (170 lb)   01/26/21 78.5 kg (173 lb)   12/18/20 79.4 kg (175 lb)     General: Normal appearing. No distress. Appropriately groomed.  HEENT: Normocephalic. Eyes conjunctiva clear lids without ptosis, pupils equal and reactive to light accommodation, ears normal shape and contour, canals are clear bilaterally, tympanic membranes are benign, nasal mucosa benign, oropharynx is without erythema, edema or exudates.   Neck: Supple without JVD or bruit. Thyroid is not enlarged.   Pulmonary: Clear to ausculation.  Normal effort. No rales, ronchi, or wheezing.  Cardiovascular: Regular rate and rhythm without murmur. Carotid and radial pulses are intact and equal bilaterally.  Abdomen: Soft, nontender, nondistended. Normal bowel sounds. Liver and spleen are not palpable  Neurologic: Grossly nonfocal  Lymph: No cervical or supraclavicular lymph nodes are palpable  Skin: Warm and dry.  No obvious lesions.  Musculoskeletal: Normal gait. No extremity cyanosis, clubbing, or edema.  Psych: Normal mood and affect. Alert and oriented x3. Judgment and insight is normal.      Assessment & Plan:   70 y.o. female with the following -  Add pepcid  Labs and patch  Ok to wean off lexapro  Rec more fruit/veggies and less sweets.   She defers seeing cardiology until after reviewing results of the patch.   1. Indigestion    2. Bee allergy status    3. Attention deficit hyperactivity disorder (ADHD), predominantly hyperactive type    4. Recurrent major depressive disorder, in full remission (HCC)    5. Obstructive sleep apnea    6. Tachycardia  - RIH Zio Patch Monitor; Future  - CBC WITH DIFFERENTIAL; Future  - Comp Metabolic Panel; Future  - TSH; Future  - HEMOGLOBIN A1C; Future    7. Mixed stress and urge urinary " incontinence    8. Pure hypercholesterolemia  - Lipid Profile; Future    9. Mild intermittent asthma without complication    10. Dizzy    11. Colon cancer screening  - OCCULT BLOOD FECES IMMUNOASSAY; Future    12. Abnormal finding of blood chemistry, unspecified   - HEMOGLOBIN A1C; Future    13. Aortic valve sclerosis    Other orders  - famotidine (PEPCID) 20 MG Tab; Take 1 tablet by mouth 2 times a day. For indigestion  Dispense: 180 tablet; Refill: 2  - oxybutynin SR (DITROPAN-XL) 10 MG CR tablet; Take 1 tablet by mouth every day.  Dispense: 100 tablet; Refill: 2       Return in about 1 month (around 5/19/2021), or ziopatch, for labs.    Please note that this dictation was created using voice recognition software. I have made every reasonable attempt to correct obvious errors, but I expect that there are errors of grammar and possibly content that I did not discover before finalizing the note.

## 2021-04-19 NOTE — PROGRESS NOTES
Annual Health Assessment Questions:    1.  Are you currently engaging in any exercise or physical activity? Yes walking    2.  How would you describe your mood or emotional well-being today? good    3.  Have you had any falls in the last year? Yes    4.  Have you noticed any problems with your balance or had difficulty walking? Yes    5.  In the last six months have you experienced any leakage of urine? Yes when laughing and in the morning    6. DPA/Advanced Directive: Patient has Advanced Directive, but it is not on file. Instructed to bring in a copy to scan into their chart.

## 2021-04-20 ENCOUNTER — PATIENT MESSAGE (OUTPATIENT)
Dept: CARDIOLOGY | Facility: MEDICAL CENTER | Age: 71
End: 2021-04-20

## 2021-04-20 PROBLEM — I47.29 NSVT (NONSUSTAINED VENTRICULAR TACHYCARDIA) (HCC): Status: ACTIVE | Noted: 2021-04-19

## 2021-05-13 ENCOUNTER — HOSPITAL ENCOUNTER (OUTPATIENT)
Dept: LAB | Facility: MEDICAL CENTER | Age: 71
End: 2021-05-13
Attending: FAMILY MEDICINE
Payer: MEDICARE

## 2021-05-13 DIAGNOSIS — E78.00 PURE HYPERCHOLESTEROLEMIA: ICD-10-CM

## 2021-05-13 DIAGNOSIS — R00.0 TACHYCARDIA: ICD-10-CM

## 2021-05-13 DIAGNOSIS — R79.9 ABNORMAL FINDING OF BLOOD CHEMISTRY, UNSPECIFIED: ICD-10-CM

## 2021-05-13 LAB
ALBUMIN SERPL BCP-MCNC: 4.1 G/DL (ref 3.2–4.9)
ALBUMIN/GLOB SERPL: 1.5 G/DL
ALP SERPL-CCNC: 112 U/L (ref 30–99)
ALT SERPL-CCNC: 13 U/L (ref 2–50)
ANION GAP SERPL CALC-SCNC: 9 MMOL/L (ref 7–16)
AST SERPL-CCNC: 20 U/L (ref 12–45)
BASOPHILS # BLD AUTO: 0.7 % (ref 0–1.8)
BASOPHILS # BLD: 0.03 K/UL (ref 0–0.12)
BILIRUB SERPL-MCNC: 0.5 MG/DL (ref 0.1–1.5)
BUN SERPL-MCNC: 16 MG/DL (ref 8–22)
CALCIUM SERPL-MCNC: 9.3 MG/DL (ref 8.5–10.5)
CHLORIDE SERPL-SCNC: 104 MMOL/L (ref 96–112)
CHOLEST SERPL-MCNC: 201 MG/DL (ref 100–199)
CO2 SERPL-SCNC: 25 MMOL/L (ref 20–33)
CREAT SERPL-MCNC: 0.74 MG/DL (ref 0.5–1.4)
EOSINOPHIL # BLD AUTO: 0.16 K/UL (ref 0–0.51)
EOSINOPHIL NFR BLD: 4 % (ref 0–6.9)
ERYTHROCYTE [DISTWIDTH] IN BLOOD BY AUTOMATED COUNT: 48.6 FL (ref 35.9–50)
EST. AVERAGE GLUCOSE BLD GHB EST-MCNC: 108 MG/DL
FASTING STATUS PATIENT QL REPORTED: NORMAL
GLOBULIN SER CALC-MCNC: 2.8 G/DL (ref 1.9–3.5)
GLUCOSE SERPL-MCNC: 89 MG/DL (ref 65–99)
HBA1C MFR BLD: 5.4 % (ref 4–5.6)
HCT VFR BLD AUTO: 43.6 % (ref 37–47)
HDLC SERPL-MCNC: 57 MG/DL
HGB BLD-MCNC: 13.4 G/DL (ref 12–16)
IMM GRANULOCYTES # BLD AUTO: 0.01 K/UL (ref 0–0.11)
IMM GRANULOCYTES NFR BLD AUTO: 0.2 % (ref 0–0.9)
LDLC SERPL CALC-MCNC: 117 MG/DL
LYMPHOCYTES # BLD AUTO: 1.64 K/UL (ref 1–4.8)
LYMPHOCYTES NFR BLD: 40.7 % (ref 22–41)
MCH RBC QN AUTO: 29.4 PG (ref 27–33)
MCHC RBC AUTO-ENTMCNC: 30.7 G/DL (ref 33.6–35)
MCV RBC AUTO: 95.6 FL (ref 81.4–97.8)
MONOCYTES # BLD AUTO: 0.44 K/UL (ref 0–0.85)
MONOCYTES NFR BLD AUTO: 10.9 % (ref 0–13.4)
NEUTROPHILS # BLD AUTO: 1.75 K/UL (ref 2–7.15)
NEUTROPHILS NFR BLD: 43.5 % (ref 44–72)
NRBC # BLD AUTO: 0 K/UL
NRBC BLD-RTO: 0 /100 WBC
PLATELET # BLD AUTO: 242 K/UL (ref 164–446)
PMV BLD AUTO: 10.8 FL (ref 9–12.9)
POTASSIUM SERPL-SCNC: 4.1 MMOL/L (ref 3.6–5.5)
PROT SERPL-MCNC: 6.9 G/DL (ref 6–8.2)
RBC # BLD AUTO: 4.56 M/UL (ref 4.2–5.4)
SODIUM SERPL-SCNC: 138 MMOL/L (ref 135–145)
TRIGL SERPL-MCNC: 134 MG/DL (ref 0–149)
TSH SERPL DL<=0.005 MIU/L-ACNC: 1.84 UIU/ML (ref 0.38–5.33)
WBC # BLD AUTO: 4 K/UL (ref 4.8–10.8)

## 2021-05-13 PROCEDURE — 36415 COLL VENOUS BLD VENIPUNCTURE: CPT

## 2021-05-13 PROCEDURE — 83036 HEMOGLOBIN GLYCOSYLATED A1C: CPT

## 2021-05-13 PROCEDURE — 85025 COMPLETE CBC W/AUTO DIFF WBC: CPT

## 2021-05-13 PROCEDURE — 80061 LIPID PANEL: CPT

## 2021-05-13 PROCEDURE — 80053 COMPREHEN METABOLIC PANEL: CPT

## 2021-05-13 PROCEDURE — 84443 ASSAY THYROID STIM HORMONE: CPT

## 2021-05-17 ENCOUNTER — HOSPITAL ENCOUNTER (OUTPATIENT)
Facility: MEDICAL CENTER | Age: 71
End: 2021-05-17
Attending: FAMILY MEDICINE
Payer: MEDICARE

## 2021-05-17 PROCEDURE — 82274 ASSAY TEST FOR BLOOD FECAL: CPT

## 2021-05-18 DIAGNOSIS — Z12.11 COLON CANCER SCREENING: ICD-10-CM

## 2021-05-20 LAB — IMM ASSAY OCC BLD FITOB: NEGATIVE

## 2021-05-27 ENCOUNTER — NON-PROVIDER VISIT (OUTPATIENT)
Dept: CARDIOLOGY | Facility: MEDICAL CENTER | Age: 71
End: 2021-05-27
Attending: FAMILY MEDICINE
Payer: MEDICARE

## 2021-05-27 ENCOUNTER — TELEPHONE (OUTPATIENT)
Dept: CARDIOLOGY | Facility: MEDICAL CENTER | Age: 71
End: 2021-05-27

## 2021-05-27 DIAGNOSIS — I47.10 SVT (SUPRAVENTRICULAR TACHYCARDIA) (HCC): ICD-10-CM

## 2021-05-27 DIAGNOSIS — R00.0 TACHYCARDIA: ICD-10-CM

## 2021-05-27 NOTE — TELEPHONE ENCOUNTER
Home enrollment completed for the 14 day Zio XT Holter monitoring program per Roselyn Porras MD.  Monitor to be mailed to patient by [employer].    >Currently pending EOS.

## 2021-06-10 ENCOUNTER — HOSPITAL ENCOUNTER (OUTPATIENT)
Dept: LAB | Facility: MEDICAL CENTER | Age: 71
End: 2021-06-10
Attending: FAMILY MEDICINE
Payer: MEDICARE

## 2021-06-10 ENCOUNTER — OFFICE VISIT (OUTPATIENT)
Dept: MEDICAL GROUP | Facility: PHYSICIAN GROUP | Age: 71
End: 2021-06-10
Payer: MEDICARE

## 2021-06-10 VITALS
HEIGHT: 62 IN | BODY MASS INDEX: 33.25 KG/M2 | SYSTOLIC BLOOD PRESSURE: 116 MMHG | OXYGEN SATURATION: 99 % | TEMPERATURE: 98.4 F | DIASTOLIC BLOOD PRESSURE: 58 MMHG | WEIGHT: 180.7 LBS | HEART RATE: 80 BPM | RESPIRATION RATE: 16 BRPM

## 2021-06-10 DIAGNOSIS — R00.0 TACHYCARDIA: ICD-10-CM

## 2021-06-10 DIAGNOSIS — R42 DIZZY: ICD-10-CM

## 2021-06-10 DIAGNOSIS — F33.42 RECURRENT MAJOR DEPRESSIVE DISORDER, IN FULL REMISSION (HCC): ICD-10-CM

## 2021-06-10 DIAGNOSIS — Z91.030 BEE ALLERGY STATUS: ICD-10-CM

## 2021-06-10 DIAGNOSIS — N39.46 MIXED STRESS AND URGE URINARY INCONTINENCE: ICD-10-CM

## 2021-06-10 DIAGNOSIS — R74.8 ELEVATED ALKALINE PHOSPHATASE LEVEL: ICD-10-CM

## 2021-06-10 DIAGNOSIS — F90.1 ATTENTION DEFICIT HYPERACTIVITY DISORDER (ADHD), PREDOMINANTLY HYPERACTIVE TYPE: ICD-10-CM

## 2021-06-10 DIAGNOSIS — R10.9 ABDOMINAL PAIN, UNSPECIFIED ABDOMINAL LOCATION: ICD-10-CM

## 2021-06-10 DIAGNOSIS — R93.5 ABNORMAL CT OF THE ABDOMEN: ICD-10-CM

## 2021-06-10 DIAGNOSIS — K86.89 PANCREATIC DUCT DILATED: ICD-10-CM

## 2021-06-10 DIAGNOSIS — I47.29 NSVT (NONSUSTAINED VENTRICULAR TACHYCARDIA) (HCC): ICD-10-CM

## 2021-06-10 DIAGNOSIS — K30 INDIGESTION: ICD-10-CM

## 2021-06-10 DIAGNOSIS — I35.8 AORTIC VALVE SCLEROSIS: ICD-10-CM

## 2021-06-10 DIAGNOSIS — G47.33 OBSTRUCTIVE SLEEP APNEA: ICD-10-CM

## 2021-06-10 LAB
ALBUMIN SERPL BCP-MCNC: 4.2 G/DL (ref 3.2–4.9)
ALBUMIN/GLOB SERPL: 1.4 G/DL
ALP SERPL-CCNC: 126 U/L (ref 30–99)
ALT SERPL-CCNC: 21 U/L (ref 2–50)
ANION GAP SERPL CALC-SCNC: 10 MMOL/L (ref 7–16)
APPEARANCE UR: CLEAR
AST SERPL-CCNC: 24 U/L (ref 12–45)
BILIRUB SERPL-MCNC: 0.3 MG/DL (ref 0.1–1.5)
BILIRUB UR QL STRIP.AUTO: NEGATIVE
BUN SERPL-MCNC: 18 MG/DL (ref 8–22)
CALCIUM SERPL-MCNC: 10.1 MG/DL (ref 8.5–10.5)
CHLORIDE SERPL-SCNC: 102 MMOL/L (ref 96–112)
CO2 SERPL-SCNC: 26 MMOL/L (ref 20–33)
COLOR UR: YELLOW
CREAT SERPL-MCNC: 0.93 MG/DL (ref 0.5–1.4)
GGT SERPL-CCNC: 13 U/L (ref 7–34)
GLOBULIN SER CALC-MCNC: 2.9 G/DL (ref 1.9–3.5)
GLUCOSE SERPL-MCNC: 89 MG/DL (ref 65–99)
GLUCOSE UR STRIP.AUTO-MCNC: NEGATIVE MG/DL
KETONES UR STRIP.AUTO-MCNC: NEGATIVE MG/DL
LEUKOCYTE ESTERASE UR QL STRIP.AUTO: NEGATIVE
LIPASE SERPL-CCNC: 21 U/L (ref 11–82)
MICRO URNS: NORMAL
NITRITE UR QL STRIP.AUTO: NEGATIVE
PH UR STRIP.AUTO: 6 [PH] (ref 5–8)
POTASSIUM SERPL-SCNC: 3.7 MMOL/L (ref 3.6–5.5)
PROT SERPL-MCNC: 7.1 G/DL (ref 6–8.2)
PROT UR QL STRIP: NEGATIVE MG/DL
PTH-INTACT SERPL-MCNC: 59.2 PG/ML (ref 14–72)
RBC UR QL AUTO: NEGATIVE
SODIUM SERPL-SCNC: 138 MMOL/L (ref 135–145)
SP GR UR STRIP.AUTO: 1.02
UROBILINOGEN UR STRIP.AUTO-MCNC: 0.2 MG/DL

## 2021-06-10 PROCEDURE — 84155 ASSAY OF PROTEIN SERUM: CPT | Mod: XU

## 2021-06-10 PROCEDURE — 83970 ASSAY OF PARATHORMONE: CPT

## 2021-06-10 PROCEDURE — 83690 ASSAY OF LIPASE: CPT

## 2021-06-10 PROCEDURE — 81003 URINALYSIS AUTO W/O SCOPE: CPT

## 2021-06-10 PROCEDURE — 80053 COMPREHEN METABOLIC PANEL: CPT

## 2021-06-10 PROCEDURE — 84165 PROTEIN E-PHORESIS SERUM: CPT

## 2021-06-10 PROCEDURE — 86334 IMMUNOFIX E-PHORESIS SERUM: CPT

## 2021-06-10 PROCEDURE — 83520 IMMUNOASSAY QUANT NOS NONAB: CPT

## 2021-06-10 PROCEDURE — 36415 COLL VENOUS BLD VENIPUNCTURE: CPT

## 2021-06-10 PROCEDURE — 82977 ASSAY OF GGT: CPT

## 2021-06-10 PROCEDURE — 99214 OFFICE O/P EST MOD 30 MIN: CPT | Performed by: FAMILY MEDICINE

## 2021-06-10 RX ORDER — ATOMOXETINE 40 MG/1
40 CAPSULE ORAL
Qty: 90 CAPSULE | Refills: 3 | Status: SHIPPED | OUTPATIENT
Start: 2021-06-10 | End: 2022-06-14 | Stop reason: SDUPTHER

## 2021-06-10 ASSESSMENT — FIBROSIS 4 INDEX: FIB4 SCORE: 1.6

## 2021-06-10 NOTE — PROGRESS NOTES
Subjective:     CC:   Chief Complaint   Patient presents with   • Follow-Up     Holter monitor, pt put on 5/29,   • Lab Results     Blood         HPI:   Denia presents today with f/u labs    Problem   Elevated Alkaline Phosphatase Level    6/21 alk phos 114  9/18 had abd / pelv ct for abd pain and hematuria, showed abnl pancreatic duct and kidney stones.  I will repeat a CT abd/pelv and check additional labs. F/u next mo.    1.  New left-sided hydronephrosis with 4 mm calculus in the mid left ureter.  2.  Possible 6 mm stone in the left side of the bladder or left UVJ.  3.  3 mm stone in the right side of the urinary bladder or less likely right UVJ.  4.  Prone CT scan of the pelvis or ultrasound potentially could be performed to distinguish between bladder and UVJ stones as clinically indicated.  5.  Persistent mild prominence of the pancreatic duct.     Indigestion    Onset around 1/21, notes she was much less active over the winter, has gained around 10lb,  sxs worse after eating sweets and drinking soda. Sometimes triggered by bending over. On occasion won't eat anything and might vomit, eg q 6 wks. Takes rolaid prn and it works well. pepcid has significantly helped her sxs.       Dizzy    Dizziness has mostly resolved, only occurs rarely when stands quickly after bending over in her garden.     Aortic Valve Sclerosis    Stable, continue current plan of care, sees cardiology q 6-12m       Nsvt (Nonsustained Ventricular Tachycardia) (Hcc)    Per 11/15/19 juventino, nonsustained ventricular tachycardia lasting 13 seconds w/ avg HR of 121 BPM. Stable, continue current plan of care  Pending current ziopatch, she defers f/u at this time w/ cardiology     Recurrent Major Depressive Disorder, in Full Remission (Hcc)    Mood has been good. Only gets down on herself bc doesn't walk enough. 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 Since 4/21 was able to mostly wean off the lexapro, is down to taking it  twice a week. Plans to stop bc doesn't feel she needs it.      Obstructive Sleep Apnea    Uses cpap nightly     Tachycardia    Has had this for some time, attributes it to her long h/o untreated YAZAN. Low dose lopressor helps w/ this. Also uses CPAP now.   Currently wearing her zio patch for eval.  Has noted significantly less symptoms of a fast heart rate (has had this for 20y) since she started pepcid at our last appt in 4/21. Wears a HR monitor to track this - if exerting herself HR will be in low 90s now. Has been on lopressor 12.5mg bid for about 2.5y, rx'd by cardiologist.      Attention Deficit Hyperactivity Disorder (Adhd), Predominantly Hyperactive Type    She has a h/o ADHD, is on strattera 40mg, doesn't take she needs it daily. She reduced her dose from 60mg. Will refill 06/10/21       Bee Allergy Status    Carries an epi pen, also does q6w shots     Mixed Stress and Urge Urinary Incontinence    4/21 I changed her from tid dosing to once daily and is doing better w/ this regimen.          Current Outpatient Medications Ordered in Epic   Medication Sig Dispense Refill   • atomoxetine (STRATTERA) 40 MG capsule Take 1 capsule by mouth every day. 90 capsule 3   • famotidine (PEPCID) 20 MG Tab Take 1 tablet by mouth 2 times a day. For indigestion 180 tablet 2   • oxybutynin SR (DITROPAN-XL) 10 MG CR tablet Take 1 tablet by mouth every day. 100 tablet 2   • EPINEPHrine (EPIPEN) 0.3 MG/0.3ML Solution Auto-injector solution for injection      • metoprolol (LOPRESSOR) 25 MG Tab Take 0.5 Tabs by mouth 2 times a day. 100 Tab 3   • albuterol 108 (90 Base) MCG/ACT Aero Soln inhalation aerosol Inhale 2 Puffs by mouth every 6 hours as needed for Shortness of Breath. 8.5 g 11     No current [facility]-ordered facility-administered medications on file.       Past Medical History:   Diagnosis Date   • ADHD    • Allergic rhinitis    • Asthma    • Back pain    • Basal cell carcinoma (BCC)    • Chickenpox    • Depression    •  "Fracture     Multiple fractures   • Influenza    • Kidney stone    • Nasal drainage    • Pneumonia        Social History     Tobacco Use   • Smoking status: Never Smoker   • Smokeless tobacco: Never Used   Vaping Use   • Vaping Use: Never used   Substance Use Topics   • Alcohol use: Yes     Comment: \"maybe 3 drinks a year\"   • Drug use: No       Allergies:  Shellfish allergy and Bee venom    Health Maintenance: Completed    ROS:  Per HPI      Objective:       Exam:  /58 (BP Location: Right arm, Patient Position: Sitting, BP Cuff Size: Adult)   Pulse 80   Temp 36.9 °C (98.4 °F) (Temporal)   Resp 16   Ht 1.575 m (5' 2\")   Wt 82 kg (180 lb 11.2 oz)   SpO2 99%   BMI 33.05 kg/m²   Body mass index is 33.05 kg/m².  Wt Readings from Last 4 Encounters:   06/10/21 82 kg (180 lb 11.2 oz)   04/19/21 80.7 kg (178 lb)   03/10/21 77.1 kg (170 lb)   01/26/21 78.5 kg (173 lb)       Gen: Alert and oriented, No apparent distress. Appropriately groomed.  Neck: Neck is supple without lymphadenopathy.No thyromegaly.   Lungs: Normal effort, CTA bilaterally, no wheezes, rhonchi, or rales.  CV: Regular rate and rhythm. No murmurs, rubs, or gallops.  ABD:  Soft, nontender, nondistended, NABSx4, no HSM or RBT or guarding or masses.  Ext: No clubbing, cyanosis, edema.  Skin: No rash noted.      Labs:   Results for orders placed or performed during the hospital encounter of 05/17/21   OCCULT BLOOD FECES IMMUNOASSAY   Result Value Ref Range    Occult Blood, IA Negative      ldl 117, tc 201, hdl 57, trig 134, a1c 5.4,tsh 1.84  cmp alk phos 112, wnl o/w, wbc 4.0  gfr 60  Assessment & Plan:     70 y.o. female with the following -   I will repeat a CT abd/pelv and check additional labs. F/u next mo.    1. Tachycardia    2. NSVT (nonsustained ventricular tachycardia) (HCC)    3. Aortic valve sclerosis    4. Dizzy    5. Indigestion    6. Recurrent major depressive disorder, in full remission (HCC)    7. Attention deficit hyperactivity " disorder (ADHD), predominantly hyperactive type  - atomoxetine (STRATTERA) 40 MG capsule; Take 1 capsule by mouth every day.  Dispense: 90 capsule; Refill: 3    8. Mixed stress and urge urinary incontinence    9. Bee allergy status    10. Obstructive sleep apnea    11. Elevated alkaline phosphatase level  - FREE K&L LT CHAINS, QT, SERUM; Future  - Monoclonal Protein Study; Future  - PTH INTACT (PTH ONLY); Future  - Comp Metabolic Panel; Future  - GAMMA GT (GGT); Future  - URINALYSIS,CULTURE IF INDICATED; Future  - LIPASE; Future  - CT-ABDOMEN-PELVIS WITH & W/O; Future    12. Abdominal pain, unspecified abdominal location  - CT-ABDOMEN-PELVIS WITH & W/O; Future    13. Abnormal CT of the abdomen  - CT-ABDOMEN-PELVIS WITH & W/O; Future    14. Pancreatic duct dilated  - CT-ABDOMEN-PELVIS WITH & W/O; Future        I spent a total of 31 minutes with record review, exam, communication with the patient, communication with other providers, and documentation of this encounter.      Return in about 1 month (around 7/10/2021), or and CT, for labs.    Please note that this dictation was created using voice recognition software. I have made every reasonable attempt to correct obvious errors, but I expect that there are errors of grammar and possibly content that I did not discover before finalizing the note.

## 2021-06-13 LAB
KAPPA LC FREE SER-MCNC: 16.26 MG/L (ref 3.3–19.4)
KAPPA LC FREE/LAMBDA FREE SER NEPH: 1.01 {RATIO} (ref 0.26–1.65)
LAMBDA LC FREE SERPL-MCNC: 16.07 MG/L (ref 5.71–26.3)

## 2021-06-14 LAB
ALBUMIN SERPL ELPH-MCNC: 3.98 G/DL (ref 3.75–5.01)
ALPHA1 GLOB SERPL ELPH-MCNC: 0.25 G/DL (ref 0.19–0.46)
ALPHA2 GLOB SERPL ELPH-MCNC: 0.64 G/DL (ref 0.48–1.05)
B-GLOBULIN SERPL ELPH-MCNC: 0.79 G/DL (ref 0.48–1.1)
GAMMA GLOB SERPL ELPH-MCNC: 1.04 G/DL (ref 0.62–1.51)
INTERPRETATION SERPL IFE-IMP: NORMAL
INTERPRETATION SERPL IFE-IMP: NORMAL
PATHOLOGY STUDY: NORMAL
PROT SERPL-MCNC: 6.7 G/DL (ref 6.3–8.2)

## 2021-06-17 ENCOUNTER — HOSPITAL ENCOUNTER (OUTPATIENT)
Dept: RADIOLOGY | Facility: MEDICAL CENTER | Age: 71
End: 2021-06-17
Attending: FAMILY MEDICINE
Payer: MEDICARE

## 2021-06-17 DIAGNOSIS — R74.8 ELEVATED ALKALINE PHOSPHATASE LEVEL: ICD-10-CM

## 2021-06-17 DIAGNOSIS — K86.89 PANCREATIC DUCT DILATED: ICD-10-CM

## 2021-06-17 DIAGNOSIS — R93.5 ABNORMAL CT OF THE ABDOMEN: ICD-10-CM

## 2021-06-17 DIAGNOSIS — R10.9 ABDOMINAL PAIN, UNSPECIFIED ABDOMINAL LOCATION: ICD-10-CM

## 2021-06-17 PROCEDURE — 74178 CT ABD&PLV WO CNTR FLWD CNTR: CPT | Mod: ME

## 2021-06-17 PROCEDURE — 700117 HCHG RX CONTRAST REV CODE 255: Performed by: FAMILY MEDICINE

## 2021-06-17 RX ADMIN — IOHEXOL 100 ML: 350 INJECTION, SOLUTION INTRAVENOUS at 08:55

## 2021-06-24 ENCOUNTER — OFFICE VISIT (OUTPATIENT)
Dept: MEDICAL GROUP | Facility: PHYSICIAN GROUP | Age: 71
End: 2021-06-24
Payer: MEDICARE

## 2021-06-24 VITALS
HEART RATE: 64 BPM | RESPIRATION RATE: 17 BRPM | DIASTOLIC BLOOD PRESSURE: 60 MMHG | OXYGEN SATURATION: 97 % | BODY MASS INDEX: 32.9 KG/M2 | WEIGHT: 178.8 LBS | SYSTOLIC BLOOD PRESSURE: 102 MMHG | HEIGHT: 62 IN | TEMPERATURE: 98 F

## 2021-06-24 DIAGNOSIS — R74.8 ELEVATED ALKALINE PHOSPHATASE LEVEL: ICD-10-CM

## 2021-06-24 DIAGNOSIS — K44.9 HIATAL HERNIA: ICD-10-CM

## 2021-06-24 DIAGNOSIS — K86.89 DILATED PANCREATIC DUCT: ICD-10-CM

## 2021-06-24 PROCEDURE — 99213 OFFICE O/P EST LOW 20 MIN: CPT | Performed by: FAMILY MEDICINE

## 2021-06-24 ASSESSMENT — FIBROSIS 4 INDEX: FIB4 SCORE: 1.51

## 2021-06-24 NOTE — PROGRESS NOTES
Subjective:     CC:   Chief Complaint   Patient presents with   • Follow-Up     Labs          HPI:   Denia presents today with f/u from our 6/10 appt - at that time I had repeated a CT abd/pelv + checked labs to eval her elev alk phos and hx abnl pancreatic duct and kidney stones.   Labs were neg, though alk phos still high at 126, was 112 5/13/21 5/27/21 zio patch - not yet read. Has f/u with cards mid July.    Problem   Dilated Pancreatic Duct    6/21: CT Abdomen:  There is a small hiatal hernia.  There is fatty change of the liver. There is a stable tiny left lobe hepatic cyst.  The spleen is normal.  There are small bilateral simple appearing renal cysts. No follow-up recommended.  The adrenal glands are normal.  The pancreatic duct is again noted to be mildly dilated at 4 mm. No evidence of pancreatic mass.  The aorta is normal in caliber. No evidence of retroperitoneal adenopathy.  CT Pelvis:  There is no evidence of bowel obstruction or inflammatory change.  The appendix is visualized and appears normal.  There is no evidence of free fluid.  There has been prior hysterectomy.    I will refer her to GI for a 2nd opinion re: whether she would benefit from an MRCP etc for further eval of this - no abd pain, this eval was initiated by an elevated alk phos.      Hiatal Hernia    occn sxs. Mild on CT 6/21.          Current Outpatient Medications Ordered in Epic   Medication Sig Dispense Refill   • atomoxetine (STRATTERA) 40 MG capsule Take 1 capsule by mouth every day. 90 capsule 3   • famotidine (PEPCID) 20 MG Tab Take 1 tablet by mouth 2 times a day. For indigestion 180 tablet 2   • oxybutynin SR (DITROPAN-XL) 10 MG CR tablet Take 1 tablet by mouth every day. 100 tablet 2   • EPINEPHrine (EPIPEN) 0.3 MG/0.3ML Solution Auto-injector solution for injection      • metoprolol (LOPRESSOR) 25 MG Tab Take 0.5 Tabs by mouth 2 times a day. 100 Tab 3   • albuterol 108 (90 Base) MCG/ACT Aero Soln inhalation aerosol Inhale  "2 Puffs by mouth every 6 hours as needed for Shortness of Breath. 8.5 g 11     No current [facility]-ordered facility-administered medications on file.       Past Medical History:   Diagnosis Date   • ADHD    • Allergic rhinitis    • Asthma    • Back pain    • Basal cell carcinoma (BCC)    • Chickenpox    • Depression    • Fracture     Multiple fractures   • Influenza    • Kidney stone    • Nasal drainage    • Pneumonia        Social History     Tobacco Use   • Smoking status: Never Smoker   • Smokeless tobacco: Never Used   Vaping Use   • Vaping Use: Never used   Substance Use Topics   • Alcohol use: Yes     Comment: \"maybe 3 drinks a year\"   • Drug use: No       Allergies:  Shellfish allergy and Bee venom    Health Maintenance: Completed    ROS:  Per HPI  No jaundice, occn nausea w/ gerd, mild. No other GI c/o. No f/c.      Objective:       Exam:  /60 (BP Location: Left arm, Patient Position: Sitting, BP Cuff Size: Adult)   Pulse 64   Temp 36.7 °C (98 °F) (Temporal)   Resp 17   Ht 1.575 m (5' 2\")   Wt 81.1 kg (178 lb 12.8 oz)   SpO2 97%   BMI 32.70 kg/m²   Body mass index is 32.7 kg/m².  Wt Readings from Last 4 Encounters:   06/24/21 81.1 kg (178 lb 12.8 oz)   06/10/21 82 kg (180 lb 11.2 oz)   04/19/21 80.7 kg (178 lb)   03/10/21 77.1 kg (170 lb)       Gen: Alert and oriented, No apparent distress. Appropriately groomed.  Neck: Neck is supple without lymphadenopathy.No thyromegaly.   Lungs: Normal effort, CTA bilaterally, no wheezes, rhonchi, or rales.  CV: Regular rate and rhythm. No murmurs, rubs, or gallops.  ABD:  Soft, nontender, nondistended, NABSx4, no HSM or RBT or guarding or masses.  Ext: No clubbing, cyanosis, edema.  Skin: No rash noted.      Labs:   Results for orders placed or performed during the hospital encounter of 06/10/21   LIPASE   Result Value Ref Range    Lipase 21 11 - 82 U/L   URINALYSIS,CULTURE IF INDICATED    Specimen: Urine   Result Value Ref Range    Color Yellow     Character " Clear     Specific Gravity 1.016 <1.035    Ph 6.0 5.0 - 8.0    Glucose Negative Negative mg/dL    Ketones Negative Negative mg/dL    Protein Negative Negative mg/dL    Bilirubin Negative Negative    Urobilinogen, Urine 0.2 Negative    Nitrite Negative Negative    Leukocyte Esterase Negative Negative    Occult Blood Negative Negative    Micro Urine Req see below    GAMMA GT (GGT)   Result Value Ref Range    Gamma Gt 13 7 - 34 U/L   Comp Metabolic Panel   Result Value Ref Range    Sodium 138 135 - 145 mmol/L    Potassium 3.7 3.6 - 5.5 mmol/L    Chloride 102 96 - 112 mmol/L    Co2 26 20 - 33 mmol/L    Anion Gap 10.0 7.0 - 16.0    Glucose 89 65 - 99 mg/dL    Bun 18 8 - 22 mg/dL    Creatinine 0.93 0.50 - 1.40 mg/dL    Calcium 10.1 8.5 - 10.5 mg/dL    AST(SGOT) 24 12 - 45 U/L    ALT(SGPT) 21 2 - 50 U/L    Alkaline Phosphatase 126 (H) 30 - 99 U/L    Total Bilirubin 0.3 0.1 - 1.5 mg/dL    Albumin 4.2 3.2 - 4.9 g/dL    Total Protein 7.1 6.0 - 8.2 g/dL    Globulin 2.9 1.9 - 3.5 g/dL    A-G Ratio 1.4 g/dL   PTH INTACT (PTH ONLY)   Result Value Ref Range    Pth, Intact 59.2 14.0 - 72.0 pg/mL   Monoclonal Protein Study   Result Value Ref Range    Albumin 3.98 3.75 - 5.01 g/dL    Alpha-1 Globulin 0.25 0.19 - 0.46 g/dL    Alpha-2 Globulin 0.64 0.48 - 1.05 g/dL    Beta Globulin 0.79 0.48 - 1.10 g/dL    Gamma Globulin 1.04 0.62 - 1.51 g/dL    Total Protein, Serum 6.7 6.3 - 8.2 g/dL    Immunofixation BETTE Done     Interpretation See Note     EER Monoclonal Protein Study, Ser See Note    FREE K&L LT CHAINS, QT, SERUM   Result Value Ref Range    Free Kappa Light Chains 16.26 3.30 - 19.40 mg/L    Free Lambda Light Chains 16.07 5.71 - 26.30 mg/L    Kappa-Lambda Ratio 1.01 0.26 - 1.65   ESTIMATED GFR   Result Value Ref Range    GFR If African American >60 >60 mL/min/1.73 m 2    GFR If Non African American 59 (A) >60 mL/min/1.73 m 2         Assessment & Plan:     70 y.o. female with the following -     1. Elevated alkaline phosphatase  level  - REFERRAL TO GASTROENTEROLOGY    2. Dilated pancreatic duct  - REFERRAL TO GASTROENTEROLOGY    3. Hiatal hernia            Return in about 6 months (around 12/24/2021) for med review.    Please note that this dictation was created using voice recognition software. I have made every reasonable attempt to correct obvious errors, but I expect that there are errors of grammar and possibly content that I did not discover before finalizing the note.

## 2021-06-25 ENCOUNTER — APPOINTMENT (OUTPATIENT)
Dept: MEDICAL GROUP | Facility: PHYSICIAN GROUP | Age: 71
End: 2021-06-25
Payer: MEDICARE

## 2021-06-28 PROCEDURE — 93248 EXT ECG>7D<15D REV&INTERPJ: CPT | Performed by: INTERNAL MEDICINE

## 2021-06-29 ENCOUNTER — PATIENT MESSAGE (OUTPATIENT)
Dept: HEALTH INFORMATION MANAGEMENT | Facility: OTHER | Age: 71
End: 2021-06-29

## 2021-07-15 ENCOUNTER — OFFICE VISIT (OUTPATIENT)
Dept: CARDIOLOGY | Facility: MEDICAL CENTER | Age: 71
End: 2021-07-15
Payer: MEDICARE

## 2021-07-15 VITALS
OXYGEN SATURATION: 93 % | SYSTOLIC BLOOD PRESSURE: 124 MMHG | BODY MASS INDEX: 33.09 KG/M2 | WEIGHT: 179.8 LBS | HEART RATE: 82 BPM | RESPIRATION RATE: 14 BRPM | HEIGHT: 62 IN | DIASTOLIC BLOOD PRESSURE: 78 MMHG

## 2021-07-15 DIAGNOSIS — R00.2 PALPITATIONS: ICD-10-CM

## 2021-07-15 DIAGNOSIS — G47.33 OSA (OBSTRUCTIVE SLEEP APNEA): ICD-10-CM

## 2021-07-15 DIAGNOSIS — E78.00 PURE HYPERCHOLESTEROLEMIA: ICD-10-CM

## 2021-07-15 PROCEDURE — 99214 OFFICE O/P EST MOD 30 MIN: CPT | Performed by: INTERNAL MEDICINE

## 2021-07-15 ASSESSMENT — ENCOUNTER SYMPTOMS
FALLS: 0
PALPITATIONS: 0
PND: 0
DEPRESSION: 0
DIZZINESS: 0
ORTHOPNEA: 0
ABDOMINAL PAIN: 0
SHORTNESS OF BREATH: 0
LOSS OF CONSCIOUSNESS: 0

## 2021-07-15 ASSESSMENT — FIBROSIS 4 INDEX: FIB4 SCORE: 1.51

## 2021-07-15 NOTE — LETTER
"     [facility]   [address], [address]  [place] [address]  Phone: [phone]  Fax: [phone]              Denia Deluca  1950    Encounter Date: 7/15/2021    Tawnya Cee M.D.          PROGRESS NOTE:  Chief Complaint   Patient presents with   • Palpitations   • Tachycardia   • Hyperlipidemia     F/V Dx: Pure hypercholesterolemia       Subjective:   Denia Deluca is a 70-year-old female presenting to clinic for follow-up on palpitations.    Patient has been doing well overall.  Denies any anginal or heart failure symptoms.  No palpitations.    Past Medical History:   Diagnosis Date   • ADHD    • Allergic rhinitis    • Asthma    • Back pain    • Basal cell carcinoma (BCC)    • Chickenpox    • Depression    • Fracture     Multiple fractures   • Influenza    • Kidney stone    • Nasal drainage    • Pneumonia      Past Surgical History:   Procedure Laterality Date   • ABDOMINAL HYSTERECTOMY TOTAL      age 38 for \"pre-cancer\", ovaries not removed   • CARPAL TUNNEL RELEASE     • HYSTERECTOMY LAPAROSCOPY     • OPEN REDUCTION     • PB REMV 2ND CATARACT,CORN-SCLER SECTN       Family History   Problem Relation Age of Onset   • Cancer Sister         Breast cancer   • Cancer Mother    • Cancer Father      Social History     Socioeconomic History   • Marital status:      Spouse name: Not on file   • Number of children: Not on file   • Years of education: Not on file   • Highest education level: Not on file   Occupational History   • Not on file   Tobacco Use   • Smoking status: Never Smoker   • Smokeless tobacco: Never Used   Vaping Use   • Vaping Use: Never used   Substance and Sexual Activity   • Alcohol use: Yes     Comment: \"maybe 3 drinks a year\"   • Drug use: No   • Sexual activity: Not on file   Other Topics Concern   • Not on file   Social History Narrative    , has grown children. 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      Social Determinants of Health     Financial Resource Strain:    • Difficulty of Paying Living Expenses:    Food Insecurity:    • Worried About Running Out of Food in the Last Year:    • Ran Out of Food in the Last Year:    Transportation Needs:    • Lack of Transportation (Medical):    • Lack of Transportation (Non-Medical):    Physical Activity:    • Days of Exercise per Week:    • Minutes of Exercise per Session:    Stress:    • Feeling of Stress :    Social Connections:    • Frequency of Communication with Friends and Family:    • Frequency of Social Gatherings with Friends and Family:    • Attends Taoism Services:    • Active Member of Clubs or Organizations:    • Attends Club or Organization Meetings:    • Marital Status:    Intimate Partner Violence:    • Fear of Current or Ex-Partner:    • Emotionally Abused:    • Physically Abused:    • Sexually Abused:      Allergies   Allergen Reactions   • Crab    • Shellfish Allergy Unspecified     Heart issues   • Shrimp (Diagnostic)    • Bee Venom Anaphylaxis     Will stop heart    • [place] Hives     Increase in heart rate     Outpatient Encounter Medications as of 7/15/2021   Medication Sig Dispense Refill   • Multiple Vitamin (MULTIVITAMIN ADULT PO) Take  by mouth.     • CALCIUM PO Take  by mouth.     • VITAMIN D PO Take  by mouth.     • Ascorbic Acid (VITAMIN C PO) Take  by mouth.     • atomoxetine (STRATTERA) 40 MG capsule Take 1 capsule by mouth every day. 90 capsule 3   • famotidine (PEPCID) 20 MG Tab Take 1 tablet by mouth 2 times a day. For indigestion 180 tablet 2   • oxybutynin SR (DITROPAN-XL) 10 MG CR tablet Take 1 tablet by mouth every day. 100 tablet 2   • EPINEPHrine (EPIPEN) 0.3 MG/0.3ML Solution Auto-injector solution for injection      • metoprolol (LOPRESSOR) 25 MG Tab Take 0.5 Tabs by mouth 2 times a day. 100 Tab 3   • albuterol 108 (90 Base) MCG/ACT Aero Soln inhalation aerosol Inhale 2 Puffs by mouth every 6 hours  "as needed for Shortness of Breath. 8.5 g 11     No facility-administered encounter medications on file as of 7/15/2021.     Review of Systems   Constitutional: Negative for malaise/fatigue.   Respiratory: Negative for shortness of breath.    Cardiovascular: Negative for chest pain, palpitations, orthopnea, leg swelling and PND.   Gastrointestinal: Negative for abdominal pain.   Musculoskeletal: Negative for falls.   Neurological: Negative for dizziness and loss of consciousness.   Psychiatric/Behavioral: Negative for depression.   All other systems reviewed and are negative.       Objective:   /78 (BP Location: Left arm, Patient Position: Sitting, BP Cuff Size: Adult)   Pulse 82   Resp 14   Ht 1.575 m (5' 2\")   Wt 81.6 kg (179 lb 12.8 oz)   SpO2 93%   BMI 32.89 kg/m²     Physical Exam   Constitutional: She is oriented to person, place, and time. She appears well-developed. No distress.   HENT:   Head: Normocephalic and atraumatic.   Eyes: Conjunctivae are normal. No scleral icterus.   Cardiovascular: Normal rate, regular rhythm and normal heart sounds. Exam reveals no gallop and no friction rub.   No murmur heard.  Pulmonary/Chest: Effort normal and breath sounds normal. No respiratory distress. She has no wheezes. She has no rales.   Abdominal: Soft. She exhibits no distension. There is no abdominal tenderness.   Musculoskeletal:      Cervical back: Normal range of motion and neck supple.   Neurological: She is alert and oriented to person, place, and time.   Skin: Skin is warm and dry. She is not diaphoretic.   Psychiatric: Her behavior is normal. Judgment and thought content normal.   Nursing note and vitals reviewed.    CBC, Chem-7, lipid panel performed May 2021 were reviewed and showed normal hemoglobin and renal function.  .    Assessment:     1. Palpitations     2. Pure hypercholesterolemia     3. YAZAN (obstructive sleep apnea)         Medical Decision Making:  Today's Assessment / Status / " Plan:     Her palpitations have been well controlled.  Continue metoprolol at current dose.    For hyperlipidemia, continue dietary modification.  LDL is close to goal.    For obstructive sleep apnea, continue using CPAP.    Return to clinic in 1 year or earlier if needed.    Thank you for allowing me to participate in the care of this patient. Please do not hesitate to contact me with any questions.    Tawnya Cee MD  Cardiologist  [facility]      PLEASE NOTE: This dictation was created using voice recognition software.             Roselyn Porras M.D.  [address]  [place] [address]  Via In Basket

## 2021-07-15 NOTE — PROGRESS NOTES
"Chief Complaint   Patient presents with   • Palpitations   • Tachycardia   • Hyperlipidemia     F/V Dx: Pure hypercholesterolemia       Subjective:   Denia Deluca is a 70-year-old female presenting to clinic for follow-up on palpitations.    Patient has been doing well overall.  Denies any anginal or heart failure symptoms.  No palpitations.    Past Medical History:   Diagnosis Date   • ADHD    • Allergic rhinitis    • Asthma    • Back pain    • Basal cell carcinoma (BCC)    • Chickenpox    • Depression    • Fracture     Multiple fractures   • Influenza    • Kidney stone    • Nasal drainage    • Pneumonia      Past Surgical History:   Procedure Laterality Date   • ABDOMINAL HYSTERECTOMY TOTAL      age 38 for \"pre-cancer\", ovaries not removed   • CARPAL TUNNEL RELEASE     • HYSTERECTOMY LAPAROSCOPY     • OPEN REDUCTION     • PB REMV 2ND CATARACT,CORN-SCLER SECTN       Family History   Problem Relation Age of Onset   • Cancer Sister         Breast cancer   • Cancer Mother    • Cancer Father      Social History     Socioeconomic History   • Marital status:      Spouse name: Not on file   • Number of children: Not on file   • Years of education: Not on file   • Highest education level: Not on file   Occupational History   • Not on file   Tobacco Use   • Smoking status: Never Smoker   • Smokeless tobacco: Never Used   Vaping Use   • Vaping Use: Never used   Substance and Sexual Activity   • Alcohol use: Yes     Comment: \"maybe 3 drinks a year\"   • Drug use: No   • Sexual activity: Not on file   Other Topics Concern   • Not on file   Social History Narrative    , has grown children. 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      Social Determinants of Health     Financial Resource Strain:    • Difficulty of Paying Living Expenses:    Food Insecurity:    • Worried About Running Out of Food in the Last Year:    • Ran Out of Food in the Last Year:  "   Transportation Needs:    • Lack of Transportation (Medical):    • Lack of Transportation (Non-Medical):    Physical Activity:    • Days of Exercise per Week:    • Minutes of Exercise per Session:    Stress:    • Feeling of Stress :    Social Connections:    • Frequency of Communication with Friends and Family:    • Frequency of Social Gatherings with Friends and Family:    • Attends Tenriism Services:    • Active Member of Clubs or Organizations:    • Attends Club or Organization Meetings:    • Marital Status:    Intimate Partner Violence:    • Fear of Current or Ex-Partner:    • Emotionally Abused:    • Physically Abused:    • Sexually Abused:      Allergies   Allergen Reactions   • Crab    • Shellfish Allergy Unspecified     Heart issues   • Shrimp (Diagnostic)    • Bee Venom Anaphylaxis     Will stop heart    • [place] Hives     Increase in heart rate     Outpatient Encounter Medications as of 7/15/2021   Medication Sig Dispense Refill   • Multiple Vitamin (MULTIVITAMIN ADULT PO) Take  by mouth.     • CALCIUM PO Take  by mouth.     • VITAMIN D PO Take  by mouth.     • Ascorbic Acid (VITAMIN C PO) Take  by mouth.     • atomoxetine (STRATTERA) 40 MG capsule Take 1 capsule by mouth every day. 90 capsule 3   • famotidine (PEPCID) 20 MG Tab Take 1 tablet by mouth 2 times a day. For indigestion 180 tablet 2   • oxybutynin SR (DITROPAN-XL) 10 MG CR tablet Take 1 tablet by mouth every day. 100 tablet 2   • EPINEPHrine (EPIPEN) 0.3 MG/0.3ML Solution Auto-injector solution for injection      • metoprolol (LOPRESSOR) 25 MG Tab Take 0.5 Tabs by mouth 2 times a day. 100 Tab 3   • albuterol 108 (90 Base) MCG/ACT Aero Soln inhalation aerosol Inhale 2 Puffs by mouth every 6 hours as needed for Shortness of Breath. 8.5 g 11     No facility-administered encounter medications on file as of 7/15/2021.     Review of Systems   Constitutional: Negative for malaise/fatigue.   Respiratory: Negative for shortness of breath.   "  Cardiovascular: Negative for chest pain, palpitations, orthopnea, leg swelling and PND.   Gastrointestinal: Negative for abdominal pain.   Musculoskeletal: Negative for falls.   Neurological: Negative for dizziness and loss of consciousness.   Psychiatric/Behavioral: Negative for depression.   All other systems reviewed and are negative.       Objective:   /78 (BP Location: Left arm, Patient Position: Sitting, BP Cuff Size: Adult)   Pulse 82   Resp 14   Ht 1.575 m (5' 2\")   Wt 81.6 kg (179 lb 12.8 oz)   SpO2 93%   BMI 32.89 kg/m²     Physical Exam   Constitutional: She is oriented to person, place, and time. She appears well-developed. No distress.   HENT:   Head: Normocephalic and atraumatic.   Eyes: Conjunctivae are normal. No scleral icterus.   Cardiovascular: Normal rate, regular rhythm and normal heart sounds. Exam reveals no gallop and no friction rub.   No murmur heard.  Pulmonary/Chest: Effort normal and breath sounds normal. No respiratory distress. She has no wheezes. She has no rales.   Abdominal: Soft. She exhibits no distension. There is no abdominal tenderness.   Musculoskeletal:      Cervical back: Normal range of motion and neck supple.   Neurological: She is alert and oriented to person, place, and time.   Skin: Skin is warm and dry. She is not diaphoretic.   Psychiatric: Her behavior is normal. Judgment and thought content normal.   Nursing note and vitals reviewed.    CBC, Chem-7, lipid panel performed May 2021 were reviewed and showed normal hemoglobin and renal function.  .    Assessment:     1. Palpitations     2. Pure hypercholesterolemia     3. YAZAN (obstructive sleep apnea)         Medical Decision Making:  Today's Assessment / Status / Plan:     Her palpitations have been well controlled.  Continue metoprolol at current dose.    For hyperlipidemia, continue dietary modification.  LDL is close to goal.    For obstructive sleep apnea, continue using CPAP.    Return to " clinic in 1 year or earlier if needed.    Thank you for allowing me to participate in the care of this patient. Please do not hesitate to contact me with any questions.    Tawnya Cee MD  Cardiologist  [facility]      PLEASE NOTE: This dictation was created using voice recognition software.

## 2021-07-20 RX ORDER — OXYBUTYNIN CHLORIDE 5 MG/1
5 TABLET ORAL 3 TIMES DAILY
Qty: 270 TABLET | Refills: 3 | Status: SHIPPED | OUTPATIENT
Start: 2021-07-20 | End: 2021-09-07 | Stop reason: SDUPTHER

## 2021-08-14 ENCOUNTER — PATIENT MESSAGE (OUTPATIENT)
Dept: SLEEP MEDICINE | Facility: MEDICAL CENTER | Age: 71
End: 2021-08-14

## 2021-08-19 ENCOUNTER — HOSPITAL ENCOUNTER (OUTPATIENT)
Dept: LAB | Facility: MEDICAL CENTER | Age: 71
End: 2021-08-19
Attending: INTERNAL MEDICINE
Payer: MEDICARE

## 2021-08-19 LAB
ALBUMIN SERPL BCP-MCNC: 4.4 G/DL (ref 3.2–4.9)
ALBUMIN/GLOB SERPL: 1.6 G/DL
ALP SERPL-CCNC: 118 U/L (ref 30–99)
ALT SERPL-CCNC: 14 U/L (ref 2–50)
ANION GAP SERPL CALC-SCNC: 9 MMOL/L (ref 7–16)
AST SERPL-CCNC: 21 U/L (ref 12–45)
BILIRUB SERPL-MCNC: 0.4 MG/DL (ref 0.1–1.5)
BUN SERPL-MCNC: 18 MG/DL (ref 8–22)
CALCIUM SERPL-MCNC: 9.8 MG/DL (ref 8.5–10.5)
CHLORIDE SERPL-SCNC: 104 MMOL/L (ref 96–112)
CO2 SERPL-SCNC: 25 MMOL/L (ref 20–33)
CREAT SERPL-MCNC: 0.81 MG/DL (ref 0.5–1.4)
GLOBULIN SER CALC-MCNC: 2.7 G/DL (ref 1.9–3.5)
GLUCOSE SERPL-MCNC: 95 MG/DL (ref 65–99)
POTASSIUM SERPL-SCNC: 4 MMOL/L (ref 3.6–5.5)
PROT SERPL-MCNC: 7.1 G/DL (ref 6–8.2)
SODIUM SERPL-SCNC: 138 MMOL/L (ref 135–145)

## 2021-08-19 PROCEDURE — 84075 ASSAY ALKALINE PHOSPHATASE: CPT | Mod: XU

## 2021-08-19 PROCEDURE — 80053 COMPREHEN METABOLIC PANEL: CPT

## 2021-08-19 PROCEDURE — 84080 ASSAY ALKALINE PHOSPHATASES: CPT

## 2021-08-19 PROCEDURE — 36415 COLL VENOUS BLD VENIPUNCTURE: CPT

## 2021-08-19 PROCEDURE — 83516 IMMUNOASSAY NONANTIBODY: CPT

## 2021-08-21 LAB — MITOCHONDRIA M2 IGG SER-ACNC: 7 UNITS (ref 0–24.9)

## 2021-08-22 LAB
ALP BONE SERPL-CCNC: 40 U/L (ref 0–55)
ALP ISOS SERPL HS-CCNC: 0 U/L
ALP LIVER SERPL-CCNC: 85 U/L (ref 0–94)
ALP SERPL-CCNC: 125 U/L (ref 40–120)

## 2021-08-25 ENCOUNTER — TELEPHONE (OUTPATIENT)
Dept: MEDICAL GROUP | Facility: PHYSICIAN GROUP | Age: 71
End: 2021-08-25

## 2021-08-25 NOTE — TELEPHONE ENCOUNTER
Called patient and directed her towards Dr. Seymour since he ordered the blood labs. Patient reported that she thought that this a good plan. Assured her that we would help if she needs further assistance and that I would let Dr. Porras know.

## 2021-08-25 NOTE — TELEPHONE ENCOUNTER
----- Message from Roselyn Porras M.D. sent at 8/25/2021  3:14 PM PDT -----  Regarding: FW:  Recent bloodwork   Trying to make sense of who ordered the CMP and other recent labs.  I do not see a note from that doctor.  Please tell the patient she should follow-up with the doctor who ordered it but I am more than happy to review these results with her, she can schedule an appointment with me next available.  It is only mildly elevated but she does need to have a discussion regarding this.  ----- Message -----  From: Sandrine Mehta, Med Ass't  Sent: 8/24/2021   9:41 AM PDT  To: Roselyn Porras M.D.  Subject: FW:  Recent bloodwork                              ----- Message -----  From: Denia Deluca  Sent: 8/24/2021   9:31 AM PDT  To: [facility]  Subject:  Recent bloodwork                                There is a high rated alkaline phos. In test results from last week.  I do not know if that is worrisome

## 2021-09-04 ENCOUNTER — PATIENT MESSAGE (OUTPATIENT)
Dept: MEDICAL GROUP | Facility: PHYSICIAN GROUP | Age: 71
End: 2021-09-04

## 2021-09-04 DIAGNOSIS — N39.46 MIXED STRESS AND URGE URINARY INCONTINENCE: ICD-10-CM

## 2021-09-07 NOTE — TELEPHONE ENCOUNTER
From: Denia Deluca  To: Physician Roselyn Porras  Sent: 9/4/2021 5:42 PM PDT  Subject: er bladder pills    It seems the er/ long acting bladder pills DO work best for me.. once I gave them a chance.. or for some reason they seem best for me now.. so that is what I will reorder

## 2021-09-08 RX ORDER — OXYBUTYNIN CHLORIDE 10 MG/1
TABLET, EXTENDED RELEASE ORAL
Qty: 100 TABLET | Refills: 3 | Status: SHIPPED | OUTPATIENT
Start: 2021-09-08 | End: 2021-12-09

## 2021-09-08 RX ORDER — OXYBUTYNIN CHLORIDE 5 MG/1
5 TABLET ORAL 3 TIMES DAILY
Qty: 270 TABLET | Refills: 3 | Status: SHIPPED | OUTPATIENT
Start: 2021-09-08 | End: 2021-12-09

## 2021-09-23 ENCOUNTER — TELEPHONE (OUTPATIENT)
Dept: HEALTH INFORMATION MANAGEMENT | Facility: OTHER | Age: 71
End: 2021-09-23

## 2021-09-23 NOTE — TELEPHONE ENCOUNTER
Outcome: No answer    Please transfer to Patient Outreach Team at [phone] when patient returns call.      [facility] Verified: yes    Attempt # 2

## 2021-10-15 ENCOUNTER — PATIENT MESSAGE (OUTPATIENT)
Dept: SLEEP MEDICINE | Facility: MEDICAL CENTER | Age: 71
End: 2021-10-15

## 2021-10-15 NOTE — TELEPHONE ENCOUNTER
From: Denia Deluca  To: Nurse Practitioner Obi Barraza  Sent: 10/15/2021 11:36 AM PDT  Subject: Virtual appt    Had 11:10 appt. you never came on?

## 2021-12-08 ENCOUNTER — PATIENT MESSAGE (OUTPATIENT)
Dept: MEDICAL GROUP | Facility: PHYSICIAN GROUP | Age: 71
End: 2021-12-08

## 2021-12-08 DIAGNOSIS — R00.0 TACHYCARDIA: ICD-10-CM

## 2021-12-08 DIAGNOSIS — R00.2 PALPITATIONS: ICD-10-CM

## 2021-12-09 ENCOUNTER — OFFICE VISIT (OUTPATIENT)
Dept: MEDICAL GROUP | Facility: PHYSICIAN GROUP | Age: 71
End: 2021-12-09
Payer: MEDICARE

## 2021-12-09 VITALS
SYSTOLIC BLOOD PRESSURE: 126 MMHG | RESPIRATION RATE: 16 BRPM | HEART RATE: 78 BPM | WEIGHT: 177.8 LBS | BODY MASS INDEX: 32.72 KG/M2 | DIASTOLIC BLOOD PRESSURE: 84 MMHG | HEIGHT: 62 IN | OXYGEN SATURATION: 93 % | TEMPERATURE: 97.1 F

## 2021-12-09 DIAGNOSIS — K30 INDIGESTION: ICD-10-CM

## 2021-12-09 DIAGNOSIS — K44.9 HIATAL HERNIA: ICD-10-CM

## 2021-12-09 DIAGNOSIS — K86.89 DILATED PANCREATIC DUCT: ICD-10-CM

## 2021-12-09 PROCEDURE — G0439 PPPS, SUBSEQ VISIT: HCPCS | Performed by: FAMILY MEDICINE

## 2021-12-09 RX ORDER — OMEPRAZOLE 20 MG/1
20 CAPSULE, DELAYED RELEASE ORAL
Qty: 60 CAPSULE | Refills: 2 | Status: SHIPPED | OUTPATIENT
Start: 2021-12-09 | End: 2022-02-03 | Stop reason: SDUPTHER

## 2021-12-09 ASSESSMENT — ENCOUNTER SYMPTOMS: GENERAL WELL-BEING: GOOD

## 2021-12-09 ASSESSMENT — FIBROSIS 4 INDEX: FIB4 SCORE: 1.65

## 2021-12-09 ASSESSMENT — ACTIVITIES OF DAILY LIVING (ADL): BATHING_REQUIRES_ASSISTANCE: 0

## 2021-12-09 ASSESSMENT — PATIENT HEALTH QUESTIONNAIRE - PHQ9: CLINICAL INTERPRETATION OF PHQ2 SCORE: 0

## 2021-12-09 NOTE — TELEPHONE ENCOUNTER
From: Denia Deluca  To: Physician Roselyn Porras  Sent: 2021 4:26 PM PST  Subject: Metoprolol    Please send new script to "[facility]. Prior one     Thank you

## 2021-12-09 NOTE — PROGRESS NOTES
"Chief Complaint   Patient presents with   • Annual Wellness Visit   • Lab Results     BW: 8/19/21, CT Abd-Pelv 6/17/21       HPI:  Denia Deluca is a 71 y.o. here for Medicare Annual Wellness Visit   Saw me last 6/24/21, hx elevated alk phos and dilated pancreatic duct.  She did see GI around late 7/21 (no record in epic), had egd and dilation of esophagus, told had some \"gerd lesions\" - improved and taking pepcid 20mg bid which has really helped but sxs recur at times. Has appt w/ GI 1/22. Triggers eggs/trent/cheese.    Saw cards 7/15/21 for palpitations well controlled f/u yrly, except recently worse w/ gerd episode.   Patient Active Problem List    Diagnosis Date Noted   • Dilated pancreatic duct 06/24/2021   • Hiatal hernia 06/24/2021   • Elevated alkaline phosphatase level 06/10/2021   • Indigestion 04/19/2021   • Dizzy 04/19/2021   • Aortic valve sclerosis 04/19/2021   • NSVT (nonsustained ventricular tachycardia) ([facility]) 04/19/2021   • Recurrent major depressive disorder, in full remission ([facility]) 03/02/2020   • PTSD (post-traumatic stress disorder) 11/04/2019   • Epiretinal membrane (ERM) of left eye 05/10/2019   • Obstructive sleep apnea 05/09/2019   • Pure hypercholesterolemia 11/09/2018   • Tachycardia 09/26/2018   • Attention deficit hyperactivity disorder (ADHD), predominantly hyperactive type 05/11/2018   • Bee allergy status 05/11/2018   • Mixed stress and urge urinary incontinence 05/11/2018   • Mild intermittent asthma without complication 05/11/2018       Current Outpatient Medications   Medication Sig Dispense Refill   • metoprolol tartrate (LOPRESSOR) 25 MG Tab Take 0.5 Tablets by mouth 2 times a day. 100 Tablet 3   • omeprazole (PRILOSEC) 20 MG delayed-release capsule Take 1 Capsule by mouth 2 times daily with meals as needed. 60 Capsule 2   • Multiple Vitamin (MULTIVITAMIN ADULT PO) Take  by mouth.     • CALCIUM PO Take  by mouth.     • VITAMIN D PO Take  by mouth.     • Ascorbic Acid " (VITAMIN C PO) Take  by mouth.     • atomoxetine (STRATTERA) 40 MG capsule Take 1 capsule by mouth every day. 90 capsule 3   • famotidine (PEPCID) 20 MG Tab Take 1 tablet by mouth 2 times a day. For indigestion 180 tablet 2   • EPINEPHrine (EPIPEN) 0.3 MG/0.3ML Solution Auto-injector solution for injection      • albuterol 108 (90 Base) MCG/ACT Aero Soln inhalation aerosol Inhale 2 Puffs by mouth every 6 hours as needed for Shortness of Breath. 8.5 g 11     No current facility-administered medications for this visit.          Current supplements as per medication list.     Allergies: Crab, Shellfish allergy, Shrimp (diagnostic), Bee venom, and [place]    Current social contact/activities: volunteering, thrift store     She  reports that she has never smoked. She has never used smokeless tobacco. She reports current alcohol use. She reports that she does not use drugs.  Counseling given: Not Answered      DPA/Advanced Directive:  Patient has Advanced Directive, but it is not on file. Instructed to bring in a copy to scan into their chart.    ROS:    Gait: Uses no assistive device  Ostomy: No  Other tubes: No  Amputations: No  Chronic oxygen use: No CPAP  Last eye exam: 2018  Wears hearing aids: No   : Reports urinary leakage during the last 6 months that has not interfered at all with their daily activities or sleep.    Screening:  Annual  Depression Screening    Little interest or pleasure in doing things?  0 - not at all  Feeling down, depressed , or hopeless? 0 - not at all  Patient Health Questionnaire Score: 0     If depressive symptoms identified deferred to follow up visit unless specifically addressed in assessment and plan.    Interpretation of PHQ-9 Total Score   Score Severity   1-4 No Depression   5-9 Mild Depression   10-14 Moderate Depression   15-19 Moderately Severe Depression   20-27 Severe Depression    Screening for Cognitive Impairment    Three Minute Recall (captain, garden, picture) 3/3    Lance  clock face with all 12 numbers and set the hands to show 5 past 8.  Yes    Cognitive concerns identified deferred for follow up unless specifically addressed in assessment and plan.    Fall Risk Assessment    Has the patient had two or more falls in the last year or any fall with injury in the last year?  No    Safety Assessment    Throw rugs on floor.  No  Handrails on all stairs.  No  Good lighting in all hallways.  Yes  Difficulty hearing.  No  Patient counseled about all safety risks that were identified.    Functional Assessment ADLs    Are there any barriers preventing you from cooking for yourself or meeting nutritional needs?  No.    Are there any barriers preventing you from driving safely or obtaining transportation?  No.    Are there any barriers preventing you from using a telephone or calling for help?  No.    Are there any barriers preventing you from shopping?  No.    Are there any barriers preventing you from taking care of your own finances?  No.    Are there any barriers preventing you from managing your medications?  No.    Are there any barriers preventing you from showering, bathing or dressing yourself?  No.    Are you currently engaging in any exercise or physical activity?  Yes.  6000 steps per day  What is your perception of your health?  Good.      Health Maintenance Summary          Overdue - Annual Wellness Visit (Every 366 Days) Overdue since 11/10/2021    11/09/2020  Visit Dx: Medicare annual wellness visit, subsequent    11/04/2019  Visit Dx: Medicare annual wellness visit, subsequent    11/09/2018  Visit Dx: Medicare annual wellness visit, initial          MAMMOGRAM (Yearly) Tentatively due on 4/2/2022 04/02/2021  MA-SCREENING MAMMO BILAT W/TOMOSYNTHESIS W/CAD    02/06/2020  MA-SCREENING MAMMO BILAT W/TOMOSYNTHESIS W/CAD    10/31/2018  MA-SCREENING MAMMO BILAT W/TOMOSYNTHESIS W/CAD          BONE DENSITY (Every 5 Years) Tentatively due on 1/11/2024 01/11/2019  DS-BONE DENSITY  STUDY (DEXA)          IMM DTaP/Tdap/Td Vaccine (3 - Td or Tdap) Next due on 3/8/2031    2021  Imm Admin: Tdap Vaccine    2011  Imm Admin: Tdap Vaccine          IMM PNEUMOCOCCAL VACCINE: 65+ Years (Series Information) Completed    2016  Imm Admin: Pneumococcal polysaccharide vaccine (PPSV-23)    2015  Imm Admin: Pneumococcal Conjugate Vaccine (Prevnar/PCV-13)    2011  Imm Admin: Pneumococcal polysaccharide vaccine (PPSV-23)          HEPATITIS C SCREENING  Completed    10/27/2020  HCV Scrn ( [id number] 1xLife)          IMM ZOSTER VACCINES (Series Information) Completed    2021  Imm Admin: Zoster Vaccine Recombinant (RZV) (SHINGRIX)    2020  Imm Admin: Zoster Vaccine Recombinant (RZV) (SHINGRIX)          IMM INFLUENZA (Series Information) Completed    2021  Imm Admin: Influenza Vaccine Adult HD    10/26/2020  Imm Admin: Influenza Vaccine Adult HD    2019  Imm Admin: Influenza Vaccine Adult HD    10/19/2018  Imm Admin: Influenza Vaccine Adult HD    10/12/2017  Imm Admin: INFLUENZA TIV (IM)    Only the first 5 history entries have been loaded, but more history exists.          COVID-19 Vaccine (Series Information) Completed    10/18/2021  Imm Admin: Pfizer SARS-CoV-2 Vaccine    2021  Imm Admin: Pfizer SARS-CoV-2 Vaccine    2021  Imm Admin: Pfizer SARS-CoV-2 Vaccine          IMM HEP B VACCINE (Series Information) Aged Out    No completion history exists for this topic.          IMM MENINGOCOCCAL VACCINE (MCV4) (Series Information) Aged Out    No completion history exists for this topic.          Discontinued - COLORECTAL CANCER SCREENING  Discontinued    2021  OCCULT BLOOD FECES IMMUNOASSAY    06/10/2020  OCCULT BLOOD FECES IMMUNOASSAY    2019  OCCULT BLOOD FECES IMMUNOASSAY    2019  OCCULT BLOOD FECES IMMUNOASSAY                Patient Care Team:  Roselyn Porras M.D. as PCP - General (Family Medicine)  Maria R Reyes, M.D. as  "Consulting Physician (Allergy and Immunology)  Tawnya Cee M.D. as Consulting Physician (Cardiovascular Disease (Cardiology))  Alfonso Denton M.D. (Inactive) as Consulting Physician (Pulmonary Medicine)  Gustavo Villegas Ass't as    [facility] (DME Supplier)        Social History     Tobacco Use   • Smoking status: Never Smoker   • Smokeless tobacco: Never Used   Vaping Use   • Vaping Use: Never used   Substance Use Topics   • Alcohol use: Yes     Comment: \"maybe 3 drinks a year\"   • Drug use: No     Family History   Problem Relation Age of Onset   • Cancer Sister         Breast cancer   • Cancer Mother    • Cancer Father      She  has a past medical history of ADHD, Allergic rhinitis, Asthma, Back pain, Basal cell carcinoma (BCC), Chickenpox, Depression, Fracture, Influenza, Kidney stone, Nasal drainage, and Pneumonia.   Past Surgical History:   Procedure Laterality Date   • ABDOMINAL HYSTERECTOMY TOTAL      age 38 for \"pre-cancer\", ovaries not removed   • CARPAL TUNNEL RELEASE     • HYSTERECTOMY LAPAROSCOPY     • OPEN REDUCTION     • PB REMV 2ND CATARACT,CORN-SCLER SECTN         Exam:   /84 (BP Location: Right arm, Patient Position: Sitting, BP Cuff Size: Adult)   Pulse 78   Temp 36.2 °C (97.1 °F) (Temporal)   Resp 16   Ht 1.575 m (5' 2\")   Wt 80.6 kg (177 lb 12.8 oz)   SpO2 93%  Body mass index is 32.52 kg/m².    Hearing good.    Dentition fair  Alert, oriented in no acute distress.  Eye contact is good, speech goal directed, affect calm    Assessment and Plan. The following treatment and monitoring plan is recommended:    1. Dilated pancreatic duct    2. Hiatal hernia    3. Indigestion    Other orders  - omeprazole (PRILOSEC) 20 MG delayed-release capsule; Take 1 Capsule by mouth 2 times daily with meals as needed.  Dispense: 60 Capsule; Refill: 2  add PPI prn for gerd, f/u GI  rec to add fiber to her daily fiber.   Ok to take extra 1/2 tab of metoprolol if she " needs to for palpitations.  Lost her child around xmas 20y ago, hard for her , defers referral to .     Services suggested: No services needed at this time  Health Care Screening: Age-appropriate preventive services recommended by USPTF and ACIP covered by Medicare were discussed today. Services ordered if indicated and agreed upon by the patient.  Referrals offered: Community-based lifestyle interventions to reduce health risks and promote self-management and wellness, fall prevention, nutrition, physical activity, tobacco-use cessation, weight loss, and mental health services as per orders if indicated.    Discussion today about general wellness and lifestyle habits:    · Prevent falls and reduce trip hazards; Cautioned about securing or removing rugs.  · Have a working fire alarm and carbon monoxide detector;   · Engage in regular physical activity and social activities     Follow-up: No follow-ups on file.

## 2021-12-26 ENCOUNTER — PATIENT MESSAGE (OUTPATIENT)
Dept: MEDICAL GROUP | Facility: PHYSICIAN GROUP | Age: 71
End: 2021-12-26

## 2021-12-26 DIAGNOSIS — F33.42 RECURRENT MAJOR DEPRESSIVE DISORDER, IN FULL REMISSION (HCC): ICD-10-CM

## 2021-12-27 ENCOUNTER — PATIENT MESSAGE (OUTPATIENT)
Dept: MEDICAL GROUP | Facility: PHYSICIAN GROUP | Age: 71
End: 2021-12-27

## 2021-12-27 DIAGNOSIS — F33.42 RECURRENT MAJOR DEPRESSIVE DISORDER, IN FULL REMISSION (HCC): ICD-10-CM

## 2021-12-27 DIAGNOSIS — F43.10 POSTTRAUMATIC STRESS DISORDER: ICD-10-CM

## 2021-12-27 RX ORDER — ESCITALOPRAM OXALATE 10 MG/1
10 TABLET ORAL DAILY
Qty: 100 TABLET | Refills: 0 | Status: SHIPPED | OUTPATIENT
Start: 2021-12-27 | End: 2022-03-14 | Stop reason: SDUPTHER

## 2021-12-27 RX ORDER — ESCITALOPRAM OXALATE 10 MG/1
10 TABLET ORAL DAILY
COMMUNITY
End: 2021-12-27 | Stop reason: SDUPTHER

## 2021-12-27 NOTE — TELEPHONE ENCOUNTER
From: Denia Deluca  To: Physician Roselyn Porras  Sent: 12/26/2021 9:32 AM PST  Subject: Depression     I thought when I saw you recently my depression would be seasonal and go away. It has been very low and pounding … I will go back on the meds but I would like to return to therapy. I went B4 with senior care and they gave me next avail and we did not click AT ALL. CAN I please have a referral again and I will pay more attention to whom I am matched to. I had years of great therapy experiences when We were living in [place]

## 2022-01-09 ENCOUNTER — APPOINTMENT (OUTPATIENT)
Dept: RADIOLOGY | Facility: MEDICAL CENTER | Age: 72
End: 2022-01-09
Attending: EMERGENCY MEDICINE
Payer: MEDICARE

## 2022-01-09 ENCOUNTER — HOSPITAL ENCOUNTER (EMERGENCY)
Facility: MEDICAL CENTER | Age: 72
End: 2022-01-09
Attending: EMERGENCY MEDICINE
Payer: MEDICARE

## 2022-01-09 VITALS
HEIGHT: 63 IN | OXYGEN SATURATION: 97 % | WEIGHT: 178.57 LBS | DIASTOLIC BLOOD PRESSURE: 66 MMHG | TEMPERATURE: 96.9 F | BODY MASS INDEX: 31.64 KG/M2 | RESPIRATION RATE: 18 BRPM | HEART RATE: 76 BPM | SYSTOLIC BLOOD PRESSURE: 119 MMHG

## 2022-01-09 DIAGNOSIS — S80.01XA CONTUSION OF RIGHT KNEE, INITIAL ENCOUNTER: ICD-10-CM

## 2022-01-09 PROCEDURE — 99284 EMERGENCY DEPT VISIT MOD MDM: CPT

## 2022-01-09 PROCEDURE — 73564 X-RAY EXAM KNEE 4 OR MORE: CPT | Mod: RT

## 2022-01-09 ASSESSMENT — FIBROSIS 4 INDEX: FIB4 SCORE: 1.65

## 2022-01-09 NOTE — ED PROVIDER NOTES
ED Provider Note    CHIEF COMPLAINT  Chief Complaint   Patient presents with   • Knee Pain       HPI  Denia Deluca is a 71 y.o. female who presents for evaluation of acute right knee injury.  The patient reports that she was walking the other day and took a misstep and fell directly on top of her right knee.  She denies injury to the head neck chest abdomen or pelvis.  She has had pain on the anterior aspect of the knee ever since her injury.  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  No loss of consciousness headache chest pain all other systems are negative.     PAST MEDICAL HISTORY  Past Medical History:   Diagnosis Date   • ADHD    • Allergic rhinitis    • Asthma    • Back pain    • Basal cell carcinoma (BCC)    • Chickenpox    • Depression    • Fracture     Multiple fractures   • Influenza    • Kidney stone    • Nasal drainage    • Pneumonia        FAMILY HISTORY  Noncontributory    SOCIAL HISTORY  Social History     Socioeconomic History   • Marital status:      Spouse name: Not on file   • Number of children: Not on file   • Years of education: Not on file   • Highest education level: Not on file   Occupational History   • Not on file   Tobacco Use   • Smoking status: Never Smoker   • Smokeless tobacco: Never Used   Vaping Use   • Vaping Use: Never used   Substance and Sexual Activity   • Alcohol use: Yes     Comment: Occasionally   • Drug use: No   • Sexual activity: Not on file   Other Topics Concern   • Not on file   Social History Narrative    , has grown children. 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      Social Determinants of Health     Financial Resource Strain:    • Difficulty of Paying Living Expenses: Not on file   Food Insecurity:    • Worried  "About Running Out of Food in the Last Year: Not on file   • Ran Out of Food in the Last Year: Not on file   Transportation Needs:    • Lack of Transportation (Medical): Not on file   • Lack of Transportation (Non-Medical): Not on file   Physical Activity:    • Days of Exercise per Week: Not on file   • Minutes of Exercise per Session: Not on file   Stress:    • Feeling of Stress : Not on file   Social Connections:    • Frequency of Communication with Friends and Family: Not on file   • Frequency of Social Gatherings with Friends and Family: Not on file   • Attends Nondenominational Services: Not on file   • Active Member of Clubs or Organizations: Not on file   • Attends Club or Organization Meetings: Not on file   • Marital Status: Not on file   Intimate Partner Violence:    • Fear of Current or Ex-Partner: Not on file   • Emotionally Abused: Not on file   • Physically Abused: Not on file   • Sexually Abused: Not on file   Housing Stability:    • Unable to Pay for Housing in the Last Year: Not on file   • Number of Places Lived in the Last Year: Not on file   • Unstable Housing in the Last Year: Not on file       SURGICAL HISTORY  Past Surgical History:   Procedure Laterality Date   • ABDOMINAL HYSTERECTOMY TOTAL      age 38 for \"pre-cancer\", ovaries not removed   • CARPAL TUNNEL RELEASE     • HYSTERECTOMY LAPAROSCOPY     • OPEN REDUCTION     • [place] REMV 2ND CATARACT,CORN-SCLER SECTN         CURRENT MEDICATIONS  No current facility-administered medications for this encounter.    Current Outpatient Medications:   •  escitalopram (LEXAPRO) 10 MG Tab, Take 1 Tablet by mouth every day., Disp: 100 Tablet, Rfl: 0  •  metoprolol tartrate (LOPRESSOR) 25 MG Tab, Take 0.5 Tablets by mouth 2 times a day., Disp: 100 Tablet, Rfl: 3  •  omeprazole (PRILOSEC) 20 MG delayed-release capsule, Take 1 Capsule by mouth 2 times daily with meals as needed., Disp: 60 Capsule, Rfl: 2  •  Multiple Vitamin (MULTIVITAMIN ADULT PO), Take  by mouth., " "Disp: , Rfl:   •  CALCIUM PO, Take  by mouth., Disp: , Rfl:   •  VITAMIN D PO, Take  by mouth., Disp: , Rfl:   •  Ascorbic Acid (VITAMIN C PO), Take  by mouth., Disp: , Rfl:   •  atomoxetine (STRATTERA) 40 MG capsule, Take 1 capsule by mouth every day., Disp: 90 capsule, Rfl: 3  •  famotidine (PEPCID) 20 MG Tab, Take 1 tablet by mouth 2 times a day. For indigestion, Disp: 180 tablet, Rfl: 2  •  EPINEPHrine (EPIPEN) 0.3 MG/0.3ML Solution Auto-injector solution for injection, , Disp: , Rfl:   •  albuterol 108 (90 Base) MCG/ACT Aero Soln inhalation aerosol, Inhale 2 Puffs by mouth every 6 hours as needed for Shortness of Breath., Disp: 8.5 g, Rfl: 11      ALLERGIES  Allergies   Allergen Reactions   • Crab    • Shellfish Allergy Unspecified     Heart issues   • Shrimp (Diagnostic)    • Bee Venom Anaphylaxis     Will stop heart    • [place] Hives     Increase in heart rate       PHYSICAL EXAM  VITAL SIGNS: /80   Pulse 86   Temp 36.7 °C (98 °F) (Temporal)   Resp 20   Ht 1.6 m (5' 3\")   Wt 81 kg (178 lb 9.2 oz)   SpO2 98%   BMI 31.63 kg/m²       Constitutional: Well developed, Well nourished, No acute distress, Non-toxic appearance.   HENT: Normocephalic, Atraumatic, Bilateral external ears normal, Oropharynx moist, No oral exudates, Nose normal.   Eyes: PERRLA, EOMI, Conjunctiva normal, No discharge.   Neck: Normal range of motion, No tenderness, Supple, No stridor.    Cardiovascular: Normal heart rate, Normal rhythm, No murmurs, No rubs, No gallops.   Thorax & Lungs: Normal breath sounds, No respiratory distress, No wheezing, No chest tenderness.   Abdomen: Bowel sounds normal, Soft, No tenderness, No masses, No pulsatile masses.   Skin: Warm, Dry, No erythema, No rash.   Musculoskeletal: Right lower extremity exam is notable for 1 x 1 cm of ecchymosis and bruising on the anterior right knee overlying the patella.  There is no significant effusion.  The patient did not have any obvious laxity in her anterior " "posterior drawer tests and there was no large effusion noted   neurologic: Alert & oriented x 3, Normal motor function, Normal sensory function, No focal deficits noted.   Psychiatric: Affect normal, Judgment normal, Mood normal.     DX-KNEE COMPLETE 4+ RIGHT   Final Result      No evidence of fracture or dislocation.            COURSE & MEDICAL DECISION MAKING  Pertinent Labs & Imaging studies reviewed. (See chart for details)  Patient presents here after direct trauma to the right knee.  She does not have any obvious fracture.  She is ambulatory without significant limp.  I doubt she has any significant ligamentous injury.  We discussed crutches but the patient reports that they make her more \"clumsy \"than the underlying injury.  I recommended ice and Tylenol and return as needed for new or worsening symptoms    FINAL IMPRESSION  1.  Right knee contusion         Electronically signed by: Kenji Urbina M.D., 1/9/2022 10:57 AM    "

## 2022-01-09 NOTE — ED NOTES
Patient is stable for discharge at this time, anticipatory guidance provided, close follow-up is encouraged, and ED return instructions have been detailed. Patient is both agreeable to the disposition and plan and discharged home in ambulatory state.

## 2022-01-09 NOTE — ED TRIAGE NOTES
"Pt reports chronic \"numbness\" affecting her right leg (for decades).  She is therefore prone to \"tripping.\"  C/O right knee pain incurring since yesterday.  Chief Complaint   Patient presents with   • Knee Pain     /80   Pulse 86   Temp 36.7 °C (98 °F) (Temporal)   Resp 20   Ht 1.6 m (5' 3\")   Wt 81 kg (178 lb 9.2 oz)   SpO2 98%   BMI 31.63 kg/m²   Has this patient been vaccinated for COVID YES  If not, would they like to be vaccinated while in the ER if eligible?  N/A  Would the patient like to speak with the ERP about the possibility of receiving the COVID vaccine today before making a decision? N/A     "

## 2022-01-13 ENCOUNTER — PATIENT MESSAGE (OUTPATIENT)
Dept: HEALTH INFORMATION MANAGEMENT | Facility: OTHER | Age: 72
End: 2022-01-13
Payer: MEDICARE

## 2022-01-19 ENCOUNTER — TELEPHONE (OUTPATIENT)
Dept: HEALTH INFORMATION MANAGEMENT | Facility: OTHER | Age: 72
End: 2022-01-19

## 2022-01-20 DIAGNOSIS — R29.6 MULTIPLE FALLS: ICD-10-CM

## 2022-02-01 ENCOUNTER — PATIENT MESSAGE (OUTPATIENT)
Dept: MEDICAL GROUP | Facility: PHYSICIAN GROUP | Age: 72
End: 2022-02-01

## 2022-02-01 RX ORDER — OMEPRAZOLE 40 MG/1
40 CAPSULE, DELAYED RELEASE ORAL DAILY
COMMUNITY
End: 2022-02-01 | Stop reason: SDUPTHER

## 2022-02-01 NOTE — PATIENT COMMUNICATION
Received request via: Pharmacy    Was the patient seen in the last year in this department? Yes 12/9/21    Does the patient have an active prescription (recently filled or refills available) for medication(s) requested? No

## 2022-02-01 NOTE — TELEPHONE ENCOUNTER
From: Denia Deluca  To: Physician Roselyn Porras  Sent: 2/1/2022 12:40 PM PST  Subject: Omeprazole 20mg    The above reference med.. the digestive doctor changed to 40 mg daily… I do not need that strong… could you please send to [facility] mail order 90 day supply for 20mg please. Later this month he will do the colonoscopy to determine bowel issue situation. Then I will make my next appointment with you

## 2022-02-02 RX ORDER — OMEPRAZOLE 40 MG/1
40 CAPSULE, DELAYED RELEASE ORAL DAILY
Qty: 100 CAPSULE | Refills: 3 | Status: SHIPPED | OUTPATIENT
Start: 2022-02-02 | End: 2022-05-04

## 2022-02-03 ENCOUNTER — PATIENT MESSAGE (OUTPATIENT)
Dept: MEDICAL GROUP | Facility: PHYSICIAN GROUP | Age: 72
End: 2022-02-03

## 2022-02-04 RX ORDER — OMEPRAZOLE 20 MG/1
20 CAPSULE, DELAYED RELEASE ORAL
Qty: 200 CAPSULE | Refills: 3 | Status: SHIPPED | OUTPATIENT
Start: 2022-02-04 | End: 2022-05-05

## 2022-02-04 NOTE — TELEPHONE ENCOUNTER
----- Message from Denia Deluca sent at 2/3/2022  4:10 PM PST -----  Regarding: [facility]Digestive meds   Please see my latest text to you regarding 20mm vs 40    I received no call or response but Willa contacted me to  40 mg.  i prefer 20. PLEASE .  DAILY

## 2022-03-10 ENCOUNTER — PHYSICAL THERAPY (OUTPATIENT)
Dept: PHYSICAL THERAPY | Facility: MEDICAL CENTER | Age: 72
End: 2022-03-10
Attending: FAMILY MEDICINE
Payer: MEDICARE

## 2022-03-10 DIAGNOSIS — R29.6 MULTIPLE FALLS: ICD-10-CM

## 2022-03-10 PROCEDURE — 97162 PT EVAL MOD COMPLEX 30 MIN: CPT

## 2022-03-10 SDOH — ECONOMIC STABILITY: GENERAL: QUALITY OF LIFE: GOOD

## 2022-03-10 ASSESSMENT — BALANCE ASSESSMENTS
PICK UP OBJECT FROM THE FLOOR FROM A STANDING POSITION: 4
SITTING TO STANDING: 4
STANDING UNSUPPORTED ONE FOOT IN FRONT: 4
TRANSFERS: 4
TURN 360 DEGREES: 4
LOOK OVER LEFT AND RIGHT SHOULDERS WHILE STANDING: 4
STANDING UNSUPPORTED WITH EYES CLOSED: 4
STANDING ON ONE LEG: 4
LONG VERSION TOTAL SCORE (MAX 56): 56
REACHING FORWARD WITH OUTSTRETCHED ARM WHILE STANDING: 4
PLACE ALTERNATE FOOT ON STEP OR STOOL WHILE STANDING UNSUPPORTED: 4
SITTING UNSUPPORTED: 4
STANDING UNSUPPORTED WITH FEET TOGETHER: 4
STANDING TO SITTING: 4
STANDING UNSUPPORTED: 4
LONG VERSION TOTAL SCORE (MAX 56): 56

## 2022-03-10 ASSESSMENT — ENCOUNTER SYMPTOMS
PAIN SCALE AT HIGHEST: 0
PAIN SCALE AT LOWEST: 0
PAIN SCALE: 0
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
## 2022-03-14 ENCOUNTER — PATIENT MESSAGE (OUTPATIENT)
Dept: MEDICAL GROUP | Facility: PHYSICIAN GROUP | Age: 72
End: 2022-03-14
Payer: MEDICARE

## 2022-03-14 DIAGNOSIS — F33.42 RECURRENT MAJOR DEPRESSIVE DISORDER, IN FULL REMISSION (HCC): ICD-10-CM

## 2022-03-15 ENCOUNTER — APPOINTMENT (OUTPATIENT)
Dept: PHYSICAL THERAPY | Facility: MEDICAL CENTER | Age: 72
End: 2022-03-15
Attending: FAMILY MEDICINE
Payer: MEDICARE

## 2022-03-15 RX ORDER — ESCITALOPRAM OXALATE 10 MG/1
TABLET ORAL
Qty: 100 TABLET | Refills: 0 | Status: SHIPPED | OUTPATIENT
Start: 2022-03-15 | End: 2022-07-28 | Stop reason: SDUPTHER

## 2022-03-15 RX ORDER — ESCITALOPRAM OXALATE 10 MG/1
10 TABLET ORAL DAILY
Qty: 100 TABLET | Refills: 3 | Status: SHIPPED | OUTPATIENT
Start: 2022-03-15 | End: 2022-03-15

## 2022-03-15 NOTE — TELEPHONE ENCOUNTER
From: Denia Deluca  To: Physician Roselyn Porras  Sent: 3/14/2022 8:31 PM PDT  Subject: Escitalopram    [facility] is asking for new script for my lexapro. The request went to a Dr I don’t recognize, Deena Gallardo. Can you please ensure script request gets handled    Thanks    Yovani Deluca

## 2022-03-17 ENCOUNTER — APPOINTMENT (OUTPATIENT)
Dept: PHYSICAL THERAPY | Facility: MEDICAL CENTER | Age: 72
End: 2022-03-17
Attending: FAMILY MEDICINE
Payer: MEDICARE

## 2022-03-22 ENCOUNTER — APPOINTMENT (OUTPATIENT)
Dept: PHYSICAL THERAPY | Facility: MEDICAL CENTER | Age: 72
End: 2022-03-22
Attending: FAMILY MEDICINE
Payer: MEDICARE

## 2022-03-24 ENCOUNTER — APPOINTMENT (OUTPATIENT)
Dept: PHYSICAL THERAPY | Facility: MEDICAL CENTER | Age: 72
End: 2022-03-24
Attending: FAMILY MEDICINE
Payer: MEDICARE

## 2022-03-29 ENCOUNTER — APPOINTMENT (OUTPATIENT)
Dept: PHYSICAL THERAPY | Facility: MEDICAL CENTER | Age: 72
End: 2022-03-29
Attending: FAMILY MEDICINE
Payer: MEDICARE

## 2022-03-31 ENCOUNTER — APPOINTMENT (OUTPATIENT)
Dept: PHYSICAL THERAPY | Facility: MEDICAL CENTER | Age: 72
End: 2022-03-31
Attending: FAMILY MEDICINE
Payer: MEDICARE

## 2022-04-05 ENCOUNTER — APPOINTMENT (OUTPATIENT)
Dept: PHYSICAL THERAPY | Facility: MEDICAL CENTER | Age: 72
End: 2022-04-05
Attending: FAMILY MEDICINE
Payer: MEDICARE

## 2022-04-07 ENCOUNTER — APPOINTMENT (OUTPATIENT)
Dept: PHYSICAL THERAPY | Facility: MEDICAL CENTER | Age: 72
End: 2022-04-07
Attending: FAMILY MEDICINE
Payer: MEDICARE

## 2022-04-20 ENCOUNTER — APPOINTMENT (OUTPATIENT)
Dept: RADIOLOGY | Facility: MEDICAL CENTER | Age: 72
End: 2022-04-20
Attending: INTERNAL MEDICINE
Payer: MEDICARE

## 2022-04-21 ENCOUNTER — APPOINTMENT (OUTPATIENT)
Dept: PHYSICAL THERAPY | Facility: MEDICAL CENTER | Age: 72
End: 2022-04-21
Payer: MEDICARE

## 2022-04-28 ENCOUNTER — APPOINTMENT (OUTPATIENT)
Dept: PHYSICAL THERAPY | Facility: MEDICAL CENTER | Age: 72
End: 2022-04-28
Payer: MEDICARE

## 2022-04-29 ENCOUNTER — HOSPITAL ENCOUNTER (OUTPATIENT)
Dept: LAB | Facility: MEDICAL CENTER | Age: 72
End: 2022-04-29
Attending: FAMILY MEDICINE
Payer: MEDICARE

## 2022-04-29 DIAGNOSIS — F33.42 RECURRENT MAJOR DEPRESSIVE DISORDER, IN FULL REMISSION (HCC): ICD-10-CM

## 2022-04-29 DIAGNOSIS — E61.1 LOW IRON: ICD-10-CM

## 2022-04-29 DIAGNOSIS — E78.00 PURE HYPERCHOLESTEROLEMIA: ICD-10-CM

## 2022-04-29 DIAGNOSIS — R79.9 ABNORMAL FINDING OF BLOOD CHEMISTRY, UNSPECIFIED: ICD-10-CM

## 2022-04-29 DIAGNOSIS — K86.89 PANCREATIC DUCT DILATED: ICD-10-CM

## 2022-04-29 LAB
25(OH)D3 SERPL-MCNC: 50 NG/ML (ref 30–100)
ALBUMIN SERPL BCP-MCNC: 4.5 G/DL (ref 3.2–4.9)
ALBUMIN/GLOB SERPL: 1.7 G/DL
ALP SERPL-CCNC: 129 U/L (ref 30–99)
ALT SERPL-CCNC: 11 U/L (ref 2–50)
ANION GAP SERPL CALC-SCNC: 11 MMOL/L (ref 7–16)
AST SERPL-CCNC: 16 U/L (ref 12–45)
BASOPHILS # BLD AUTO: 0.7 % (ref 0–1.8)
BASOPHILS # BLD: 0.03 K/UL (ref 0–0.12)
BILIRUB SERPL-MCNC: 0.5 MG/DL (ref 0.1–1.5)
BUN SERPL-MCNC: 16 MG/DL (ref 8–22)
CALCIUM SERPL-MCNC: 9.8 MG/DL (ref 8.5–10.5)
CHLORIDE SERPL-SCNC: 102 MMOL/L (ref 96–112)
CHOLEST SERPL-MCNC: 247 MG/DL (ref 100–199)
CO2 SERPL-SCNC: 26 MMOL/L (ref 20–33)
CREAT SERPL-MCNC: 0.76 MG/DL (ref 0.5–1.4)
EOSINOPHIL # BLD AUTO: 0.12 K/UL (ref 0–0.51)
EOSINOPHIL NFR BLD: 2.8 % (ref 0–6.9)
ERYTHROCYTE [DISTWIDTH] IN BLOOD BY AUTOMATED COUNT: 43.9 FL (ref 35.9–50)
EST. AVERAGE GLUCOSE BLD GHB EST-MCNC: 108 MG/DL
FASTING STATUS PATIENT QL REPORTED: NORMAL
FERRITIN SERPL-MCNC: 56.1 NG/ML (ref 10–291)
GFR SERPLBLD CREATININE-BSD FMLA CKD-EPI: 83 ML/MIN/1.73 M 2
GLOBULIN SER CALC-MCNC: 2.6 G/DL (ref 1.9–3.5)
GLUCOSE SERPL-MCNC: 90 MG/DL (ref 65–99)
HBA1C MFR BLD: 5.4 % (ref 4–5.6)
HCT VFR BLD AUTO: 44.1 % (ref 37–47)
HDLC SERPL-MCNC: 58 MG/DL
HGB BLD-MCNC: 14.3 G/DL (ref 12–16)
IMM GRANULOCYTES # BLD AUTO: 0.01 K/UL (ref 0–0.11)
IMM GRANULOCYTES NFR BLD AUTO: 0.2 % (ref 0–0.9)
IRON SATN MFR SERPL: 26 % (ref 15–55)
IRON SERPL-MCNC: 85 UG/DL (ref 40–170)
LDLC SERPL CALC-MCNC: 163 MG/DL
LYMPHOCYTES # BLD AUTO: 1.46 K/UL (ref 1–4.8)
LYMPHOCYTES NFR BLD: 34 % (ref 22–41)
MCH RBC QN AUTO: 30.2 PG (ref 27–33)
MCHC RBC AUTO-ENTMCNC: 32.4 G/DL (ref 33.6–35)
MCV RBC AUTO: 93 FL (ref 81.4–97.8)
MONOCYTES # BLD AUTO: 0.39 K/UL (ref 0–0.85)
MONOCYTES NFR BLD AUTO: 9.1 % (ref 0–13.4)
NEUTROPHILS # BLD AUTO: 2.29 K/UL (ref 2–7.15)
NEUTROPHILS NFR BLD: 53.2 % (ref 44–72)
NRBC # BLD AUTO: 0 K/UL
NRBC BLD-RTO: 0 /100 WBC
PLATELET # BLD AUTO: 251 K/UL (ref 164–446)
PMV BLD AUTO: 9.8 FL (ref 9–12.9)
POTASSIUM SERPL-SCNC: 3.7 MMOL/L (ref 3.6–5.5)
PROT SERPL-MCNC: 7.1 G/DL (ref 6–8.2)
RBC # BLD AUTO: 4.74 M/UL (ref 4.2–5.4)
SODIUM SERPL-SCNC: 139 MMOL/L (ref 135–145)
TIBC SERPL-MCNC: 329 UG/DL (ref 250–450)
TRIGL SERPL-MCNC: 132 MG/DL (ref 0–149)
TSH SERPL DL<=0.005 MIU/L-ACNC: 3.09 UIU/ML (ref 0.38–5.33)
UIBC SERPL-MCNC: 244 UG/DL (ref 110–370)
WBC # BLD AUTO: 4.3 K/UL (ref 4.8–10.8)

## 2022-04-29 PROCEDURE — 83550 IRON BINDING TEST: CPT

## 2022-04-29 PROCEDURE — 80061 LIPID PANEL: CPT

## 2022-04-29 PROCEDURE — 84443 ASSAY THYROID STIM HORMONE: CPT

## 2022-04-29 PROCEDURE — 36415 COLL VENOUS BLD VENIPUNCTURE: CPT

## 2022-04-29 PROCEDURE — 80053 COMPREHEN METABOLIC PANEL: CPT

## 2022-04-29 PROCEDURE — 83540 ASSAY OF IRON: CPT

## 2022-04-29 PROCEDURE — 82306 VITAMIN D 25 HYDROXY: CPT

## 2022-04-29 PROCEDURE — 84075 ASSAY ALKALINE PHOSPHATASE: CPT | Mod: XU

## 2022-04-29 PROCEDURE — 85025 COMPLETE CBC W/AUTO DIFF WBC: CPT

## 2022-04-29 PROCEDURE — 83036 HEMOGLOBIN GLYCOSYLATED A1C: CPT

## 2022-04-29 PROCEDURE — 82728 ASSAY OF FERRITIN: CPT

## 2022-04-29 PROCEDURE — 84080 ASSAY ALKALINE PHOSPHATASES: CPT

## 2022-04-30 ENCOUNTER — HOSPITAL ENCOUNTER (OUTPATIENT)
Facility: MEDICAL CENTER | Age: 72
End: 2022-04-30
Attending: FAMILY MEDICINE
Payer: MEDICARE

## 2022-04-30 PROCEDURE — 82274 ASSAY TEST FOR BLOOD FECAL: CPT

## 2022-05-03 ENCOUNTER — HOSPITAL ENCOUNTER (OUTPATIENT)
Dept: RADIOLOGY | Facility: MEDICAL CENTER | Age: 72
End: 2022-05-03
Attending: INTERNAL MEDICINE
Payer: MEDICARE

## 2022-05-03 DIAGNOSIS — Z53.9 PROCEDURE NOT CARRIED OUT: ICD-10-CM

## 2022-05-03 LAB
ALP BONE SERPL-CCNC: 57 U/L (ref 0–55)
ALP ISOS SERPL HS-CCNC: 0 U/L
ALP LIVER SERPL-CCNC: 78 U/L (ref 0–94)
ALP SERPL-CCNC: 135 U/L (ref 40–120)

## 2022-05-03 PROCEDURE — 74270 X-RAY XM COLON 1CNTRST STD: CPT

## 2022-05-04 ENCOUNTER — OFFICE VISIT (OUTPATIENT)
Dept: MEDICAL GROUP | Facility: PHYSICIAN GROUP | Age: 72
End: 2022-05-04
Payer: MEDICARE

## 2022-05-04 VITALS
HEART RATE: 98 BPM | SYSTOLIC BLOOD PRESSURE: 114 MMHG | BODY MASS INDEX: 32.76 KG/M2 | HEIGHT: 62 IN | WEIGHT: 178 LBS | OXYGEN SATURATION: 93 % | RESPIRATION RATE: 18 BRPM | DIASTOLIC BLOOD PRESSURE: 62 MMHG | TEMPERATURE: 96.4 F

## 2022-05-04 DIAGNOSIS — R74.8 ELEVATED ALKALINE PHOSPHATASE LEVEL: ICD-10-CM

## 2022-05-04 DIAGNOSIS — F33.42 RECURRENT MAJOR DEPRESSIVE DISORDER, IN FULL REMISSION (HCC): ICD-10-CM

## 2022-05-04 DIAGNOSIS — F90.1 ATTENTION DEFICIT HYPERACTIVITY DISORDER (ADHD), PREDOMINANTLY HYPERACTIVE TYPE: ICD-10-CM

## 2022-05-04 DIAGNOSIS — E78.00 PURE HYPERCHOLESTEROLEMIA: ICD-10-CM

## 2022-05-04 DIAGNOSIS — R00.2 PALPITATIONS: ICD-10-CM

## 2022-05-04 DIAGNOSIS — L98.9 SKIN LESION: ICD-10-CM

## 2022-05-04 PROCEDURE — 93000 ELECTROCARDIOGRAM COMPLETE: CPT | Performed by: FAMILY MEDICINE

## 2022-05-04 PROCEDURE — 99213 OFFICE O/P EST LOW 20 MIN: CPT | Performed by: FAMILY MEDICINE

## 2022-05-04 RX ORDER — OXYBUTYNIN CHLORIDE 10 MG/1
TABLET, EXTENDED RELEASE ORAL
COMMUNITY
Start: 2022-02-21 | End: 2022-08-01 | Stop reason: SDUPTHER

## 2022-05-04 ASSESSMENT — FIBROSIS 4 INDEX: FIB4 SCORE: 1.36

## 2022-05-04 NOTE — PROGRESS NOTES
Subjective:     CC:   Chief Complaint   Patient presents with   • Follow-Up   • Medication Management   • Tachycardia     Reaction to magnesium citrate to help loosen bowels, did not work for 4 1/2 hours, random small activities cause rapid heart rate, even after medication. Rapid change from 130 to 70.         HPI:   Denia presents today with :   Last saw me 12/21 - has a history of palpitations and saw cardiology July 15 of 2/2021 for this generally well controlled issue which is triggered by episodes of reflux. Had a cscope yest, mag cit upset her stomach and caused some palpitations.   Recent labs shows a borderline elevated alkaline phosphatase, was 125 8 months ago and now 135, elevated bone fractions of 57, value was 48 months ago. Had a normal study for monoclonal proteins 6/21. Her GI doc thought maybe the strattera was affecting her liver. She is cutting down a little bit, taking it just 4d/wk but it works great for her ADHD. Plans to stay on lexapro. qtc is 447.   phq9 is 3 05/04/22   Using ppi instead of pepcid bc worked better  No problems updated.    Current Outpatient Medications Ordered in Epic   Medication Sig Dispense Refill   • oxybutynin SR (DITROPAN-XL) 10 MG CR tablet      • escitalopram (LEXAPRO) 10 MG Tab TAKE ONE TABLET BY MOUTH EVERY  Tablet 0   • omeprazole (PRILOSEC) 20 MG delayed-release capsule Take 1 Capsule by mouth 2 times daily with meals as needed for up to 90 days. 200 Capsule 3   • metoprolol tartrate (LOPRESSOR) 25 MG Tab Take 0.5 Tablets by mouth 2 times a day. 100 Tablet 3   • Multiple Vitamin (MULTIVITAMIN ADULT PO) Take  by mouth.     • CALCIUM PO Take  by mouth.     • VITAMIN D PO Take  by mouth.     • Ascorbic Acid (VITAMIN C PO) Take  by mouth.     • atomoxetine (STRATTERA) 40 MG capsule Take 1 capsule by mouth every day. (Patient taking differently: Take 40 mg by mouth every day. 4 times a week with lexapro) 90 capsule 3   • EPINEPHrine (EPIPEN) 0.3 MG/0.3ML  "Solution Auto-injector solution for injection      • albuterol 108 (90 Base) MCG/ACT Aero Soln inhalation aerosol Inhale 2 Puffs by mouth every 6 hours as needed for Shortness of Breath. 8.5 g 11     No current [facility]-ordered facility-administered medications on file.       Past Medical History:   Diagnosis Date   • ADHD    • Allergic rhinitis    • Asthma    • Back pain    • Basal cell carcinoma (BCC)    • Chickenpox    • Depression    • Fracture     Multiple fractures   • Influenza    • Kidney stone    • Nasal drainage    • Pneumonia        Social History     Tobacco Use   • Smoking status: Never Smoker   • Smokeless tobacco: Never Used   Vaping Use   • Vaping Use: Never used   Substance Use Topics   • Alcohol use: Yes     Comment: Occasionally   • Drug use: No       Allergies:  Crab, Magnesium citrate, Shellfish allergy, Shrimp (diagnostic), Bee venom, and [facility]    Health Maintenance: Completed    ROS:  Per HPI      Objective:       Exam:  /62 (BP Location: Right arm, Patient Position: Sitting, BP Cuff Size: Adult)   Pulse 98   Temp (!) 35.8 °C (96.4 °F) (Temporal)   Resp 18   Ht 1.575 m (5' 2\")   Wt 80.7 kg (178 lb)   SpO2 93%   BMI 32.56 kg/m²   Body mass index is 32.56 kg/m².  Wt Readings from Last 4 Encounters:   05/04/22 80.7 kg (178 lb)   01/19/22 81.6 kg (180 lb)   01/09/22 81 kg (178 lb 9.2 oz)   12/09/21 80.6 kg (177 lb 12.8 oz)       Gen: Alert and oriented, No apparent distress. Appropriately groomed.  Neck: Neck is supple without lymphadenopathy.No thyromegaly.   Lungs: Normal effort, CTA bilaterally, no wheezes, rhonchi, or rales.  CV: Regular rate and rhythm. No murmurs, rubs, or gallops.  ABD:  Soft, nontender, nondistended, NABSx4, no HSM or RBT or guarding or masses.  Ext: No clubbing, cyanosis, edema.  Skin: few dark moles on skin  ekg NSR    Labs:   Results for orders placed or performed during the hospital encounter of 04/29/22   FERRITIN   Result Value Ref Range    Ferritin 56.1 " 10.0 - 291.0 ng/mL   IRON/TOTAL IRON BIND   Result Value Ref Range    Iron 85 40 - 170 ug/dL    Total Iron Binding 329 250 - 450 ug/dL    Unsat Iron Binding 244 110 - 370 ug/dL    % Saturation 26 15 - 55 %   ALKALINE PHOSPHATASE ISOENZYMES   Result Value Ref Range    Alkaline Phosphatase 135 (H) 40 - 120 U/L    Liver Fractions 78 0 - 94 U/L    Bone Fractions 57 (H) 0 - 55 U/L    Alk Phos Other Calc 0 U/L   VITAMIN D,25 HYDROXY   Result Value Ref Range    25-Hydroxy   Vitamin D 25 50 30 - 100 ng/mL   HEMOGLOBIN A1C   Result Value Ref Range    Glycohemoglobin 5.4 4.0 - 5.6 %    Est Avg Glucose 108 mg/dL   TSH   Result Value Ref Range    TSH 3.090 0.380 - 5.330 uIU/mL   Lipid Profile   Result Value Ref Range    Cholesterol,Tot 247 (H) 100 - 199 mg/dL    Triglycerides 132 0 - 149 mg/dL    HDL 58 >=40 mg/dL     (H) <100 mg/dL   Comp Metabolic Panel   Result Value Ref Range    Sodium 139 135 - 145 mmol/L    Potassium 3.7 3.6 - 5.5 mmol/L    Chloride 102 96 - 112 mmol/L    Co2 26 20 - 33 mmol/L    Anion Gap 11.0 7.0 - 16.0    Glucose 90 65 - 99 mg/dL    Bun 16 8 - 22 mg/dL    Creatinine 0.76 0.50 - 1.40 mg/dL    Calcium 9.8 8.5 - 10.5 mg/dL    AST(SGOT) 16 12 - 45 U/L    ALT(SGPT) 11 2 - 50 U/L    Alkaline Phosphatase 129 (H) 30 - 99 U/L    Total Bilirubin 0.5 0.1 - 1.5 mg/dL    Albumin 4.5 3.2 - 4.9 g/dL    Total Protein 7.1 6.0 - 8.2 g/dL    Globulin 2.6 1.9 - 3.5 g/dL    A-G Ratio 1.7 g/dL   CBC WITH DIFFERENTIAL   Result Value Ref Range    WBC 4.3 (L) 4.8 - 10.8 K/uL    RBC 4.74 4.20 - 5.40 M/uL    Hemoglobin 14.3 12.0 - 16.0 g/dL    Hematocrit 44.1 37.0 - 47.0 %    MCV 93.0 81.4 - 97.8 fL    MCH 30.2 27.0 - 33.0 pg    MCHC 32.4 (L) 33.6 - 35.0 g/dL    RDW 43.9 35.9 - 50.0 fL    Platelet Count 251 164 - 446 K/uL    MPV 9.8 9.0 - 12.9 fL    Neutrophils-Polys 53.20 44.00 - 72.00 %    Lymphocytes 34.00 22.00 - 41.00 %    Monocytes 9.10 0.00 - 13.40 %    Eosinophils 2.80 0.00 - 6.90 %    Basophils 0.70 0.00 - 1.80 %     Immature Granulocytes 0.20 0.00 - 0.90 %    Nucleated RBC 0.00 /100 WBC    Neutrophils (Absolute) 2.29 2.00 - 7.15 K/uL    Lymphs (Absolute) 1.46 1.00 - 4.80 K/uL    Monos (Absolute) 0.39 0.00 - 0.85 K/uL    Eos (Absolute) 0.12 0.00 - 0.51 K/uL    Baso (Absolute) 0.03 0.00 - 0.12 K/uL    Immature Granulocytes (abs) 0.01 0.00 - 0.11 K/uL    NRBC (Absolute) 0.00 K/uL   FASTING STATUS   Result Value Ref Range    Fasting Status Fasting    ESTIMATED GFR   Result Value Ref Range    GFR (CKD-EPI) 83 >60 mL/min/1.73 m 2         Assessment & Plan:     71 y.o. female with the following -     Can f/u with new pcp in 3 months and repeat alk phos, monoclonal proteins at that time.   Refer to establish w/ [facility] for 2nd opinion re: meds. Ok to continue with strattera and lexapro for now (risk of QT prolongation).   Plans to stay on lexapro bc of loss of her dtr, persistent depn w/o the lexapro  1. Palpitations  - EKG - Clinic Performed  - REFERRAL TO CARDIOLOGY    2. Elevated alkaline phosphatase level  - Monoclonal Protein and FLC, Serum; Future  - BETTE + Free Light Chains, Qnt Ur; Future  - HEPATIC FUNCTION PANEL; Future  - ALKALINE PHOSPHATASE ISOENZYMES; Future    3. Pure hypercholesterolemia    4. Recurrent major depressive disorder, in full remission (HCC)  - Referral to Behavioral Health    5. Attention deficit hyperactivity disorder (ADHD), predominantly hyperactive type  - Referral to Behavioral Health    6. Skin lesion  - Referral to Dermatology    Other orders  - oxybutynin SR (DITROPAN-XL) 10 MG CR tablet          Return for f/u kasie 3.5m, do labs just prior.    Please note that this dictation was created using voice recognition software. I have made every reasonable attempt to correct obvious errors, but I expect that there are errors of grammar and possibly content that I did not discover before finalizing the note.

## 2022-05-05 DIAGNOSIS — Z12.11 COLON CANCER SCREENING: ICD-10-CM

## 2022-05-05 LAB — AMBIGUOUS SPECIMEN AMBIS: NORMAL

## 2022-05-09 LAB — IMM ASSAY OCC BLD FITOB: NEGATIVE

## 2022-05-18 NOTE — PROGRESS NOTES
CC: Follow-up for YAZAN on AutoPap 10-15 cm water    HPI:   Denia Deluca is a 70 y.o.female kindly referred by Sharifa Gayle M.D. and last seen 3/10/2021.  Prior HST showed KARIE of 28/berna saturation 79%. When last seen the patient was on auto titrating CPAP 10-15 cm water with and excellent compliance with a residual apnea-hypopnea index of 9.1. Her AutoPap was empirically changed to 10 to 15 cm H2O.   When last seen her 30-day compliance was excellent with a normalized AHI on APAP 10 to 15 cm water the patient reports improved symptoms using positive airway pressure. Has experienced no significant problems with mask fit, mask leak, pressure tolerance, excessive airway dryness, nasal congestion, aerophagia, or chest pain.   Today, 5/23/2022, her 30-day compliance is 96.7% for 6 hours and 42 minutes.  Her average AHI is 7.9 on APAP 10-15 cm water.  The patient reports improved symptoms using positive airway pressure.  Has experienced no significant problems with mask fit, mask leak, pressure tolerance, excessive airway dryness, nasal congestion, aerophagia, or chest pain.  Parents lived into their 90's, father was an anesthesiologist.   Significant comorbidities modifying factors include up-to-date with age-appropriate vaccinations,  depression, PTSD, pure hypercholesterolemia, attention deficit disorder, mild intermittent asthma, and mixed stress and urge urinary incontinence.        Patient Active Problem List    Diagnosis Date Noted   • BMI 31.0-31.9,adult 01/19/2022   • Dilated pancreatic duct 06/24/2021   • Hiatal hernia 06/24/2021   • Elevated alkaline phosphatase level 06/10/2021   • Indigestion 04/19/2021   • Dizzy 04/19/2021   • Aortic valve sclerosis 04/19/2021   • NSVT (nonsustained ventricular tachycardia) ([facility]) 04/19/2021   • Recurrent major depressive disorder, in full remission ([facility]) 03/02/2020   • PTSD (post-traumatic stress disorder) 11/04/2019   • Epiretinal membrane (ERM) of left eye  "05/10/2019   • Obstructive sleep apnea 05/09/2019   • Pure hypercholesterolemia 11/09/2018   • Tachycardia 09/26/2018   • Attention deficit hyperactivity disorder (ADHD), predominantly hyperactive type 05/11/2018   • Bee allergy status 05/11/2018   • Mixed stress and urge urinary incontinence 05/11/2018   • Mild intermittent asthma without complication 05/11/2018       Past Medical History:   Diagnosis Date   • ADHD    • Allergic rhinitis    • Asthma    • Back pain    • Basal cell carcinoma (BCC)    • Chickenpox    • Depression    • Fracture     Multiple fractures   • Influenza    • Kidney stone    • Nasal drainage    • Pneumonia         Past Surgical History:   Procedure Laterality Date   • ABDOMINAL HYSTERECTOMY TOTAL      age 38 for \"pre-cancer\", ovaries not removed   • CARPAL TUNNEL RELEASE     • HYSTERECTOMY LAPAROSCOPY     • OPEN REDUCTION     • CA REMV 2ND CATARACT,CORN-SCLER SECTN         Family History   Problem Relation Age of Onset   • Cancer Sister         Breast cancer   • Cancer Mother    • Cancer Father        Social History     Socioeconomic History   • Marital status:      Spouse name: Not on file   • Number of children: Not on file   • Years of education: Not on file   • Highest education level: Not on file   Occupational History   • Not on file   Tobacco Use   • Smoking status: Never Smoker   • Smokeless tobacco: Never Used   Vaping Use   • Vaping Use: Never used   Substance and Sexual Activity   • Alcohol use: Yes     Comment: Occasionally   • Drug use: No   • Sexual activity: Not on file   Other Topics Concern   • Not on file   Social History Narrative    , has grown children. 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      Social Determinants of Health     Financial Resource Strain: Not on file   Food Insecurity: Not on file   Transportation Needs: Not on file   Physical Activity: Not on file   Stress: Not on file   Social Connections: Not " "on file   Intimate Partner Violence: Not on file   Housing Stability: Not on file       Current Outpatient Medications   Medication Sig Dispense Refill   • oxybutynin SR (DITROPAN-XL) 10 MG CR tablet      • escitalopram (LEXAPRO) 10 MG Tab TAKE ONE TABLET BY MOUTH EVERY  Tablet 0   • metoprolol tartrate (LOPRESSOR) 25 MG Tab Take 0.5 Tablets by mouth 2 times a day. 100 Tablet 3   • Multiple Vitamin (MULTIVITAMIN ADULT PO) Take  by mouth.     • CALCIUM PO Take  by mouth.     • VITAMIN D PO Take  by mouth.     • Ascorbic Acid (VITAMIN C PO) Take  by mouth.     • atomoxetine (STRATTERA) 40 MG capsule Take 1 capsule by mouth every day. (Patient taking differently: Take 40 mg by mouth every day. 4 times a week with lexapro) 90 capsule 3   • EPINEPHrine (EPIPEN) 0.3 MG/0.3ML Solution Auto-injector solution for injection      • albuterol 108 (90 Base) MCG/ACT Aero Soln inhalation aerosol Inhale 2 Puffs by mouth every 6 hours as needed for Shortness of Breath. 8.5 g 11     No current facility-administered medications for this visit.    \"CURRENT RX\"    ALLERGIES: Crab, Magnesium citrate, Shellfish allergy, Shrimp (diagnostic), Bee venom, and [place]    ROS    Constitutional: Denies fever, chills, sweats,  weight loss, fatigue  Cardiovascular: Denies chest pain, tightness, palpitations, swelling in legs/feet, fainting, difficulty breathing when lying down but gets better when sitting up.   Respiratory: Denies shortness of breath, cough, sputum, wheezing, painful breathing, coughing up blood.   Sleep: per HPI  Gastrointestinal: Denies  difficulty swallowing, nausea, abdominal pain, diarrhea, constipation, heartburn.  Musculoskeletal: Denies painful joints, sore muscles, back pain.        PHYSICAL EXAM      /70 (BP Location: Left arm, Patient Position: Sitting, BP Cuff Size: Adult)   Pulse 86   Resp 16   Ht 1.575 m (5' 2\")   Wt 82.6 kg (182 lb)   SpO2 94%   BMI 33.29 kg/m²   Appearance: Well-nourished, " well-developed, no acute distress  Eyes:  PERRLA, EOMI  ENMT: masked  Neck: Supple, trachea midline, no masses  Respiratory effort:  No intercostal retractions or use of accessory muscles  Lung auscultation:  No wheezes rhonchi rubs or rales  Cardiac: No murmurs, rubs, or gallops; regular rhythm, normal rate; no edema  Abdomen:  No tenderness, no organomegaly.  Musculoskeletal:  Grossly normal; gait and station normal; digits and nails normal  Skin:  No rashes, petechiae, cyanosis  Neurologic: without focal signs; oriented to person, time, place, and purpose; judgement intact  Psychiatric:  No depression, anxiety, agitation      Medical Decision Making       The medical record was reviewed in its entirety including the referral notes, records from primary care, consultants notes, hospital records, lab, imaging, microbiology, immunology, and immunizations.  Care gaps identified and reviewed with the patient.    Diagnostic and titration nocturnal polysomnogram's, home sleep apnea tests, continuous nocturnal oximetry results, multiple sleep latency tests, and compliance reports reviewed.  1. Obstructive sleep apnea  - DME Mask and Supplies      PLAN:   Has been advised to continue the current apap, clean equipment frequently, and get new mask and supplies as allowed by insurance and DME. Advised about potential problems including nasal obstruction/stuffiness, mask leak or discomfort , frequent awakenings, chill or dryness of the upper airway, noise, inconvenience, and lack of benefit. Recommend an earlier appointment, if significant treatment barriers develop.    The risks of untreated sleep apnea were discussed with the patient at length. Patients with YAZAN are at increased risk of cardiovascular disease including coronary artery disease, systemic arterial hypertension, pulmonary arterial hypertension, cardiac arrythmias, and stroke. YAZAN patients have an increased risk of motor vehicle accidents, type 2 diabetes,  chronic kidney disease, and non-alcoholic liver disease. The patient was advised to avoid driving a motor vehicle when drowsy.    Positive airway pressure will favorably impact many of the adverse conditions and effects provoked by YAZAN.    Have advised the patient to follow up with the appropriate healthcare practitioners for all other medical problems and issues.    N95 mask wearing, handwashing, and social distancing protocols reviewed and encouraged.    Return in about 1 year (around 5/23/2023).    Total time 30 minutes

## 2022-05-23 ENCOUNTER — OFFICE VISIT (OUTPATIENT)
Dept: SLEEP MEDICINE | Facility: MEDICAL CENTER | Age: 72
End: 2022-05-23
Payer: MEDICARE

## 2022-05-23 VITALS
BODY MASS INDEX: 33.49 KG/M2 | DIASTOLIC BLOOD PRESSURE: 70 MMHG | HEIGHT: 62 IN | WEIGHT: 182 LBS | HEART RATE: 86 BPM | SYSTOLIC BLOOD PRESSURE: 118 MMHG | RESPIRATION RATE: 16 BRPM | OXYGEN SATURATION: 94 %

## 2022-05-23 DIAGNOSIS — G47.33 OBSTRUCTIVE SLEEP APNEA: ICD-10-CM

## 2022-05-23 PROCEDURE — 99214 OFFICE O/P EST MOD 30 MIN: CPT | Performed by: INTERNAL MEDICINE

## 2022-05-23 ASSESSMENT — FIBROSIS 4 INDEX: FIB4 SCORE: 1.36

## 2022-05-25 ENCOUNTER — OFFICE VISIT (OUTPATIENT)
Dept: DERMATOLOGY | Facility: IMAGING CENTER | Age: 72
End: 2022-05-25
Payer: MEDICARE

## 2022-05-25 DIAGNOSIS — Z85.828 HISTORY OF BASAL CELL CARCINOMA (BCC): ICD-10-CM

## 2022-05-25 DIAGNOSIS — Z12.83 SKIN CANCER SCREENING: ICD-10-CM

## 2022-05-25 DIAGNOSIS — D18.01 CHERRY ANGIOMA: ICD-10-CM

## 2022-05-25 DIAGNOSIS — L82.1 SEBORRHEIC KERATOSES: ICD-10-CM

## 2022-05-25 DIAGNOSIS — L81.4 LENTIGINES: ICD-10-CM

## 2022-05-25 DIAGNOSIS — D22.9 MULTIPLE NEVI: ICD-10-CM

## 2022-05-25 PROCEDURE — 99213 OFFICE O/P EST LOW 20 MIN: CPT | Performed by: NURSE PRACTITIONER

## 2022-05-25 NOTE — PROGRESS NOTES
DERMATOLOGY NOTE  FOLLOW UP VISIT       Chief complaint: Follow-Up (katie)     Multiple areas of concern previously address per Cheyenne's note    History of skin cancer: Yes, Details: BCC nose and lip 2015, 2016, BCC forehead 2010  History of precancers/actinic keratoses: Yes, Details: see above  History of biopsies:Yes, Details: yes, see above  History of blistering/severe sunburns:Yes, Details: young adult  Family history of skin cancer:Yes, Details: mother- SCC, BCC; father - BCC  Family history of atypical moles:No    Past Medical History:   Diagnosis Date   • ADHD    • Allergic rhinitis    • Asthma    • Back pain    • Basal cell carcinoma (BCC)    • Chickenpox    • Depression    • Fracture     Multiple fractures   • Influenza    • Kidney stone    • Nasal drainage    • Pneumonia         Family History   Problem Relation Age of Onset   • Cancer Sister         Breast cancer   • Cancer Mother    • Cancer Father         Social History     Socioeconomic History   • Marital status:      Spouse name: Not on file   • Number of children: Not on file   • Years of education: Not on file   • Highest education level: Not on file   Occupational History   • Not on file   Tobacco Use   • Smoking status: Never Smoker   • Smokeless tobacco: Never Used   Vaping Use   • Vaping Use: Never used   Substance and Sexual Activity   • Alcohol use: Yes     Comment: Occasionally   • Drug use: No   • Sexual activity: Not on file   Other Topics Concern   • Not on file   Social History Narrative    , has grown children. 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      Social Determinants of Health     Financial Resource Strain: Not on file   Food Insecurity: Not on file   Transportation Needs: Not on file   Physical Activity: Not on file   Stress: Not on file   Social Connections: Not on file   Intimate Partner Violence: Not on file   Housing Stability: Not on file        Allergies   Allergen  Reactions   • Crab    • Magnesium Citrate Palpitations and Myalgia   • Shellfish Allergy Unspecified     Heart issues   • Shrimp (Diagnostic)    • Bee Venom Anaphylaxis     Will stop heart    • [place] Hives     Increase in heart rate        MEDICATIONS:  Medications relevant to specialty reviewed.    Current Outpatient Medications:   •  oxybutynin SR (DITROPAN-XL) 10 MG CR tablet, , Disp: , Rfl:   •  escitalopram (LEXAPRO) 10 MG Tab, TAKE ONE TABLET BY MOUTH EVERY DAY, Disp: 100 Tablet, Rfl: 0  •  metoprolol tartrate (LOPRESSOR) 25 MG Tab, Take 0.5 Tablets by mouth 2 times a day., Disp: 100 Tablet, Rfl: 3  •  Multiple Vitamin (MULTIVITAMIN ADULT PO), Take  by mouth., Disp: , Rfl:   •  CALCIUM PO, Take  by mouth., Disp: , Rfl:   •  VITAMIN D PO, Take  by mouth., Disp: , Rfl:   •  Ascorbic Acid (VITAMIN C PO), Take  by mouth., Disp: , Rfl:   •  atomoxetine (STRATTERA) 40 MG capsule, Take 1 capsule by mouth every day. (Patient taking differently: Take 40 mg by mouth every day. 4 times a week with lexapro), Disp: 90 capsule, Rfl: 3  •  EPINEPHrine (EPIPEN) 0.3 MG/0.3ML Solution Auto-injector solution for injection, , Disp: , Rfl:   •  albuterol 108 (90 Base) MCG/ACT Aero Soln inhalation aerosol, Inhale 2 Puffs by mouth every 6 hours as needed for Shortness of Breath., Disp: 8.5 g, Rfl: 11     REVIEW OF SYSTEMS:   Positive for skin (see HPI)  Negative for fevers and chills  All other systems reviewed and are negative.     EXAM:      A total body skin exam was performed excluding the genitals per patient preference and including the following areas: head (including face), neck, chest, abdomen, groin/buttocks, back, bilateral upper extremities, and bilateral lower extremities with the following pertinent findings listed below and/or in assessment/plan.      -Several scattered 1-3mm bright red macules and thin papules on the Lt scalp & trunk  -Several skin-colored & tan stuck-on waxy papules scattered on the scalp, hairline  and trunk as well as extremities  -Well-healed cicatrix at BCC excision at hairline: no reoccurrence noted   -sun exposed skin of trunk and b/l upper and lower extremities with scattered clinically benign light brown reticulated macules all of which were morphologically similar and none of which were suspicious for skin cancer today on exam  -Multiple tan medium brown skin-colored macules papules scattered over the trunk >> extremities. All with benign-appearing pigment network patterns on dermoscopy      IMPRESSION / PLAN:      1. Multiple nevi  - Benign-appearing nature of lesions discussed during exam.   - Advised to continue to monitor for any return to clinic for new or concerning changes.  - ABCDE's of melanoma discussed      2. Lentigines  - Benign-appearing nature of lesions discussed during exam.   - Advised to continue to monitor for any return to clinic for new or concerning changes.      3. Cherry angioma  - Benign-appearing nature of lesions discussed during exam.   - Advised to continue to monitor for any return to clinic for new or concerning changes.      4. Seborrheic keratoses  - Benign-appearing nature of lesions discussed during exam.   - Advised to continue to monitor for any return to clinic for new or concerning changes.      5. History of basal cell carcinoma (BCC)  - NMSC education/counseling as noted below      6. Skin cancer screening  Skin cancer education  - discussed importance of sun protective clothing, eyewear in addition to the use of broad spectrum sunscreen with SPF 30 or greater, as well as need for reapplication ~every 2 hours when exposed to UVR  - discussed importance following up for any new or changing lesions as noted in handout given, but every 12 months exams in clinic in the setting of dermatologic history  - ABCDE's of melanoma discussed/handout given             Return to clinic in: Return in about 1 year (around 5/25/2023) for STEVE. and as needed for any new or  changing skin lesions.

## 2022-06-14 ENCOUNTER — OFFICE VISIT (OUTPATIENT)
Dept: MEDICAL GROUP | Facility: PHYSICIAN GROUP | Age: 72
End: 2022-06-14
Payer: MEDICARE

## 2022-06-14 VITALS
HEIGHT: 62 IN | HEART RATE: 79 BPM | SYSTOLIC BLOOD PRESSURE: 108 MMHG | TEMPERATURE: 97.5 F | OXYGEN SATURATION: 96 % | BODY MASS INDEX: 33.31 KG/M2 | WEIGHT: 181 LBS | DIASTOLIC BLOOD PRESSURE: 70 MMHG | RESPIRATION RATE: 16 BRPM

## 2022-06-14 DIAGNOSIS — F90.1 ATTENTION DEFICIT HYPERACTIVITY DISORDER (ADHD), PREDOMINANTLY HYPERACTIVE TYPE: ICD-10-CM

## 2022-06-14 PROCEDURE — 99213 OFFICE O/P EST LOW 20 MIN: CPT | Performed by: FAMILY MEDICINE

## 2022-06-14 RX ORDER — ATOMOXETINE 40 MG/1
CAPSULE ORAL
Qty: 90 CAPSULE | Refills: 0 | Status: SHIPPED | OUTPATIENT
Start: 2022-06-14 | End: 2022-08-22

## 2022-06-14 ASSESSMENT — PATIENT HEALTH QUESTIONNAIRE - PHQ9
1. LITTLE INTEREST OR PLEASURE IN DOING THINGS: NOT AT ALL
2. FEELING DOWN, DEPRESSED, IRRITABLE, OR HOPELESS: NOT AT ALL
4. FEELING TIRED OR HAVING LITTLE ENERGY: NOT AT ALL
6. FEELING BAD ABOUT YOURSELF - OR THAT YOU ARE A FAILURE OR HAVE LET YOURSELF OR YOUR FAMILY DOWN: NOT AL ALL
5. POOR APPETITE OR OVEREATING: NOT AT ALL
8. MOVING OR SPEAKING SO SLOWLY THAT OTHER PEOPLE COULD HAVE NOTICED. OR THE OPPOSITE, BEING SO FIGETY OR RESTLESS THAT YOU HAVE BEEN MOVING AROUND A LOT MORE THAN USUAL: NOT AT ALL
7. TROUBLE CONCENTRATING ON THINGS, SUCH AS READING THE NEWSPAPER OR WATCHING TELEVISION: NOT AT ALL
SUM OF ALL RESPONSES TO PHQ9 QUESTIONS 1 AND 2: 0
SUM OF ALL RESPONSES TO PHQ QUESTIONS 1-9: 0
9. THOUGHTS THAT YOU WOULD BE BETTER OFF DEAD, OR OF HURTING YOURSELF: NOT AT ALL
3. TROUBLE FALLING OR STAYING ASLEEP OR SLEEPING TOO MUCH: NOT AT ALL

## 2022-06-14 ASSESSMENT — FIBROSIS 4 INDEX: FIB4 SCORE: 1.36
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
## 2022-06-14 NOTE — PATIENT INSTRUCTIONS
ALKALINE PHOSPHATASE ISOENZYMES  Order: [id number]  Status: Final result    Visible to patient: Yes (seen)    Next appt: 07/20/2022 at 09:45 AM in Cardiology (Sudhakar Goncalves M.D.)    Dx: Pancreatic duct dilated    0 Result Notes    Component Ref Range & Units 1 mo ago 9 mo ago   Alkaline Phosphatase 40 - 120 U/L 135 High   125 High     Liver Fractions 0 - 94 U/L 78  85 CM    Comment: INTERPRETIVE INFORMATION: Alk-Phosphatase Liver Calc   Bone Specific Alkaline Phosphatase (7832519) and 5'-nucleotidase   (7070621) may be useful in identifying disorders of bone and   liver, respectively.    Bone Fractions 0 - 55 U/L 57 High   40    Alk Phos Other Calc U/L 0  0 CM    Comment: Performed By: [facility]   [address] [place] [id number]           REPEAT NON FASTING LABS IN EARLY 8/22 AND F/U WITH NEW PCP 8/12/22

## 2022-07-08 ENCOUNTER — OFFICE VISIT (OUTPATIENT)
Dept: MEDICAL GROUP | Facility: PHYSICIAN GROUP | Age: 72
End: 2022-07-08
Payer: MEDICARE

## 2022-07-08 VITALS
OXYGEN SATURATION: 95 % | HEART RATE: 61 BPM | RESPIRATION RATE: 16 BRPM | TEMPERATURE: 98.4 F | BODY MASS INDEX: 33.68 KG/M2 | WEIGHT: 183 LBS | DIASTOLIC BLOOD PRESSURE: 72 MMHG | HEIGHT: 62 IN | SYSTOLIC BLOOD PRESSURE: 132 MMHG

## 2022-07-08 DIAGNOSIS — R29.898 JAW CLICKING: ICD-10-CM

## 2022-07-08 PROCEDURE — 99213 OFFICE O/P EST LOW 20 MIN: CPT | Performed by: FAMILY MEDICINE

## 2022-07-08 ASSESSMENT — FIBROSIS 4 INDEX: FIB4 SCORE: 1.36
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
## 2022-07-20 ENCOUNTER — TELEMEDICINE (OUTPATIENT)
Dept: CARDIOLOGY | Facility: MEDICAL CENTER | Age: 72
End: 2022-07-20
Payer: MEDICARE

## 2022-07-20 VITALS
WEIGHT: 182 LBS | HEIGHT: 62 IN | DIASTOLIC BLOOD PRESSURE: 88 MMHG | HEART RATE: 80 BPM | BODY MASS INDEX: 33.49 KG/M2 | OXYGEN SATURATION: 96 % | SYSTOLIC BLOOD PRESSURE: 138 MMHG

## 2022-07-20 DIAGNOSIS — R00.2 PALPITATIONS: ICD-10-CM

## 2022-07-20 DIAGNOSIS — G47.33 OSA ON CPAP: ICD-10-CM

## 2022-07-20 PROCEDURE — 99214 OFFICE O/P EST MOD 30 MIN: CPT | Performed by: INTERNAL MEDICINE

## 2022-07-20 ASSESSMENT — FIBROSIS 4 INDEX: FIB4 SCORE: 1.36

## 2022-07-20 NOTE — PROGRESS NOTES
"      Cardiology Telemedicine Visit Follow-up Consultation Note    Date of note:    7/20/2022    Primary Care Provider: Roselyn Porras M.D.    Name:             Denia Deluca   YOB: 1950  MRN:               [id number]      This evaluation was conducted via Zoom, using secure and encrypted videoconferencing technology.  The patient was in a private location in the [facility].  The patient's identity was confirmed and verbal consent for the telemedicine encounter was obtained.       Chief Complaint   Patient presents with   • Palpitations       HISTORY OF PRESENT ILLNESS  Ms. Denia Deluca is a 71 y.o. female who returns to see us for follow-up of palpitations    Last clinic visit: 7/15/2021 with Dr. Cee.  Patient is new to me.    Interim History:  Has been doing well for the past year.  Continues to have occasional episodes of fast heartbeat which she is alerted by her watch otherwise no episodes of palpitations, shortness of breath or dizziness/lightheadedness.  Has been on metoprolol 12.5 mg twice daily.      Past Medical History:   Diagnosis Date   • ADHD    • Allergic rhinitis    • Asthma    • Back pain    • Basal cell carcinoma (BCC)    • Chickenpox    • Depression    • Fracture     Multiple fractures   • Influenza    • Kidney stone    • Nasal drainage    • Pneumonia          Past Surgical History:   Procedure Laterality Date   • ABDOMINAL HYSTERECTOMY TOTAL      age 38 for \"pre-cancer\", ovaries not removed   • CARPAL TUNNEL RELEASE     • HYSTERECTOMY LAPAROSCOPY     • OPEN REDUCTION     • AZ REMV 2ND CATARACT,CORN-SCLER SECTN           Current Outpatient Medications   Medication Sig Dispense Refill   • atomoxetine (STRATTERA) 40 MG capsule Once daily 4 times a week with lexapro (Patient taking differently: Once daily 4 times a week with lexapro,weening off of meds) 90 Capsule 0   • oxybutynin SR (DITROPAN-XL) 10 MG CR tablet      • escitalopram (LEXAPRO) 10 MG Tab TAKE ONE " TABLET BY MOUTH EVERY  Tablet 0   • metoprolol tartrate (LOPRESSOR) 25 MG Tab Take 0.5 Tablets by mouth 2 times a day. 100 Tablet 3   • Multiple Vitamin (MULTIVITAMIN ADULT PO) Take  by mouth.     • CALCIUM PO Take  by mouth.     • VITAMIN D PO Take  by mouth.     • Ascorbic Acid (VITAMIN C PO) Take  by mouth.     • EPINEPHrine (EPIPEN) 0.3 MG/0.3ML Solution Auto-injector solution for injection      • albuterol 108 (90 Base) MCG/ACT Aero Soln inhalation aerosol Inhale 2 Puffs by mouth every 6 hours as needed for Shortness of Breath. 8.5 g 11     No current facility-administered medications for this visit.         Allergies   Allergen Reactions   • Crab    • Magnesium Citrate Palpitations and Myalgia   • Shellfish Allergy Unspecified     Heart issues   • Shrimp (Diagnostic)    • Bee Venom Anaphylaxis     Will stop heart    • [place] Hives     Increase in heart rate         Family History   Problem Relation Age of Onset   • Cancer Sister         Breast cancer   • Cancer Mother    • Cancer Father          Social History     Socioeconomic History   • Marital status:      Spouse name: Not on file   • Number of children: Not on file   • Years of education: Not on file   • Highest education level: Not on file   Occupational History   • Not on file   Tobacco Use   • Smoking status: Never Smoker   • Smokeless tobacco: Never Used   Vaping Use   • Vaping Use: Never used   Substance and Sexual Activity   • Alcohol use: Not Currently     Comment: Occasionally   • Drug use: No   • Sexual activity: Not on file   Other Topics Concern   • Not on file   Social History Narrative    , has grown children. 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      Social Determinants of Health     Financial Resource Strain: Not on file   Food Insecurity: Not on file   Transportation Needs: Not on file   Physical Activity: Not on file   Stress: Not on file   Social Connections: Not on file  "  Intimate Partner Violence: Not on file   Housing Stability: Not on file         Physical Exam:  Vitals as provided by the patient  /88 (BP Location: Left arm, Patient Position: Sitting, BP Cuff Size: Adult)   Pulse 80   Ht 1.575 m (5' 2\")   Wt 82.6 kg (182 lb)   SpO2 96%    Oxygen Therapy:  Pulse Oximetry: 96 % (Taken today at 9:53am)  BP Readings from Last 4 Encounters:   07/20/22 138/88   07/08/22 132/72   06/14/22 108/70   05/23/22 118/70       Weight/BMI: Body mass index is 33.29 kg/m².  Wt Readings from Last 4 Encounters:   07/20/22 82.6 kg (182 lb)   07/08/22 83 kg (183 lb)   06/14/22 82.1 kg (181 lb)   05/23/22 82.6 kg (182 lb)       GEN: Well developed, well nourished and in no acute distress.  Neck:  No JVD noted at 90 degrees, trachea midline  CVS: Pulse as reported by patient, no visible LE edema.  Resp: Unlabored respiratory effort, no cough or audible wheeze  MSK/Ext: No clubbing or cyanosis visible appreciated.  Skin: No rashes in visible areas.  Psych: A&O x 3, appropriate affect, good judgement, well groomed      Lab Data Review:  Lab Results   Component Value Date/Time    CHOLSTRLTOT 247 (H) 04/29/2022 09:40 AM     (H) 04/29/2022 09:40 AM    HDL 58 04/29/2022 09:40 AM    TRIGLYCERIDE 132 04/29/2022 09:40 AM       Lab Results   Component Value Date/Time    SODIUM 139 04/29/2022 09:40 AM    POTASSIUM 3.7 04/29/2022 09:40 AM    CHLORIDE 102 04/29/2022 09:40 AM    CO2 26 04/29/2022 09:40 AM    GLUCOSE 90 04/29/2022 09:40 AM    BUN 16 04/29/2022 09:40 AM    CREATININE 0.76 04/29/2022 09:40 AM     Lab Results   Component Value Date/Time    ALKPHOSPHAT 129 (H) 04/29/2022 09:40 AM    ASTSGOT 16 04/29/2022 09:40 AM    ALTSGPT 11 04/29/2022 09:40 AM    TBILIRUBIN 0.5 04/29/2022 09:40 AM      Lab Results   Component Value Date/Time    WBC 4.3 (L) 04/29/2022 09:40 AM     Lab Results   Component Value Date/Time    HBA1C 5.4 04/29/2022 09:40 AM    HBA1C 5.4 05/13/2021 10:39 AM       Cardiac " Imaging and Procedures Review:    EKG dated 5/4/2022: My personal interpretation is sinus rhythm    Echo dated 8/9/2019:   CONCLUSIONS  Normal left ventricular systolic function. Left ventricular ejection   fraction is visually estimated to be 65%.   Normal diastolic function.  Normal inferior vena cava size and inspiratory collapse.    Nuclear Perfusion Imaging (9/3/2019):   Negative for ischemia and infarction      Radiology test Review:  CT Abd/pelvis 6/17/2021: IMPRESSION:   1.  Stable mild dilatation of the pancreatic duct. No evidence of pancreatic mass.   2.  Fatty change of the liver. Small hiatal hernia.   3.  3 mm right lower pole renal calyceal stone.       Assessment & Plan     1. Palpitations     2. YAZAN on CPAP         Has been on metoprolol 12.5 mg twice daily for occasional episodes of tachycardia for many years.  Does have allergies and her allergist have suggested to discontinue this medication.  After discussion, advised to discontinue metoprolol for a week and monitor symptoms.  Discussed that if palpitations/tachycardia worsens, she can restart metoprolol.  However, if symptoms do not change she can discontinue metoprolol altogether.    Is compliant with CPAP machine.      All of patient's excellent questions were answered to the best of my knowledge and to her satisfaction.  It was a pleasure seeing Ms. Denia Deluca in my clinic today. Return in about 1 year (around 7/20/2023). Patient is aware to call the cardiology clinic with any questions or concerns.      Sudhakar Goncalves MD  [facility]  [facility], [address]  [address] [address]  Phone: [phone]  Fax: [phone]

## 2022-07-27 DIAGNOSIS — R29.898 JAW CLICKING: ICD-10-CM

## 2022-07-28 ENCOUNTER — OFFICE VISIT (OUTPATIENT)
Dept: BEHAVIORAL HEALTH | Facility: CLINIC | Age: 72
End: 2022-07-28
Payer: MEDICARE

## 2022-07-28 DIAGNOSIS — F33.42 RECURRENT MAJOR DEPRESSIVE DISORDER, IN FULL REMISSION (HCC): ICD-10-CM

## 2022-07-28 DIAGNOSIS — F43.10 POSTTRAUMATIC STRESS DISORDER: ICD-10-CM

## 2022-07-28 DIAGNOSIS — F90.1 ATTENTION DEFICIT HYPERACTIVITY DISORDER (ADHD), PREDOMINANTLY HYPERACTIVE TYPE: ICD-10-CM

## 2022-07-28 PROCEDURE — 90792 PSYCH DIAG EVAL W/MED SRVCS: CPT | Performed by: PSYCHIATRY & NEUROLOGY

## 2022-07-28 RX ORDER — ATOMOXETINE 40 MG/1
40 CAPSULE ORAL DAILY
Qty: 90 CAPSULE | Refills: 0 | Status: SHIPPED | OUTPATIENT
Start: 2022-07-28 | End: 2022-10-31 | Stop reason: SDUPTHER

## 2022-07-28 RX ORDER — ESCITALOPRAM OXALATE 10 MG/1
10 TABLET ORAL
Qty: 90 TABLET | Refills: 0 | Status: SHIPPED | OUTPATIENT
Start: 2022-07-28 | End: 2022-10-31 | Stop reason: SDUPTHER

## 2022-07-28 NOTE — PROGRESS NOTES
[facility]   Therapy Progress Note      This provider informed the patient their medical records are totally confidential except for the use by other providers involved in their care, or if the patient signs a release, or to report instances of child or elder abuse, or if it is determined they are an immediate risk to harm themselves or others.    Name: Denia Deluca  MRN: [id number]  : 1950  Age: 71 y.o.  Date of assessment: 2022  PCP: Roselyn Porras M.D.      Present Illness:   Chart reviewed prior to seeing her in my office.  She is 71,  53 years, and moved to [place] from [facility] 4 years ago.  She is a retired .  She is referred for evaluation of depression and ADHD.  Her primary concern is her ADHD treatment.  She has restless, very distractible, and impulsive at times.  She is somewhat compulsive and is able to complete projects.  She is also able to focus on books but has difficulty focusing on movies.  She is currently on Strattera 40 mg.  She was previously on 60 but possibly had some disagreeable side effects.  Lexapro 10 mg daily is helping her depression.    Past Psych History:   She last saw a psychiatrist 3 years ago.  No previous psychiatric hospitalization.    Substance Abuse History:  No alcohol or substance abuse problems    Family History:   Her parents are .  She had 2 biological daughters 1  in a motor vehicle accident at age 17.  She had 2 brothers.  One is .  1 had bipolar disorder.  Her maternal great grand mother was bipolar.    Medical History:  Hiatal hernia, obstructive sleep apnea with CPAP, history of basal cell CA    Psych Medications:  See above.  She was previously treated with Paxil for depression.    Allergies:   [place], shellfish, shrimp, bee venom, walnuts, magnesium citrate    Mental Status:   She is alert, oriented, and cooperative.  Relatedness is good.  Somewhat obsessional.  Well-groomed.   Speech is a little rapid.  Anxious.  Memory is good.  Insight and judgment are good.  No indication of psychotic thinking.    Current Risk:       Suicidal: Not suicidal       Homicidal: Not homicidal       Self-Harm: No plan to harm self       Relapse (Low/Moderate/High): Moderate       Crisis Safety Plan Reviewed: Discussed with the patient    Diagnosis:  Major depressive disorder, recurrent  ADHD  PTSD    Treatment Plan:  The current treatment plan consists of quarterly psychiatric sessions designed to evaluate her depression, ADHD, and PTSD.    Duration will be for a minimum of 12 months and will be reviewed at each visit.    Goals: Remission of depression and PTSD symptoms with improvement in ADHD symptoms in order to prevent relapse due to the chronic nature of her behavioral health problems and mental illness.  Continue Strattera 40 mg daily.  Continue Lexapro 10 mg daily.      Ephraim Chavez M.D.     This note was created using voice recognition software (Dragon). The accuracy of the dictation is limited by the abilities of the software. I have reviewed the note prior to signing, however some errors in grammar and context are still possible. If you have any questions related to this note please do not hesitate to contact our office.

## 2022-08-01 RX ORDER — OXYBUTYNIN CHLORIDE 10 MG/1
10 TABLET, EXTENDED RELEASE ORAL DAILY
Qty: 100 TABLET | Refills: 0 | Status: SHIPPED | OUTPATIENT
Start: 2022-08-01 | End: 2022-12-22 | Stop reason: SDUPTHER

## 2022-08-16 ENCOUNTER — HOSPITAL ENCOUNTER (OUTPATIENT)
Dept: LAB | Facility: MEDICAL CENTER | Age: 72
End: 2022-08-16
Attending: FAMILY MEDICINE
Payer: MEDICARE

## 2022-08-16 DIAGNOSIS — R74.8 ELEVATED ALKALINE PHOSPHATASE LEVEL: ICD-10-CM

## 2022-08-16 LAB
ALBUMIN SERPL BCP-MCNC: 4.3 G/DL (ref 3.2–4.9)
ALP SERPL-CCNC: 137 U/L (ref 30–99)
ALT SERPL-CCNC: 11 U/L (ref 2–50)
AST SERPL-CCNC: 14 U/L (ref 12–45)
BILIRUB CONJ SERPL-MCNC: <0.2 MG/DL (ref 0.1–0.5)
BILIRUB INDIRECT SERPL-MCNC: ABNORMAL MG/DL (ref 0–1)
BILIRUB SERPL-MCNC: 0.3 MG/DL (ref 0.1–1.5)
PROT SERPL-MCNC: 6.8 G/DL (ref 6–8.2)

## 2022-08-16 PROCEDURE — 82784 ASSAY IGA/IGD/IGG/IGM EACH: CPT

## 2022-08-16 PROCEDURE — 80076 HEPATIC FUNCTION PANEL: CPT

## 2022-08-16 PROCEDURE — 84155 ASSAY OF PROTEIN SERUM: CPT | Mod: XU

## 2022-08-16 PROCEDURE — 84165 PROTEIN E-PHORESIS SERUM: CPT

## 2022-08-16 PROCEDURE — 86335 IMMUNFIX E-PHORSIS/URINE/CSF: CPT

## 2022-08-16 PROCEDURE — 36415 COLL VENOUS BLD VENIPUNCTURE: CPT

## 2022-08-16 PROCEDURE — 86334 IMMUNOFIX E-PHORESIS SERUM: CPT

## 2022-08-16 PROCEDURE — 83521 IG LIGHT CHAINS FREE EACH: CPT | Mod: 91

## 2022-08-16 PROCEDURE — 83520 IMMUNOASSAY QUANT NOS NONAB: CPT | Mod: 91

## 2022-08-16 PROCEDURE — 84156 ASSAY OF PROTEIN URINE: CPT

## 2022-08-16 PROCEDURE — 84075 ASSAY ALKALINE PHOSPHATASE: CPT | Mod: XU

## 2022-08-16 PROCEDURE — 84080 ASSAY ALKALINE PHOSPHATASES: CPT

## 2022-08-18 LAB
ALBUMIN SERPL ELPH-MCNC: 3.93 G/DL (ref 3.75–5.01)
ALP BONE SERPL-CCNC: 50 U/L (ref 0–55)
ALP ISOS SERPL HS-CCNC: 0 U/L
ALP LIVER SERPL-CCNC: 92 U/L (ref 0–94)
ALP SERPL-CCNC: 142 U/L (ref 40–120)
ALPHA1 GLOB SERPL ELPH-MCNC: 0.27 G/DL (ref 0.19–0.46)
ALPHA2 GLOB SERPL ELPH-MCNC: 0.69 G/DL (ref 0.48–1.05)
B-GLOBULIN SERPL ELPH-MCNC: 0.8 G/DL (ref 0.48–1.1)
EER MONOCLONAL PROTEIN AND FLC, SERUM Q5224: NORMAL
GAMMA GLOB SERPL ELPH-MCNC: 1.12 G/DL (ref 0.62–1.51)
IGA SERPL-MCNC: 146 MG/DL (ref 68–408)
IGG SERPL-MCNC: 898 MG/DL (ref 768–1632)
IGM SERPL-MCNC: 164 MG/DL (ref 35–263)
INTERPRETATION SERPL IFE-IMP: NORMAL
INTERPRETATION SERPL IFE-IMP: NORMAL
KAPPA LC FREE SER-MCNC: 15.57 MG/L (ref 3.3–19.4)
KAPPA LC FREE/LAMBDA FREE SER NEPH: 1.02 {RATIO} (ref 0.26–1.65)
LAMBDA LC FREE SERPL-MCNC: 15.28 MG/L (ref 5.71–26.3)
PROT SERPL-MCNC: 6.8 G/DL (ref 6.3–8.2)

## 2022-08-19 LAB
COLLECT DURATION TIME SPEC: ABNORMAL HRS
INTERPRETATION UR IFE-IMP: ABNORMAL
KAPPA LC FREE 24H UR-MRATE: ABNORMAL MG/D
KAPPA LC FREE UR-MCNC: 38.18 MG/L (ref 0–32.9)
LAMBDA LC FREE 24H UR-MRATE: ABNORMAL MG/D
LAMBDA LC FREE UR-MCNC: 4.12 MG/L (ref 0–3.79)
PROT 24H UR-MRATE: ABNORMAL MG/D
SPECIMEN VOL ?TM UR: ABNORMAL ML

## 2022-08-22 ENCOUNTER — E-CONSULT (OUTPATIENT)
Dept: HEMATOLOGY ONCOLOGY | Facility: MEDICAL CENTER | Age: 72
End: 2022-08-22

## 2022-08-22 ENCOUNTER — OFFICE VISIT (OUTPATIENT)
Dept: MEDICAL GROUP | Facility: PHYSICIAN GROUP | Age: 72
End: 2022-08-22
Payer: MEDICARE

## 2022-08-22 VITALS
HEART RATE: 82 BPM | TEMPERATURE: 97.6 F | RESPIRATION RATE: 16 BRPM | OXYGEN SATURATION: 95 % | WEIGHT: 185 LBS | HEIGHT: 62 IN | SYSTOLIC BLOOD PRESSURE: 122 MMHG | BODY MASS INDEX: 34.04 KG/M2 | DIASTOLIC BLOOD PRESSURE: 70 MMHG

## 2022-08-22 DIAGNOSIS — G47.33 OBSTRUCTIVE SLEEP APNEA: ICD-10-CM

## 2022-08-22 DIAGNOSIS — R74.8 ELEVATED ALKALINE PHOSPHATASE LEVEL: ICD-10-CM

## 2022-08-22 DIAGNOSIS — R19.8 ABNORMAL BOWEL HABITS: ICD-10-CM

## 2022-08-22 DIAGNOSIS — F90.1 ATTENTION DEFICIT HYPERACTIVITY DISORDER (ADHD), PREDOMINANTLY HYPERACTIVE TYPE: ICD-10-CM

## 2022-08-22 DIAGNOSIS — Z71.9 ENCOUNTER FOR CONSULTATION: ICD-10-CM

## 2022-08-22 DIAGNOSIS — Z12.31 ENCOUNTER FOR SCREENING MAMMOGRAM FOR MALIGNANT NEOPLASM OF BREAST: ICD-10-CM

## 2022-08-22 DIAGNOSIS — Z00.00 HEALTHCARE MAINTENANCE: ICD-10-CM

## 2022-08-22 DIAGNOSIS — F33.42 RECURRENT MAJOR DEPRESSIVE DISORDER, IN FULL REMISSION (HCC): ICD-10-CM

## 2022-08-22 DIAGNOSIS — R00.0 TACHYCARDIA: ICD-10-CM

## 2022-08-22 PROCEDURE — 99215 OFFICE O/P EST HI 40 MIN: CPT | Performed by: FAMILY MEDICINE

## 2022-08-22 PROCEDURE — 99446 NTRPROF PH1/NTRNET/EHR 5-10: CPT | Performed by: INTERNAL MEDICINE

## 2022-08-22 RX ORDER — OMEPRAZOLE 20 MG/1
CAPSULE, DELAYED RELEASE ORAL
COMMUNITY
Start: 2022-08-02 | End: 2023-02-16 | Stop reason: SDUPTHER

## 2022-08-22 ASSESSMENT — FIBROSIS 4 INDEX: FIB4 SCORE: 1.19

## 2022-08-22 NOTE — PROGRESS NOTES
E-Consult Response     After careful review of the patient's information available in the medical record, the following are my findings and recommendations:    Reason for consult:  Elevated free light chains and alk phos    Summary of data reviewed: Kappa ;light chains are elevated but the kappa/lambda ratio is normal.and there is no monoclonal spike on the electrophoresis.  The alk phos grimaldo been mildly elevated since 2018. CT abdomen in June 2021 does show fatty liver.    Recommendations: I would ignore the kappa light chains with normal ratio and lack of M spike. The alk phos is probably due to the fatty liver.     E-Consult Time: 10 minutes were spent with >50% of the total time spent reviewing items outlined in the summary of data reviewed (Use code [id number])    Yuan Torres M.D.

## 2022-08-22 NOTE — ASSESSMENT & PLAN NOTE
Chronic, continues to have elevated alkaline phosphatase.  CT of abdomen pelvis done as well which showed mild prominence of the pancreatic duct, 3 mm stone right side of the urinary bladder and possible 6 mm stone on left side of the bladder.  She also had some follow-up lab work done, proteins normal however her light chains were elevated and her urine test.  Will consult with hematology and see if they have feel like there is any further evaluation warranted besides rechecking her alk phos in about 6 months.  Isoenzyme test did show her bone fraction as well as liver fractions to be within a normal range, liver fraction slightly higher than bone.  Currently asymptomatic.

## 2022-08-22 NOTE — ASSESSMENT & PLAN NOTE
Chronic, continues to take Strattera 40 mg daily for this. Has been seen by behavioral health who directed her to continue taking this medication. Will continue to see behavioral health every 3 months.

## 2022-08-22 NOTE — LETTER
[facility]  SHANON Purdy  [address]  [facility] [address]  Fax: [phone]   Authorization for Release/Disclosure of   Protected Health Information   Name: GILBERTO MÉNDEZ : 1950 SSN: [id number]   [facility]  [facility] [address] Phone:    [phone] (home)    I authorize the entity listed below to release/disclose the PHI below to:   [facility]/SHANON Purdy and SHANON Purdy   Provider or Entity Name:  [facility]   Address   City, [facility], Zip:               [address] [place] [address], [place] [address]   Phone:  [phone]      Fax:      [phone]        Reason for request: continuity of care   Information to be released:    [ X ] LAST COLONOSCOPY,  including any PATH REPORT and follow-up  [ X ] LAST FIT/COLOGUARD RESULT [  ] LAST DEXA  [  ] LAST MAMMOGRAM  [  ] LAST PAP  [  ] LAST LABS [  ] RETINA EXAM REPORT  [  ] IMMUNIZATION RECORDS  [  ] Release all info      [  ] Check here and initial the line next to each item to release ALL health information INCLUDING  _____ Care and treatment for drug and / or alcohol abuse  _____ HIV testing, infection status, or AIDS  _____ Genetic Testing    DATES OF SERVICE OR TIME PERIOD TO BE DISCLOSED: _____________  I understand and acknowledge that:  * This Authorization may be revoked at any time by you in writing, except if your health information has already been used or disclosed.  * Your health information that will be used or disclosed as a result of you signing this authorization could be re-disclosed by the recipient. If this occurs, your re-disclosed health information may no longer be protected by State or Federal laws.  * You may refuse to sign this Authorization. Your refusal will not affect your ability to obtain treatment.  * This Authorization becomes effective upon signing and will  on (date) __________.      If no date is indicated, this Authorization will   one (1) year from the signature date.    Name: Denia Deluca    Signature:   Date:     2022       PLEASE FAX REQUESTED RECORDS BACK TO: [phone]

## 2022-08-22 NOTE — LETTER
[facility]  SHANON Purdy  [address]  [place] [address]  Fax: [phone]   Authorization for Release/Disclosure of   Protected Health Information   Name: DENIA CHUNG MÉNDEZ : 1950 SSN: [id number]   Address: [place] [address] Phone:    [phone] (home)    I authorize the entity listed below to release/disclose the PHI below to:   [facility]/SHANON Purdy and SHANON Purdy   Provider or Entity Name:  [facility]    Address   City, State, [facility]   Phone:      Fax:     Reason for request: continuity of care   Information to be released:    [  ] LAST COLONOSCOPY,  including any PATH REPORT and follow-up  [  ] LAST FIT/COLOGUARD RESULT [  ] LAST DEXA  [  ] LAST MAMMOGRAM  [  ] LAST PAP  [  ] LAST LABS [  ] RETINA EXAM REPORT  [  ] IMMUNIZATION RECORDS  [ x ] Release all info      [  ] Check here and initial the line next to each item to release ALL health information INCLUDING  _____ Care and treatment for drug and / or alcohol abuse  _____ HIV testing, infection status, or AIDS  _____ Genetic Testing    DATES OF SERVICE OR TIME PERIOD TO BE DISCLOSED: _____________  I understand and acknowledge that:  * This Authorization may be revoked at any time by you in writing, except if your health information has already been used or disclosed.  * Your health information that will be used or disclosed as a result of you signing this authorization could be re-disclosed by the recipient. If this occurs, your re-disclosed health information may no longer be protected by State or Federal laws.  * You may refuse to sign this Authorization. Your refusal will not affect your ability to obtain treatment.  * This Authorization becomes effective upon signing and will  on (date) __________.      If no date is indicated, this Authorization will  one (1) year from the signature date.    Name: Denia Méndez    Signature:    Date:     8/22/2022       PLEASE FAX REQUESTED RECORDS BACK TO: [phone]

## 2022-08-22 NOTE — ASSESSMENT & PLAN NOTE
Chronic, taking Metoprolol 12.5 mg daily in AM. Feels her symptoms are improved and states her HR shoots up when outside gardening.

## 2022-08-22 NOTE — ASSESSMENT & PLAN NOTE
>>ASSESSMENT AND PLAN FOR RECURRENT MAJOR DEPRESSIVE DISORDER, IN FULL REMISSION (HCC) WRITTEN ON 8/22/2022  9:45 AM BY SHANON HUIZAR, on Lexapro 10 mg daily. Staes she has a hard time getting through [employer] due to her daughters death. Behavioral health MD may switch her up.

## 2022-08-22 NOTE — PROGRESS NOTES
" Subjective:     CC:   Chief Complaint   Patient presents with    Establish Care     New Pt     Results     08/16/22     Diarrhea     X 3 years,        HISTORY OF THE PRESENT ILLNESS: Patient is a 71 y.o. female. This pleasant patient is here today to establish care and discuss her diarrhea and lab results. Her prior PCP was Dr. Porras.  She notes weight gain but states she stopped walking due to her amount of falls she has had, she just needs to get into the habit of using the walking stick that she was advised by another provider. Endorses she will increase her walking again for exercise as well as cut back on sugar intake.       Attention deficit hyperactivity disorder (ADHD), predominantly hyperactive type  Chronic, continues to take Strattera 40 mg daily for this. Has been seen by behavioral health who directed her to continue taking this medication. Will continue to see behavioral health every 3 months.     Tachycardia  Chronic, taking Metoprolol 12.5 mg daily in AM. Feels her symptoms are improved and states her HR shoots up when outside gardening.     Recurrent major depressive disorder, in full remission (HCC)  Chronic, on Lexapro 10 mg daily. Staes she has a hard time getting through [place] due to her daughters death. Behavioral health MD may switch her up.     Abnormal bowel habits  Chronic, States she has a lot of allergies, she seeing her allergist Dr. Reyes regularly and maybe feels that she has some food related allergies that are triggering her diarrhea. She states that she has \"twisted bowels\" and was told she shouldn't have another one done however she let GI do another one and was told she has a vaginal prolapse that is blocking her bowel for colonoscopy.   States she also had a barium enema done. States the diarrhea has been ongoing for 3 years and constantly has to go even when she urinates. States it is very soft and orange/brown or orange in color.   Has been seen by GI, which told her " to take Miralax enough to keep her regular. She states that is because she will go 3 days without a bowel movement and then 3 days of loose stools. She is getting enough fiber and water. Discussed trying a food elimination diet to see if this is helpful. She is aware she needs to make diet changes for bowels and weight loss, states she is eating too many gummy fruit snacks.     Elevated alkaline phosphatase level  Chronic, continues to have elevated alkaline phosphatase.  CT of abdomen pelvis done as well which showed mild prominence of the pancreatic duct, 3 mm stone right side of the urinary bladder and possible 6 mm stone on left side of the bladder.  She also had some follow-up lab work done, proteins normal however her light chains were elevated and her urine test.  Will consult with hematology and see if they have feel like there is any further evaluation warranted besides rechecking her alk phos in about 6 months.  Isoenzyme test did show her bone fraction as well as liver fractions to be within a normal range, liver fraction slightly higher than bone.  Currently asymptomatic.    Obstructive sleep apnea  Chronic, wears her CPAP nightly and notes improvement since being compliant with this.      Current Outpatient Medications Ordered in Epic   Medication Sig Dispense Refill    omeprazole (PRILOSEC) 20 MG delayed-release capsule       oxybutynin SR (DITROPAN-XL) 10 MG CR tablet Take 1 Tablet by mouth every day. 100 Tablet 0    atomoxetine (STRATTERA) 40 MG capsule Take 1 Capsule by mouth every day. 90 Capsule 0    escitalopram (LEXAPRO) 10 MG Tab Take 1 Tablet by mouth every day. 90 Tablet 0    metoprolol tartrate (LOPRESSOR) 25 MG Tab Take 0.5 Tablets by mouth 2 times a day. (Patient taking differently: Take 12.5 mg by mouth every day.) 100 Tablet 3    Multiple Vitamin (MULTIVITAMIN ADULT PO) Take  by mouth.      CALCIUM PO Take  by mouth.      VITAMIN D PO Take  by mouth.      Ascorbic Acid (VITAMIN C PO)  "Take  by mouth.      EPINEPHrine (EPIPEN) 0.3 MG/0.3ML Solution Auto-injector solution for injection       albuterol 108 (90 Base) MCG/ACT Aero Soln inhalation aerosol Inhale 2 Puffs by mouth every 6 hours as needed for Shortness of Breath. 8.5 g 11     No current [facility]-ordered facility-administered medications on file.       Health Maintenance: Completed        Objective:       Exam: /70 (BP Location: Right arm, Patient Position: Sitting, BP Cuff Size: Small adult)   Pulse 82   Temp 36.4 °C (97.6 °F) (Temporal)   Resp 16   Ht 1.575 m (5' 2\")   Wt 83.9 kg (185 lb)   SpO2 95%  Body mass index is 33.84 kg/m².    Physical Exam  Vitals reviewed.   Constitutional:       Appearance: Normal appearance.   HENT:      Mouth/Throat:      Mouth: Mucous membranes are moist.      Pharynx: Oropharynx is clear.   Eyes:      Conjunctiva/sclera: Conjunctivae normal.      Pupils: Pupils are equal, round, and reactive to light.   Cardiovascular:      Rate and Rhythm: Normal rate and regular rhythm.      Pulses: Normal pulses.      Heart sounds: Normal heart sounds. No murmur heard.  Pulmonary:      Effort: Pulmonary effort is normal. No respiratory distress.      Breath sounds: Normal breath sounds. No stridor. No wheezing, rhonchi or rales.   Chest:      Chest wall: No tenderness.   Abdominal:      General: Abdomen is flat. Bowel sounds are normal.   Musculoskeletal:      Right lower leg: No edema.      Left lower leg: No edema.   Neurological:      Mental Status: She is alert and oriented to person, place, and time.   Psychiatric:         Mood and Affect: Mood normal.         Behavior: Behavior normal.        A chaperone was offered to the patient during today's exam. Patient declined chaperone.      Assessment & Plan:   71 y.o. female with the following -    1. Attention deficit hyperactivity disorder (ADHD), predominantly hyperactive type  Chronic, Stable and being followed by behavioral health.  2. Elevated alkaline " phosphatase level  Chronic, also had elevated light chains, negative for elevated proteins. E consult sent to hematology for further recommendations if any for f/u. will continue to monitor every 4 to 6 months unless otherwise specified by heme.  - ALKALINE PHOSPHATASE ISOENZYMES; Future    3. Tachycardia  Chronic, continues to take metoprolol and is well controlled on this.  By cardiology  4. Recurrent major depressive disorder, in full remission (HCC)  Chronic, stable at this time.  Continues to be followed by behavioral health and continues with current medication regimen  5. Encounter for screening mammogram for malignant neoplasm of breast  - MA-SCREENING MAMMO BILAT W/TOMOSYNTHESIS W/CAD; Future    6. Healthcare maintenance  - CBC WITH DIFFERENTIAL; Future  - Comp Metabolic Panel; Future  - Lipid Profile; Future  - VITAMIN D,25 HYDROXY; Future  - TSH WITH REFLEX TO FT4; Future  - HEMOGLOBIN A1C; Future  - VITAMIN B12; Future    7. Abnormal bowel habits  Chronic, has been seen by GI multiple times in the past.  Will discuss with her allergist possible food allergy testing and doing elimination diet.  8. Obstructive sleep apnea  [place], stable on current regimen.  Other orders  - omeprazole (PRILOSEC) 20 MG delayed-release capsule     I spent a total of 57 minutes with record review, exam, communication with the patient, communication with other providers, and documentation of this encounter.    Return in about 6 months (around 2/22/2023) for F/U labs.    Please note that this dictation was created using voice recognition software. I have made every reasonable attempt to correct obvious errors, but I expect that there are errors of grammar and possibly content that I did not discover before finalizing the note.    SCARLETT Purdy.

## 2022-08-22 NOTE — ASSESSMENT & PLAN NOTE
Chronic, on Lexapro 10 mg daily. Dani she has a hard time getting through [place] due to her daughters death. Behavioral health MD may switch her up.

## 2022-08-22 NOTE — ASSESSMENT & PLAN NOTE
"Chronic, States she has a lot of allergies, she seeing her allergist Dr. Reyes regularly and maybe feels that she has some food related allergies that are triggering her diarrhea. She states that she has \"twisted bowels\" and was told she shouldn't have another one done however she let GI do another one and was told she has a vaginal prolapse that is blocking her bowel for colonoscopy.   States she also had a barium enema done. States the diarrhea has been ongoing for 3 years and constantly has to go even when she urinates. States it is very soft and orange/brown or orange in color.   Has been seen by GI, which told her to take Miralax enough to keep her regular. She states that is because she will go 3 days without a bowel movement and then 3 days of loose stools. She is getting enough fiber and water. Discussed trying a food elimination diet to see if this is helpful. She is aware she needs to make diet changes for bowels and weight loss, states she is eating too many gummy fruit snacks.   "

## 2022-09-16 ENCOUNTER — OFFICE VISIT (OUTPATIENT)
Dept: MEDICAL GROUP | Facility: PHYSICIAN GROUP | Age: 72
End: 2022-09-16
Payer: MEDICARE

## 2022-09-16 VITALS
RESPIRATION RATE: 16 BRPM | TEMPERATURE: 98 F | WEIGHT: 182 LBS | HEIGHT: 62 IN | HEART RATE: 84 BPM | DIASTOLIC BLOOD PRESSURE: 76 MMHG | OXYGEN SATURATION: 96 % | SYSTOLIC BLOOD PRESSURE: 128 MMHG | BODY MASS INDEX: 33.49 KG/M2
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
## 2022-09-16 PROCEDURE — 99214 OFFICE O/P EST MOD 30 MIN: CPT | Performed by: FAMILY MEDICINE

## 2022-09-16 RX ORDER — AMOXICILLIN AND CLAVULANATE POTASSIUM 875; 125 MG/1; MG/1
1 TABLET, FILM COATED ORAL 2 TIMES DAILY
Qty: 10 TABLET | Refills: 0 | Status: SHIPPED
Start: 2022-09-16 | End: 2022-09-16

## 2022-09-16 RX ORDER — AMOXICILLIN AND CLAVULANATE POTASSIUM 875; 125 MG/1; MG/1
1 TABLET, FILM COATED ORAL 2 TIMES DAILY
Qty: 10 TABLET | Refills: 0 | Status: SHIPPED | OUTPATIENT
Start: 2022-09-16 | End: 2022-09-21

## 2022-09-16 ASSESSMENT — FIBROSIS 4 INDEX: FIB4 SCORE: 1.21

## 2022-09-16 ASSESSMENT — PAIN SCALES - GENERAL: PAINLEVEL: 2=MINIMAL-SLIGHT

## 2022-09-16 NOTE — PROGRESS NOTES
Subjective:     CC:   Chief Complaint   Patient presents with    Dog Bite     09/15/22 @ 11am L middle finger, no fever, no body aches, px 2        HPI:   Denia presents today with wound after dog bite on 9/15/22, L middle finger. States her dog and her sisters dog were fighting over dog treats and she and her  reacahed in to separate them and got bit, unsure which dog did this. Her dog is a beagle, and her sisters dog is a poodle/Aussie     She is up to date on Tetanus this year, states after it happened she washed her finger with soap and water and covered with bandage.  Denies fever, chills, body aches, pain 2/10 currently.       No problem-specific Assessment & Plan notes found for this encounter.      Current Outpatient Medications Ordered in Epic   Medication Sig Dispense Refill    amoxicillin-clavulanate (AUGMENTIN) 875-125 MG Tab Take 1 Tablet by mouth 2 times a day for 5 days. 10 Tablet 0    omeprazole (PRILOSEC) 20 MG delayed-release capsule       oxybutynin SR (DITROPAN-XL) 10 MG CR tablet Take 1 Tablet by mouth every day. 100 Tablet 0    atomoxetine (STRATTERA) 40 MG capsule Take 1 Capsule by mouth every day. 90 Capsule 0    escitalopram (LEXAPRO) 10 MG Tab Take 1 Tablet by mouth every day. 90 Tablet 0    metoprolol tartrate (LOPRESSOR) 25 MG Tab Take 0.5 Tablets by mouth 2 times a day. (Patient taking differently: Take 12.5 mg by mouth every day.) 100 Tablet 3    Multiple Vitamin (MULTIVITAMIN ADULT PO) Take  by mouth.      CALCIUM PO Take  by mouth.      VITAMIN D PO Take  by mouth.      Ascorbic Acid (VITAMIN C PO) Take  by mouth.      EPINEPHrine (EPIPEN) 0.3 MG/0.3ML Solution Auto-injector solution for injection       albuterol 108 (90 Base) MCG/ACT Aero Soln inhalation aerosol Inhale 2 Puffs by mouth every 6 hours as needed for Shortness of Breath. 8.5 g 11     No current [facility]-ordered facility-administered medications on file.       Health Maintenance: Completed          Objective:  "    Exam:  /76 (BP Location: Right arm, Patient Position: Sitting, BP Cuff Size: Adult)   Pulse 84   Temp 36.7 °C (98 °F) (Temporal)   Resp 16   Ht 1.575 m (5' 2\")   Wt 82.6 kg (182 lb)   SpO2 96%   BMI 33.29 kg/m²  Body mass index is 33.29 kg/m².    Physical Exam  Vitals reviewed.   Constitutional:       General: She is not in acute distress.     Appearance: Normal appearance.   Eyes:      Conjunctiva/sclera: Conjunctivae normal.      Pupils: Pupils are equal, round, and reactive to light.   Cardiovascular:      Rate and Rhythm: Normal rate.      Pulses: Normal pulses.   Pulmonary:      Effort: Pulmonary effort is normal. No respiratory distress.   Skin:     General: Skin is warm and dry.      Findings: Laceration present.             Comments: Left 3rd digit with laceration due to dog bite. Slight redness, swelling, bruising around veronica wound and nail bed. Laceration already starting to close by primary intention, currently no depth to wound bed. No s/s of infection   Neurological:      Mental Status: She is alert.        A chaperone was offered to the patient during today's exam. Patient declined chaperone.        Assessment & Plan:     72 y.o. female with the following -     1. Dog bite, initial encounter  Acute problem x1 day.  She is up-to-date on her Tdap.  In clinic it was irrigated with saline cleansed with Betadine, and covered with bandage.  Discussed signs or symptoms of infection and to please come back in to see me if this occurs or go to the ER.  We will prophylactically give Augmentin 500 mg twice daily x5 days.  Handout given on wound care for animal bites and return as needed.  - amoxicillin-clavulanate (AUGMENTIN) 875-125 MG Tab; Take 1 Tablet by mouth 2 times a day for 5 days.  Dispense: 10 Tablet; Refill: 0       I spent a total of 30 minutes with record review, exam, communication with the patient, communication with other providers, and documentation of this encounter.      No " follow-ups on file.    Please note that this dictation was created using voice recognition software. I have made every reasonable attempt to correct obvious errors, but I expect that there are errors of grammar and possibly content that I did not discover before finalizing the note.

## 2022-09-29 ENCOUNTER — HOSPITAL ENCOUNTER (OUTPATIENT)
Dept: RADIOLOGY | Facility: MEDICAL CENTER | Age: 72
End: 2022-09-29
Attending: FAMILY MEDICINE
Payer: MEDICARE

## 2022-09-29 DIAGNOSIS — Z12.31 ENCOUNTER FOR SCREENING MAMMOGRAM FOR MALIGNANT NEOPLASM OF BREAST: ICD-10-CM

## 2022-09-29 PROCEDURE — 77063 BREAST TOMOSYNTHESIS BI: CPT

## 2022-10-18 DIAGNOSIS — J45.20 MILD INTERMITTENT ASTHMA WITHOUT COMPLICATION: ICD-10-CM

## 2022-10-18 DIAGNOSIS — Z91.030 BEE ALLERGY STATUS: ICD-10-CM

## 2022-10-18 RX ORDER — ALBUTEROL SULFATE 90 UG/1
2 AEROSOL, METERED RESPIRATORY (INHALATION) EVERY 6 HOURS PRN
Qty: 8.5 G | Refills: 3 | Status: SHIPPED | OUTPATIENT
Start: 2022-10-18 | End: 2022-10-19 | Stop reason: SDUPTHER

## 2022-10-19 ENCOUNTER — OFFICE VISIT (OUTPATIENT)
Dept: MEDICAL GROUP | Facility: PHYSICIAN GROUP | Age: 72
End: 2022-10-19
Payer: MEDICARE

## 2022-10-19 VITALS
HEART RATE: 97 BPM | WEIGHT: 185 LBS | BODY MASS INDEX: 34.04 KG/M2 | HEIGHT: 62 IN | DIASTOLIC BLOOD PRESSURE: 68 MMHG | TEMPERATURE: 97.1 F | OXYGEN SATURATION: 97 % | SYSTOLIC BLOOD PRESSURE: 118 MMHG | RESPIRATION RATE: 14 BRPM

## 2022-10-19 DIAGNOSIS — J45.20 MILD INTERMITTENT ASTHMA WITHOUT COMPLICATION: ICD-10-CM

## 2022-10-19 DIAGNOSIS — R51.9 LEFT-SIDED HEADACHE: ICD-10-CM

## 2022-10-19 DIAGNOSIS — Z91.030 BEE ALLERGY STATUS: ICD-10-CM

## 2022-10-19 DIAGNOSIS — Z00.00 HEALTHCARE MAINTENANCE: ICD-10-CM

## 2022-10-19 PROCEDURE — 99215 OFFICE O/P EST HI 40 MIN: CPT | Performed by: FAMILY MEDICINE

## 2022-10-19 RX ORDER — ALBUTEROL SULFATE 90 UG/1
2 AEROSOL, METERED RESPIRATORY (INHALATION) EVERY 6 HOURS PRN
Qty: 8.5 G | Refills: 0 | Status: SHIPPED | OUTPATIENT
Start: 2022-10-19 | End: 2022-10-26 | Stop reason: SDUPTHER

## 2022-10-19 ASSESSMENT — FIBROSIS 4 INDEX: FIB4 SCORE: 1.21

## 2022-10-19 NOTE — PROGRESS NOTES
Subjective:     CC:   Chief Complaint   Patient presents with    Pain     L side of eye, x 6 months, hurt to touch, constant,     Medication Refill    Pharyngitis     X 2 weeks, Neg covid test at home, cough,        HPI:   Denia presents today with concerns regarding her jaw pain, states she was discussing this with her dentist and he mentioned that the left side of her temple has been hurting and he told her it could be a pituitary dysfunction. States that when she first noticed it in the spring she had a bruise and it was hurting, now only hurts mildly when she touches it. She does report falling frequently, + for weight gain (over 2 years she has gained 10 lbs), bowel changes which are chronic. She does have hx of sinus problems, but does not feel this is the cause. Hx of chronic migraines but improved and no longer has these.   Denies blurred vision, diplopia, increased  urination.   She has also had some issues with her asthma due to a flare up, states she had a sore throat and cough but had a negative home COVID test.     No problem-specific Assessment & Plan notes found for this encounter.      Current Outpatient Medications Ordered in Epic   Medication Sig Dispense Refill    albuterol 108 (90 Base) MCG/ACT Aero Soln inhalation aerosol Inhale 2 Puffs every 6 hours as needed for Shortness of Breath. 8.5 g 0    omeprazole (PRILOSEC) 20 MG delayed-release capsule       oxybutynin SR (DITROPAN-XL) 10 MG CR tablet Take 1 Tablet by mouth every day. 100 Tablet 0    atomoxetine (STRATTERA) 40 MG capsule Take 1 Capsule by mouth every day. 90 Capsule 0    escitalopram (LEXAPRO) 10 MG Tab Take 1 Tablet by mouth every day. 90 Tablet 0    metoprolol tartrate (LOPRESSOR) 25 MG Tab Take 0.5 Tablets by mouth 2 times a day. (Patient taking differently: Take 12.5 mg by mouth every day.) 100 Tablet 3    Multiple Vitamin (MULTIVITAMIN ADULT PO) Take  by mouth.      CALCIUM PO Take  by mouth.      VITAMIN D PO Take  by mouth.    "   Ascorbic Acid (VITAMIN C PO) Take  by mouth.      EPINEPHrine (EPIPEN) 0.3 MG/0.3ML Solution Auto-injector solution for injection        No current Epic-ordered facility-administered medications on file.       Health Maintenance: Completed      Objective:     Exam:  /68 (BP Location: Right arm, Patient Position: Sitting, BP Cuff Size: Adult)   Pulse 97   Temp 36.2 °C (97.1 °F) (Temporal)   Resp 14   Ht 1.575 m (5' 2\")   Wt 83.9 kg (185 lb)   SpO2 97%   BMI 33.84 kg/m²  Body mass index is 33.84 kg/m².    Physical Exam  Vitals reviewed.   Constitutional:       General: She is not in acute distress.     Appearance: Normal appearance.   Eyes:      Conjunctiva/sclera: Conjunctivae normal.      Pupils: Pupils are equal, round, and reactive to light.   Cardiovascular:      Rate and Rhythm: Normal rate and regular rhythm.      Pulses: Normal pulses.      Heart sounds: Normal heart sounds. No murmur heard.  Pulmonary:      Effort: Pulmonary effort is normal. No respiratory distress.      Breath sounds: Normal breath sounds. No stridor. No wheezing, rhonchi or rales.   Chest:      Chest wall: No tenderness.   Abdominal:      General: Bowel sounds are normal.   Musculoskeletal:      Right lower leg: No edema.      Left lower leg: No edema.   Skin:     General: Skin is warm and dry.   Neurological:      Mental Status: She is alert and oriented to person, place, and time.    Neuro: CN II-XII intact, strength 5/5 in all muscle groups, sensation intact bilaterally to light touch and pinprick, reflexes 2+ bilaterally, coordination intact bilaterally, Romberg negative, pronator drift negative, Babinski negative  A chaperone was offered to the patient during today's exam. Chaperone name: Ml BLANCHARD student  was present.      Assessment & Plan:     72 y.o. female with the following -     1. 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 Can do OTC antihistamines and seeing allergist regularly.   - albuterol 108 (90 Base) MCG/ACT Aero Soln inhalation aerosol; Inhale 2 Puffs every 6 hours as needed for Shortness of Breath.  Dispense: 8.5 g; Refill: 0    2. Bee allergy status  - albuterol 108 (90 Base) MCG/ACT Aero Soln inhalation aerosol; Inhale 2 Puffs every 6 hours as needed for Shortness of Breath.  Dispense: 8.5 g; Refill: 0    3. Left-sided headache  Chronic since spring, states it is not really a headache but pain with palpation that feels like a bruise. Her dentist was concerned she has a pituitary issue. Will get some updated lab work.   - ACTH; Future  - CORTISOL; Future    4. Healthcare maintenance  - Referral to Ophthalmology       I spent a total of 41 minutes with record review, exam, communication with the patient, communication with other providers, and documentation of this encounter.      No follow-ups on file.    Please note that this dictation was created using voice recognition software. I have made every reasonable attempt to correct obvious errors, but I expect that there are errors of grammar and possibly content that I did not discover before finalizing the note.

## 2022-10-26 DIAGNOSIS — Z91.030 BEE ALLERGY STATUS: ICD-10-CM

## 2022-10-26 DIAGNOSIS — J45.20 MILD INTERMITTENT ASTHMA WITHOUT COMPLICATION: ICD-10-CM

## 2022-10-26 RX ORDER — ALBUTEROL SULFATE 90 UG/1
2 AEROSOL, METERED RESPIRATORY (INHALATION) EVERY 6 HOURS PRN
Qty: 8.5 G | Refills: 3 | Status: SHIPPED | OUTPATIENT
Start: 2022-10-26 | End: 2024-01-05 | Stop reason: SDUPTHER

## 2022-10-26 NOTE — TELEPHONE ENCOUNTER
Received request via: PatientIn the past [facility] MAIL IN WAS ALWAYS 3 month supply.. that is the price saver of mail in. (My  already messaged you this info].  In the future please be sure my [facility] mail in prescription is 3 month supply  Thanks    Was the patient seen in the last year in this department? Yes    Does the patient have an active prescription (recently filled or refills available) for medication(s) requested? No

## 2022-10-31 ENCOUNTER — OFFICE VISIT (OUTPATIENT)
Dept: BEHAVIORAL HEALTH | Facility: CLINIC | Age: 72
End: 2022-10-31
Payer: MEDICARE

## 2022-10-31 DIAGNOSIS — F43.10 POSTTRAUMATIC STRESS DISORDER: ICD-10-CM

## 2022-10-31 DIAGNOSIS — F33.1 MAJOR DEPRESSIVE DISORDER, RECURRENT EPISODE, MODERATE (HCC): ICD-10-CM

## 2022-10-31 DIAGNOSIS — F33.42 RECURRENT MAJOR DEPRESSIVE DISORDER, IN FULL REMISSION (HCC): ICD-10-CM

## 2022-10-31 DIAGNOSIS — F90.1 ATTENTION DEFICIT HYPERACTIVITY DISORDER (ADHD), PREDOMINANTLY HYPERACTIVE TYPE: ICD-10-CM

## 2022-10-31 PROCEDURE — 99214 OFFICE O/P EST MOD 30 MIN: CPT | Performed by: PSYCHIATRY & NEUROLOGY

## 2022-10-31 RX ORDER — ESCITALOPRAM OXALATE 10 MG/1
10 TABLET ORAL
Qty: 135 TABLET | Refills: 0 | Status: SHIPPED
Start: 2022-10-31 | End: 2023-01-16

## 2022-10-31 RX ORDER — ATOMOXETINE 40 MG/1
40 CAPSULE ORAL DAILY
Qty: 90 CAPSULE | Refills: 0 | Status: SHIPPED | OUTPATIENT
Start: 2022-10-31 | End: 2023-01-16 | Stop reason: SDUPTHER

## 2022-10-31 NOTE — PROGRESS NOTES
[facility]   Follow Up Assessment     This provider informed the patient their medical records are totally confidential except for the use by other providers involved in their care, or if the patient signs a release, or to report instances of child or elder abuse, or if it is determined they are an immediate risk to harm themselves or others.    Name: Denia Deluca  MRN: [id number]  : 1950  Age: 72 y.o.  Date of assessment: 10/31/2022  PCP: SHANON Purdy      Subjective:  Reviewed prior to seeing her in my office.  She was last seen on .  Her 78-year-old sister  in early October of this year.  We reviewed her current medications, purposes, doses, etc. and discussed options.  She would like to increase her Lexapro to a total of 45 mg daily.  She prefers to continue Strattera 40mg am for now.    Objective:  She is alert, oriented, and cooperative.  Relatedness is good.  Grooming is good.  Speech is normal rate.  Talkative.  Sad and anxious.  Memory is good.  Insight and judgment are good.  No indication of psychotic thinking.    Current Risk:       Suicidal: Not suicidal       Homicidal: Not homicidal       Self-Harm: No plan to harm self       Relapse: (Low/Moderate/High): Moderate       Crisis Safety Plan Reviewed: Discussed with patient    Diagnosis:   Major depressive disorder, recurrent  ADHD  PTSD    Treatment Plan:  Current treatment plan consists of a follow-up visit in 2 months and then quarterly psychiatric sessions designed to evaluate her depression, ADHD, and PTSD.    Duration will be for a minimum of 12 months and will be reviewed at each visit.    Goals: Remission of depression with improvement in ADHD symptoms and control of PTSD symptoms in order to prevent relapse due to the chronic nature of her behavioral health problems and mental illness.  Continue Strattera 40 mg a.m. for now.  Continue Lexapro at a total dose of 15 mg a.m.    Ephraim Chavez  M.D.      This note was created using voice recognition software (Dragon). The accuracy of the dictation is limited by the abilities of the software. I have reviewed the note prior to signing, however some errors in grammar and context are still possible. If you have any questions related to this note please do not hesitate to contact our office.

## 2022-11-10 ENCOUNTER — DOCUMENTATION (OUTPATIENT)
Dept: HEALTH INFORMATION MANAGEMENT | Facility: OTHER | Age: 72
End: 2022-11-10
Payer: MEDICARE

## 2022-11-10 ENCOUNTER — PATIENT MESSAGE (OUTPATIENT)
Dept: HEALTH INFORMATION MANAGEMENT | Facility: OTHER | Age: 72
End: 2022-11-10

## 2022-11-15 ENCOUNTER — HOSPITAL ENCOUNTER (OUTPATIENT)
Dept: LAB | Facility: MEDICAL CENTER | Age: 72
End: 2022-11-15
Attending: FAMILY MEDICINE
Payer: MEDICARE

## 2022-11-15 DIAGNOSIS — R74.8 ELEVATED ALKALINE PHOSPHATASE LEVEL: ICD-10-CM

## 2022-11-15 DIAGNOSIS — Z00.00 HEALTHCARE MAINTENANCE: ICD-10-CM

## 2022-11-15 DIAGNOSIS — R51.9 LEFT-SIDED HEADACHE: ICD-10-CM

## 2022-11-15 LAB
25(OH)D3 SERPL-MCNC: 51 NG/ML (ref 30–100)
ALBUMIN SERPL BCP-MCNC: 4.5 G/DL (ref 3.2–4.9)
ALBUMIN/GLOB SERPL: 1.6 G/DL
ALP SERPL-CCNC: 126 U/L (ref 30–99)
ALT SERPL-CCNC: 11 U/L (ref 2–50)
ANION GAP SERPL CALC-SCNC: 9 MMOL/L (ref 7–16)
AST SERPL-CCNC: 16 U/L (ref 12–45)
BASOPHILS # BLD AUTO: 0.9 % (ref 0–1.8)
BASOPHILS # BLD: 0.05 K/UL (ref 0–0.12)
BILIRUB SERPL-MCNC: 0.5 MG/DL (ref 0.1–1.5)
BUN SERPL-MCNC: 27 MG/DL (ref 8–22)
CALCIUM SERPL-MCNC: 9.9 MG/DL (ref 8.5–10.5)
CHLORIDE SERPL-SCNC: 102 MMOL/L (ref 96–112)
CHOLEST SERPL-MCNC: 217 MG/DL (ref 100–199)
CO2 SERPL-SCNC: 28 MMOL/L (ref 20–33)
CORTIS SERPL-MCNC: 16.2 UG/DL (ref 0–23)
CREAT SERPL-MCNC: 0.82 MG/DL (ref 0.5–1.4)
EOSINOPHIL # BLD AUTO: 0.19 K/UL (ref 0–0.51)
EOSINOPHIL NFR BLD: 3.6 % (ref 0–6.9)
ERYTHROCYTE [DISTWIDTH] IN BLOOD BY AUTOMATED COUNT: 45.8 FL (ref 35.9–50)
EST. AVERAGE GLUCOSE BLD GHB EST-MCNC: 111 MG/DL
FASTING STATUS PATIENT QL REPORTED: NORMAL
GFR SERPLBLD CREATININE-BSD FMLA CKD-EPI: 76 ML/MIN/1.73 M 2
GLOBULIN SER CALC-MCNC: 2.9 G/DL (ref 1.9–3.5)
GLUCOSE SERPL-MCNC: 89 MG/DL (ref 65–99)
HBA1C MFR BLD: 5.5 % (ref 4–5.6)
HCT VFR BLD AUTO: 43.9 % (ref 37–47)
HDLC SERPL-MCNC: 56 MG/DL
HGB BLD-MCNC: 14.2 G/DL (ref 12–16)
IMM GRANULOCYTES # BLD AUTO: 0.02 K/UL (ref 0–0.11)
IMM GRANULOCYTES NFR BLD AUTO: 0.4 % (ref 0–0.9)
LDLC SERPL CALC-MCNC: 143 MG/DL
LYMPHOCYTES # BLD AUTO: 1.75 K/UL (ref 1–4.8)
LYMPHOCYTES NFR BLD: 33 % (ref 22–41)
MCH RBC QN AUTO: 30.8 PG (ref 27–33)
MCHC RBC AUTO-ENTMCNC: 32.3 G/DL (ref 33.6–35)
MCV RBC AUTO: 95.2 FL (ref 81.4–97.8)
MONOCYTES # BLD AUTO: 0.47 K/UL (ref 0–0.85)
MONOCYTES NFR BLD AUTO: 8.9 % (ref 0–13.4)
NEUTROPHILS # BLD AUTO: 2.82 K/UL (ref 2–7.15)
NEUTROPHILS NFR BLD: 53.2 % (ref 44–72)
NRBC # BLD AUTO: 0 K/UL
NRBC BLD-RTO: 0 /100 WBC
PLATELET # BLD AUTO: 272 K/UL (ref 164–446)
PMV BLD AUTO: 10.2 FL (ref 9–12.9)
POTASSIUM SERPL-SCNC: 3.6 MMOL/L (ref 3.6–5.5)
PROT SERPL-MCNC: 7.4 G/DL (ref 6–8.2)
RBC # BLD AUTO: 4.61 M/UL (ref 4.2–5.4)
SODIUM SERPL-SCNC: 139 MMOL/L (ref 135–145)
TRIGL SERPL-MCNC: 92 MG/DL (ref 0–149)
TSH SERPL DL<=0.005 MIU/L-ACNC: 3.15 UIU/ML (ref 0.38–5.33)
VIT B12 SERPL-MCNC: 987 PG/ML (ref 211–911)
WBC # BLD AUTO: 5.3 K/UL (ref 4.8–10.8)

## 2022-11-15 PROCEDURE — 80061 LIPID PANEL: CPT

## 2022-11-15 PROCEDURE — 85025 COMPLETE CBC W/AUTO DIFF WBC: CPT

## 2022-11-15 PROCEDURE — 82306 VITAMIN D 25 HYDROXY: CPT

## 2022-11-15 PROCEDURE — 36415 COLL VENOUS BLD VENIPUNCTURE: CPT

## 2022-11-15 PROCEDURE — 82533 TOTAL CORTISOL: CPT

## 2022-11-15 PROCEDURE — 83036 HEMOGLOBIN GLYCOSYLATED A1C: CPT

## 2022-11-15 PROCEDURE — 80053 COMPREHEN METABOLIC PANEL: CPT

## 2022-11-15 PROCEDURE — 84080 ASSAY ALKALINE PHOSPHATASES: CPT

## 2022-11-15 PROCEDURE — 82024 ASSAY OF ACTH: CPT

## 2022-11-15 PROCEDURE — 84075 ASSAY ALKALINE PHOSPHATASE: CPT

## 2022-11-15 PROCEDURE — 84443 ASSAY THYROID STIM HORMONE: CPT

## 2022-11-15 PROCEDURE — 82607 VITAMIN B-12: CPT

## 2022-11-16 LAB — ACTH PLAS-MCNC: 17.1 PG/ML (ref 7.2–63.3)

## 2022-11-17 LAB
ALP BONE SERPL-CCNC: 53 U/L (ref 0–55)
ALP ISOS SERPL HS-CCNC: 0 U/L
ALP LIVER SERPL-CCNC: 82 U/L (ref 0–94)
ALP SERPL-CCNC: 135 U/L (ref 40–120)

## 2022-11-22 ENCOUNTER — PATIENT MESSAGE (OUTPATIENT)
Dept: HEALTH INFORMATION MANAGEMENT | Facility: OTHER | Age: 72
End: 2022-11-22

## 2022-12-06 NOTE — TELEPHONE ENCOUNTER
Member called to scheduled STEVO, I scheduled it and then after the call when I went to enter my notes- I saw prior notes that her last STEVO was 1/19/22, so she has already completed her STEVO for 2022. She can RS anytime in 2023.

## 2022-12-19 SDOH — ECONOMIC STABILITY: INCOME INSECURITY: IN THE LAST 12 MONTHS, WAS THERE A TIME WHEN YOU WERE NOT ABLE TO PAY THE MORTGAGE OR RENT ON TIME?: NO

## 2022-12-19 SDOH — ECONOMIC STABILITY: FOOD INSECURITY: WITHIN THE PAST 12 MONTHS, YOU WORRIED THAT YOUR FOOD WOULD RUN OUT BEFORE YOU GOT MONEY TO BUY MORE.: NEVER TRUE

## 2022-12-19 SDOH — HEALTH STABILITY: PHYSICAL HEALTH: ON AVERAGE, HOW MANY DAYS PER WEEK DO YOU ENGAGE IN MODERATE TO STRENUOUS EXERCISE (LIKE A BRISK WALK)?: 1 DAY

## 2022-12-19 SDOH — ECONOMIC STABILITY: HOUSING INSECURITY
IN THE LAST 12 MONTHS, WAS THERE A TIME WHEN YOU DID NOT HAVE A STEADY PLACE TO SLEEP OR SLEPT IN A SHELTER (INCLUDING NOW)?: NO

## 2022-12-19 SDOH — ECONOMIC STABILITY: TRANSPORTATION INSECURITY
IN THE PAST 12 MONTHS, HAS THE LACK OF TRANSPORTATION KEPT YOU FROM MEDICAL APPOINTMENTS OR FROM GETTING MEDICATIONS?: NO

## 2022-12-19 SDOH — ECONOMIC STABILITY: FOOD INSECURITY: WITHIN THE PAST 12 MONTHS, THE FOOD YOU BOUGHT JUST DIDN'T LAST AND YOU DIDN'T HAVE MONEY TO GET MORE.: NEVER TRUE

## 2022-12-19 SDOH — HEALTH STABILITY: PHYSICAL HEALTH: ON AVERAGE, HOW MANY MINUTES DO YOU ENGAGE IN EXERCISE AT THIS LEVEL?: 10 MIN

## 2022-12-19 SDOH — ECONOMIC STABILITY: INCOME INSECURITY: HOW HARD IS IT FOR YOU TO PAY FOR THE VERY BASICS LIKE FOOD, HOUSING, MEDICAL CARE, AND HEATING?: NOT VERY HARD

## 2022-12-19 SDOH — HEALTH STABILITY: MENTAL HEALTH
STRESS IS WHEN SOMEONE FEELS TENSE, NERVOUS, ANXIOUS, OR CAN'T SLEEP AT NIGHT BECAUSE THEIR MIND IS TROUBLED. HOW STRESSED ARE YOU?: ONLY A LITTLE

## 2022-12-19 SDOH — ECONOMIC STABILITY: TRANSPORTATION INSECURITY
IN THE PAST 12 MONTHS, HAS LACK OF RELIABLE TRANSPORTATION KEPT YOU FROM MEDICAL APPOINTMENTS, MEETINGS, WORK OR FROM GETTING THINGS NEEDED FOR DAILY LIVING?: NO

## 2022-12-19 SDOH — ECONOMIC STABILITY: TRANSPORTATION INSECURITY
IN THE PAST 12 MONTHS, HAS LACK OF TRANSPORTATION KEPT YOU FROM MEETINGS, WORK, OR FROM GETTING THINGS NEEDED FOR DAILY LIVING?: NO

## 2022-12-19 SDOH — ECONOMIC STABILITY: HOUSING INSECURITY

## 2022-12-19 ASSESSMENT — LIFESTYLE VARIABLES
AUDIT-C TOTAL SCORE: 1
SKIP TO QUESTIONS 9-10: 1
HOW MANY STANDARD DRINKS CONTAINING ALCOHOL DO YOU HAVE ON A TYPICAL DAY: PATIENT DOES NOT DRINK
HOW OFTEN DO YOU HAVE A DRINK CONTAINING ALCOHOL: MONTHLY OR LESS
HOW OFTEN DO YOU HAVE SIX OR MORE DRINKS ON ONE OCCASION: NEVER

## 2022-12-19 ASSESSMENT — SOCIAL DETERMINANTS OF HEALTH (SDOH)
HOW HARD IS IT FOR YOU TO PAY FOR THE VERY BASICS LIKE FOOD, HOUSING, MEDICAL CARE, AND HEATING?: NOT VERY HARD
WITHIN THE PAST 12 MONTHS, YOU WORRIED THAT YOUR FOOD WOULD RUN OUT BEFORE YOU GOT THE MONEY TO BUY MORE: NEVER TRUE
IN A TYPICAL WEEK, HOW MANY TIMES DO YOU TALK ON THE PHONE WITH FAMILY, FRIENDS, OR NEIGHBORS?: MORE THAN THREE TIMES A WEEK
HOW OFTEN DO YOU GET TOGETHER WITH FRIENDS OR RELATIVES?: TWICE A WEEK
HOW OFTEN DO YOU HAVE SIX OR MORE DRINKS ON ONE OCCASION: NEVER
HOW OFTEN DO YOU ATTEND CHURCH OR RELIGIOUS SERVICES?: 1 TO 4 TIMES PER YEAR
DO YOU BELONG TO ANY CLUBS OR ORGANIZATIONS SUCH AS CHURCH GROUPS UNIONS, FRATERNAL OR ATHLETIC GROUPS, OR SCHOOL GROUPS?: YES
HOW OFTEN DO YOU HAVE A DRINK CONTAINING ALCOHOL: MONTHLY OR LESS
HOW OFTEN DO YOU GET TOGETHER WITH FRIENDS OR RELATIVES?: TWICE A WEEK
HOW MANY DRINKS CONTAINING ALCOHOL DO YOU HAVE ON A TYPICAL DAY WHEN YOU ARE DRINKING: PATIENT DOES NOT DRINK
DO YOU BELONG TO ANY CLUBS OR ORGANIZATIONS SUCH AS CHURCH GROUPS UNIONS, FRATERNAL OR ATHLETIC GROUPS, OR SCHOOL GROUPS?: YES
HOW OFTEN DO YOU ATTENT MEETINGS OF THE CLUB OR ORGANIZATION YOU BELONG TO?: MORE THAN 4 TIMES PER YEAR
HOW OFTEN DO YOU ATTENT MEETINGS OF THE CLUB OR ORGANIZATION YOU BELONG TO?: MORE THAN 4 TIMES PER YEAR
HOW OFTEN DO YOU ATTEND CHURCH OR RELIGIOUS SERVICES?: 1 TO 4 TIMES PER YEAR
IN A TYPICAL WEEK, HOW MANY TIMES DO YOU TALK ON THE PHONE WITH FAMILY, FRIENDS, OR NEIGHBORS?: MORE THAN THREE TIMES A WEEK

## 2022-12-20 ENCOUNTER — PATIENT MESSAGE (OUTPATIENT)
Dept: MEDICAL GROUP | Facility: MEDICAL CENTER | Age: 72
End: 2022-12-20

## 2022-12-20 ENCOUNTER — OFFICE VISIT (OUTPATIENT)
Dept: MEDICAL GROUP | Facility: MEDICAL CENTER | Age: 72
End: 2022-12-20
Payer: MEDICARE

## 2022-12-20 VITALS
HEIGHT: 63 IN | BODY MASS INDEX: 32.81 KG/M2 | OXYGEN SATURATION: 97 % | RESPIRATION RATE: 16 BRPM | SYSTOLIC BLOOD PRESSURE: 124 MMHG | HEART RATE: 80 BPM | WEIGHT: 185.19 LBS | TEMPERATURE: 97.8 F | DIASTOLIC BLOOD PRESSURE: 64 MMHG

## 2022-12-20 DIAGNOSIS — R74.8 ELEVATED ALKALINE PHOSPHATASE LEVEL: ICD-10-CM

## 2022-12-20 DIAGNOSIS — K86.89 DILATED PANCREATIC DUCT: ICD-10-CM

## 2022-12-20 DIAGNOSIS — Z00.00 ENCOUNTER FOR MEDICAL EXAMINATION TO ESTABLISH CARE: ICD-10-CM

## 2022-12-20 DIAGNOSIS — F90.1 ATTENTION DEFICIT HYPERACTIVITY DISORDER (ADHD), PREDOMINANTLY HYPERACTIVE TYPE: ICD-10-CM

## 2022-12-20 DIAGNOSIS — Z85.828 HISTORY OF BASAL CELL CARCINOMA (BCC): ICD-10-CM

## 2022-12-20 DIAGNOSIS — L98.9 LESION OF SKIN OF SCALP: ICD-10-CM

## 2022-12-20 DIAGNOSIS — E78.00 PURE HYPERCHOLESTEROLEMIA: ICD-10-CM

## 2022-12-20 DIAGNOSIS — F33.1 MAJOR DEPRESSIVE DISORDER, RECURRENT EPISODE, MODERATE (HCC): ICD-10-CM

## 2022-12-20 PROBLEM — R00.0 TACHYCARDIA: Status: RESOLVED | Noted: 2018-09-26 | Resolved: 2022-12-20

## 2022-12-20 PROCEDURE — 99214 OFFICE O/P EST MOD 30 MIN: CPT | Performed by: STUDENT IN AN ORGANIZED HEALTH CARE EDUCATION/TRAINING PROGRAM

## 2022-12-20 RX ORDER — COVID-19 ANTIGEN TEST
KIT MISCELLANEOUS
COMMUNITY
Start: 2022-12-06 | End: 2022-12-20

## 2022-12-20 ASSESSMENT — FIBROSIS 4 INDEX: FIB4 SCORE: 1.28

## 2022-12-20 NOTE — PROGRESS NOTES
"Subjective:     CC:  Diagnoses of Encounter for medical examination to establish care, Lesion of skin of scalp, History of basal cell carcinoma (BCC), Attention deficit hyperactivity disorder (ADHD), predominantly hyperactive type, Major depressive disorder, recurrent episode, moderate (HCC), Elevated alkaline phosphatase level, Dilated pancreatic duct, and Pure hypercholesterolemia were pertinent to this visit.    HISTORY OF THE PRESENT ILLNESS: Patient is a 72 y.o. female. This pleasant patient is here today to establish care and discuss the following. His/her prior PCP was in [facility].    She has a history of elevated alkaline phosphatase with normal bone and liver fractions.  She also has a dilated pancreatic duct seen on CT scan.  She is supposed to follow-up with GI but has not for this.      She also has a history of basal cell carcinoma on the scalp, she has a recurrent growth that is near the scar from her previous basal cell removal.  It is bleeding and itching and she feels like it is getting bigger.  She has seen dermatology and they have frozen it, but it keeps coming back.  Per patient they will not remove it and she would like it removed.     She has PTSD, depression and ADHD which are all managed by psychiatry.  She is currently on Strattera and Lexapro.      ROS:   ROS      Objective:     Exam: /64 (BP Location: Left arm, Patient Position: Sitting, BP Cuff Size: Adult)   Pulse 80   Temp 36.6 °C (97.8 °F) (Temporal)   Resp 16   Ht 1.6 m (5' 3\")   Wt 84 kg (185 lb 3 oz)   SpO2 97%  Body mass index is 32.8 kg/m².    Physical Exam  Vitals reviewed.   Constitutional:       General: She is not in acute distress.     Appearance: She is normal weight. She is not toxic-appearing.   HENT:      Head: Normocephalic and atraumatic.      Right Ear: External ear normal.      Left Ear: External ear normal.   Eyes:      General:         Right eye: No discharge.         Left eye: No discharge.      " Extraocular Movements: Extraocular movements intact.      Conjunctiva/sclera: Conjunctivae normal.   Cardiovascular:      Rate and Rhythm: Normal rate and regular rhythm.      Pulses: Normal pulses.      Heart sounds: Normal heart sounds. No murmur heard.  Pulmonary:      Effort: Pulmonary effort is normal. No respiratory distress.      Breath sounds: Normal breath sounds.   Abdominal:      General: Abdomen is flat. Bowel sounds are normal. There is no distension.      Palpations: Abdomen is soft.      Tenderness: There is no abdominal tenderness.   Musculoskeletal:         General: Normal range of motion.   Skin:     General: Skin is warm and dry.      Capillary Refill: Capillary refill takes less than 2 seconds.      Comments: Rough appearing raised lesion, with several patches of dark red/black in other parts that are white and flaky.   Neurological:      Mental Status: She is alert.   Psychiatric:         Mood and Affect: Mood normal.         Behavior: Behavior normal.         Thought Content: Thought content normal.         Judgment: Judgment normal.       Assessment & Plan:   72 y.o. female with the following -    1. Encounter for medical examination to establish care  History, problem list, medications and allergies reviewed.  Records requested from previous provider if applicable.    2. Lesion of skin of scalp  3. History of basal cell carcinoma (BCC)  The lesion it does not necessarily look like a basal cell, dermatologist has looked at this and said it does not need to be removed.  I offered cryotherapy today which patient declines.  She really does not want to have it removed.  I offered a referral to either dermatology or plastic surgery for second opinion and possibly removal.  She elects to see an outside dermatologist  - Referral to Dermatology    4. Attention deficit hyperactivity disorder (ADHD), predominantly hyperactive type  5. Major depressive disorder, recurrent episode, moderate  (HCC)  Continue to follow with psychiatry, continue with Strattera and Lexapro at current dosing    6. Elevated alkaline phosphatase level  7. Dilated pancreatic duct  Discussed the importance of following up with a GI doctor to make sure that there is no pathology that needs to be addressed regarding the elevated alk phos in the setting of a dilated pancreatic duct.  Patient understands will reach out to GI doctor    8. Pure hypercholesterolemia  Overall improving, recheck in 6 months      Return in about 6 months (around 6/20/2023).    Please note that this dictation was created using voice recognition software. I have made every reasonable attempt to correct obvious errors, but I expect that there are errors of grammar and possibly content that I did not discover before finalizing the note.

## 2022-12-22 ENCOUNTER — OFFICE VISIT (OUTPATIENT)
Dept: DERMATOLOGY | Facility: IMAGING CENTER | Age: 72
End: 2022-12-22
Payer: MEDICARE

## 2022-12-22 DIAGNOSIS — D48.9 NEOPLASM OF UNCERTAIN BEHAVIOR: ICD-10-CM

## 2022-12-22 PROCEDURE — 11311 SHAVE SKIN LESION 0.6-1.0 CM: CPT | Performed by: NURSE PRACTITIONER

## 2022-12-22 RX ORDER — OXYBUTYNIN CHLORIDE 10 MG/1
10 TABLET, EXTENDED RELEASE ORAL DAILY
Qty: 100 TABLET | Refills: 0 | Status: SHIPPED | OUTPATIENT
Start: 2022-12-22 | End: 2023-03-23

## 2022-12-22 NOTE — TELEPHONE ENCOUNTER
Received request via: Patient    Was the patient seen in the last year in this department? Yes    Does the patient have an active prescription (recently filled or refills available) for medication(s) requested? No    Does the patient have intermediate Plus and need 100 day supply (blood pressure, diabetes and cholesterol meds only)? Yes, quantity updated to 100 days

## 2022-12-22 NOTE — PROGRESS NOTES
DERMATOLOGY NOTE  FOLLOW UP VISIT       Chief complaint: Follow-Up and Skin Lesion  Lesion on forehead      History of skin cancer: Yes, Details: BCC nose and lip , 2016, BCC forehead   History of precancers/actinic keratoses: Yes, Details: see above  History of biopsies:Yes, Details: yes, see above  History of blistering/severe sunburns:Yes, Details: young adult  Family history of skin cancer:Yes, Details: mother- SCC, BCC; father - BCC  Family history of atypical moles:No    Past Medical History:   Diagnosis Date    ADHD     Allergic rhinitis     Asthma     Back pain     Basal cell carcinoma (BCC)     BMI 31.0-31.9,adult 2022    Chickenpox     Depression     Fracture     Multiple fractures    Influenza     Kidney stone     Nasal drainage     Pneumonia         Family History   Problem Relation Age of Onset    Cancer Mother     Cancer Father     Breast Cancer Sister     Cancer Sister         Breast cancer    Lupus Sister     Leukemia Brother     Colorectal Cancer Neg Hx     Peritoneal Cancer Neg Hx     Tubal Cancer Neg Hx     Ovarian Cancer Neg Hx         Social History     Socioeconomic History    Marital status:      Spouse name: Not on file    Number of children: Not on file    Years of education: Not on file    Highest education level: Bachelor's degree (e.g., BA, AB, BS)   Occupational History    Not on file   Tobacco Use    Smoking status: Never    Smokeless tobacco: Never   Vaping Use    Vaping Use: Never used   Substance and Sexual Activity    Alcohol use: Not Currently     Comment: Occasionally    Drug use: No    Sexual activity: Not on file   Other Topics Concern    Not on file   Social History Narrative    , has grown children. 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     Her parents are .  She had 2 biological daughters 1  in a motor vehicle accident at age 17.  She had 2 brothers.  One is .  1 had bipolar disorder.   Her maternal great grand mother was bipolar.     Social Determinants of Health     Financial Resource Strain: Low Risk     Difficulty of Paying Living Expenses: Not very hard   Food Insecurity: No Food Insecurity    Worried About Running Out of Food in the Last Year: Never true    Ran Out of Food in the Last Year: Never true   Transportation Needs: No Transportation Needs    Lack of Transportation (Medical): No    Lack of Transportation (Non-Medical): No   Physical Activity: Insufficiently Active    Days of Exercise per Week: 1 day    Minutes of Exercise per Session: 10 min   Stress: No Stress Concern Present    Feeling of Stress : Only a little   Social Connections: Socially Integrated    Frequency of Communication with Friends and Family: More than three times a week    Frequency of Social Gatherings with Friends and Family: Twice a week    Attends Hoahaoism Services: 1 to 4 times per year    Active Member of Clubs or Organizations: Yes    Attends Club or Organization Meetings: More than 4 times per year    Marital Status:    Intimate Partner Violence: Not on file   Housing Stability: Unknown    Unable to Pay for Housing in the Last Year: No    Number of Places Lived in the Last Year: Not on file    Unstable Housing in the Last Year: No        Allergies   Allergen Reactions    Bee Venom Anaphylaxis     Will stop heart   Will stop heart     Crab     Magnesium Citrate Palpitations, Myalgia and Unspecified    Shellfish Allergy Unspecified and Anaphylaxis     Heart issues  Heart issues    Shrimp (Diagnostic)     [place] Hives     Increase in heart rate  Increase in heart rate        MEDICATIONS:  Medications relevant to specialty reviewed.    Current Outpatient Medications:     escitalopram (LEXAPRO) 10 MG Tab, Take 1 Tablet by mouth every day for 90 days., Disp: 135 Tablet, Rfl: 0    atomoxetine (STRATTERA) 40 MG capsule, Take 1 Capsule by mouth every day for 90 days., Disp: 90 Capsule, Rfl: 0    albuterol 108  (90 Base) MCG/ACT Aero Soln inhalation aerosol, Inhale 2 Puffs every 6 hours as needed for Shortness of Breath., Disp: 8.5 g, Rfl: 3    omeprazole (PRILOSEC) 20 MG delayed-release capsule, , Disp: , Rfl:     metoprolol tartrate (LOPRESSOR) 25 MG Tab, Take 0.5 Tablets by mouth 2 times a day. (Patient taking differently: Take 12.5 mg by mouth every day.), Disp: 100 Tablet, Rfl: 3    Multiple Vitamin (MULTIVITAMIN ADULT PO), Take  by mouth., Disp: , Rfl:     CALCIUM PO, Take  by mouth., Disp: , Rfl:     VITAMIN D PO, Take  by mouth., Disp: , Rfl:     Ascorbic Acid (VITAMIN C PO), Take  by mouth., Disp: , Rfl:     EPINEPHrine (EPIPEN) 0.3 MG/0.3ML Solution Auto-injector solution for injection, , Disp: , Rfl:     oxybutynin SR (DITROPAN-XL) 10 MG CR tablet, Take 1 Tablet by mouth every day., Disp: 100 Tablet, Rfl: 0     REVIEW OF SYSTEMS:   Positive for skin (see HPI)  Negative for fevers and chills  All other systems reviewed and are negative.     EXAM:      A focused skin exam was performed including the affected areas of the head (including face). Notable findings on exam today listed below and/or in assessment/plan.       1 cm waxy stuck on appearing tan papule that is mildly inflamed to upper mid forehead in hairline    IMPRESSION / PLAN:    1. Neoplasm of uncertain behavior  Procedure Note   Procedure: Biopsy by shave technique  Location: mid upper forehead  Size: as noted in exam  Preoperative diagnosis:SK, ISK vs other  Risks, benefits and alternatives of procedure discussed, verbal consent obtained for photo (see chart) and written informed consent obtained for procedure. Time out completed. Area of biopsy prepped with alcohol. Anesthesia with 1% lidocaine with epinephrine administered with 30 gauge needle. Shave biopsy of the site performed. Hemostasis achieved with pressure and hyfrecator. Vaseline applied to wound with bandage. Patient tolerated procedure well and there were no complications. The specimen was  sent to the pathology lab by the staff. Wound care was discussed.           Return to clinic in: Return for pending Bx results. and as needed for any new or changing skin lesions.

## 2023-01-03 ENCOUNTER — TELEPHONE (OUTPATIENT)
Dept: DERMATOLOGY | Facility: IMAGING CENTER | Age: 73
End: 2023-01-03
Payer: MEDICARE

## 2023-01-16 ENCOUNTER — OFFICE VISIT (OUTPATIENT)
Dept: BEHAVIORAL HEALTH | Facility: CLINIC | Age: 73
End: 2023-01-16
Payer: MEDICARE

## 2023-01-16 DIAGNOSIS — F90.1 ATTENTION DEFICIT HYPERACTIVITY DISORDER (ADHD), PREDOMINANTLY HYPERACTIVE TYPE: ICD-10-CM

## 2023-01-16 DIAGNOSIS — F33.1 MAJOR DEPRESSIVE DISORDER, RECURRENT EPISODE, MODERATE (HCC): ICD-10-CM

## 2023-01-16 DIAGNOSIS — F43.10 POSTTRAUMATIC STRESS DISORDER: ICD-10-CM

## 2023-01-16 PROCEDURE — 99214 OFFICE O/P EST MOD 30 MIN: CPT | Performed by: PSYCHIATRY & NEUROLOGY

## 2023-01-16 RX ORDER — ATOMOXETINE 40 MG/1
40 CAPSULE ORAL DAILY
Qty: 90 CAPSULE | Refills: 0 | Status: SHIPPED | OUTPATIENT
Start: 2023-01-16 | End: 2023-04-07 | Stop reason: SDUPTHER

## 2023-01-16 RX ORDER — BUPROPION HYDROCHLORIDE 100 MG/1
100 TABLET, EXTENDED RELEASE ORAL DAILY
Qty: 90 TABLET | Refills: 0 | Status: SHIPPED
Start: 2023-01-16 | End: 2023-03-22

## 2023-01-16 NOTE — PROGRESS NOTES
[facility]   Follow Up Assessment     This provider informed the patient their medical records are totally confidential except for the use by other providers involved in their care, or if the patient signs a release, or to report instances of child or elder abuse, or if it is determined they are an immediate risk to harm themselves or others.    Name: Denia Deluca  MRN: [id number]  : 1950  Age: 72 y.o.  Date of assessment: /40 mg for now.  PCP: Hoda Sloan M.D.      Subjective:  Chart reviewed prior to seeing her in my office.  She was last seen on .  She would like to taper off Lexapro which does not appear to be opening her depression.  She would like to try Wellbutrin which for other family members has been beneficial for their depressions and could help her ADHD symptoms.  She prefers to continue Strattera 40 mg a.m. for now.  Several relatives are bipolar apparently.    Objective:  She is alert, oriented, and cooperative.  Relatedness is good.  Grooming is good.  Speech is normal rate.  Talkative.  Anxious.  Memory is good.  Insight and judgment are good.  No indication of psychotic thinking.    Current Risk:       Suicidal: Not suicidal       Homicidal: Not homicidal       Self-Harm: No plan to harm self       Relapse: (Low/Moderate/High): Moderate       Crisis Safety Plan Reviewed:    Diagnosis:   Major depressive disorder, recurrent  ADHD  PTSD    Treatment Plan:  The current treatment plan consists of a follow-up visit in 3 weeks and then monthly psychiatric sessions designed to evaluate her depression, ADHD, and PTSD.    Duration will be for a minimum of 12 months and will be reviewed at each visit.    Goals: Remission of depression and movement and ADHD symptoms in order to prevent relapse chronic nature of her behavioral health problems and mental illness.  Decrease Lexapro to 10 mg daily x1 week and then further decrease Lexapro to 5 mg daily for 1 week and  discontinue.  Continue Strattera 40 mg a.m. for now.  Start Wellbutrin  mg a.m. possible side effects discussed.    Ephraim Chavez M.D.      This note was created using voice recognition software (Dragon). The accuracy of the dictation is limited by the abilities of the software. I have reviewed the note prior to signing, however some errors in grammar and context are still possible. If you have any questions related to this note please do not hesitate to contact our office.

## 2023-01-23 PROBLEM — E66.09 CLASS 1 OBESITY DUE TO EXCESS CALORIES WITH BODY MASS INDEX (BMI) OF 33.0 TO 33.9 IN ADULT: Status: ACTIVE | Noted: 2023-01-23

## 2023-01-23 PROBLEM — E66.811 CLASS 1 OBESITY DUE TO EXCESS CALORIES WITH BODY MASS INDEX (BMI) OF 33.0 TO 33.9 IN ADULT: Status: ACTIVE | Noted: 2023-01-23

## 2023-02-03 ENCOUNTER — HOSPITAL ENCOUNTER (OUTPATIENT)
Dept: LAB | Facility: MEDICAL CENTER | Age: 73
End: 2023-02-03
Attending: INTERNAL MEDICINE
Payer: MEDICARE

## 2023-02-03 LAB
BUN SERPL-MCNC: 18 MG/DL (ref 8–22)
CREAT SERPL-MCNC: 0.85 MG/DL (ref 0.5–1.4)
GFR SERPLBLD CREATININE-BSD FMLA CKD-EPI: 73 ML/MIN/1.73 M 2

## 2023-02-03 PROCEDURE — 84520 ASSAY OF UREA NITROGEN: CPT

## 2023-02-03 PROCEDURE — 36415 COLL VENOUS BLD VENIPUNCTURE: CPT

## 2023-02-03 PROCEDURE — 82565 ASSAY OF CREATININE: CPT

## 2023-02-06 ENCOUNTER — OFFICE VISIT (OUTPATIENT)
Dept: BEHAVIORAL HEALTH | Facility: CLINIC | Age: 73
End: 2023-02-06
Payer: MEDICARE

## 2023-02-06 DIAGNOSIS — F33.1 MAJOR DEPRESSIVE DISORDER, RECURRENT EPISODE, MODERATE (HCC): ICD-10-CM

## 2023-02-06 DIAGNOSIS — F90.1 ATTENTION DEFICIT HYPERACTIVITY DISORDER (ADHD), PREDOMINANTLY HYPERACTIVE TYPE: ICD-10-CM

## 2023-02-06 DIAGNOSIS — F43.10 POSTTRAUMATIC STRESS DISORDER: ICD-10-CM

## 2023-02-06 PROCEDURE — 99214 OFFICE O/P EST MOD 30 MIN: CPT | Performed by: PSYCHIATRY & NEUROLOGY

## 2023-02-06 NOTE — PROGRESS NOTES
[facility]   Follow Up Assessment     This provider informed the patient their medical records are totally confidential except for the use by other providers involved in their care, or if the patient signs a release, or to report instances of child or elder abuse, or if it is determined they are an immediate risk to harm themselves or others.    Name: Denia Deluca  MRN: [id number]  : 1950  Age: 72 y.o.  Date of assessment: 2023  PCP: Hoda Sloan M.D.      Subjective:  Chart reviewed prior to seeing her in my office.  She feels stable on her current medication combination and prefers to not make any changes.  We reviewed purposes, doses, etc.    Objective:  He is alert, oriented, and cooperative.  Talkative.  Relatedness is good.  Grooming is good.  Anxious.  Memory is good.  Insight and judgment are good.  No indication of psychotic thinking.    Current Risk:       Suicidal: Not suicidal       Homicidal: Not homicidal       Self-Harm: No plan to harm self       Relapse: (Low/Moderate/High): Moderate       Crisis Safety Plan Reviewed: Discussed with patient    Diagnosis:   Major depressive disorder, recurrent  ADHD  PTSD    Treatment Plan:  The current treatment plan consists of a follow-up visit in 3 weeks and then monthly psychiatric sessions designed to evaluate her depression, ADHD, and PTSD.    Duration will be for a minimum of 12 months and will be reviewed at each visit.    Goals: Remission of depression with improvement in ADHD symptoms in order to prevent relapse due to the chronic nature of her behavioral health problems and mental illness.  Continue Strattera 40 mg a.m. for now.  Continue Wellbutrin  mg a.m.        Ephraim Chavez M.D.      This note was created using voice recognition software (Dragon). The accuracy of the dictation is limited by the abilities of the software. I have reviewed the note prior to signing, however some errors in grammar and context  are still possible. If you have any questions related to this note please do not hesitate to contact our office.

## 2023-02-07 ENCOUNTER — PATIENT MESSAGE (OUTPATIENT)
Dept: MEDICAL GROUP | Facility: MEDICAL CENTER | Age: 73
End: 2023-02-07
Payer: MEDICARE

## 2023-02-08 ENCOUNTER — HOSPITAL ENCOUNTER (OUTPATIENT)
Dept: RADIOLOGY | Facility: MEDICAL CENTER | Age: 73
End: 2023-02-08
Attending: INTERNAL MEDICINE
Payer: MEDICARE

## 2023-02-08 DIAGNOSIS — K86.89 PANCREATIC FISTULA: ICD-10-CM

## 2023-02-08 PROCEDURE — 74170 CT ABD WO CNTRST FLWD CNTRST: CPT

## 2023-02-08 PROCEDURE — 700117 HCHG RX CONTRAST REV CODE 255: Performed by: INTERNAL MEDICINE

## 2023-02-08 RX ADMIN — IOHEXOL 100 ML: 350 INJECTION, SOLUTION INTRAVENOUS at 15:26

## 2023-02-16 DIAGNOSIS — R00.2 PALPITATIONS: ICD-10-CM

## 2023-02-16 DIAGNOSIS — R00.0 TACHYCARDIA: ICD-10-CM

## 2023-02-16 RX ORDER — OMEPRAZOLE 20 MG/1
20 CAPSULE, DELAYED RELEASE ORAL 2 TIMES DAILY
Qty: 200 CAPSULE | Refills: 3 | Status: SHIPPED | OUTPATIENT
Start: 2023-02-16 | End: 2024-01-04 | Stop reason: SDUPTHER

## 2023-02-16 NOTE — TELEPHONE ENCOUNTER
Received request via: Patient    Was the patient seen in the last year in this department? Yes    Does the patient have an active prescription (recently filled or refills available) for medication(s) requested? No    Does the patient have skilled nursing Plus and need 100 day supply (blood pressure, diabetes and cholesterol meds only)? Yes, quantity updated to 100 days

## 2023-02-17 ENCOUNTER — OFFICE VISIT (OUTPATIENT)
Dept: MEDICAL GROUP | Facility: MEDICAL CENTER | Age: 73
End: 2023-02-17
Payer: MEDICARE

## 2023-02-17 VITALS
OXYGEN SATURATION: 96 % | TEMPERATURE: 97.1 F | SYSTOLIC BLOOD PRESSURE: 120 MMHG | HEART RATE: 98 BPM | WEIGHT: 182.98 LBS | BODY MASS INDEX: 32.42 KG/M2 | HEIGHT: 63 IN | RESPIRATION RATE: 17 BRPM | DIASTOLIC BLOOD PRESSURE: 70 MMHG

## 2023-02-17 DIAGNOSIS — Z00.00 ANNUAL PHYSICAL EXAM: ICD-10-CM

## 2023-02-17 DIAGNOSIS — E78.00 PURE HYPERCHOLESTEROLEMIA: ICD-10-CM

## 2023-02-17 DIAGNOSIS — K59.01 SLOW TRANSIT CONSTIPATION: ICD-10-CM

## 2023-02-17 PROBLEM — R42 DIZZY: Status: RESOLVED | Noted: 2021-04-19 | Resolved: 2023-02-17

## 2023-02-17 PROBLEM — K30 INDIGESTION: Status: RESOLVED | Noted: 2021-04-19 | Resolved: 2023-02-17

## 2023-02-17 PROCEDURE — 99397 PER PM REEVAL EST PAT 65+ YR: CPT | Performed by: STUDENT IN AN ORGANIZED HEALTH CARE EDUCATION/TRAINING PROGRAM

## 2023-02-17 PROCEDURE — 99214 OFFICE O/P EST MOD 30 MIN: CPT | Mod: 25 | Performed by: STUDENT IN AN ORGANIZED HEALTH CARE EDUCATION/TRAINING PROGRAM

## 2023-02-17 ASSESSMENT — FIBROSIS 4 INDEX: FIB4 SCORE: 1.28

## 2023-02-17 NOTE — PROGRESS NOTES
Subjective:     CC:   Chief Complaint   Patient presents with    Annual Exam         HPI:   Denia Deluca is a 72 y.o. female who presents for annual exam and to discuss the following.    Problem   Slow Transit Constipation    This is a new problem.  She has had constipation for about the last week.  She states that is secondary to visiting her daughter who eats mostly processed food and does not exercise.  She has tried MiraLAX daily as well as a over-the-counter stool softener without much relief.  She tried an enema this morning and got a couple hard bowel movements out.  She still feels bloated and without an appetite.  She denies any abdominal pain.  She takes a daily fiber supplement.     Pure Hypercholesterolemia    This is a chronic uncontrolled problem. The 10-year ASCVD risk score (Sonya DK, et al., 2019) is: 10.6%    Lab Results   Component Value Date/Time    CHOLSTRLTOT 217 (H) 11/15/2022 08:17 AM     (H) 11/15/2022 08:17 AM    HDL 56 11/15/2022 08:17 AM    TRIGLYCERIDE 92 11/15/2022 08:17 AM            Indigestion (Resolved)    Onset around 1/21, notes she was much less active over the winter, has gained around 10lb,  sxs worse after eating sweets and drinking soda. Sometimes triggered by bending over. On occasion won't eat anything and might vomit, eg q 6 wks. Takes rolaid prn and it works well. pepcid has significantly helped her sxs.       Dizzy (Resolved)    Dizziness has mostly resolved, only occurs rarely when stands quickly after bending over in her garden.         Health Maintenance  Osteoporosis Screen/ DEXA: Not due   PT/vit D for falls prevention: Has PT   Cholesterol Screening: History of hyperlipidemia, not treated   Diabetes Screening: Completed   Aspirin Use: NA    Diet: Eat healthy, low in red meat   Exercise: Active   Substance Abuse: NA     Cancer screening  Colorectal Cancer Screening: Up to date    Lung Cancer Screening: NA    Cervical Cancer Screening: Aged out    Breast Cancer Screening: Up to date     Infectious disease screening/Immunizations  --Hepatitis C Screening: Completed   --Immunizations: Up to date      She  has a past medical history of ADHD, Allergic rhinitis, Asthma, Back pain, Basal cell carcinoma (BCC), BMI 31.0-31.9,adult (2022), Chickenpox, Depression, Fracture, Influenza, Kidney stone, Nasal drainage, and Pneumonia.  She  has a past surgical history that includes open reduction (Right); abdominal hysterectomy total; carpal tunnel release; pr remv 2nd cataract,corn-scler sectn; and hysterectomy laparoscopy.    Family History   Problem Relation Age of Onset    Cancer Mother     Cancer Father     Breast Cancer Sister     Cancer Sister         Breast cancer    Lupus Sister     Leukemia Brother     Colorectal Cancer Neg Hx     Peritoneal Cancer Neg Hx     Tubal Cancer Neg Hx     Ovarian Cancer Neg Hx        Social History     Socioeconomic History    Marital status:      Spouse name: Not on file    Number of children: Not on file    Years of education: Not on file    Highest education level: Bachelor's degree (e.g., BA, AB, BS)   Occupational History    Not on file   Tobacco Use    Smoking status: Never    Smokeless tobacco: Never   Vaping Use    Vaping Use: Never used   Substance and Sexual Activity    Alcohol use: Not Currently     Comment: Occasionally    Drug use: No    Sexual activity: Not on file   Other Topics Concern    Not on file   Social History Narrative    , has grown children. She is retired, had worked for a [employer] company in the past then as .     Her parents are .  She had 2 biological daughters 1  in a motor vehicle accident at age 17.  She had 2 brothers.  One is .  1 had bipolar disorder.  Her maternal great grand mother was bipolar.     Social Determinants of Health     Financial Resource Strain: Low Risk     Difficulty of Paying Living Expenses: Not very hard   Food Insecurity: No Food  Insecurity    Worried About Running Out of Food in the Last Year: Never true    Ran Out of Food in the Last Year: Never true   Transportation Needs: No Transportation Needs    Lack of Transportation (Medical): No    Lack of Transportation (Non-Medical): No   Physical Activity: Insufficiently Active    Days of Exercise per Week: 1 day    Minutes of Exercise per Session: 10 min   Stress: No Stress Concern Present    Feeling of Stress : Only a little   Social Connections: Socially Integrated    Frequency of Communication with Friends and Family: More than three times a week    Frequency of Social Gatherings with Friends and Family: Twice a week    Attends Congregational Services: 1 to 4 times per year    Active Member of Clubs or Organizations: Yes    Attends Club or Organization Meetings: More than 4 times per year    Marital Status:    Intimate Partner Violence: Not on file   Housing Stability: Unknown    Unable to Pay for Housing in the Last Year: No    Number of Places Lived in the Last Year: Not on file    Unstable Housing in the Last Year: No       Patient Active Problem List    Diagnosis Date Noted    Slow transit constipation 02/17/2023    Class 1 obesity due to excess calories with body mass index (BMI) of 33.0 to 33.9 in adult 01/23/2023    Major depressive disorder, recurrent episode, moderate (HCC) 10/31/2022    Abnormal bowel habits 08/22/2022    BMI 33.0-33.9,adult 01/19/2022    Dilated pancreatic duct 06/24/2021    Hiatal hernia 06/24/2021    Elevated alkaline phosphatase level 06/10/2021    Aortic valve sclerosis 04/19/2021    NSVT (nonsustained ventricular tachycardia) 04/19/2021    Recurrent major depressive disorder, in full remission (HCC) 03/02/2020    Posttraumatic stress disorder 11/04/2019    Epiretinal membrane (ERM) of left eye 05/10/2019    Obstructive sleep apnea 05/09/2019    Pure hypercholesterolemia 11/09/2018    Attention deficit hyperactivity disorder (ADHD), predominantly hyperactive  "type 05/11/2018    Bee allergy status 05/11/2018    Mixed stress and urge urinary incontinence 05/11/2018    Mild intermittent asthma without complication 05/11/2018         Current Outpatient Medications   Medication Sig Dispense Refill    metoprolol tartrate (LOPRESSOR) 25 MG Tab Take 0.5 Tablets by mouth 2 times a day. 100 Tablet 3    omeprazole (PRILOSEC) 20 MG delayed-release capsule Take 1 Capsule by mouth 2 times a day. 200 Capsule 3    atomoxetine (STRATTERA) 40 MG capsule Take 1 Capsule by mouth every day for 90 days. 90 Capsule 0    buPROPion SR (WELLBUTRIN SR) 100 MG TABLET SR 12 HR Take 1 Tablet by mouth every day for 90 days. 90 Tablet 0    oxybutynin SR (DITROPAN-XL) 10 MG CR tablet Take 1 Tablet by mouth every day. 100 Tablet 0    albuterol 108 (90 Base) MCG/ACT Aero Soln inhalation aerosol Inhale 2 Puffs every 6 hours as needed for Shortness of Breath. 8.5 g 3    Multiple Vitamin (MULTIVITAMIN ADULT PO) Take  by mouth.      CALCIUM PO Take  by mouth.      VITAMIN D PO Take  by mouth.      Ascorbic Acid (VITAMIN C PO) Take  by mouth.      EPINEPHrine (EPIPEN) 0.3 MG/0.3ML Solution Auto-injector solution for injection        No current facility-administered medications for this visit.     Allergies   Allergen Reactions    Bee Venom Anaphylaxis     Will stop heart   Will stop heart     Crab     Magnesium Citrate Palpitations, Myalgia and Unspecified    Shellfish Allergy Unspecified and Anaphylaxis     Heart issues  Heart issues    Shrimp (Diagnostic)     Ogden Hives     Increase in heart rate  Increase in heart rate     Objective:     /70 (BP Location: Left arm, Patient Position: Sitting, BP Cuff Size: Adult)   Pulse 98   Temp 36.2 °C (97.1 °F) (Temporal)   Resp 17   Ht 1.6 m (5' 3\")   Wt 83 kg (182 lb 15.7 oz)   SpO2 96%   BMI 32.41 kg/m²   Body mass index is 32.41 kg/m².  Wt Readings from Last 4 Encounters:   02/17/23 83 kg (182 lb 15.7 oz)   01/23/23 84.4 kg (186 lb)   12/20/22 84 kg " (185 lb 3 oz)   10/19/22 83.9 kg (185 lb)       Physical Exam:  Constitutional: Well-developed and well-nourished. Not diaphoretic. No distress.   Skin: Skin is warm and dry. No rash noted.  Head: Atraumatic without lesions.  Eyes: Conjunctivae and extraocular motions are normal. Pupils are equal, round, and reactive to light. No scleral icterus.   Ears:  External ears unremarkable. Tympanic membranes clear and intact.  Nose: Nares patent. Septum midline. Turbinates without erythema nor edema. No discharge.   Mouth/Throat: Dentition is normal. Tongue normal. Oropharynx is clear and moist. Posterior pharynx without erythema or exudates.  Neck: Supple, trachea midline. Normal range of motion. No thyromegaly present. No lymphadenopathy--cervical or supraclavicular.  Cardiovascular: Regular rate and rhythm, S1 and S2 without murmur, rubs, or gallops.  Lungs: Normal inspiratory effort, CTA bilaterally, no wheezes/rhonchi/rales  Abdomen: Soft, non tender, and without distention. Active bowel sounds in all four quadrants. No rebound, guarding, masses or HSM.  Extremities: No cyanosis, clubbing, erythema, nor edema. Distal pulses intact and symmetric.   Musculoskeletal: All major joints AROM full in all directions without pain.  Neurological: Alert and oriented x 3.  No cranial nerve deficit. 5/5 myotomes. Sensation intact.   Psychiatric:  Behavior, mood, and affect are appropriate.      Assessment and Plan:     1. Annual physical exam  Discussed routine screening and vaccinations, discussed healthy diet and exercise, return in 1 year for annual    2. Slow transit constipation  This is a new problem.  Advised patient to increase MiraLAX to twice daily.  Advised patient to increase fiber supplementation to twice daily.  Advised patient to use enemas once or twice daily until stool is soft.  If she is not able to have a bowel movement by Monday she needs to seek care.    3. Pure hypercholesterolemia  This is a chronic  uncontrolled condition.  We did discuss her ASCVD risk and that really she should be on a statin.  She does not want to start one at this time and understands that that may mean she is at increased risk of heart attack or stroke.  She would like to recheck her labs in 6 months and is open to starting a medication at that time if her cholesterol is still elevated.  - Lipid Profile; Future    Please note that this dictation was created using voice recognition software. I have made every reasonable attempt to correct obvious errors, but I expect that there are errors of grammar and possibly content that I did not discover before finalizing the note.      Follow-up: Return in about 6 months (around 8/17/2023) for HLD.

## 2023-02-23 ENCOUNTER — PATIENT MESSAGE (OUTPATIENT)
Dept: MEDICAL GROUP | Facility: MEDICAL CENTER | Age: 73
End: 2023-02-23
Payer: MEDICARE

## 2023-02-27 ENCOUNTER — OFFICE VISIT (OUTPATIENT)
Dept: BEHAVIORAL HEALTH | Facility: CLINIC | Age: 73
End: 2023-02-27
Payer: MEDICARE

## 2023-02-27 DIAGNOSIS — F33.1 MAJOR DEPRESSIVE DISORDER, RECURRENT EPISODE, MODERATE (HCC): ICD-10-CM

## 2023-02-27 DIAGNOSIS — F43.10 POSTTRAUMATIC STRESS DISORDER: ICD-10-CM

## 2023-02-27 DIAGNOSIS — F90.1 ATTENTION DEFICIT HYPERACTIVITY DISORDER (ADHD), PREDOMINANTLY HYPERACTIVE TYPE: ICD-10-CM

## 2023-02-27 PROCEDURE — 99214 OFFICE O/P EST MOD 30 MIN: CPT | Performed by: PSYCHIATRY & NEUROLOGY

## 2023-02-27 RX ORDER — ATOMOXETINE 40 MG/1
40 CAPSULE ORAL DAILY
Qty: 90 CAPSULE | Refills: 0 | Status: CANCELLED | OUTPATIENT
Start: 2023-02-27 | End: 2023-05-28

## 2023-02-27 RX ORDER — BUPROPION HYDROCHLORIDE 100 MG/1
100 TABLET, EXTENDED RELEASE ORAL DAILY
Qty: 90 TABLET | Refills: 0 | Status: CANCELLED | OUTPATIENT
Start: 2023-02-27 | End: 2023-05-28

## 2023-02-27 NOTE — PROGRESS NOTES
[facility]   Follow Up Assessment     This provider informed the patient their medical records are totally confidential except for the use by other providers involved in their care, or if the patient signs a release, or to report instances of child or elder abuse, or if it is determined they are an immediate risk to harm themselves or others.    Name: Denia Deluca  MRN: [id number]  : 1950  Age: 72 y.o.  Date of assessment: 2023  PCP: Hoda Sloan M.D.      Subjective:  Chart reviewed prior to seeing her in my office.  She was last seen on .  We reviewed her current medications, purposes, doses, etc.  She prefers to not make any changes in her medications at this time.  Talked about various family members including her 68-year-old sister who sustained a severe head injury when she was 5 years old.    Objective:  He is alert, oriented, and cooperative.  Relatedness is good.  Talkative.  Grooming is good.  Speech is a little rapid.  Anxious.  Memory is good.  Insight and judgment are good.  No indication of psychotic thinking.    Current Risk:       Suicidal: Not suicidal       Homicidal: Not homicidal       Self-Harm: No plan to harm self       Relapse: (Low/Moderate/High): Moderate       Crisis Safety Plan Reviewed: Discussed with patient    Diagnosis:   Major depressive disorder, recurrent  ADHD   PTSD      Treatment Plan:  The current treatment plan consists of monthly psychiatric sessions designed to evaluate her depression, ADHD, and PTSD.    Duration will be for a minimum of 12 months and will be reviewed at each visit.    Goals: Remission of depression with improvement in ADHD symptoms and control of PTSD symptoms in order to prevent relapse due to the chronic nature of her behavioral health problems and mental illness.  Continue Strattera 40 mg a.m. for now.  Continue Wellbutrin  mg a.m.  She has supplies until .    Ephraim Chavez M.D.      This  note was created using voice recognition software (Dragon). The accuracy of the dictation is limited by the abilities of the software. I have reviewed the note prior to signing, however some errors in grammar and context are still possible. If you have any questions related to this note please do not hesitate to contact our office.

## 2023-03-13 ENCOUNTER — OFFICE VISIT (OUTPATIENT)
Dept: MEDICAL GROUP | Facility: MEDICAL CENTER | Age: 73
End: 2023-03-13
Payer: MEDICARE

## 2023-03-13 VITALS
TEMPERATURE: 97.2 F | OXYGEN SATURATION: 95 % | WEIGHT: 186.95 LBS | HEIGHT: 63 IN | DIASTOLIC BLOOD PRESSURE: 70 MMHG | BODY MASS INDEX: 33.12 KG/M2 | SYSTOLIC BLOOD PRESSURE: 110 MMHG | HEART RATE: 110 BPM

## 2023-03-13 DIAGNOSIS — R19.8 ABNORMAL BOWEL HABITS: ICD-10-CM

## 2023-03-13 DIAGNOSIS — R00.0 TACHYCARDIA: ICD-10-CM

## 2023-03-13 PROCEDURE — 99214 OFFICE O/P EST MOD 30 MIN: CPT | Performed by: STUDENT IN AN ORGANIZED HEALTH CARE EDUCATION/TRAINING PROGRAM

## 2023-03-13 ASSESSMENT — FIBROSIS 4 INDEX: FIB4 SCORE: 1.28

## 2023-03-13 NOTE — PROGRESS NOTES
"Subjective:     CC: Abnormal bowel movements, tachycardia    HPI:   Denia presents today with    Problem   Abnormal Bowel Habits    This is a chronic condition.  She has had abnormal bowel movements alternate between constipation and diarrhea for years.  She has been trying MiraLAX and fiber supplements to regulate this without any improvement.  She has also followed with GI who cannot do colonoscopy due to her having a tortuous colon.  They stated that she needs to have his regulated oral she may be in jeopardy of losing part of her colon.  She is requesting referral for second opinion at a different GI office.     Tachycardia    This is a chronic condition, worsening over the last 6 months.  She has been on metoprolol 12.5 mg twice daily in the past, in the last 6 months she has decreased to 12.5 mg daily at the request of cardiology.  Per patient the cardiologist said that if she is feeling palpitations she can go back to taking 12.5 mg a day.  Over the last several months she has noticed that her resting heart rate is in the low 100s, today it is 105.         Health Maintenance: Completed    ROS:  ROS    Objective:     Exam:  /70 (BP Location: Left arm, Patient Position: Sitting, BP Cuff Size: Adult)   Pulse (!) 110   Temp 36.2 °C (97.2 °F) (Temporal)   Ht 1.6 m (5' 3\")   Wt 84.8 kg (186 lb 15.2 oz)   SpO2 95%   BMI 33.12 kg/m²  Body mass index is 33.12 kg/m².    Physical Exam  Vitals reviewed.   Constitutional:       General: She is not in acute distress.     Appearance: She is not toxic-appearing.   HENT:      Head: Normocephalic and atraumatic.      Right Ear: External ear normal.      Left Ear: External ear normal.   Eyes:      General:         Right eye: No discharge.         Left eye: No discharge.      Extraocular Movements: Extraocular movements intact.      Conjunctiva/sclera: Conjunctivae normal.   Pulmonary:      Effort: Pulmonary effort is normal. No respiratory distress.   Skin:     " General: Skin is warm and dry.   Neurological:      Mental Status: She is alert.   Psychiatric:         Mood and Affect: Mood normal.         Behavior: Behavior normal.         Thought Content: Thought content normal.         Judgment: Judgment normal.         Assessment & Plan:     72 y.o. female with the following -     1. Abnormal bowel habits  Discussed that using bowel regimens that have fiber and MiraLAX can be very person specific, so she does need to experiment a little to see what were for her.  Per patient she has not had any stool testing or blood work to figure out why she is having abnormal bowel movements.  I am not sure if this is accurate given that she does follow-up with GI, but I do not see any in her chart so the labs below have been ordered.  I have also referred her to a GI doctor in [place] for second opinion.  - Referral to Gastroenterology  - CULTURE STOOL; Future  - FECAL LACTOFERRIN QUALITATIVE; Future  - Complete O&P; Future  - CALPROTECTIN,FECAL; Future  - Sed Rate; Future  - CRP QUANTITIVE (NON-CARDIAC); Future  - Comp Metabolic Panel; Future  - CBC WITH DIFFERENTIAL; Future  - CELIAC DISEASE AB PANEL; Future  - OCCULT BLOOD FECES IMMUNOASSAY; Future    2. Tachycardia  Tachycardia to the 100s.  Patient is not symptomatic.  Discussed that she can talk to her cardiologist and go back to taking her metoprolol twice a day to see if this resolves her symptoms.      No follow-ups on file.    Please note that this dictation was created using voice recognition software. I have made every reasonable attempt to correct obvious errors, but I expect that there are errors of grammar and possibly content that I did not discover before finalizing the note.

## 2023-03-14 ENCOUNTER — HOSPITAL ENCOUNTER (OUTPATIENT)
Dept: LAB | Facility: MEDICAL CENTER | Age: 73
End: 2023-03-14
Attending: STUDENT IN AN ORGANIZED HEALTH CARE EDUCATION/TRAINING PROGRAM
Payer: MEDICARE

## 2023-03-14 DIAGNOSIS — R19.8 ABNORMAL BOWEL HABITS: ICD-10-CM

## 2023-03-14 LAB
ALBUMIN SERPL BCP-MCNC: 4.2 G/DL (ref 3.2–4.9)
ALBUMIN/GLOB SERPL: 1.7 G/DL
ALP SERPL-CCNC: 147 U/L (ref 30–99)
ALT SERPL-CCNC: 15 U/L (ref 2–50)
ANION GAP SERPL CALC-SCNC: 9 MMOL/L (ref 7–16)
AST SERPL-CCNC: 17 U/L (ref 12–45)
BASOPHILS # BLD AUTO: 0.6 % (ref 0–1.8)
BASOPHILS # BLD: 0.03 K/UL (ref 0–0.12)
BILIRUB SERPL-MCNC: 0.3 MG/DL (ref 0.1–1.5)
BUN SERPL-MCNC: 20 MG/DL (ref 8–22)
CALCIUM ALBUM COR SERPL-MCNC: 9.6 MG/DL (ref 8.5–10.5)
CALCIUM SERPL-MCNC: 9.8 MG/DL (ref 8.5–10.5)
CHLORIDE SERPL-SCNC: 103 MMOL/L (ref 96–112)
CO2 SERPL-SCNC: 28 MMOL/L (ref 20–33)
CREAT SERPL-MCNC: 0.99 MG/DL (ref 0.5–1.4)
CRP SERPL HS-MCNC: <0.3 MG/DL (ref 0–0.75)
EOSINOPHIL # BLD AUTO: 0.16 K/UL (ref 0–0.51)
EOSINOPHIL NFR BLD: 3 % (ref 0–6.9)
ERYTHROCYTE [DISTWIDTH] IN BLOOD BY AUTOMATED COUNT: 47 FL (ref 35.9–50)
ERYTHROCYTE [SEDIMENTATION RATE] IN BLOOD BY WESTERGREN METHOD: 9 MM/HOUR (ref 0–25)
GFR SERPLBLD CREATININE-BSD FMLA CKD-EPI: 60 ML/MIN/1.73 M 2
GLOBULIN SER CALC-MCNC: 2.5 G/DL (ref 1.9–3.5)
GLUCOSE SERPL-MCNC: 92 MG/DL (ref 65–99)
HCT VFR BLD AUTO: 43.8 % (ref 37–47)
HGB BLD-MCNC: 13.8 G/DL (ref 12–16)
IMM GRANULOCYTES # BLD AUTO: 0.01 K/UL (ref 0–0.11)
IMM GRANULOCYTES NFR BLD AUTO: 0.2 % (ref 0–0.9)
LYMPHOCYTES # BLD AUTO: 1.79 K/UL (ref 1–4.8)
LYMPHOCYTES NFR BLD: 33.3 % (ref 22–41)
MCH RBC QN AUTO: 30.1 PG (ref 27–33)
MCHC RBC AUTO-ENTMCNC: 31.5 G/DL (ref 33.6–35)
MCV RBC AUTO: 95.6 FL (ref 81.4–97.8)
MONOCYTES # BLD AUTO: 0.51 K/UL (ref 0–0.85)
MONOCYTES NFR BLD AUTO: 9.5 % (ref 0–13.4)
NEUTROPHILS # BLD AUTO: 2.87 K/UL (ref 2–7.15)
NEUTROPHILS NFR BLD: 53.4 % (ref 44–72)
NRBC # BLD AUTO: 0 K/UL
NRBC BLD-RTO: 0 /100 WBC
PLATELET # BLD AUTO: 255 K/UL (ref 164–446)
PMV BLD AUTO: 9.8 FL (ref 9–12.9)
POTASSIUM SERPL-SCNC: 3.6 MMOL/L (ref 3.6–5.5)
PROT SERPL-MCNC: 6.7 G/DL (ref 6–8.2)
RBC # BLD AUTO: 4.58 M/UL (ref 4.2–5.4)
SODIUM SERPL-SCNC: 140 MMOL/L (ref 135–145)
WBC # BLD AUTO: 5.4 K/UL (ref 4.8–10.8)

## 2023-03-14 PROCEDURE — 36415 COLL VENOUS BLD VENIPUNCTURE: CPT

## 2023-03-14 PROCEDURE — 82784 ASSAY IGA/IGD/IGG/IGM EACH: CPT

## 2023-03-14 PROCEDURE — 85652 RBC SED RATE AUTOMATED: CPT

## 2023-03-14 PROCEDURE — 80053 COMPREHEN METABOLIC PANEL: CPT

## 2023-03-14 PROCEDURE — 86140 C-REACTIVE PROTEIN: CPT

## 2023-03-14 PROCEDURE — 85025 COMPLETE CBC W/AUTO DIFF WBC: CPT

## 2023-03-14 PROCEDURE — 86364 TISS TRNSGLTMNASE EA IG CLAS: CPT

## 2023-03-16 LAB
IGA SERPL-MCNC: 165 MG/DL (ref 68–408)
TTG IGA SER IA-ACNC: <2 U/ML (ref 0–3)

## 2023-03-17 ENCOUNTER — HOSPITAL ENCOUNTER (OUTPATIENT)
Facility: MEDICAL CENTER | Age: 73
End: 2023-03-17
Attending: STUDENT IN AN ORGANIZED HEALTH CARE EDUCATION/TRAINING PROGRAM
Payer: MEDICARE

## 2023-03-17 DIAGNOSIS — R19.8 ABNORMAL BOWEL HABITS: ICD-10-CM

## 2023-03-17 LAB
G LAMBLIA+C PARVUM AG STL QL RAPID: NORMAL
LACTOFERRIN STL QL IA: NEGATIVE
SIGNIFICANT IND 70042: NORMAL
SITE SITE: NORMAL
SOURCE SOURCE: NORMAL

## 2023-03-17 PROCEDURE — 87328 CRYPTOSPORIDIUM AG IA: CPT

## 2023-03-17 PROCEDURE — 87899 AGENT NOS ASSAY W/OPTIC: CPT | Mod: 91

## 2023-03-17 PROCEDURE — 83993 ASSAY FOR CALPROTECTIN FECAL: CPT

## 2023-03-17 PROCEDURE — 87045 FECES CULTURE AEROBIC BACT: CPT

## 2023-03-17 PROCEDURE — 83630 LACTOFERRIN FECAL (QUAL): CPT

## 2023-03-17 PROCEDURE — 87329 GIARDIA AG IA: CPT

## 2023-03-17 PROCEDURE — 82274 ASSAY TEST FOR BLOOD FECAL: CPT

## 2023-03-18 DIAGNOSIS — R19.8 ABNORMAL BOWEL HABITS: ICD-10-CM

## 2023-03-18 LAB
AMBIGUOUS SPECIMEN AMBIS: NORMAL
E COLI SXT1+2 STL IA: NORMAL
SIGNIFICANT IND 70042: NORMAL
SITE SITE: NORMAL
SOURCE SOURCE: NORMAL

## 2023-03-19 LAB
BACTERIA STL CULT: ABNORMAL
C JEJUNI+C COLI AG STL QL: ABNORMAL
E COLI SXT1+2 STL IA: ABNORMAL
SIGNIFICANT IND 70042: ABNORMAL
SITE SITE: ABNORMAL
SOURCE SOURCE: ABNORMAL

## 2023-03-20 ENCOUNTER — PATIENT MESSAGE (OUTPATIENT)
Dept: MEDICAL GROUP | Facility: MEDICAL CENTER | Age: 73
End: 2023-03-20
Payer: MEDICARE

## 2023-03-20 DIAGNOSIS — A04.5 CAMPYLOBACTER DIARRHEA: ICD-10-CM

## 2023-03-20 LAB — IMM ASSAY OCC BLD FITOB: NEGATIVE

## 2023-03-20 RX ORDER — AZITHROMYCIN 500 MG/1
500 TABLET, FILM COATED ORAL DAILY
Qty: 3 TABLET | Refills: 0 | Status: SHIPPED | OUTPATIENT
Start: 2023-03-20 | End: 2023-03-23

## 2023-03-21 ENCOUNTER — OFFICE VISIT (OUTPATIENT)
Dept: BEHAVIORAL HEALTH | Facility: CLINIC | Age: 73
End: 2023-03-21
Payer: MEDICARE

## 2023-03-21 DIAGNOSIS — F90.1 ATTENTION DEFICIT HYPERACTIVITY DISORDER (ADHD), PREDOMINANTLY HYPERACTIVE TYPE: ICD-10-CM

## 2023-03-21 DIAGNOSIS — F33.1 MAJOR DEPRESSIVE DISORDER, RECURRENT EPISODE, MODERATE (HCC): ICD-10-CM

## 2023-03-21 DIAGNOSIS — F43.10 POSTTRAUMATIC STRESS DISORDER: ICD-10-CM

## 2023-03-21 LAB — CALPROTECTIN STL-MCNT: 128 UG/G

## 2023-03-21 PROCEDURE — 99214 OFFICE O/P EST MOD 30 MIN: CPT | Performed by: PSYCHIATRY & NEUROLOGY

## 2023-03-21 NOTE — PROGRESS NOTES
[facility]   Follow Up Assessment     This provider informed the patient their medical records are totally confidential except for the use by other providers involved in their care, or if the patient signs a release, or to report instances of child or elder abuse, or if it is determined they are an immediate risk to harm themselves or others.    Name: Denia Deluca  MRN: [id number]  : 1950  Age: 72 y.o.  Date of assessment: 3/21/2023  PCP: Hoda Sloan M.D.      Subjective:  Chart reviewed prior to seeing her in my office.  She was last seen on .  We reviewed her medications, purposes, doses, etc.  She would like to increase Wellbutrin to 150 mg a.m.  Strattera 40 mg a.m. is helping her ADHD symptoms and she prefers to stay at that dose for now.  She is stressed about having there bathroom remodeled.  She and her  plan to travel to [place] [facility] soon.    Objective:  She is alert, oriented, and cooperative.  Relatedness is good.  Grooming is good.  Speech is normal rate.  Anxious.  Memory is good.  Insight and judgment are good.  No indication of psychotic thinking.    Current Risk:       Suicidal: Not suicidal       Homicidal: Not homicidal       Self-Harm: No plan to harm self       Relapse: (Low/Moderate/High): Moderate       Crisis Safety Plan Reviewed: Discussed with patient    Diagnosis:   Major depressive disorder, recurrent  ADHD  PTSD    Treatment Plan:  The current treatment plan consists of a follow-up visit in 6 weeks and then quarterly psychiatric sessions designed to evaluate her depression, ADHD, and PTSD.    Duration will be for a minimum of 12 months and will be reviewed at each visit.    Goals: Remission of depression with improvement in ADHD symptoms and control of PTSD symptoms in order to prevent relapse due to the chronic nature of her behavioral health problems and mental illness.  Increase Wellbutrin to 150 mg a.m.  Continue Strattera 40  mg hayes for now.    Ephraim Chavez M.D.      This note was created using voice recognition software (Dragon). The accuracy of the dictation is limited by the abilities of the software. I have reviewed the note prior to signing, however some errors in grammar and context are still possible. If you have any questions related to this note please do not hesitate to contact our office.

## 2023-03-22 RX ORDER — BUPROPION HYDROCHLORIDE 150 MG/1
150 TABLET, EXTENDED RELEASE ORAL DAILY
Qty: 90 TABLET | Refills: 0 | Status: SHIPPED | OUTPATIENT
Start: 2023-03-22 | End: 2023-05-02 | Stop reason: SDUPTHER

## 2023-03-23 RX ORDER — OXYBUTYNIN CHLORIDE 10 MG/1
TABLET, EXTENDED RELEASE ORAL
Qty: 100 TABLET | Refills: 0 | Status: SHIPPED | OUTPATIENT
Start: 2023-03-23 | End: 2023-06-20 | Stop reason: SDUPTHER

## 2023-04-02 ENCOUNTER — PATIENT MESSAGE (OUTPATIENT)
Dept: MEDICAL GROUP | Facility: MEDICAL CENTER | Age: 73
End: 2023-04-02
Payer: MEDICARE

## 2023-04-06 ENCOUNTER — PATIENT MESSAGE (OUTPATIENT)
Dept: BEHAVIORAL HEALTH | Facility: CLINIC | Age: 73
End: 2023-04-06
Payer: MEDICARE

## 2023-04-06 ENCOUNTER — PATIENT MESSAGE (OUTPATIENT)
Dept: CARDIOLOGY | Facility: MEDICAL CENTER | Age: 73
End: 2023-04-06
Payer: MEDICARE

## 2023-04-06 DIAGNOSIS — R00.0 TACHYCARDIA: ICD-10-CM

## 2023-04-06 DIAGNOSIS — R00.2 PALPITATIONS: ICD-10-CM

## 2023-04-07 RX ORDER — ATOMOXETINE 40 MG/1
40 CAPSULE ORAL DAILY
Qty: 90 CAPSULE | Refills: 0 | Status: SHIPPED | OUTPATIENT
Start: 2023-04-07 | End: 2023-05-02 | Stop reason: SDUPTHER

## 2023-04-07 NOTE — PATIENT COMMUNICATION
Received request via: Patient    Was the patient seen in the last year in this department? Yes    Does the patient have an active prescription (recently filled or refills available) for medication(s) requested? No    Does the patient have [facility] Plus and need 100 day supply (blood pressure, diabetes and cholesterol meds only)? Medication is not for cholesterol, blood pressure or diabetes and Patient does not have SCP

## 2023-04-07 NOTE — TELEPHONE ENCOUNTER
Okay to go back on metoprolol 12.5 mg twice daily.  Can you please send a prescription to [facility]?  Thanks.

## 2023-04-11 ENCOUNTER — PATIENT MESSAGE (OUTPATIENT)
Dept: MEDICAL GROUP | Facility: MEDICAL CENTER | Age: 73
End: 2023-04-11
Payer: MEDICARE

## 2023-04-11 DIAGNOSIS — A04.5 CAMPYLOBACTER DIARRHEA: ICD-10-CM

## 2023-04-27 ENCOUNTER — HOSPITAL ENCOUNTER (OUTPATIENT)
Facility: MEDICAL CENTER | Age: 73
End: 2023-04-27
Attending: STUDENT IN AN ORGANIZED HEALTH CARE EDUCATION/TRAINING PROGRAM
Payer: MEDICARE

## 2023-04-27 DIAGNOSIS — A04.5 CAMPYLOBACTER DIARRHEA: ICD-10-CM

## 2023-04-27 PROCEDURE — 83993 ASSAY FOR CALPROTECTIN FECAL: CPT

## 2023-04-27 PROCEDURE — 87899 AGENT NOS ASSAY W/OPTIC: CPT | Mod: 91

## 2023-04-27 PROCEDURE — 87045 FECES CULTURE AEROBIC BACT: CPT

## 2023-04-28 LAB
E COLI SXT1+2 STL IA: NORMAL
SIGNIFICANT IND 70042: NORMAL
SITE SITE: NORMAL
SOURCE SOURCE: NORMAL

## 2023-04-29 LAB
BACTERIA STL CULT: NORMAL
C JEJUNI+C COLI AG STL QL: NORMAL
E COLI SXT1+2 STL IA: NORMAL
SIGNIFICANT IND 70042: NORMAL
SITE SITE: NORMAL
SOURCE SOURCE: NORMAL

## 2023-04-30 LAB — CALPROTECTIN STL-MCNT: 92 UG/G

## 2023-05-02 ENCOUNTER — PATIENT MESSAGE (OUTPATIENT)
Dept: CARDIOLOGY | Facility: MEDICAL CENTER | Age: 73
End: 2023-05-02
Payer: MEDICARE

## 2023-05-02 ENCOUNTER — OFFICE VISIT (OUTPATIENT)
Dept: BEHAVIORAL HEALTH | Facility: CLINIC | Age: 73
End: 2023-05-02
Payer: MEDICARE

## 2023-05-02 DIAGNOSIS — F90.1 ATTENTION DEFICIT HYPERACTIVITY DISORDER (ADHD), PREDOMINANTLY HYPERACTIVE TYPE: ICD-10-CM

## 2023-05-02 DIAGNOSIS — F43.10 POSTTRAUMATIC STRESS DISORDER: ICD-10-CM

## 2023-05-02 DIAGNOSIS — F33.1 MAJOR DEPRESSIVE DISORDER, RECURRENT EPISODE, MODERATE (HCC): ICD-10-CM

## 2023-05-02 PROCEDURE — 99214 OFFICE O/P EST MOD 30 MIN: CPT | Performed by: PSYCHIATRY & NEUROLOGY

## 2023-05-02 RX ORDER — BUPROPION HYDROCHLORIDE 150 MG/1
150 TABLET, EXTENDED RELEASE ORAL DAILY
Qty: 90 TABLET | Refills: 0 | Status: SHIPPED | OUTPATIENT
Start: 2023-05-02 | End: 2023-07-31

## 2023-05-02 RX ORDER — ATOMOXETINE 40 MG/1
40 CAPSULE ORAL DAILY
Qty: 90 CAPSULE | Refills: 0 | Status: SHIPPED
Start: 2023-05-02 | End: 2023-05-11

## 2023-05-02 NOTE — PROGRESS NOTES
[facility]   Follow Up Assessment     This provider informed the patient their medical records are totally confidential except for the use by other providers involved in their care, or if the patient signs a release, or to report instances of child or elder abuse, or if it is determined they are an immediate risk to harm themselves or others.    Name: Denia Deluca  MRN: [id number]  : 1950  Age: 72 y.o.  Date of assessment: 2023  PCP: Hoda Sloan M.D.      Subjective:  Chart reviewed prior to seeing her in my office.  She was last seen on .  She has felt stressed about the extensive yard work and remodeling of her home since last seen.  We reviewed her current medication combination.  She prefers to not make any changes at this point but wishes she could focus better.  Previously was on Strattera 60 mg a.m. but her cardiologist apparently has preferred that she not increase beyond 40 mg a.m.  She was never diagnosed as having bipolar disorder but apparently several relatives have it.  Her mood swings appear to be situational and she may overreact at times.  No manic episodes or grandiosity.    Objective:  She is alert, oriented, and talkative.  Relatedness is good.  Grooming is good.  Speech is a little rapid.  Anxious.  Memory is good.  Insight and judgment are good.  No indication of psychotic thinking.    Current Risk:       Suicidal: Not suicidal       Homicidal: Not homicidal       Self-Harm: No plan to harm self       Relapse: (Low/Moderate/High): Moderate       Crisis Safety Plan Reviewed: Discussed with patient    Diagnosis:   Major depressive disorder, recurrent  ADHD  PTSD    Treatment Plan:  The current treatment plan consists of quarterly psychiatric sessions designed to evaluate her depression, ADHD, and PTSD.    Duration will be for a minimum of 12 months and will be reviewed at each visit.    Goals: Stabilization of moods with improvement in ADHD symptoms  and control of PTSD symptoms in order to prevent relapse due to the chronic nature of her behavioral health problems and mental illness.  Continue Strattera 40 mg a.m. for now.  Continue Wellbutrin  mg a.m.      Ephraim Chavez M.D.      This note was created using voice recognition software (Dragon). The accuracy of the dictation is limited by the abilities of the software. I have reviewed the note prior to signing, however some errors in grammar and context are still possible. If you have any questions related to this note please do not hesitate to contact our office.

## 2023-05-11 RX ORDER — ATOMOXETINE 60 MG/1
60 CAPSULE ORAL DAILY
Qty: 90 CAPSULE | Refills: 0 | Status: SHIPPED
Start: 2023-05-11 | End: 2023-08-03

## 2023-05-17 ENCOUNTER — OFFICE VISIT (OUTPATIENT)
Dept: URGENT CARE | Facility: CLINIC | Age: 73
End: 2023-05-17
Payer: MEDICARE

## 2023-05-17 VITALS
BODY MASS INDEX: 32.25 KG/M2 | HEIGHT: 63 IN | RESPIRATION RATE: 16 BRPM | HEART RATE: 80 BPM | WEIGHT: 182 LBS | DIASTOLIC BLOOD PRESSURE: 70 MMHG | SYSTOLIC BLOOD PRESSURE: 110 MMHG | OXYGEN SATURATION: 97 % | TEMPERATURE: 98.2 F

## 2023-05-17 DIAGNOSIS — H10.32 ACUTE CONJUNCTIVITIS OF LEFT EYE, UNSPECIFIED ACUTE CONJUNCTIVITIS TYPE: ICD-10-CM

## 2023-05-17 PROCEDURE — 99213 OFFICE O/P EST LOW 20 MIN: CPT | Performed by: STUDENT IN AN ORGANIZED HEALTH CARE EDUCATION/TRAINING PROGRAM

## 2023-05-17 PROCEDURE — 3074F SYST BP LT 130 MM HG: CPT | Performed by: STUDENT IN AN ORGANIZED HEALTH CARE EDUCATION/TRAINING PROGRAM

## 2023-05-17 PROCEDURE — 3078F DIAST BP <80 MM HG: CPT | Performed by: STUDENT IN AN ORGANIZED HEALTH CARE EDUCATION/TRAINING PROGRAM

## 2023-05-17 RX ORDER — POLYMYXIN B SULFATE AND TRIMETHOPRIM 1; 10000 MG/ML; [USP'U]/ML
1 SOLUTION OPHTHALMIC EVERY 4 HOURS
Qty: 10 ML | Refills: 0 | Status: SHIPPED
Start: 2023-05-17 | End: 2023-06-26

## 2023-05-17 RX ORDER — OFLOXACIN 3 MG/ML
1 SOLUTION/ DROPS OPHTHALMIC 4 TIMES DAILY
Qty: 10 ML | Refills: 0 | Status: SHIPPED | OUTPATIENT
Start: 2023-05-17 | End: 2023-06-26

## 2023-05-17 ASSESSMENT — ENCOUNTER SYMPTOMS
ABDOMINAL PAIN: 0
CONSTIPATION: 0
BLURRED VISION: 0
VOMITING: 0
PHOTOPHOBIA: 0
EYE ITCHING: 1
NAUSEA: 0
WHEEZING: 0
EYE REDNESS: 1
SHORTNESS OF BREATH: 0
DIARRHEA: 0
COUGH: 0
CHILLS: 0
DOUBLE VISION: 0
FOREIGN BODY SENSATION: 0
EYE DISCHARGE: 1
FEVER: 0
EYE PAIN: 0
PALPITATIONS: 0
SORE THROAT: 0

## 2023-05-17 ASSESSMENT — FIBROSIS 4 INDEX: FIB4 SCORE: 1.239354670786373403

## 2023-05-17 NOTE — PROGRESS NOTES
"Subjective     Denia Deluca is a 72 y.o. female who presents with Eye Problem (X yesterday, Bilateral eye watery and crusty,  pain on Lt. Eye and runny nose. )            Denia is a 72 y.o. female who presents to urgent care due to eye problem.  Patient states yesterday bilateral eyes were watering more than normal and states she has had a runny nose.  Patient notes that she has seasonal allergies and takes Zyrtec daily and has tried antihistamine eyedrops.  Patient states that left eye developed discharge/drainage and was crusting this morning.  No injury or trauma to eye.  No eye pain, change in vision, blurry vision, double vision, photophobia.  No URI-like symptoms.    Eye Problem   Both eyes are affected. This is a new problem. The current episode started yesterday. There was no injury mechanism. The pain is mild. Associated symptoms include an eye discharge, eye redness and itching. Pertinent negatives include no blurred vision, double vision, fever, foreign body sensation, nausea, photophobia, recent URI or vomiting. She has tried eye drops for the symptoms. The treatment provided mild relief.       Review of Systems   Constitutional:  Negative for chills, fever and malaise/fatigue.   HENT:  Negative for ear pain and sore throat.    Eyes:  Positive for discharge, redness and itching. Negative for blurred vision, double vision, photophobia and pain.   Respiratory:  Negative for cough, shortness of breath and wheezing.    Cardiovascular:  Negative for chest pain and palpitations.   Gastrointestinal:  Negative for abdominal pain, constipation, diarrhea, nausea and vomiting.   Endo/Heme/Allergies:  Positive for environmental allergies.              Objective     /70 (BP Location: Left arm, Patient Position: Sitting, BP Cuff Size: Large adult)   Pulse 80   Temp 36.8 °C (98.2 °F) (Temporal)   Resp 16   Ht 1.6 m (5' 3\")   Wt 82.6 kg (182 lb)   SpO2 97%   BMI 32.24 kg/m²      Physical " Exam  Vitals reviewed.   Constitutional:       Appearance: Normal appearance.   HENT:      Head: Normocephalic and atraumatic.      Nose: Rhinorrhea present.      Mouth/Throat:      Mouth: Mucous membranes are moist.      Pharynx: Oropharynx is clear.   Eyes:      General:         Right eye: No discharge.         Left eye: Discharge present.     Conjunctiva/sclera:      Right eye: Right conjunctiva is injected.      Left eye: Left conjunctiva is injected.      Pupils: Pupils are equal, round, and reactive to light.   Cardiovascular:      Rate and Rhythm: Normal rate.   Pulmonary:      Effort: Pulmonary effort is normal.   Skin:     General: Skin is warm and dry.   Neurological:      General: No focal deficit present.      Mental Status: She is alert. Mental status is at baseline.                             Assessment & Plan        1. Acute conjunctivitis of left eye, unspecified acute conjunctivitis type  - polymixin-trimethoprim (POLYTRIM) 41831-3.1 UNIT/ML-% Solution; Administer 1 Drop into both eyes every 4 hours.  Dispense: 10 mL; Refill: 0    Differential diagnoses, supportive care, and indications for immediate follow-up discussed with patient. Pathogenesis of diagnosis discussed including typical length and natural progression.      Instructed to return to urgent care or nearest emergency department if symptoms fail to improve, for any change in condition, further concerns, or new concerning symptoms.    Patient states understanding and agrees with the plan of care and discharge instructions.

## 2023-06-26 ENCOUNTER — OFFICE VISIT (OUTPATIENT)
Dept: CARDIOLOGY | Facility: MEDICAL CENTER | Age: 73
End: 2023-06-26
Attending: NURSE PRACTITIONER
Payer: MEDICARE

## 2023-06-26 VITALS
RESPIRATION RATE: 16 BRPM | HEART RATE: 82 BPM | WEIGHT: 187 LBS | OXYGEN SATURATION: 97 % | HEIGHT: 63 IN | DIASTOLIC BLOOD PRESSURE: 62 MMHG | BODY MASS INDEX: 33.13 KG/M2 | SYSTOLIC BLOOD PRESSURE: 118 MMHG

## 2023-06-26 DIAGNOSIS — G47.33 OSA ON CPAP: ICD-10-CM

## 2023-06-26 DIAGNOSIS — R00.2 PALPITATIONS: ICD-10-CM

## 2023-06-26 DIAGNOSIS — G47.33 OSA (OBSTRUCTIVE SLEEP APNEA): ICD-10-CM

## 2023-06-26 DIAGNOSIS — R00.0 TACHYCARDIA: ICD-10-CM

## 2023-06-26 DIAGNOSIS — I47.29 NSVT (NONSUSTAINED VENTRICULAR TACHYCARDIA) (HCC): ICD-10-CM

## 2023-06-26 DIAGNOSIS — E78.00 PURE HYPERCHOLESTEROLEMIA: ICD-10-CM

## 2023-06-26 DIAGNOSIS — I49.3 FREQUENT PVCS: ICD-10-CM

## 2023-06-26 LAB — EKG IMPRESSION: NORMAL

## 2023-06-26 PROCEDURE — 3074F SYST BP LT 130 MM HG: CPT | Performed by: NURSE PRACTITIONER

## 2023-06-26 PROCEDURE — 93010 ELECTROCARDIOGRAM REPORT: CPT | Performed by: STUDENT IN AN ORGANIZED HEALTH CARE EDUCATION/TRAINING PROGRAM

## 2023-06-26 PROCEDURE — 99213 OFFICE O/P EST LOW 20 MIN: CPT | Performed by: NURSE PRACTITIONER

## 2023-06-26 PROCEDURE — 99214 OFFICE O/P EST MOD 30 MIN: CPT | Performed by: NURSE PRACTITIONER

## 2023-06-26 PROCEDURE — 93005 ELECTROCARDIOGRAM TRACING: CPT | Performed by: NURSE PRACTITIONER

## 2023-06-26 PROCEDURE — 99212 OFFICE O/P EST SF 10 MIN: CPT | Performed by: NURSE PRACTITIONER

## 2023-06-26 PROCEDURE — 3078F DIAST BP <80 MM HG: CPT | Performed by: NURSE PRACTITIONER

## 2023-06-26 RX ORDER — VERAPAMIL HYDROCHLORIDE 40 MG/1
40 TABLET ORAL EVERY 8 HOURS
Qty: 90 TABLET | Refills: 3 | Status: SHIPPED | OUTPATIENT
Start: 2023-06-26 | End: 2023-06-29 | Stop reason: SDUPTHER

## 2023-06-26 ASSESSMENT — FIBROSIS 4 INDEX: FIB4 SCORE: 1.239354670786373403
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
## 2023-06-26 NOTE — PATIENT INSTRUCTIONS
Palpitations  -Patient and I discussed reduction of caffeine, ETOH intake along with adequate electrolytes hydration.  -Optimize exercise program as tolerated.  -stop metoprolol and start verapamil  -try low dose over the counter magnesium    [facility] [employer]

## 2023-06-29 DIAGNOSIS — G47.33 OSA ON CPAP: ICD-10-CM

## 2023-06-29 DIAGNOSIS — R00.0 TACHYCARDIA: ICD-10-CM

## 2023-06-29 DIAGNOSIS — I47.29 NSVT (NONSUSTAINED VENTRICULAR TACHYCARDIA) (HCC): ICD-10-CM

## 2023-06-29 DIAGNOSIS — G47.33 OSA (OBSTRUCTIVE SLEEP APNEA): ICD-10-CM

## 2023-06-29 DIAGNOSIS — R00.2 PALPITATIONS: ICD-10-CM

## 2023-06-29 DIAGNOSIS — I49.3 FREQUENT PVCS: ICD-10-CM

## 2023-06-29 DIAGNOSIS — E78.00 PURE HYPERCHOLESTEROLEMIA: ICD-10-CM

## 2023-06-29 RX ORDER — VERAPAMIL HYDROCHLORIDE 40 MG/1
40 TABLET ORAL EVERY 8 HOURS
Qty: 300 TABLET | Refills: 0 | Status: SHIPPED | OUTPATIENT
Start: 2023-06-29 | End: 2023-09-08

## 2023-07-20 ENCOUNTER — DOCUMENTATION (OUTPATIENT)
Dept: HEALTH INFORMATION MANAGEMENT | Facility: OTHER | Age: 73
End: 2023-07-20
Payer: MEDICARE

## 2023-07-21 ENCOUNTER — TELEMEDICINE (OUTPATIENT)
Dept: SLEEP MEDICINE | Facility: MEDICAL CENTER | Age: 73
End: 2023-07-21
Attending: STUDENT IN AN ORGANIZED HEALTH CARE EDUCATION/TRAINING PROGRAM
Payer: MEDICARE

## 2023-07-21 VITALS — BODY MASS INDEX: 32.78 KG/M2 | WEIGHT: 185 LBS | HEIGHT: 63 IN

## 2023-07-21 DIAGNOSIS — G47.33 OSA (OBSTRUCTIVE SLEEP APNEA): ICD-10-CM

## 2023-07-21 PROCEDURE — 99213 OFFICE O/P EST LOW 20 MIN: CPT | Mod: 95 | Performed by: STUDENT IN AN ORGANIZED HEALTH CARE EDUCATION/TRAINING PROGRAM

## 2023-07-21 ASSESSMENT — FIBROSIS 4 INDEX: FIB4 SCORE: 1.239354670786373403

## 2023-07-21 NOTE — PROGRESS NOTES
[facility] Virtual Follow Up Note     Date: 2023 / Time: 8:29 AM    CC: Telemedicine sleep follow-up    This sleep consultation is provided using Telemedicine and secure encrypted software. Consent was obtained.    Consulting MD: Alxeander Ramon M.D.  Requesting Physician: Hoda Sloan M.D.  Patient name:      Denia Deluca  : 1950  MRN: [id number]     This visit was conducted via Zoom using secure and encrypted videoconferencing technology.   The patient was in a private location at home in the [facility].    The patient's identity was confirmed and verbal consent was obtained for this virtual visit.      HISTORY OF PRESENT ILLNESS:     Dneia Deluca is a 72 y.o. female with mild intermittent asthma, MDD, PTSD, ADHD, and obstructive sleep apnea on CPAP.  Presents to Sleep Clinic for yearly follow-up for management of obstructive sleep apnea.    She continues to use her CPAP machine regularly.  She finds with using her CPAP she sleeps more soundly and more continuous.  She does have difficulty during the summer of the machine running out of water in the reservoir.  This will sometimes lead to her taking off the machine an hour or so before she fully awakens.  Overall she is happy with her current mask fit and pressures.  She is buying supplies online as it is cheaper for her than going through a DME company.  No acute complaints.    DME provider: Preferred but buying online   Device: Dreamstation 2   Mask: nasal   Aerophagia: No   Snoring: No   Dry mouth: Yes  Leak: No   Skin irritation: No   Chin strap: No       MEDICAL HISTORY  Past Medical History:   Diagnosis Date    ADHD     Allergic rhinitis     Asthma     Back pain     Basal cell carcinoma (BCC)     BMI 31.0-31.9,adult 2022    Chickenpox     Depression     Fracture     Multiple fractures    Influenza     Kidney stone     Nasal drainage     Pneumonia         SURGICAL HISTORY  Past Surgical History:  "  Procedure Laterality Date    ABDOMINAL HYSTERECTOMY TOTAL      age 38 for \"pre-cancer\", ovaries not removed    CARPAL TUNNEL RELEASE      HYSTERECTOMY LAPAROSCOPY      OPEN REDUCTION Right     R foot    MI REMV 2ND CATARACT,CORN-SCLER SECTN          FAMILY HISTORY  Family History   Problem Relation Age of Onset    Cancer Mother     Cancer Father     Breast Cancer Sister     Cancer Sister         Breast cancer    Lupus Sister     Leukemia Brother     Colorectal Cancer Neg Hx     Peritoneal Cancer Neg Hx     Tubal Cancer Neg Hx     Ovarian Cancer Neg Hx        SOCIAL HISTORY  Social History     Socioeconomic History    Marital status:     Highest education level: Bachelor's degree (e.g., BA, AB, BS)   Tobacco Use    Smoking status: Never    Smokeless tobacco: Never   Vaping Use    Vaping Use: Never used   Substance and Sexual Activity    Alcohol use: Not Currently     Comment: Occasionally    Drug use: No   Social History Narrative    , has grown children. 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     Her parents are .  She had 2 biological daughters 1  in a motor vehicle accident at age 17.  She had 2 brothers.  One is .  1 had bipolar disorder.  Her maternal great grand mother was bipolar.     Social Determinants of Health     Financial Resource Strain: Low Risk  (2022)    Overall Financial Resource Strain (CARDIA)     Difficulty of Paying Living Expenses: Not very hard   Food Insecurity: No Food Insecurity (2022)    Hunger Vital Sign     Worried About Running Out of Food in the Last Year: Never true     Ran Out of Food in the Last Year: Never true   Transportation Needs: No Transportation Needs (2022)    PRAPARE - Transportation     Lack of Transportation (Medical): No     Lack of Transportation (Non-Medical): No   Physical Activity: Insufficiently Active (3/23/2023)    Exercise Vital Sign     Days of Exercise per Week: " 4 days     Minutes of Exercise per Session: 30 min   Stress: No Stress Concern Present (12/19/2022)    [employer] - Occupational Stress Questionnaire     Feeling of Stress : Only a little   Social Connections: Socially Integrated (12/19/2022)    Social Connection and Isolation Panel [NHANES]     Frequency of Communication with Friends and Family: More than three times a week     Frequency of Social Gatherings with Friends and Family: Twice a week     Attends Taoism Services: 1 to 4 times per year     Active Member of Clubs or Organizations: Yes     Attends Club or Organization Meetings: More than 4 times per year     Marital Status:    Intimate Partner Violence: Not At Risk (3/23/2023)    Humiliation, Afraid, Rape, and Kick questionnaire     Fear of Current or Ex-Partner: No     Emotionally Abused: No     Physically Abused: No     Sexually Abused: No   Housing Stability: Low Risk  (3/23/2023)    Housing Stability Vital Sign     Unable to Pay for Housing in the Last Year: No     Number of Places Lived in the Last Year: 1     Unstable Housing in the Last Year: No        CURRENT MEDICATIONS  Current Outpatient Medications   Medication Sig Dispense Refill    verapamil (ISOPTIN) 40 MG Tab Take 1 Tablet by mouth every 8 hours. 300 Tablet 0    Magnesium 100 MG Tab Take 1 Tablet by mouth every day. 90 Tablet     oxybutynin SR (DITROPAN-XL) 10 MG CR tablet Take 1 Tablet by mouth every day. 100 Tablet 3    polyethylene glycol/lytes (MIRALAX) 17 g Pack Take  by mouth 2 times a day. Take 2 tbsp, po bid with Benefiber 2 tbsp po bid.      atomoxetine (STRATTERA) 60 MG capsule Take 1 Capsule by mouth every day for 90 days. (Patient taking differently: Take 40 mg by mouth every day.) 90 Capsule 0    buPROPion SR (WELLBUTRIN-SR) 150 MG TABLET SR 12 HR sustained-release tablet Take 1 Tablet by mouth every day for 90 days. 90 Tablet 0    SUMAtriptan (IMITREX) 25 MG Tab tablet Take 1 Tablet by  "mouth one time as needed for Migraine for up to 1 dose. Do not exceed more than 10 tablets a month 30 Tablet 2    omeprazole (PRILOSEC) 20 MG delayed-release capsule Take 1 Capsule by mouth 2 times a day. (Patient taking differently: Take 20 mg by mouth every day.) 200 Capsule 3    albuterol 108 (90 Base) MCG/ACT Aero Soln inhalation aerosol Inhale 2 Puffs every 6 hours as needed for Shortness of Breath. 8.5 g 3    Multiple Vitamin (MULTIVITAMIN ADULT PO) Take  by mouth.      CALCIUM PO Take  by mouth.      VITAMIN D PO Take  by mouth.      Ascorbic Acid (VITAMIN C PO) Take  by mouth.      EPINEPHrine (EPIPEN) 0.3 MG/0.3ML Solution Auto-injector solution for injection        No current facility-administered medications for this visit.       REVIEW OF SYSTEMS  Constitutional: Denies fevers.  Gained weight  Ears/Nose/Throat/Mouth: Denies nasal congestion or sore throat   Cardiovascular: Denies chest pain or palpitations   Respiratory: Denies shortness of breath, Denies cough  Musculoskeletal/Rheum: Denies  joint pain and swelling   Sleep: See HPI      Physical Examination:  Vitals/ General Appearance:   Weight/BMI: Body mass index is 32.77 kg/m².  Ht 1.6 m (5' 3\")   Wt 83.9 kg (185 lb)   Vitals:    07/21/23 0826   Weight: 83.9 kg (185 lb)   Height: 1.6 m (5' 3\")       General: alert and oriented to time, place and person. Cooperative and in no apparent distress.   Constitutional: Alert, no distress, well-groomed.  Voice: normal volume and adin  Head: normocephalic   Pulmonary: Voice normal volume and adin. No sounds or signs of increased work of breathing.   Neurologic: No involuntary movements     Assessment and Plan  Denia Deluca is a 72 y.o. female with mild intermittent asthma, MDD, PTSD, ADHD, and obstructive sleep apnea on CPAP.  Presents to Sleep Clinic for yearly follow-up for management of obstructive sleep apnea.    The medical record was reviewed.    Diagnostic and titration nocturnal " polysomnogram's, home sleep apnea tests, continuous nocturnal oximetry results, multiple sleep latency tests, and compliance reports reviewed.  Compliance data reviewed showing 96.7% usage > 4hours in last 30  days. Average AHI 5.7 events/hour. 90-95% pressure 12.8 CWP. Pt continues to use and benefit from machine.     Current settings Auto CPAP 10-15 cm        PLAN:   -Encouraged regular CPAP usage  -Advised to reach out via MyChart with any questions     Has been advised to continue the current CPAP, clean equipment frequently, and get new mask and supplies as allowed by insurance and DME. Recommend an earlier appointment, if significant treatment barriers develop.    The risks of untreated sleep apnea were discussed with the patient at length. Patients with YAZAN are at increased risk of cardiovascular disease including coronary artery disease, systemic arterial hypertension, pulmonary arterial hypertension, cardiac arrythmias, and stroke. The patient was advised to avoid driving a motor vehicle when drowsy.    Positive airway pressure will favorably impact many of the adverse conditions and effects provoked by YAZAN.    Have advised the patient to follow up with the appropriate healthcare practitioners for all other medical problems and issues.    Return in about 1 year (around 7/21/2024).      Please note portions of this record was created using voice recognition software. I have made every reasonable attempt to correct obvious errors, but I expect that there are errors of grammar and possibly content I did not discover before finalizing the note.

## 2023-07-27 ENCOUNTER — HOSPITAL ENCOUNTER (OUTPATIENT)
Dept: LAB | Facility: MEDICAL CENTER | Age: 73
End: 2023-07-27
Attending: NURSE PRACTITIONER
Payer: MEDICARE

## 2023-07-27 ENCOUNTER — HOSPITAL ENCOUNTER (OUTPATIENT)
Facility: MEDICAL CENTER | Age: 73
End: 2023-07-27
Payer: MEDICARE

## 2023-07-27 DIAGNOSIS — G47.33 OSA (OBSTRUCTIVE SLEEP APNEA): ICD-10-CM

## 2023-07-27 DIAGNOSIS — I49.3 FREQUENT PVCS: ICD-10-CM

## 2023-07-27 DIAGNOSIS — R00.0 TACHYCARDIA: ICD-10-CM

## 2023-07-27 DIAGNOSIS — I47.29 NSVT (NONSUSTAINED VENTRICULAR TACHYCARDIA) (HCC): ICD-10-CM

## 2023-07-27 DIAGNOSIS — G47.33 OSA ON CPAP: ICD-10-CM

## 2023-07-27 DIAGNOSIS — R00.2 PALPITATIONS: ICD-10-CM

## 2023-07-27 DIAGNOSIS — E78.00 PURE HYPERCHOLESTEROLEMIA: ICD-10-CM

## 2023-07-27 LAB
CHOLEST SERPL-MCNC: 212 MG/DL (ref 100–199)
HDLC SERPL-MCNC: 54 MG/DL
LDLC SERPL CALC-MCNC: 136 MG/DL
MAGNESIUM SERPL-MCNC: 2.2 MG/DL (ref 1.5–2.5)
TRIGL SERPL-MCNC: 112 MG/DL (ref 0–149)

## 2023-07-27 PROCEDURE — 87324 CLOSTRIDIUM AG IA: CPT | Mod: XU

## 2023-07-27 PROCEDURE — 36415 COLL VENOUS BLD VENIPUNCTURE: CPT

## 2023-07-27 PROCEDURE — 83735 ASSAY OF MAGNESIUM: CPT

## 2023-07-27 PROCEDURE — 87899 AGENT NOS ASSAY W/OPTIC: CPT

## 2023-07-27 PROCEDURE — 87507 IADNA-DNA/RNA PROBE TQ 12-25: CPT

## 2023-07-27 PROCEDURE — 82653 EL-1 FECAL QUANTITATIVE: CPT

## 2023-07-27 PROCEDURE — 80061 LIPID PANEL: CPT

## 2023-07-28 ENCOUNTER — TELEPHONE (OUTPATIENT)
Dept: CARDIOLOGY | Facility: MEDICAL CENTER | Age: 73
End: 2023-07-28
Payer: MEDICARE

## 2023-07-28 LAB — MISCELLANEOUS LAB RESULT MISCLAB: NORMAL

## 2023-07-28 NOTE — TELEPHONE ENCOUNTER
Phone Number Called:  [phone]    Call outcome: Did not leave a detailed message. Requested patient to call back.    Message: Called to inform patient of J recommendations. Unable to reach. Left VM for patient to call back when able.       ----- Message from SHANON Feldman sent at 7/27/2023  3:51 PM PDT -----  Cholesterol results remain above goal.  Again, recommend statin therapy or referral to lipid clinic for additional recommendations.  ----- Message -----  From: Kathryn Zhao R.N.  Sent: 7/27/2023   2:18 PM PDT  To: SHANON Feldman    To:     Please advise. Thank you

## 2023-07-29 LAB
ADV 40+41 DNA STL QL NAA+NON-PROBE: NOT DETECTED
ASTRO TYP 1-8 RNA STL QL NAA+NON-PROBE: NOT DETECTED
C CAYETANENSIS DNA STL QL NAA+NON-PROBE: NOT DETECTED
C COLI+JEJ+UPSA DNA STL QL NAA+NON-PROBE: NOT DETECTED
CRYPTOSP DNA STL QL NAA+NON-PROBE: NOT DETECTED
E COLI O157 DNA STL QL NAA+NON-PROBE: ABNORMAL
E HISTOLYT DNA STL QL NAA+NON-PROBE: NOT DETECTED
EAEC PAA PLAS AGGR+AATA ST NAA+NON-PRB: NOT DETECTED
EC STX1+STX2 GENES STL QL NAA+NON-PROBE: NOT DETECTED
EPEC EAE GENE STL QL NAA+NON-PROBE: DETECTED
ETEC LTA+ST1A+ST1B TOX ST NAA+NON-PROBE: NOT DETECTED
G LAMBLIA DNA STL QL NAA+NON-PROBE: NOT DETECTED
NOROVIRUS GI+II RNA STL QL NAA+NON-PROBE: ABNORMAL
P SHIGELLOIDES DNA STL QL NAA+NON-PROBE: NOT DETECTED
RVA RNA STL QL NAA+NON-PROBE: ABNORMAL
S ENT+BONG DNA STL QL NAA+NON-PROBE: NOT DETECTED
SAPO I+II+IV+V RNA STL QL NAA+NON-PROBE: ABNORMAL
SHIGELLA SP+EIEC IPAH ST NAA+NON-PROBE: NOT DETECTED
V CHOL+PARA+VUL DNA STL QL NAA+NON-PROBE: NOT DETECTED
V CHOLERAE DNA STL QL NAA+NON-PROBE: NOT DETECTED
Y ENTEROCOL DNA STL QL NAA+NON-PROBE: NOT DETECTED

## 2023-07-30 LAB — ELASTASE PANC STL-MCNT: 544 UG/G

## 2023-08-02 ENCOUNTER — OFFICE VISIT (OUTPATIENT)
Dept: BEHAVIORAL HEALTH | Facility: CLINIC | Age: 73
End: 2023-08-02
Payer: MEDICARE

## 2023-08-02 DIAGNOSIS — F33.1 MAJOR DEPRESSIVE DISORDER, RECURRENT EPISODE, MODERATE (HCC): ICD-10-CM

## 2023-08-02 DIAGNOSIS — F90.1 ATTENTION DEFICIT HYPERACTIVITY DISORDER (ADHD), PREDOMINANTLY HYPERACTIVE TYPE: ICD-10-CM

## 2023-08-02 DIAGNOSIS — F43.10 POSTTRAUMATIC STRESS DISORDER: ICD-10-CM

## 2023-08-02 PROCEDURE — 99214 OFFICE O/P EST MOD 30 MIN: CPT | Performed by: PSYCHIATRY & NEUROLOGY

## 2023-08-02 RX ORDER — ATOMOXETINE 60 MG/1
60 CAPSULE ORAL DAILY
Qty: 90 CAPSULE | Refills: 0 | Status: SHIPPED | OUTPATIENT
Start: 2023-08-02 | End: 2023-10-03 | Stop reason: SDUPTHER

## 2023-08-02 RX ORDER — BUPROPION HYDROCHLORIDE 150 MG/1
150 TABLET, EXTENDED RELEASE ORAL 2 TIMES DAILY
Qty: 180 TABLET | Refills: 0 | Status: SHIPPED | OUTPATIENT
Start: 2023-08-02 | End: 2023-10-03 | Stop reason: SDUPTHER

## 2023-08-02 NOTE — PROGRESS NOTES
[facility]   Follow Up Assessment     This provider informed the patient their medical records are totally confidential except for the use by other providers involved in their care, or if the patient signs a release, or to report instances of child or elder abuse, or if it is determined they are an immediate risk to harm themselves or others.    Name: Denia Deluca  MRN: [id number]  : 1950  Age: 72 y.o.  Date of assessment: 2023  PCP: Hoda Sloan M.D.      Subjective:  Chart reviewed prior to seeing her in my office.  She was seen most recently on May 2.  He is quite concerned about her 24-year-old sister who is defrosted for $2 million and had to move from [place] to a care home center locally.  She is worried about her  who will have orthopedic surgery soon.  Life has been very stressful for her recently.  We reviewed her current medications.  Her mail order service does not have Strattera 60 mg which she previously took for her ADHD symptoms.  She will use a local pharmacy for that medication she does want to continue Wellbutrin  mg a.m.    Objective:  She is alert, oriented, and cooperative.  Relatedness is good.  Obsessional.  Grooming is good.  Speech is a little rapid.  Anxious.  Memory is good.  Insight and judgment are good.  No indication of psychotic thinking.    Current Risk:       Suicidal: Not suicidal       Homicidal: Not homicidal       Self-Harm: No plan to harm self       Relapse: (Low/Moderate/High): Moderate       Crisis Safety Plan Reviewed: Discussed with patient    Diagnosis:   Major depressive disorder, recurrent  ADHD  PTSD    Treatment Plan:  The current treatment plan consists of a follow-up visit in 2 months and then quarterly sessions designed to evaluate her depression, and ADHD.    Duration will be for a minimum of 12 months and will be reviewed at each visit.    Goals: Remission of depression with improvement in ADHD symptoms in order to  prevent relapse due to the chronic nature of her behavioral health problems and mental illness.  Continue Wellbutrin  mg a.m. using mail order service.  Change Strattera to 60 mg a.m. using local pharmacy.    Ephraim Chavez M.D.      This note was created using voice recognition software (Dragon). The accuracy of the dictation is limited by the abilities of the software. I have reviewed the note prior to signing, however some errors in grammar and context are still possible. If you have any questions related to this note please do not hesitate to contact our office.

## 2023-08-03 ENCOUNTER — OFFICE VISIT (OUTPATIENT)
Dept: MEDICAL GROUP | Facility: MEDICAL CENTER | Age: 73
End: 2023-08-03
Payer: MEDICARE

## 2023-08-03 VITALS
RESPIRATION RATE: 16 BRPM | HEART RATE: 94 BPM | SYSTOLIC BLOOD PRESSURE: 122 MMHG | HEIGHT: 63 IN | OXYGEN SATURATION: 96 % | DIASTOLIC BLOOD PRESSURE: 66 MMHG | TEMPERATURE: 97.6 F | WEIGHT: 185.19 LBS | BODY MASS INDEX: 32.81 KG/M2

## 2023-08-03 DIAGNOSIS — N39.46 MIXED STRESS AND URGE URINARY INCONTINENCE: ICD-10-CM

## 2023-08-03 DIAGNOSIS — R79.89 ABNORMAL LFTS: ICD-10-CM

## 2023-08-03 DIAGNOSIS — E78.00 PURE HYPERCHOLESTEROLEMIA: ICD-10-CM

## 2023-08-03 PROBLEM — R00.0 TACHYCARDIA: Status: RESOLVED | Noted: 2018-09-26 | Resolved: 2023-08-03

## 2023-08-03 PROCEDURE — 99214 OFFICE O/P EST MOD 30 MIN: CPT | Performed by: STUDENT IN AN ORGANIZED HEALTH CARE EDUCATION/TRAINING PROGRAM

## 2023-08-03 PROCEDURE — 3078F DIAST BP <80 MM HG: CPT | Performed by: STUDENT IN AN ORGANIZED HEALTH CARE EDUCATION/TRAINING PROGRAM

## 2023-08-03 PROCEDURE — 3074F SYST BP LT 130 MM HG: CPT | Performed by: STUDENT IN AN ORGANIZED HEALTH CARE EDUCATION/TRAINING PROGRAM

## 2023-08-03 RX ORDER — OXYBUTYNIN CHLORIDE 5 MG/1
5 TABLET ORAL 2 TIMES DAILY
Qty: 180 TABLET | Refills: 3 | Status: SHIPPED | OUTPATIENT
Start: 2023-08-03 | End: 2024-01-04 | Stop reason: SDUPTHER

## 2023-08-03 ASSESSMENT — FIBROSIS 4 INDEX: FIB4 SCORE: 1.239354670786373403

## 2023-08-03 NOTE — PROGRESS NOTES
"Subjective:     CC: LFTs, HLD, Incontinence    HPI:   Denia presents today with    Problem   Abnormal Lfts    This is a chronic condition.  She follows with GI in [place].  They are sending her to a hepatologist but would like an MRCP completed so that she can have all the information she needs a first time she sees a hepatologist.  They are requesting that we order this.     Pure Hypercholesterolemia    This is a chronic uncontrolled problem.  She is not on any medication.     The 10-year ASCVD risk score (Sonya SANTOS, et al., 2019) is: 10.9%    Lab Results   Component Value Date/Time    CHOLSTRLTOT 212 (H) 07/27/2023 09:42 AM     (H) 07/27/2023 09:42 AM    HDL 54 07/27/2023 09:42 AM    TRIGLYCERIDE 112 07/27/2023 09:42 AM            Mixed Stress and Urge Urinary Incontinence    This is a chronic condition.  She has been on a 24-hour tablet for some time and notices that in the morning the tablet has worn off.  She is hoping to go back to twice a day dosing so that she can take 1 prior to bedtime.     Tachycardia (Resolved)     ROS:  ROS    Objective:     Exam:  /66   Pulse 94   Temp 36.4 °C (97.6 °F) (Temporal)   Resp 16   Ht 1.6 m (5' 3\")   Wt 84 kg (185 lb 3 oz)   SpO2 96%   BMI 32.80 kg/m²  Body mass index is 32.8 kg/m².    Physical Exam  Vitals reviewed.   Constitutional:       General: She is not in acute distress.     Appearance: She is not toxic-appearing.   HENT:      Head: Normocephalic and atraumatic.      Right Ear: External ear normal.      Left Ear: External ear normal.   Eyes:      General:         Right eye: No discharge.         Left eye: No discharge.      Extraocular Movements: Extraocular movements intact.      Conjunctiva/sclera: Conjunctivae normal.   Cardiovascular:      Rate and Rhythm: Normal rate and regular rhythm.      Heart sounds: Normal heart sounds.   Pulmonary:      Effort: Pulmonary effort is normal. No respiratory distress.      Breath sounds: Normal breath sounds. " No wheezing or rales.   Skin:     General: Skin is warm and dry.   Neurological:      Mental Status: She is alert.   Psychiatric:         Mood and Affect: Mood normal.         Behavior: Behavior normal.         Thought Content: Thought content normal.         Judgment: Judgment normal.           Assessment & Plan:     72 y.o. female with the following -     1. Pure hypercholesterolemia  Patient is hesitant to start medication.  She does understand the risks of not being on a statin, including increased risk of heart attack and stroke.  She is going to work on healthy diet and exercise for the next 3 months in hopes she can get her cholesterol down.  At that time if it is still the same she would be open to starting medication  - Lipid Profile; Future    2. Mixed stress and urge urinary incontinence  Change dosing to twice daily from 24-hour tablet to help with morning incontinence  - oxybutynin (DITROPAN) 5 MG Tab; Take 1 Tablet by mouth 2 times a day.  Dispense: 180 Tablet; Refill: 3    3. Abnormal LFTs  MRCP ordered at the request of hepatologist.  - [id number]; Future        No follow-ups on file.    Please note that this dictation was created using voice recognition software. I have made every reasonable attempt to correct obvious errors, but I expect that there are errors of grammar and possibly content that I did not discover before finalizing the note.

## 2023-08-06 ENCOUNTER — HOSPITAL ENCOUNTER (OUTPATIENT)
Dept: RADIOLOGY | Facility: MEDICAL CENTER | Age: 73
End: 2023-08-06
Attending: STUDENT IN AN ORGANIZED HEALTH CARE EDUCATION/TRAINING PROGRAM
Payer: MEDICARE

## 2023-08-06 DIAGNOSIS — R79.89 ABNORMAL LFTS: ICD-10-CM

## 2023-08-06 PROCEDURE — 74181 MRI ABDOMEN W/O CONTRAST: CPT

## 2023-08-07 ENCOUNTER — PATIENT MESSAGE (OUTPATIENT)
Dept: MEDICAL GROUP | Facility: MEDICAL CENTER | Age: 73
End: 2023-08-07
Payer: MEDICARE

## 2023-08-07 DIAGNOSIS — E78.00 PURE HYPERCHOLESTEROLEMIA: ICD-10-CM

## 2023-08-07 RX ORDER — ATORVASTATIN CALCIUM 10 MG/1
10 TABLET, FILM COATED ORAL NIGHTLY
Qty: 90 TABLET | Refills: 1 | Status: SHIPPED | OUTPATIENT
Start: 2023-08-07 | End: 2023-08-11 | Stop reason: SDUPTHER

## 2023-08-11 DIAGNOSIS — E78.00 PURE HYPERCHOLESTEROLEMIA: ICD-10-CM

## 2023-08-11 RX ORDER — ATORVASTATIN CALCIUM 10 MG/1
10 TABLET, FILM COATED ORAL NIGHTLY
Qty: 100 TABLET | Refills: 3 | Status: SHIPPED | OUTPATIENT
Start: 2023-08-11 | End: 2024-01-05 | Stop reason: SDUPTHER

## 2023-08-11 NOTE — TELEPHONE ENCOUNTER
Received request via: Pharmacy    Was the patient seen in the last year in this department? Yes    Does the patient have an active prescription (recently filled or refills available) for medication(s) requested? Yes. Needs to be 100-day supply per pharmacy/insurance.  Does the patient have group home Plus and need 100 day supply (blood pressure, diabetes and cholesterol meds only)? Yes, quantity updated to 100 days

## 2023-08-28 ENCOUNTER — PATIENT MESSAGE (OUTPATIENT)
Dept: MEDICAL GROUP | Facility: MEDICAL CENTER | Age: 73
End: 2023-08-28
Payer: MEDICARE

## 2023-08-28 DIAGNOSIS — K86.89 DILATED PANCREATIC DUCT: ICD-10-CM

## 2023-09-14 ENCOUNTER — HOSPITAL ENCOUNTER (OUTPATIENT)
Dept: LAB | Facility: MEDICAL CENTER | Age: 73
End: 2023-09-14
Attending: STUDENT IN AN ORGANIZED HEALTH CARE EDUCATION/TRAINING PROGRAM
Payer: MEDICARE

## 2023-09-14 DIAGNOSIS — E78.00 PURE HYPERCHOLESTEROLEMIA: ICD-10-CM

## 2023-09-14 LAB
CHOLEST SERPL-MCNC: 141 MG/DL (ref 100–199)
FASTING STATUS PATIENT QL REPORTED: NORMAL
HDLC SERPL-MCNC: 67 MG/DL
LDLC SERPL CALC-MCNC: 59 MG/DL
TRIGL SERPL-MCNC: 77 MG/DL (ref 0–149)

## 2023-09-14 PROCEDURE — 80061 LIPID PANEL: CPT

## 2023-09-14 PROCEDURE — 36415 COLL VENOUS BLD VENIPUNCTURE: CPT

## 2023-09-21 ENCOUNTER — OFFICE VISIT (OUTPATIENT)
Dept: MEDICAL GROUP | Facility: MEDICAL CENTER | Age: 73
End: 2023-09-21
Payer: MEDICARE

## 2023-09-21 VITALS
OXYGEN SATURATION: 99 % | DIASTOLIC BLOOD PRESSURE: 66 MMHG | WEIGHT: 182 LBS | BODY MASS INDEX: 32.25 KG/M2 | HEART RATE: 91 BPM | TEMPERATURE: 96.5 F | SYSTOLIC BLOOD PRESSURE: 134 MMHG | RESPIRATION RATE: 16 BRPM | HEIGHT: 63 IN

## 2023-09-21 DIAGNOSIS — R19.8 ABNORMAL BOWEL HABITS: ICD-10-CM

## 2023-09-21 DIAGNOSIS — Z23 IMMUNIZATION DUE: ICD-10-CM

## 2023-09-21 DIAGNOSIS — E78.00 PURE HYPERCHOLESTEROLEMIA: ICD-10-CM

## 2023-09-21 DIAGNOSIS — K86.89 DILATED PANCREATIC DUCT: ICD-10-CM

## 2023-09-21 PROCEDURE — 3075F SYST BP GE 130 - 139MM HG: CPT | Performed by: STUDENT IN AN ORGANIZED HEALTH CARE EDUCATION/TRAINING PROGRAM

## 2023-09-21 PROCEDURE — 99214 OFFICE O/P EST MOD 30 MIN: CPT | Mod: 25 | Performed by: STUDENT IN AN ORGANIZED HEALTH CARE EDUCATION/TRAINING PROGRAM

## 2023-09-21 PROCEDURE — 3078F DIAST BP <80 MM HG: CPT | Performed by: STUDENT IN AN ORGANIZED HEALTH CARE EDUCATION/TRAINING PROGRAM

## 2023-09-21 PROCEDURE — G0008 ADMIN INFLUENZA VIRUS VAC: HCPCS | Performed by: STUDENT IN AN ORGANIZED HEALTH CARE EDUCATION/TRAINING PROGRAM

## 2023-09-21 PROCEDURE — 90662 IIV NO PRSV INCREASED AG IM: CPT | Performed by: STUDENT IN AN ORGANIZED HEALTH CARE EDUCATION/TRAINING PROGRAM

## 2023-09-21 ASSESSMENT — FIBROSIS 4 INDEX: FIB4 SCORE: 1.26

## 2023-09-21 NOTE — PROGRESS NOTES
"Subjective:     CC: Pancreatic duct dilation, abnormal bowel habits, hyperlipidemia    HPI:   Denia presents today with    Problem   Abnormal Bowel Habits    This is a chronic condition.  She initially had alternating constipation and diarrhea but now has only diarrhea.  She is incontinent of stool at times.  She does follow with GI.  She is currently taking MiraLAX twice daily as well as a fiber supplement.     Dilated Pancreatic Duct    Chronic condition, waiting to see a hepatologist.  Recent MRI showed continued bile duct dilation    6/21: CT Abdomen:  There is a small hiatal hernia.  There is fatty change of the liver. There is a stable tiny left lobe hepatic cyst.  The spleen is normal.  There are small bilateral simple appearing renal cysts. No follow-up recommended.  The adrenal glands are normal.  The pancreatic duct is again noted to be mildly dilated at 4 mm. No evidence of pancreatic mass.  The aorta is normal in caliber. No evidence of retroperitoneal adenopathy.  CT Pelvis:  There is no evidence of bowel obstruction or inflammatory change.  The appendix is visualized and appears normal.  There is no evidence of free fluid.  There has been prior hysterectomy.    I will refer her to GI for a 2nd opinion re: whether she would benefit from an MRCP etc for further eval of this - no abd pain, this eval was initiated by an elevated alk phos.      Pure Hypercholesterolemia    This is a chronic uncontrolled problem.  Currently on Lipitor 10 mg nightly     The 10-year ASCVD risk score (Sonya DK, et al., 2019) is: 13.2%    Lab Results   Component Value Date/Time    CHOLSTRLTOT 141 09/14/2023 10:41 AM    LDL 59 09/14/2023 10:41 AM    HDL 67 09/14/2023 10:41 AM    TRIGLYCERIDE 77 09/14/2023 10:41 AM                ROS:  ROS    Objective:     Exam:  /66   Pulse 91   Temp 35.8 °C (96.5 °F) (Temporal)   Resp 16   Ht 1.6 m (5' 3\")   Wt 82.6 kg (182 lb)   SpO2 99%   BMI 32.24 kg/m²  Body mass index is " 32.24 kg/m².    Physical Exam  Vitals reviewed.   Constitutional:       General: She is not in acute distress.     Appearance: She is not toxic-appearing.      Comments: Exam through observation only   HENT:      Head: Normocephalic and atraumatic.      Right Ear: External ear normal.      Left Ear: External ear normal.   Eyes:      General:         Right eye: No discharge.         Left eye: No discharge.      Extraocular Movements: Extraocular movements intact.      Conjunctiva/sclera: Conjunctivae normal.   Pulmonary:      Effort: Pulmonary effort is normal. No respiratory distress.   Skin:     General: Skin is warm and dry.   Neurological:      Mental Status: She is alert.   Psychiatric:         Mood and Affect: Mood normal.         Behavior: Behavior normal.         Thought Content: Thought content normal.         Judgment: Judgment normal.           Assessment & Plan:     73 y.o. female with the following -     1. Dilated pancreatic duct  Crystal the importance of following up with a hepatologist    2. Abnormal bowel habits  Chronic condition, she is having excessive diarrhea and bowel incontinence.  We discussed discontinuing MiraLAX and not restarting unless she had constipation.  We also discussed continuing the fiber supplement as this can bulk up the stools.  Continue to follow with GI    3. Pure hypercholesterolemia  Chronic condition, currently well controlled, continue Lipitor at current dosing    4. Immunization due  - Influenza Vaccine, High Dose (65+ Only)      No follow-ups on file.    Please note that this dictation was created using voice recognition software. I have made every reasonable attempt to correct obvious errors, but I expect that there are errors of grammar and possibly content that I did not discover before finalizing the note.

## 2023-10-03 ENCOUNTER — OFFICE VISIT (OUTPATIENT)
Dept: BEHAVIORAL HEALTH | Facility: CLINIC | Age: 73
End: 2023-10-03
Payer: MEDICARE

## 2023-10-03 DIAGNOSIS — F90.1 ATTENTION DEFICIT HYPERACTIVITY DISORDER (ADHD), PREDOMINANTLY HYPERACTIVE TYPE: ICD-10-CM

## 2023-10-03 DIAGNOSIS — F43.10 POSTTRAUMATIC STRESS DISORDER: ICD-10-CM

## 2023-10-03 DIAGNOSIS — F33.1 MAJOR DEPRESSIVE DISORDER, RECURRENT EPISODE, MODERATE (HCC): ICD-10-CM

## 2023-10-03 PROCEDURE — 99214 OFFICE O/P EST MOD 30 MIN: CPT | Performed by: PSYCHIATRY & NEUROLOGY

## 2023-10-03 RX ORDER — ATOMOXETINE 60 MG/1
60 CAPSULE ORAL DAILY
Qty: 90 CAPSULE | Refills: 0 | Status: SHIPPED | OUTPATIENT
Start: 2023-10-03 | End: 2023-12-04

## 2023-10-03 RX ORDER — BUPROPION HYDROCHLORIDE 150 MG/1
150 TABLET, EXTENDED RELEASE ORAL DAILY
Qty: 90 TABLET | Refills: 0 | Status: SHIPPED | OUTPATIENT
Start: 2023-10-03 | End: 2024-01-01

## 2023-10-03 NOTE — PROGRESS NOTES
[facility]   Follow Up Assessment     This provider informed the patient their medical records are totally confidential except for the use by other providers involved in their care, or if the patient signs a release, or to report instances of child or elder abuse, or if it is determined they are an immediate risk to harm themselves or others.    Name: Denia Deluca  MRN: [id number]  : 1950  Age: 73 y.o.  Date of assessment: 10/3/2023  PCP: Hoda Sloan M.D.      Subjective:  Chart reviewed prior to seeing her in my office.  She was last seen on .  Wellbutrin  mg a.m. is helping her depression and Strattera 60 mg a.m. is helping her ADHD symptoms.  She now wants both provided by her mail order service.  No medication changes requested.  We talked about her 25-year-old nephew who  jumping off of [employer] 6 or 7 years ago when he was parachute hang gliding.    Objective:  She is alert, oriented, and cooperative.  Relatedness is good.  Grooming is good.  Speech is normal rate.  Anxious.  Memory is good.  Insight and judgment are good.  No indication of psychotic thinking.    Current Risk:       Suicidal: Not suicidal       Homicidal: Not homicidal       Self-Harm: No plan to harm self       Relapse: (Low/Moderate/High): Moderate       Crisis Safety Plan Reviewed: Discussed with patient    Diagnosis:   Major depressive disorder, recurrent  ADHD  PTSD    Treatment Plan:  Current treatment plan consists of quarterly psychiatric sessions designed to evaluate her depression, ADHD, and PTSD.    Duration will be for a minimum of 12 months and will be reviewed at each visit.    Goals: Remission of depression with improvement in ADHD symptoms and order to prevent relapse due to the chronic nature of her behavioral health problems and mental illness.  Continue Wellbutrin  mg a.m.  Continue Strattera 60 mg a.m.    Ephraim Chavez M.D.      This note was created using  voice recognition software (Dragon). The accuracy of the dictation is limited by the abilities of the software. I have reviewed the note prior to signing, however some errors in grammar and context are still possible. If you have any questions related to this note please do not hesitate to contact our office.

## 2023-10-12 ENCOUNTER — APPOINTMENT (OUTPATIENT)
Dept: MEDICAL GROUP | Facility: MEDICAL CENTER | Age: 73
End: 2023-10-12
Payer: MEDICARE

## 2023-11-27 ENCOUNTER — OFFICE VISIT (OUTPATIENT)
Dept: MEDICAL GROUP | Facility: MEDICAL CENTER | Age: 73
End: 2023-11-27
Payer: MEDICARE

## 2023-11-27 VITALS
OXYGEN SATURATION: 95 % | HEIGHT: 63 IN | HEART RATE: 109 BPM | BODY MASS INDEX: 31.71 KG/M2 | SYSTOLIC BLOOD PRESSURE: 122 MMHG | RESPIRATION RATE: 16 BRPM | TEMPERATURE: 97.4 F | WEIGHT: 179 LBS | DIASTOLIC BLOOD PRESSURE: 64 MMHG

## 2023-11-27 DIAGNOSIS — R19.8 ABNORMAL BOWEL HABITS: ICD-10-CM

## 2023-11-27 DIAGNOSIS — H93.13 TINNITUS OF BOTH EARS: ICD-10-CM

## 2023-11-27 PROCEDURE — 3074F SYST BP LT 130 MM HG: CPT | Performed by: STUDENT IN AN ORGANIZED HEALTH CARE EDUCATION/TRAINING PROGRAM

## 2023-11-27 PROCEDURE — 3078F DIAST BP <80 MM HG: CPT | Performed by: STUDENT IN AN ORGANIZED HEALTH CARE EDUCATION/TRAINING PROGRAM

## 2023-11-27 PROCEDURE — 99214 OFFICE O/P EST MOD 30 MIN: CPT | Performed by: STUDENT IN AN ORGANIZED HEALTH CARE EDUCATION/TRAINING PROGRAM

## 2023-11-27 ASSESSMENT — FIBROSIS 4 INDEX: FIB4 SCORE: 1.26

## 2023-11-27 NOTE — PROGRESS NOTES
"Subjective:     CC: Tinnitus, abnormal bowel habits    HPI:   Denia presents today with    Problem   Abnormal Bowel Habits    This is a chronic condition.  She initially had alternating constipation and diarrhea but now has only diarrhea.  She is incontinent of stool at times.  She does follow with GI.  She is currently taking MiraLAX twice daily as well as a fiber supplement.  She is still following with GI in [place] but seems like she has somewhat lost touch.  They were talking about doing colonoscopy under general anesthesia.       She had 1 month of on and off tinnitus, worse on the left side.  This is resolved with cleaning her ears.    ROS:  ROS    Objective:     Exam:  /64   Pulse (!) 109   Temp 36.3 °C (97.4 °F) (Temporal)   Resp 16   Ht 1.6 m (5' 3\")   Wt 81.2 kg (179 lb)   SpO2 95%   BMI 31.71 kg/m²  Body mass index is 31.71 kg/m².    Physical Exam  Vitals reviewed.   Constitutional:       General: She is not in acute distress.     Appearance: She is not toxic-appearing.   HENT:      Head: Normocephalic and atraumatic.      Right Ear: Tympanic membrane, ear canal and external ear normal. There is no impacted cerumen.      Left Ear: Tympanic membrane, ear canal and external ear normal. There is no impacted cerumen.   Eyes:      General:         Right eye: No discharge.         Left eye: No discharge.      Extraocular Movements: Extraocular movements intact.      Conjunctiva/sclera: Conjunctivae normal.   Pulmonary:      Effort: Pulmonary effort is normal. No respiratory distress.   Skin:     General: Skin is warm and dry.   Neurological:      Mental Status: She is alert.   Psychiatric:         Mood and Affect: Mood normal.         Behavior: Behavior normal.         Thought Content: Thought content normal.         Judgment: Judgment normal.         Assessment & Plan:     73 y.o. female with the following -     1. Tinnitus of both ears  We discussed the importance of keeping her ears clean and " wearing her hearing aids.  Today her ears are normal in exam and tinnitus seems to have resolved    2. Abnormal bowel habits  We discussed the importance of continuing her fiber supplementation.  I do think it is important for her to follow-up with GI due to her elevated LFTs, enlarged common bile duct and abnormal bowel habits.  She will pursue colonoscopy under anesthesia with [facility] if they call her to schedule    No follow-ups on file.    Please note that this dictation was created using voice recognition software. I have made every reasonable attempt to correct obvious errors, but I expect that there are errors of grammar and possibly content that I did not discover before finalizing the note.

## 2023-12-04 RX ORDER — ATOMOXETINE 60 MG/1
60 CAPSULE ORAL
Qty: 90 CAPSULE | Refills: 0 | Status: SHIPPED | OUTPATIENT
Start: 2023-12-04 | End: 2024-01-03 | Stop reason: SDUPTHER

## 2023-12-11 DIAGNOSIS — I47.29 NSVT (NONSUSTAINED VENTRICULAR TACHYCARDIA) (HCC): ICD-10-CM

## 2023-12-11 DIAGNOSIS — G47.33 OSA (OBSTRUCTIVE SLEEP APNEA): ICD-10-CM

## 2023-12-11 DIAGNOSIS — R00.0 TACHYCARDIA: ICD-10-CM

## 2023-12-11 DIAGNOSIS — R00.2 PALPITATIONS: ICD-10-CM

## 2023-12-11 DIAGNOSIS — E78.00 PURE HYPERCHOLESTEROLEMIA: ICD-10-CM

## 2023-12-11 DIAGNOSIS — G47.33 OSA ON CPAP: ICD-10-CM

## 2023-12-11 DIAGNOSIS — I49.3 FREQUENT PVCS: ICD-10-CM

## 2023-12-11 RX ORDER — VERAPAMIL HYDROCHLORIDE 40 MG/1
40 TABLET ORAL EVERY 8 HOURS
Qty: 300 TABLET | Refills: 1 | Status: SHIPPED
Start: 2023-12-11 | End: 2023-12-28

## 2023-12-11 NOTE — TELEPHONE ENCOUNTER
Is the patient due for a refill? Yes    Was the patient seen the past year? Yes    Date of last office visit: 6/26/23    Does the patient have an upcoming appointment?  Yes   If yes, When? 12/28/23 w/RAMIREZ    Provider to refill:CONNER    Does the patients insurance require a 100 day supply?  Yes

## 2023-12-27 ENCOUNTER — HOSPITAL ENCOUNTER (OUTPATIENT)
Dept: LAB | Facility: MEDICAL CENTER | Age: 73
End: 2023-12-27
Attending: STUDENT IN AN ORGANIZED HEALTH CARE EDUCATION/TRAINING PROGRAM
Payer: MEDICARE

## 2023-12-27 DIAGNOSIS — E78.00 PURE HYPERCHOLESTEROLEMIA: ICD-10-CM

## 2023-12-27 PROCEDURE — 80061 LIPID PANEL: CPT

## 2023-12-27 PROCEDURE — 36415 COLL VENOUS BLD VENIPUNCTURE: CPT

## 2023-12-28 ENCOUNTER — OFFICE VISIT (OUTPATIENT)
Dept: CARDIOLOGY | Facility: MEDICAL CENTER | Age: 73
End: 2023-12-28
Attending: INTERNAL MEDICINE
Payer: MEDICARE

## 2023-12-28 VITALS
WEIGHT: 180 LBS | HEIGHT: 63 IN | SYSTOLIC BLOOD PRESSURE: 110 MMHG | HEART RATE: 106 BPM | DIASTOLIC BLOOD PRESSURE: 66 MMHG | OXYGEN SATURATION: 97 % | BODY MASS INDEX: 31.89 KG/M2

## 2023-12-28 DIAGNOSIS — E78.00 PURE HYPERCHOLESTEROLEMIA: ICD-10-CM

## 2023-12-28 DIAGNOSIS — R00.2 PALPITATIONS: ICD-10-CM

## 2023-12-28 DIAGNOSIS — I47.10 PSVT (PAROXYSMAL SUPRAVENTRICULAR TACHYCARDIA) (HCC): ICD-10-CM

## 2023-12-28 DIAGNOSIS — G47.33 OBSTRUCTIVE SLEEP APNEA: ICD-10-CM

## 2023-12-28 DIAGNOSIS — I49.3 PVC'S (PREMATURE VENTRICULAR CONTRACTIONS): ICD-10-CM

## 2023-12-28 LAB
CHOLEST SERPL-MCNC: 162 MG/DL (ref 100–199)
HDLC SERPL-MCNC: 66 MG/DL
LDLC SERPL CALC-MCNC: 70 MG/DL
TRIGL SERPL-MCNC: 131 MG/DL (ref 0–149)

## 2023-12-28 PROCEDURE — 99214 OFFICE O/P EST MOD 30 MIN: CPT | Performed by: INTERNAL MEDICINE

## 2023-12-28 PROCEDURE — 3078F DIAST BP <80 MM HG: CPT | Performed by: INTERNAL MEDICINE

## 2023-12-28 PROCEDURE — 3074F SYST BP LT 130 MM HG: CPT | Performed by: INTERNAL MEDICINE

## 2023-12-28 PROCEDURE — 99213 OFFICE O/P EST LOW 20 MIN: CPT | Performed by: INTERNAL MEDICINE

## 2023-12-28 RX ORDER — DILTIAZEM HYDROCHLORIDE 120 MG/1
120 CAPSULE, COATED, EXTENDED RELEASE ORAL DAILY
Qty: 90 CAPSULE | Refills: 3 | Status: SHIPPED | OUTPATIENT
Start: 2023-12-28 | End: 2024-01-04 | Stop reason: SDUPTHER

## 2023-12-28 ASSESSMENT — FIBROSIS 4 INDEX: FIB4 SCORE: 1.26

## 2023-12-29 ENCOUNTER — PATIENT MESSAGE (OUTPATIENT)
Dept: MEDICAL GROUP | Facility: MEDICAL CENTER | Age: 73
End: 2023-12-29
Payer: MEDICARE

## 2023-12-29 DIAGNOSIS — Z78.0 POSTMENOPAUSAL: ICD-10-CM

## 2023-12-29 NOTE — PROGRESS NOTES
Cardiology Initial Consultation Note    Date of note:    12/28/2023    Primary Care Provider: Hoda Sloan M.D.  Referring Provider: No ref. provider found    Patient Name: Denia Deluca   YOB: 1950  MRN:              [id number]    Chief Complaint   Patient presents with    Tachycardia    Palpitations       History of Present Illness: Ms. Denia Deluca is a 73 y.o. woman with past medical history significant for obstructive sleep apnea on CPAP, hyperlipidemia, ADHD, palpitations due to PVCs and paroxysmal SVT who presents to the cardiology office for follow-up.    She is a former patient of Dr. Goncalves.  Last seen in the office by Annel Evans on 6/26/2023.  During that visit, beta-blocker therapy was switched to CCB for palpitations due to allergies and concern for decreased efficiency of her EpiPen.     She tells me that she has been taking verapamil as prescribed.  However sometimes misses doses due to 3 times daily dosing.  She has noticed improvement in palpitations.  Rarely gets palpitations.  They occur about once a month.  Sometimes will notice her heart racing with these palpitations.  Denies having exertional chest pain or dyspnea.  No episodes of lightheadedness/dizziness or syncope.    Also, she started taking atorvastatin 10 mg daily given her history of hyperlipidemia.  Tolerating this medication without major side effects or myalgias.    Cardiovascular Risk Factors:  1. Smoking status: Denies  2. Type II Diabetes Mellitus: Denies   Lab Results   Component Value Date/Time    HBA1C 5.5 11/15/2022 08:17 AM    HBA1C 5.4 04/29/2022 09:40 AM     3. Hypertension: Denies  4. Dyslipidemia: Yes   Cholesterol,Tot   Date Value Ref Range Status   12/27/2023 162 100 - 199 mg/dL Final     LDL   Date Value Ref Range Status   12/27/2023 70 <100 mg/dL Final     HDL   Date Value Ref Range Status   12/27/2023 66 >=40 mg/dL Final     Triglycerides   Date Value Ref Range Status  "  12/27/2023 131 0 - 149 mg/dL Final     5. Family history of early Coronary Artery Disease in a first degree relative (Male less than 55 years of age; Female less than 65 years of age): Denies      Review of Systems:  As per HPI.  ROS questions obtained and are negative except as stated above.    Past Medical History:   Diagnosis Date    ADHD     Allergic rhinitis     Asthma     Back pain     Basal cell carcinoma (BCC)     BMI 31.0-31.9,adult 1/19/2022    Chickenpox     Depression     Fracture     Multiple fractures    Influenza     Kidney stone     Nasal drainage     Pneumonia          Past Surgical History:   Procedure Laterality Date    ABDOMINAL HYSTERECTOMY TOTAL      age 38 for \"pre-cancer\", ovaries not removed    CARPAL TUNNEL RELEASE      HYSTERECTOMY LAPAROSCOPY      OPEN REDUCTION Right     R foot    [place] REMV 2ND CATARACT,CORN-SCLER SECTN           Current Outpatient Medications   Medication Sig Dispense Refill    DILTIAZem CD (CARDIZEM CD) 120 MG CAPSULE SR 24 HR Take 1 Capsule by mouth every day. 90 Capsule 3    atomoxetine (STRATTERA) 60 MG capsule TAKE ONE CAPSULE BY MOUTH EVERY DAY 90 Capsule 0    buPROPion SR (WELLBUTRIN SR) 150 MG TABLET SR 12 HR sustained-release tablet Take 1 Tablet by mouth every day for 90 days. 90 Tablet 0    atorvastatin (LIPITOR) 10 MG Tab Take 1 Tablet by mouth every evening. 100 Tablet 3    oxybutynin (DITROPAN) 5 MG Tab Take 1 Tablet by mouth 2 times a day. 180 Tablet 3    Magnesium 100 MG Tab Take 1 Tablet by mouth every day. 90 Tablet     SUMAtriptan (IMITREX) 25 MG Tab tablet Take 1 Tablet by mouth one time as needed for Migraine for up to 1 dose. Do not exceed more than 10 tablets a month 30 Tablet 2    omeprazole (PRILOSEC) 20 MG delayed-release capsule Take 1 Capsule by mouth 2 times a day. (Patient taking differently: Take 20 mg by mouth every day. Once a day) 200 Capsule 3    albuterol 108 (90 Base) MCG/ACT Aero Soln inhalation aerosol Inhale 2 Puffs every 6 hours " as needed for Shortness of Breath. 8.5 g 3    Multiple Vitamin (MULTIVITAMIN ADULT PO) Take  by mouth.      CALCIUM PO Take  by mouth.      VITAMIN D PO Take  by mouth.      Ascorbic Acid (VITAMIN C PO) Take  by mouth.      EPINEPHrine (EPIPEN) 0.3 MG/0.3ML Solution Auto-injector solution for injection        No current facility-administered medications for this visit.         Allergies   Allergen Reactions    Bee Venom Anaphylaxis     Will stop heart   Will stop heart     Crab     Magnesium Citrate Palpitations, Myalgia and Unspecified    Shellfish Allergy Unspecified and Anaphylaxis     Heart issues  Heart issues    Shrimp (Diagnostic)     Dust Mite Extract     [place] Hives     Increase in heart rate  Increase in heart rate         Family History   Problem Relation Age of Onset    Cancer Mother     Cancer Father     Breast Cancer Sister     Cancer Sister         Breast cancer    Lupus Sister     Leukemia Brother     Colorectal Cancer Neg Hx     Peritoneal Cancer Neg Hx     Tubal Cancer Neg Hx     Ovarian Cancer Neg Hx          Social History     Socioeconomic History    Marital status:      Spouse name: Not on file    Number of children: Not on file    Years of education: Not on file    Highest education level: Bachelor's degree (e.g., BA, AB, BS)   Occupational History    Not on file   Tobacco Use    Smoking status: Never    Smokeless tobacco: Never   Vaping Use    Vaping Use: Never used   Substance and Sexual Activity    Alcohol use: Not Currently     Comment: Occasionally    Drug use: No    Sexual activity: Not on file   Other Topics Concern    Not on file   Social History Narrative    , has grown children. 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     Her parents are .  She had 2 biological daughters 1  in a motor vehicle accident at age 17.  She had 2 brothers.  One is .  1 had bipolar disorder.  Her maternal great grand mother was  bipolar.     Social Determinants of Health     Financial Resource Strain: Low Risk  (12/19/2022)    Overall Financial Resource Strain (CARDIA)     Difficulty of Paying Living Expenses: Not very hard   Food Insecurity: No Food Insecurity (12/19/2022)    Hunger Vital Sign     Worried About Running Out of Food in the Last Year: Never true     Ran Out of Food in the Last Year: Never true   Transportation Needs: No Transportation Needs (12/19/2022)    PRAPARE - Transportation     Lack of Transportation (Medical): No     Lack of Transportation (Non-Medical): No   Physical Activity: Insufficiently Active (3/23/2023)    Exercise Vital Sign     Days of Exercise per Week: 4 days     Minutes of Exercise per Session: 30 min   Stress: No Stress Concern Present (12/19/2022)    [employer] - Occupational Stress Questionnaire     Feeling of Stress : Only a little   Social Connections: Socially Integrated (12/19/2022)    Social Connection and Isolation Panel [NHANES]     Frequency of Communication with Friends and Family: More than three times a week     Frequency of Social Gatherings with Friends and Family: Twice a week     Attends Protestant Services: 1 to 4 times per year     Active Member of Clubs or Organizations: Yes     Attends Club or Organization Meetings: More than 4 times per year     Marital Status:    Intimate Partner Violence: Not At Risk (3/23/2023)    Humiliation, Afraid, Rape, and Kick questionnaire     Fear of Current or Ex-Partner: No     Emotionally Abused: No     Physically Abused: No     Sexually Abused: No   Housing Stability: Low Risk  (3/23/2023)    Housing Stability Vital Sign     Unable to Pay for Housing in the Last Year: No     Number of Places Lived in the Last Year: 1     Unstable Housing in the Last Year: No         Physical Exam:  Ambulatory Vitals  /66 (BP Location: Left arm, Patient Position: Sitting, BP Cuff Size: Adult)   Pulse (!) 106   Ht 1.6 m (5'  "3\")   Wt 81.6 kg (180 lb)   SpO2 97%    Oxygen Therapy:  Pulse Oximetry: 97 %  BP Readings from Last 4 Encounters:   12/28/23 110/66   11/27/23 122/64   09/21/23 134/66   08/03/23 122/66       Weight/BMI: Body mass index is 31.89 kg/m².  Wt Readings from Last 4 Encounters:   12/28/23 81.6 kg (180 lb)   11/27/23 81.2 kg (179 lb)   09/21/23 82.6 kg (182 lb)   08/03/23 84 kg (185 lb 3 oz)         General: Not in acute distress  Neck:  No carotid bruits, No JVD appreciated  CVS:  RRR, Normal S1, S2. No murmurs, rubs or gallops  Resp: Normal respiratory effort, lungs CTA bilaterally. No rales or rhonchi  Abdomen: Soft, non-distended, non-tender to palpation  Skin: No obvious rashes, no cyanosis  Neurological: Alert and oriented x3, moves all extremities, no focal neurologic deficits  Psychiatric: Appropriate affect  Extremities:   Extremities warm, no edema, pulses 2+      Lab Data Review:  Lab Results   Component Value Date/Time    CHOLSTRLTOT 162 12/27/2023 11:09 AM    LDL 70 12/27/2023 11:09 AM    HDL 66 12/27/2023 11:09 AM    TRIGLYCERIDE 131 12/27/2023 11:09 AM       Lab Results   Component Value Date/Time    SODIUM 140 03/14/2023 02:54 PM    POTASSIUM 3.6 03/14/2023 02:54 PM    CHLORIDE 103 03/14/2023 02:54 PM    CO2 28 03/14/2023 02:54 PM    GLUCOSE 92 03/14/2023 02:54 PM    BUN 20 03/14/2023 02:54 PM    CREATININE 0.99 03/14/2023 02:54 PM     Lab Results   Component Value Date/Time    ALKPHOSPHAT 147 (H) 03/14/2023 02:54 PM    ASTSGOT 17 03/14/2023 02:54 PM    ALTSGPT 15 03/14/2023 02:54 PM    TBILIRUBIN 0.3 03/14/2023 02:54 PM      Lab Results   Component Value Date/Time    WBC 5.4 03/14/2023 02:54 PM    HEMOGLOBIN 13.8 03/14/2023 02:54 PM     Lab Results   Component Value Date/Time    HBA1C 5.5 11/15/2022 08:17 AM    HBA1C 5.4 04/29/2022 09:40 AM         Cardiac Imaging and Procedures Review:    EKG dated 2022:   My personal interpretation is sinus rhythm    Echo dated 2019:   Normal left ventricular systolic " function. Left ventricular ejection   fraction is visually estimated to be 65%.   Normal diastolic function.  Normal inferior vena cava size and inspiratory collapse.    Cardiac stress test 2019:   Negative for ischemia or infarction.    Assessment & Plan     1. Palpitations  DILTIAZem CD (CARDIZEM CD) 120 MG CAPSULE SR 24 HR      2. PSVT (paroxysmal supraventricular tachycardia)  DILTIAZem CD (CARDIZEM CD) 120 MG CAPSULE SR 24 HR      3. PVC's (premature ventricular contractions)  DILTIAZem CD (CARDIZEM CD) 120 MG CAPSULE SR 24 HR      4. Pure hypercholesterolemia        5. Obstructive sleep apnea              Medical Decision Making:  Ms. Denia Deluca is a 73 y.o. woman with past medical history significant for obstructive sleep apnea on CPAP, hyperlipidemia, ADHD, palpitations due to PVCs and paroxysmal SVT who presents to the cardiology office for follow-up.    1. Palpitations  2. PSVT (paroxysmal supraventricular tachycardia)  3. PVC's (premature ventricular contractions)  -Palpitations are stable.  Will occasionally get it about once a month.  Sporadic in nature and resolve on its own.  Has been taking verapamil 3 times daily.  Due to inconvenience and possibility of missing doses, I will go ahead and switch verapamil to long-acting diltiazem 120 mg daily.  Patient is in agreement.    4. Pure hypercholesterolemia  -Review of recent lipid panel that was performed a few days ago shows significant improvement in LDL from 130s down to 70.  Continue current dose of atorvastatin 10 mg daily.    5. Obstructive sleep apnea  -Continue CPAP nightly.  Emphasized importance of compliance with CPAP at night.      It was a pleasure seeing Ms. Denia Deluca in the office today. Return in about 1 year (around 12/28/2024). Patient is aware to call the cardiology clinic with any questions or concerns.      Hamzah Basurto MD, [facility]  Cardiologist, [facility]  [facility],  [address]  [address] [address]  [place] [address]  Phone: [phone]  Fax: [phone]    Please note that this dictation was created using voice recognition software. I have made every reasonable attempt to correct obvious errors, but it is possible there are errors of grammar and possibly content that I did not discover before finalizing the note.

## 2024-01-03 ENCOUNTER — TELEPHONE (OUTPATIENT)
Dept: HEALTH INFORMATION MANAGEMENT | Facility: OTHER | Age: 74
End: 2024-01-03
Payer: MEDICARE

## 2024-01-03 ENCOUNTER — OFFICE VISIT (OUTPATIENT)
Dept: BEHAVIORAL HEALTH | Facility: CLINIC | Age: 74
End: 2024-01-03
Payer: MEDICARE

## 2024-01-03 DIAGNOSIS — F33.1 MAJOR DEPRESSIVE DISORDER, RECURRENT EPISODE, MODERATE (HCC): ICD-10-CM

## 2024-01-03 DIAGNOSIS — F43.10 POSTTRAUMATIC STRESS DISORDER: ICD-10-CM

## 2024-01-03 DIAGNOSIS — F90.1 ATTENTION DEFICIT HYPERACTIVITY DISORDER (ADHD), PREDOMINANTLY HYPERACTIVE TYPE: ICD-10-CM

## 2024-01-03 PROCEDURE — 99214 OFFICE O/P EST MOD 30 MIN: CPT | Performed by: PSYCHIATRY & NEUROLOGY

## 2024-01-03 RX ORDER — ATOMOXETINE 60 MG/1
60 CAPSULE ORAL
Qty: 180 CAPSULE | Refills: 0 | Status: SHIPPED | OUTPATIENT
Start: 2024-01-03 | End: 2024-01-05 | Stop reason: SDUPTHER

## 2024-01-03 RX ORDER — BUPROPION HYDROCHLORIDE 150 MG/1
150 TABLET, EXTENDED RELEASE ORAL DAILY
Qty: 180 TABLET | Refills: 0 | Status: SHIPPED | OUTPATIENT
Start: 2024-01-03 | End: 2024-01-05 | Stop reason: SDUPTHER

## 2024-01-03 NOTE — PROGRESS NOTES
[facility]   Follow Up Assessment     This provider informed the patient their medical records are totally confidential except for the use by other providers involved in their care, or if the patient signs a release, or to report instances of child or elder abuse, or if it is determined they are an immediate risk to harm themselves or others.    Name: Denia Deluca  MRN: [id number]  : 1950  Age: 73 y.o.  Date of assessment: 1/3/2024  PCP: Hoda Sloan M.D.      Subjective:  Chart reviewed prior to seeing her in my office.  She was last seen on October 3.  She feels stable on Wellbutrin  mg a.m. in combination with Strattera 60 mg a.m.  No medication changes requested.  She is changing mail-order services to [facility].     Objective:  She is alert, oriented, and cooperative.  Talkative.  Relatedness is good.  Grooming is good.  Speech is a little rapid.  Anxious.  Memory is good.  Insight and judgment are good.  No indication of psychotic thinking.    Current Risk:       Suicidal: Not suicidal       Homicidal: Not homicidal       Self-Harm: No plan to harm self       Relapse: (Low/Moderate/High): Moderate       Crisis Safety Plan Reviewed: Discussed with patient    Diagnosis:   Major depressive disorder, recurrent  ADHD  PTSD    Treatment Plan:  The current treatment plan consists of psychiatric sessions every 6 months designed to evaluate her depression, ADHD, and PTSD.    Duration will be for a minimum of 12 months and will be reviewed at each visit.    Goals: Remission of depression with improvement in ADHD symptoms in order to prevent relapse due to the chronic nature of her behavioral health problems and mental illness.  Continue Wellbutrin  mg a.m.  Continue Strattera 60 mg a.m.    Ephraim Chavez M.D.      This note was created using voice recognition software (Dragon). The accuracy of the dictation is limited by the abilities of the software. I have reviewed the note  prior to signing, however some errors in grammar and context are still possible. If you have any questions related to this note please do not hesitate to contact our office.

## 2024-01-04 DIAGNOSIS — N39.46 MIXED STRESS AND URGE URINARY INCONTINENCE: ICD-10-CM

## 2024-01-04 DIAGNOSIS — R00.2 PALPITATIONS: ICD-10-CM

## 2024-01-04 DIAGNOSIS — I49.3 PVC'S (PREMATURE VENTRICULAR CONTRACTIONS): ICD-10-CM

## 2024-01-04 DIAGNOSIS — I47.10 PSVT (PAROXYSMAL SUPRAVENTRICULAR TACHYCARDIA) (HCC): ICD-10-CM

## 2024-01-04 RX ORDER — VERAPAMIL HYDROCHLORIDE 40 MG/1
TABLET ORAL
COMMUNITY
End: 2024-02-15 | Stop reason: CLARIF

## 2024-01-04 RX ORDER — OXYBUTYNIN CHLORIDE 5 MG/1
5 TABLET ORAL 2 TIMES DAILY
Qty: 180 TABLET | Refills: 3 | Status: SHIPPED | OUTPATIENT
Start: 2024-01-04 | End: 2024-01-05 | Stop reason: SDUPTHER

## 2024-01-04 RX ORDER — DILTIAZEM HYDROCHLORIDE 120 MG/1
120 CAPSULE, COATED, EXTENDED RELEASE ORAL DAILY
Qty: 90 CAPSULE | Refills: 1 | Status: SHIPPED | OUTPATIENT
Start: 2024-01-04 | End: 2024-01-05 | Stop reason: SDUPTHER

## 2024-01-04 RX ORDER — OMEPRAZOLE 20 MG/1
20 CAPSULE, DELAYED RELEASE ORAL 2 TIMES DAILY
Qty: 200 CAPSULE | Refills: 3 | Status: SHIPPED | OUTPATIENT
Start: 2024-01-04 | End: 2024-01-05 | Stop reason: SDUPTHER

## 2024-01-04 NOTE — TELEPHONE ENCOUNTER
Received request via: Patient    Was the patient seen in the last year in this department? Yes    Does the patient have an active prescription (recently filled or refills available) for medication(s) requested? No    Does the patient have half-way Plus and need 100 day supply (blood pressure, diabetes and cholesterol meds only)? Needs to be resent to new pharamacy   [Hypertension] : hypertension [FreeTextEntry1] : edema

## 2024-01-04 NOTE — TELEPHONE ENCOUNTER
Received request via: Patient    Was the patient seen in the last year in this department? Yes    Does the patient have an active prescription (recently filled or refills available) for medication(s) requested?  Needs 1 month supply prior to switching mail order

## 2024-01-05 DIAGNOSIS — G47.33 OSA ON CPAP: ICD-10-CM

## 2024-01-05 DIAGNOSIS — J45.20 MILD INTERMITTENT ASTHMA WITHOUT COMPLICATION: ICD-10-CM

## 2024-01-05 DIAGNOSIS — R00.0 TACHYCARDIA: ICD-10-CM

## 2024-01-05 DIAGNOSIS — E78.00 PURE HYPERCHOLESTEROLEMIA: ICD-10-CM

## 2024-01-05 DIAGNOSIS — N39.46 MIXED STRESS AND URGE URINARY INCONTINENCE: ICD-10-CM

## 2024-01-05 DIAGNOSIS — R00.2 PALPITATIONS: ICD-10-CM

## 2024-01-05 DIAGNOSIS — I47.29 NSVT (NONSUSTAINED VENTRICULAR TACHYCARDIA) (HCC): ICD-10-CM

## 2024-01-05 DIAGNOSIS — G47.33 OSA (OBSTRUCTIVE SLEEP APNEA): ICD-10-CM

## 2024-01-05 DIAGNOSIS — I49.3 FREQUENT PVCS: ICD-10-CM

## 2024-01-05 DIAGNOSIS — I47.10 PSVT (PAROXYSMAL SUPRAVENTRICULAR TACHYCARDIA) (HCC): ICD-10-CM

## 2024-01-05 DIAGNOSIS — I49.3 PVC'S (PREMATURE VENTRICULAR CONTRACTIONS): ICD-10-CM

## 2024-01-05 DIAGNOSIS — Z91.030 BEE ALLERGY STATUS: ICD-10-CM

## 2024-01-05 PROCEDURE — RXMED WILLOW AMBULATORY MEDICATION CHARGE: Performed by: STUDENT IN AN ORGANIZED HEALTH CARE EDUCATION/TRAINING PROGRAM

## 2024-01-05 PROCEDURE — RXMED WILLOW AMBULATORY MEDICATION CHARGE: Performed by: PSYCHIATRY & NEUROLOGY

## 2024-01-05 RX ORDER — ATORVASTATIN CALCIUM 10 MG/1
10 TABLET, FILM COATED ORAL NIGHTLY
Qty: 100 TABLET | Refills: 3 | Status: SHIPPED | OUTPATIENT
Start: 2024-01-05

## 2024-01-05 RX ORDER — OXYBUTYNIN CHLORIDE 5 MG/1
5 TABLET ORAL 2 TIMES DAILY
Qty: 180 TABLET | Refills: 3 | Status: SHIPPED | OUTPATIENT
Start: 2024-01-05

## 2024-01-05 RX ORDER — SUMATRIPTAN 25 MG/1
25 TABLET, FILM COATED ORAL
Qty: 30 TABLET | Refills: 2 | Status: SHIPPED | OUTPATIENT
Start: 2024-01-05

## 2024-01-05 RX ORDER — ALBUTEROL SULFATE 90 UG/1
2 AEROSOL, METERED RESPIRATORY (INHALATION) EVERY 6 HOURS PRN
Qty: 8.5 G | Refills: 3 | Status: SHIPPED | OUTPATIENT
Start: 2024-01-05

## 2024-01-05 RX ORDER — ATOMOXETINE 60 MG/1
60 CAPSULE ORAL
Qty: 180 CAPSULE | Refills: 0 | Status: SHIPPED | OUTPATIENT
Start: 2024-01-05 | End: 2024-03-18

## 2024-01-05 RX ORDER — BUPROPION HYDROCHLORIDE 150 MG/1
150 TABLET, EXTENDED RELEASE ORAL DAILY
Qty: 180 TABLET | Refills: 0 | Status: SHIPPED | OUTPATIENT
Start: 2024-01-05 | End: 2024-07-03

## 2024-01-05 RX ORDER — OMEPRAZOLE 20 MG/1
20 CAPSULE, DELAYED RELEASE ORAL 2 TIMES DAILY
Qty: 200 CAPSULE | Refills: 3 | Status: SHIPPED | OUTPATIENT
Start: 2024-01-05

## 2024-01-05 RX ORDER — DILTIAZEM HYDROCHLORIDE 120 MG/1
120 CAPSULE, COATED, EXTENDED RELEASE ORAL DAILY
Qty: 90 CAPSULE | Refills: 1 | Status: SHIPPED | OUTPATIENT
Start: 2024-01-05

## 2024-01-05 NOTE — TELEPHONE ENCOUNTER
Hello!    I'm helping Yovani Deluca transition their medications to [facility] on [address].  Would you please authorize these prescriptions?    Thank you!     Hafsa Barnes

## 2024-01-05 NOTE — TELEPHONE ENCOUNTER
Hello!    I'm helping Yovani Deluca transition their medications to [facility] on [address].  Would you please authorize these prescriptions?    Thank you!     Hafsa Barnes

## 2024-01-05 NOTE — TELEPHONE ENCOUNTER
Hello!    I'm helping Yovani Deluca transition their medications to [facility] on [address].  Would you please authorize these prescriptions?    Thank you!       Hafsa Barnes     left facial pain and swelling

## 2024-01-08 PROCEDURE — RXMED WILLOW AMBULATORY MEDICATION CHARGE: Performed by: STUDENT IN AN ORGANIZED HEALTH CARE EDUCATION/TRAINING PROGRAM

## 2024-01-09 ENCOUNTER — PHARMACY VISIT (OUTPATIENT)
Dept: PHARMACY | Facility: MEDICAL CENTER | Age: 74
End: 2024-01-09
Payer: COMMERCIAL

## 2024-01-15 PROCEDURE — RXMED WILLOW AMBULATORY MEDICATION CHARGE: Performed by: STUDENT IN AN ORGANIZED HEALTH CARE EDUCATION/TRAINING PROGRAM

## 2024-01-16 ENCOUNTER — PHARMACY VISIT (OUTPATIENT)
Dept: PHARMACY | Facility: MEDICAL CENTER | Age: 74
End: 2024-01-16
Payer: COMMERCIAL

## 2024-01-18 ENCOUNTER — HOSPITAL ENCOUNTER (OUTPATIENT)
Dept: RADIOLOGY | Facility: MEDICAL CENTER | Age: 74
End: 2024-01-18
Attending: NURSE PRACTITIONER
Payer: MEDICARE

## 2024-01-18 DIAGNOSIS — K21.9 GASTROESOPHAGEAL REFLUX DISEASE, UNSPECIFIED WHETHER ESOPHAGITIS PRESENT: ICD-10-CM

## 2024-01-18 DIAGNOSIS — E66.9 OBESITY, UNSPECIFIED CLASSIFICATION, UNSPECIFIED OBESITY TYPE, UNSPECIFIED WHETHER SERIOUS COMORBIDITY PRESENT: ICD-10-CM

## 2024-01-18 DIAGNOSIS — K59.00 COLONIC CONSTIPATION: ICD-10-CM

## 2024-01-18 PROCEDURE — 74018 RADEX ABDOMEN 1 VIEW: CPT

## 2024-02-13 NOTE — ASSESSMENT & PLAN NOTE
Chronic, stable. Last lipid panel in Dec 2023 was WNL. She currently takes atorvastatin 10 mg daily. We discussed her dietary/lifestyle regimen. Follow up with PCP for continued monitoring and management.    Lab Results   Component Value Date/Time    CHOLSTRLTOT 162 12/27/2023 11:09 AM    TRIGLYCERIDE 131 12/27/2023 11:09 AM    HDL 66 12/27/2023 11:09 AM    LDL 70 12/27/2023 11:09 AM

## 2024-02-13 NOTE — ASSESSMENT & PLAN NOTE
Chronic, stable. Continue with current defined treatment plan: Albuterol PRN. States she does not use it regularly but has been using it more recently because of the moisture in the air. She gets allergy shots regularly. Follow-up at least annually.

## 2024-02-13 NOTE — ASSESSMENT & PLAN NOTE
Chronic, stable. PHQ-9 in office today is 0. Currently taking wellbutrin 150 mg QOD. States she feels this has well controlled her mood. States Ravi time is when she feels it the most due to her daughter's death which happened over 20 years ago. She sees psych for medication management only. Discussed benefit of a therapist during that time of year. She states she will think about it. Follow up with PCP as needed.

## 2024-02-13 NOTE — ASSESSMENT & PLAN NOTE
Chronic, stable. Compliant with CPAP at night. Does not require supplemental O2. Follows with pulmonology.

## 2024-02-13 NOTE — ASSESSMENT & PLAN NOTE
Chronic, stable. Noted on ZioPatch results from 2019. She was taking beta-blockers, but is now receiving allergy shots, so she switched to verapamil and then has recently switched over to only diltiazem 120 mg daily. She follows with cardiology.

## 2024-02-14 ASSESSMENT — ACTIVITIES OF DAILY LIVING (ADL): BATHING_REQUIRES_ASSISTANCE: 0

## 2024-02-14 ASSESSMENT — ENCOUNTER SYMPTOMS: GENERAL WELL-BEING: GOOD

## 2024-02-14 ASSESSMENT — PATIENT HEALTH QUESTIONNAIRE - PHQ9: CLINICAL INTERPRETATION OF PHQ2 SCORE: 0

## 2024-02-15 ENCOUNTER — OFFICE VISIT (OUTPATIENT)
Dept: MEDICAL GROUP | Facility: PHYSICIAN GROUP | Age: 74
End: 2024-02-15
Payer: MEDICARE

## 2024-02-15 VITALS
DIASTOLIC BLOOD PRESSURE: 64 MMHG | HEIGHT: 63 IN | SYSTOLIC BLOOD PRESSURE: 120 MMHG | WEIGHT: 182 LBS | BODY MASS INDEX: 32.25 KG/M2

## 2024-02-15 DIAGNOSIS — I47.10 PSVT (PAROXYSMAL SUPRAVENTRICULAR TACHYCARDIA) (HCC): ICD-10-CM

## 2024-02-15 DIAGNOSIS — F90.1 ATTENTION DEFICIT HYPERACTIVITY DISORDER (ADHD), PREDOMINANTLY HYPERACTIVE TYPE: ICD-10-CM

## 2024-02-15 DIAGNOSIS — Z91.81 RISK FOR FALLS: ICD-10-CM

## 2024-02-15 DIAGNOSIS — G47.33 OBSTRUCTIVE SLEEP APNEA: ICD-10-CM

## 2024-02-15 DIAGNOSIS — E78.00 PURE HYPERCHOLESTEROLEMIA: ICD-10-CM

## 2024-02-15 DIAGNOSIS — I47.29 NSVT (NONSUSTAINED VENTRICULAR TACHYCARDIA) (HCC): ICD-10-CM

## 2024-02-15 DIAGNOSIS — F33.1 MAJOR DEPRESSIVE DISORDER, RECURRENT EPISODE, MODERATE (HCC): ICD-10-CM

## 2024-02-15 DIAGNOSIS — J45.20 MILD INTERMITTENT ASTHMA WITHOUT COMPLICATION: ICD-10-CM

## 2024-02-15 PROCEDURE — 3074F SYST BP LT 130 MM HG: CPT

## 2024-02-15 PROCEDURE — 1126F AMNT PAIN NOTED NONE PRSNT: CPT

## 2024-02-15 PROCEDURE — G0439 PPPS, SUBSEQ VISIT: HCPCS

## 2024-02-15 PROCEDURE — 3078F DIAST BP <80 MM HG: CPT

## 2024-02-15 RX ORDER — CYANOCOBALAMIN (VITAMIN B-12) 500 MCG
1 TABLET ORAL DAILY
COMMUNITY
End: 2024-03-25

## 2024-02-15 SDOH — ECONOMIC STABILITY: FOOD INSECURITY: WITHIN THE PAST 12 MONTHS, THE FOOD YOU BOUGHT JUST DIDN'T LAST AND YOU DIDN'T HAVE MONEY TO GET MORE.: NEVER TRUE

## 2024-02-15 SDOH — ECONOMIC STABILITY: INCOME INSECURITY: IN THE LAST 12 MONTHS, WAS THERE A TIME WHEN YOU WERE NOT ABLE TO PAY THE MORTGAGE OR RENT ON TIME?: NO

## 2024-02-15 SDOH — ECONOMIC STABILITY: INCOME INSECURITY: HOW HARD IS IT FOR YOU TO PAY FOR THE VERY BASICS LIKE FOOD, HOUSING, MEDICAL CARE, AND HEATING?: NOT HARD AT ALL

## 2024-02-15 SDOH — ECONOMIC STABILITY: FOOD INSECURITY: WITHIN THE PAST 12 MONTHS, YOU WORRIED THAT YOUR FOOD WOULD RUN OUT BEFORE YOU GOT MONEY TO BUY MORE.: NEVER TRUE

## 2024-02-15 SDOH — ECONOMIC STABILITY: HOUSING INSECURITY: IN THE LAST 12 MONTHS, HOW MANY PLACES HAVE YOU LIVED?: 1

## 2024-02-15 ASSESSMENT — PAIN SCALES - GENERAL: PAINLEVEL: NO PAIN

## 2024-02-15 ASSESSMENT — FIBROSIS 4 INDEX: FIB4 SCORE: 1.26

## 2024-02-15 NOTE — ASSESSMENT & PLAN NOTE
Chronic, stable. Continue with current defined treatment plan: strattera 60 mg. She states this controls her hyperactivity but states she still talks a lot. She sees a psychiatrist for medication management. Follow-up as previously scheduled.

## 2024-02-15 NOTE — ASSESSMENT & PLAN NOTE
Patient fall-risk assessment in office is positive. She reports 2 or more falls in the last year. States she has done PT. She states she does not pick her feet up when she walks and that causes her to trip over many things. Educated on removing hazards in the home, keeping walkways clear, as well as wearing appropriate non-slip footwear around the house.

## 2024-02-15 NOTE — PROGRESS NOTES
Comprehensive Health Assessment Program     Denia Deluca is a 73 y.o. here for her comprehensive health assessment.    Patient Active Problem List    Diagnosis Date Noted    Risk for falls 02/15/2024    Palpitations 12/28/2023    PSVT (paroxysmal supraventricular tachycardia) 12/28/2023    Abnormal LFTs 08/03/2023    PVC's (premature ventricular contractions) 06/26/2023    Slow transit constipation 02/17/2023    Class 1 obesity due to excess calories with body mass index (BMI) of 33.0 to 33.9 in adult 01/23/2023    Abnormal bowel habits 08/22/2022    BMI 33.0-33.9,adult 01/19/2022    Dilated pancreatic duct 06/24/2021    Hiatal hernia 06/24/2021    Elevated alkaline phosphatase level 06/10/2021    Aortic valve sclerosis 04/19/2021    NSVT (nonsustained ventricular tachycardia) (HCC) 04/19/2021    Major depressive disorder, recurrent episode, moderate (HCC) 03/02/2020    Posttraumatic stress disorder 11/04/2019    Epiretinal membrane (ERM) of left eye 05/10/2019    Obstructive sleep apnea 05/09/2019    Pure hypercholesterolemia 11/09/2018    Attention deficit hyperactivity disorder (ADHD), predominantly hyperactive type 05/11/2018    Bee allergy status 05/11/2018    Mixed stress and urge urinary incontinence 05/11/2018    Mild intermittent asthma without complication 05/11/2018       Current Outpatient Medications   Medication Sig Dispense Refill    Omega-3 Fatty Acids (FISH OIL) 300 MG Cap Take  by mouth.      atomoxetine (STRATTERA) 60 MG capsule Take 1 Capsule by mouth every day for 180 days. 180 Capsule 0    buPROPion SR (WELLBUTRIN SR) 150 MG TABLET SR 12 HR sustained-release tablet Take 1 Tablet by mouth every day for 180 days. 180 Tablet 0    DILTIAZem CD (CARDIZEM CD) 120 MG CAPSULE SR 24 HR Take 1 capsule by mouth every day. 90 Capsule 1    oxybutynin (DITROPAN) 5 MG Tab Take 1 Tablet by mouth 2 times a day. 180 Tablet 3    omeprazole (PRILOSEC) 20 MG delayed-release capsule Take 1 Capsule by  "mouth 2 times a day. 200 Capsule 3    atorvastatin (LIPITOR) 10 MG Tab Take 1 Tablet by mouth every evening. 100 Tablet 3    SUMAtriptan (IMITREX) 25 MG Tab tablet Take 1 Tablet by mouth one time as needed for Migraine for up to 1 dose. Do not exceed more than 10 tablets a month 30 Tablet 2    albuterol 108 (90 Base) MCG/ACT Aero Soln inhalation aerosol Inhale 2 Puffs every 6 hours as needed for Shortness of Breath. 8.5 g 3    Multiple Vitamin (MULTIVITAMIN ADULT PO) Take  by mouth.      CALCIUM PO Take  by mouth.      VITAMIN D PO Take  by mouth.      Ascorbic Acid (VITAMIN C PO) Take  by mouth.      EPINEPHrine (EPIPEN) 0.3 MG/0.3ML Solution Auto-injector solution for injection        No current facility-administered medications for this visit.          Current supplements as per medication list.     Allergies:   Bee venom, Crab, Magnesium citrate, Shellfish allergy, Shrimp (diagnostic), Dust mite extract, and [place]  Social History     Tobacco Use    Smoking status: Never    Smokeless tobacco: Never   Vaping Use    Vaping Use: Never used   Substance Use Topics    Alcohol use: Not Currently     Comment: Occasionally    Drug use: No     Family History   Problem Relation Age of Onset    Cancer Mother     Cancer Father     Breast Cancer Sister     Cancer Sister         Breast cancer    Lupus Sister     Leukemia Brother     Colorectal Cancer Neg Hx     Peritoneal Cancer Neg Hx     Tubal Cancer Neg Hx     Ovarian Cancer Neg Hx      Denia  has a past medical history of ADHD, Allergic rhinitis, Asthma, Back pain, Basal cell carcinoma (BCC), BMI 31.0-31.9,adult (1/19/2022), Chickenpox, Depression, Fracture, Influenza, Kidney stone, Nasal drainage, and Pneumonia.   Past Surgical History:   Procedure Laterality Date    ABDOMINAL HYSTERECTOMY TOTAL      age 38 for \"pre-cancer\", ovaries not removed    CARPAL TUNNEL RELEASE      HYSTERECTOMY LAPAROSCOPY      OPEN REDUCTION Right     R foot    VT REMV 2ND CATARACT,CORN-SCLER " SECTN         Screening:  In the last six months have you experienced any leakage of urine? Yes, can happen at night. She takes oxybutynin. Wears absorbant underwear.     Depression Screening  Little interest or pleasure in doing things?  0 - not at all  Feeling down, depressed , or hopeless? 0 - not at all  Patient Health Questionnaire Score: 0     If depressive symptoms identified deferred to follow up visit unless specifically addressed in assessment and plan.    Interpretation of PHQ-9 Total Score   Score Severity   1-4 No Depression   5-9 Mild Depression   10-14 Moderate Depression   15-19 Moderately Severe Depression   20-27 Severe Depression    Screening for Cognitive Impairment  Do you or any of your friends or family members have any concern about your memory? No  Three Minute Recall (Banana, Sunrise, Chair) 3/3    Lance clock face with all 12 numbers and set the hands to show 20 past 8.  Yes    Cognitive concerns identified deferred for follow up unless specifically addressed in assessment and plan.    Fall Risk Assessment  Has the patient had two or more falls in the last year or any fall with injury in the last year?  Yes She is supposed to use walking sticks when she walks. She does not pick her legs up.     Safety Assessment  Do you always wear your seatbelt?  Yes  Any changes to home needed to function safely? No  Difficulty hearing.  Yes, she has hearing aids but is not good about wearing them.   Patient counseled about all safety risks that were identified.    Functional Assessment ADLs  Are there any barriers preventing you from cooking for yourself or meeting nutritional needs?  No.    Are there any barriers preventing you from driving safely or obtaining transportation?  No.    Are there any barriers preventing you from using a telephone or calling for help?  No    Are there any barriers preventing you from shopping?  No.    Are there any barriers preventing you from taking care of your own  finances?  No    Are there any barriers preventing you from managing your medications?  No    Are there any barriers preventing you from showering, bathing or dressing yourself? No    Are there any barriers preventing you from doing housework or laundry? No  Are there any barriers preventing you from using the toilet?No  Are you currently engaging in any exercise or physical activity?  No.   She wants to get back to walking but is supposed to use a walking stick when she walks and she does not want to. States COVID caused her to stop walking at the gym.     Self-Assessment of Health  What is your perception of your health? Good  Do you sleep more than six hours a night? Yes  In the past 7 days, how much did pain keep you from doing your normal work? Some  Do you spend quality time with family or friends (virtually or in person)? Yes  Do you usually eat a heart healthy diet that constists of a variety of fruits, vegetables, whole grains and fiber? Yes  Do you eat foods high in fat and/or Fast Food more than three times per week? No    Advance Care Planning  Do you have an Advance Directive, Living Will, Durable Power of , or POLST? Yes  Advance Directive       is on file      Health Maintenance Summary            Scheduled - Bone Density Scan (Every 5 Years) Scheduled for 2/20/2024 01/11/2019  DS-BONE DENSITY STUDY (DEXA)              Annual Wellness Visit (Yearly) Next due on 2/17/2024 02/17/2023  Level of Service: ND PREVENTIVE VISIT,EST,65 & OVER    01/23/2023  Level of Service: ND ANNUAL WELLNESS VISIT-INCLUDES PPPS SUBSEQUE*    01/19/2022  Level of Service: ND ANNUAL WELLNESS VISIT-INCLUDES PPPS SUBSEQUE*    11/09/2020  Visit Dx: Medicare annual wellness visit, subsequent    11/04/2019  Visit Dx: Medicare annual wellness visit, subsequent    Only the first 5 history entries have been loaded, but more history exists.              Mammogram (Every 2 Years) Next due on 9/29/2024 09/29/2022   MA-SCREENING MAMMO BILAT W/TOMOSYNTHESIS W/CAD    2021  MA-SCREENING MAMMO BILAT W/TOMOSYNTHESIS W/CAD    2020  MA-SCREENING MAMMO BILAT W/TOMOSYNTHESIS W/CAD    10/31/2018  MA-SCREENING MAMMO BILAT W/TOMOSYNTHESIS W/CAD              IMM DTaP/Tdap/Td Vaccine (4 - Td or Tdap) Next due on 3/8/2031      2021  Imm Admin: Tdap Vaccine    2016  Imm Admin: Tdap Vaccine    2011  Imm Admin: Tdap Vaccine              Pneumococcal Vaccine: 65+ Years (Series Information) Completed      2016  Imm Admin: Pneumococcal polysaccharide vaccine (PPSV-23)    2015  Imm Admin: Pneumococcal Conjugate Vaccine (Prevnar/PCV-13)    2011  Imm Admin: Pneumococcal polysaccharide vaccine (PPSV-23)              Hepatitis C Screening  Tentatively Complete      10/27/2020  Hepatitis C Antibody component of HCV Scrn ( 5048-8232 1xLife)              Zoster (Shingles) Vaccines (Series Information) Completed      2021  Imm Admin: Zoster Vaccine Recombinant (RZV) (SHINGRIX)    2020  Imm Admin: Zoster Vaccine Recombinant (RZV) (SHINGRIX)              Influenza Vaccine (Series Information) Completed      2023  Imm Admin: Influenza Vaccine Adult HD    2022  Imm Admin: Influenza, Unspecified - HISTORICAL DATA    2021  Imm Admin: Influenza Vaccine Adult HD    10/26/2020  Imm Admin: Influenza Vaccine Adult HD    2019  Imm Admin: Influenza Vaccine Adult HD    Only the first 5 history entries have been loaded, but more history exists.              COVID-19 Vaccine (Series Information) Completed      10/02/2023  Imm Admin: Comirnaty (Covid-19 Vaccine, Mrna, [id number] Formula)    10/24/2022  Imm Admin: PFIZER BIVALENT SARS-COV-2 VACCINE (12+)    10/18/2021  Imm Admin: PFIZER PURPLE CAP SARS-COV-2 VACCINATION (12+)    2021  Imm Admin: PFIZER PURPLE CAP SARS-COV-2 VACCINATION (12+)    2021  Imm Admin: PFIZER PURPLE CAP SARS-COV-2 VACCINATION (12+)    Only the first 5  history entries have been loaded, but more history exists.              Hepatitis A Vaccine (Hep A) (Series Information) Aged Out      No completion history exists for this topic.              Hepatitis B Vaccine (Hep B) (Series Information) Aged Out      No completion history exists for this topic.              HPV Vaccines (Series Information) Aged Out      No completion history exists for this topic.              Polio Vaccine (Inactivated Polio) (Series Information) Aged Out      No completion history exists for this topic.              Meningococcal Immunization (Series Information) Aged Out      No completion history exists for this topic.              Discontinued - Colorectal Cancer Screening  Discontinued      03/17/2023  OCCULT BLOOD FECES IMMUNOASSAY    02/24/2023  Colon Cancer Screening Annual FIT (Done)    04/30/2022  OCCULT BLOOD FECES IMMUNOASSAY    02/22/2022  REFERRAL TO GI FOR COLONOSCOPY    05/17/2021  OCCULT BLOOD FECES IMMUNOASSAY    Only the first 5 history entries have been loaded, but more history exists.                    Patient Care Team:  Hoda Sloan M.D. as PCP - General (Family Medicine)  Roselyn Porras M.D. as PCP - [facility] Paneled  Maria R Reyes, M.D. as Consulting Physician (Allergy and Immunology)  Tawnya Cee M.D. as Consulting Physician (Cardiovascular Disease (Cardiology))  Alfonso Denton M.D. (Inactive) as Consulting Physician (Pulmonary Medicine)  Gustavo Villegas Ass't as    [facility] (DME Supplier)  Madina Arrington R.N. as RN Care Coordinator  (Registered Nurse)      Financial Resource Strain: Low Risk  (2/15/2024)    Overall Financial Resource Strain (CARDIA)     Difficulty of Paying Living Expenses: Not hard at all      Transportation Needs: No Transportation Needs (2/15/2024)    PRAPARE - Transportation     Lack of Transportation (Medical): No     Lack of Transportation (Non-Medical): No      Food Insecurity: No Food Insecurity  "(2/15/2024)    Hunger Vital Sign     Worried About Running Out of Food in the Last Year: Never true     Ran Out of Food in the Last Year: Never true        Encounter Vitals  Blood Pressure : 120/64  Weight: 82.6 kg (182 lb)  Height: 160 cm (5' 3\")  BMI (Calculated): 32.24  Pain Score: No pain     Alert, oriented in no acute distress.  Eye contact is good, speech goal directed, affect calm.    Assessment and Plan. The following treatment and monitoring plan is recommended:  Major depressive disorder, recurrent episode, moderate (HCC)  Chronic, stable. PHQ-9 in office today is 0. Currently taking wellbutrin 150 mg QOD. States she feels this has well controlled her mood. States Ravi time is when she feels it the most due to her daughter's death which happened over 20 years ago. She sees psych for medication management only. Discussed benefit of a therapist during that time of year. She states she will think about it. Follow up with PCP as needed.      Mild intermittent asthma without complication  Chronic, stable. Continue with current defined treatment plan: Albuterol PRN. States she does not use it regularly but has been using it more recently because of the moisture in the air. She gets allergy shots regularly. Follow-up at least annually.      NSVT (nonsustained ventricular tachycardia) (HCC)  Chronic, stable. Noted on ZioPatch results from 2019. She was taking beta-blockers, but is now receiving allergy shots, so she switched to verapamil and then has recently switched over to only diltiazem 120 mg daily. She follows with cardiology.     Obstructive sleep apnea  Chronic, stable. Compliant with CPAP at night. Does not require supplemental O2. Follows with pulmonology.       PSVT (paroxysmal supraventricular tachycardia)  Chronic, stable. Takes diltiazem 120 mg daily. She follows with cardiology annually.    Pure hypercholesterolemia  Chronic, stable. Last lipid panel in Dec 2023 was WNL. She currently takes " atorvastatin 10 mg daily. We discussed her dietary/lifestyle regimen. Follow up with PCP for continued monitoring and management.    Lab Results   Component Value Date/Time    CHOLSTRLTOT 162 12/27/2023 11:09 AM    TRIGLYCERIDE 131 12/27/2023 11:09 AM    HDL 66 12/27/2023 11:09 AM    LDL 70 12/27/2023 11:09 AM         Risk for falls  Patient fall-risk assessment in office is positive. She reports 2 or more falls in the last year. States she has done PT. She states she does not pick her feet up when she walks and that causes her to trip over many things. Educated on removing hazards in the home, keeping walkways clear, as well as wearing appropriate non-slip footwear around the house.       Attention deficit hyperactivity disorder (ADHD), predominantly hyperactive type  Chronic, stable. Continue with current defined treatment plan: strattera 60 mg. She states this controls her hyperactivity but states she still talks a lot. She sees a psychiatrist for medication management. Follow-up as previously scheduled.      Services suggested: No services needed at this time  Health Care Screening: Age-appropriate preventive services recommended by USPTF and ACIP covered by Medicare were discussed today. Services ordered if indicated and agreed upon by the patient.  Referrals offered: Community-based lifestyle interventions to reduce health risks and promote self-management and wellness, fall prevention, nutrition, physical activity, tobacco-use cessation, weight loss, and mental health services as per orders if indicated.    Discussion today about general wellness and lifestyle habits:    Prevent falls and reduce trip hazards; Cautioned about securing or removing rugs.  Have a working fire alarm and carbon monoxide detector.  Engage in regular physical activity and social activities.    Follow-up: Return for appointment with Primary Care Provider as needed..

## 2024-02-20 ENCOUNTER — HOSPITAL ENCOUNTER (OUTPATIENT)
Dept: RADIOLOGY | Facility: MEDICAL CENTER | Age: 74
End: 2024-02-20
Attending: STUDENT IN AN ORGANIZED HEALTH CARE EDUCATION/TRAINING PROGRAM
Payer: MEDICARE

## 2024-02-20 ENCOUNTER — PATIENT MESSAGE (OUTPATIENT)
Dept: MEDICAL GROUP | Facility: MEDICAL CENTER | Age: 74
End: 2024-02-20

## 2024-02-20 DIAGNOSIS — D22.9 ATYPICAL MOLE: ICD-10-CM

## 2024-02-20 DIAGNOSIS — Z78.0 POSTMENOPAUSAL: ICD-10-CM

## 2024-02-20 PROCEDURE — 77080 DXA BONE DENSITY AXIAL: CPT

## 2024-03-11 PROCEDURE — RXMED WILLOW AMBULATORY MEDICATION CHARGE: Performed by: INTERNAL MEDICINE

## 2024-03-13 PROCEDURE — RXMED WILLOW AMBULATORY MEDICATION CHARGE: Performed by: STUDENT IN AN ORGANIZED HEALTH CARE EDUCATION/TRAINING PROGRAM

## 2024-03-16 ENCOUNTER — APPOINTMENT (OUTPATIENT)
Dept: RADIOLOGY | Facility: MEDICAL CENTER | Age: 74
End: 2024-03-16
Attending: EMERGENCY MEDICINE
Payer: MEDICARE

## 2024-03-16 ENCOUNTER — HOSPITAL ENCOUNTER (OUTPATIENT)
Facility: MEDICAL CENTER | Age: 74
End: 2024-03-17
Attending: EMERGENCY MEDICINE | Admitting: HOSPITALIST
Payer: MEDICARE

## 2024-03-16 DIAGNOSIS — R07.9 ACUTE CHEST PAIN: ICD-10-CM

## 2024-03-16 DIAGNOSIS — R06.00 DYSPNEA, UNSPECIFIED TYPE: ICD-10-CM

## 2024-03-16 DIAGNOSIS — R59.9 ENLARGED LYMPH NODE: ICD-10-CM

## 2024-03-16 PROBLEM — Z71.89 ACP (ADVANCE CARE PLANNING): Status: ACTIVE | Noted: 2024-03-16

## 2024-03-16 PROBLEM — E78.2 MIXED HYPERLIPIDEMIA: Status: ACTIVE | Noted: 2024-03-16

## 2024-03-16 LAB
ALBUMIN SERPL BCP-MCNC: 4.3 G/DL (ref 3.2–4.9)
ALBUMIN/GLOB SERPL: 1.5 G/DL
ALP SERPL-CCNC: 189 U/L (ref 30–99)
ALT SERPL-CCNC: 27 U/L (ref 2–50)
ANION GAP SERPL CALC-SCNC: 15 MMOL/L (ref 7–16)
AST SERPL-CCNC: 34 U/L (ref 12–45)
BASOPHILS # BLD AUTO: 0.4 % (ref 0–1.8)
BASOPHILS # BLD: 0.03 K/UL (ref 0–0.12)
BILIRUB SERPL-MCNC: 0.4 MG/DL (ref 0.1–1.5)
BUN SERPL-MCNC: 16 MG/DL (ref 8–22)
CALCIUM ALBUM COR SERPL-MCNC: 11.5 MG/DL (ref 8.5–10.5)
CALCIUM SERPL-MCNC: 11.7 MG/DL (ref 8.4–10.2)
CHLORIDE SERPL-SCNC: 99 MMOL/L (ref 96–112)
CO2 SERPL-SCNC: 25 MMOL/L (ref 20–33)
CREAT SERPL-MCNC: 0.92 MG/DL (ref 0.5–1.4)
D DIMER PPP IA.FEU-MCNC: <0.27 UG/ML (FEU) (ref 0–0.5)
EKG IMPRESSION: NORMAL
EOSINOPHIL # BLD AUTO: 0.09 K/UL (ref 0–0.51)
EOSINOPHIL NFR BLD: 1.1 % (ref 0–6.9)
ERYTHROCYTE [DISTWIDTH] IN BLOOD BY AUTOMATED COUNT: 43.9 FL (ref 35.9–50)
GFR SERPLBLD CREATININE-BSD FMLA CKD-EPI: 66 ML/MIN/1.73 M 2
GLOBULIN SER CALC-MCNC: 2.9 G/DL (ref 1.9–3.5)
GLUCOSE SERPL-MCNC: 96 MG/DL (ref 65–99)
HCT VFR BLD AUTO: 42.9 % (ref 37–47)
HGB BLD-MCNC: 14.6 G/DL (ref 12–16)
IMM GRANULOCYTES # BLD AUTO: 0.02 K/UL (ref 0–0.11)
IMM GRANULOCYTES NFR BLD AUTO: 0.3 % (ref 0–0.9)
LYMPHOCYTES # BLD AUTO: 2.08 K/UL (ref 1–4.8)
LYMPHOCYTES NFR BLD: 26.5 % (ref 22–41)
MCH RBC QN AUTO: 31 PG (ref 27–33)
MCHC RBC AUTO-ENTMCNC: 34 G/DL (ref 32.2–35.5)
MCV RBC AUTO: 91.1 FL (ref 81.4–97.8)
MONOCYTES # BLD AUTO: 0.68 K/UL (ref 0–0.85)
MONOCYTES NFR BLD AUTO: 8.7 % (ref 0–13.4)
NEUTROPHILS # BLD AUTO: 4.94 K/UL (ref 1.82–7.42)
NEUTROPHILS NFR BLD: 63 % (ref 44–72)
NRBC # BLD AUTO: 0 K/UL
NRBC BLD-RTO: 0 /100 WBC (ref 0–0.2)
PLATELET # BLD AUTO: 271 K/UL (ref 164–446)
PMV BLD AUTO: 9.3 FL (ref 9–12.9)
POTASSIUM SERPL-SCNC: 3.6 MMOL/L (ref 3.6–5.5)
PROT SERPL-MCNC: 7.2 G/DL (ref 6–8.2)
RBC # BLD AUTO: 4.71 M/UL (ref 4.2–5.4)
SODIUM SERPL-SCNC: 139 MMOL/L (ref 135–145)
TROPONIN T SERPL-MCNC: 7 NG/L (ref 6–19)
TROPONIN T SERPL-MCNC: 7 NG/L (ref 6–19)
WBC # BLD AUTO: 7.8 K/UL (ref 4.8–10.8)

## 2024-03-16 PROCEDURE — 36415 COLL VENOUS BLD VENIPUNCTURE: CPT

## 2024-03-16 PROCEDURE — 94760 N-INVAS EAR/PLS OXIMETRY 1: CPT

## 2024-03-16 PROCEDURE — 99223 1ST HOSP IP/OBS HIGH 75: CPT | Mod: 25,AI | Performed by: HOSPITALIST

## 2024-03-16 PROCEDURE — 99497 ADVNCD CARE PLAN 30 MIN: CPT | Performed by: HOSPITALIST

## 2024-03-16 PROCEDURE — 80053 COMPREHEN METABOLIC PANEL: CPT

## 2024-03-16 PROCEDURE — G0378 HOSPITAL OBSERVATION PER HR: HCPCS

## 2024-03-16 PROCEDURE — 93005 ELECTROCARDIOGRAM TRACING: CPT

## 2024-03-16 PROCEDURE — 99285 EMERGENCY DEPT VISIT HI MDM: CPT

## 2024-03-16 PROCEDURE — 71045 X-RAY EXAM CHEST 1 VIEW: CPT

## 2024-03-16 PROCEDURE — 84484 ASSAY OF TROPONIN QUANT: CPT

## 2024-03-16 PROCEDURE — 85379 FIBRIN DEGRADATION QUANT: CPT

## 2024-03-16 PROCEDURE — 700117 HCHG RX CONTRAST REV CODE 255: Performed by: EMERGENCY MEDICINE

## 2024-03-16 PROCEDURE — 71275 CT ANGIOGRAPHY CHEST: CPT

## 2024-03-16 PROCEDURE — 85025 COMPLETE CBC W/AUTO DIFF WBC: CPT

## 2024-03-16 RX ORDER — ASPIRIN 81 MG/1
81 TABLET ORAL DAILY
Status: DISCONTINUED | OUTPATIENT
Start: 2024-03-16 | End: 2024-03-17 | Stop reason: HOSPADM

## 2024-03-16 RX ORDER — OXYCODONE HYDROCHLORIDE 5 MG/1
5 TABLET ORAL
Status: DISCONTINUED | OUTPATIENT
Start: 2024-03-16 | End: 2024-03-17 | Stop reason: HOSPADM

## 2024-03-16 RX ORDER — ATORVASTATIN CALCIUM 10 MG/1
10 TABLET, FILM COATED ORAL DAILY
Status: DISCONTINUED | OUTPATIENT
Start: 2024-03-17 | End: 2024-03-17 | Stop reason: HOSPADM

## 2024-03-16 RX ORDER — POLYETHYLENE GLYCOL 3350 17 G/17G
1 POWDER, FOR SOLUTION ORAL
Status: DISCONTINUED | OUTPATIENT
Start: 2024-03-16 | End: 2024-03-17 | Stop reason: HOSPADM

## 2024-03-16 RX ORDER — HYDROMORPHONE HYDROCHLORIDE 1 MG/ML
0.25 INJECTION, SOLUTION INTRAMUSCULAR; INTRAVENOUS; SUBCUTANEOUS
Status: DISCONTINUED | OUTPATIENT
Start: 2024-03-16 | End: 2024-03-17 | Stop reason: HOSPADM

## 2024-03-16 RX ORDER — OXYBUTYNIN CHLORIDE 5 MG/1
5 TABLET ORAL DAILY
Status: DISCONTINUED | OUTPATIENT
Start: 2024-03-17 | End: 2024-03-17 | Stop reason: HOSPADM

## 2024-03-16 RX ORDER — SUMATRIPTAN 25 MG/1
25 TABLET, FILM COATED ORAL
Status: DISCONTINUED | OUTPATIENT
Start: 2024-03-16 | End: 2024-03-17 | Stop reason: HOSPADM

## 2024-03-16 RX ORDER — DILTIAZEM HYDROCHLORIDE 120 MG/1
120 CAPSULE, COATED, EXTENDED RELEASE ORAL DAILY
Status: DISCONTINUED | OUTPATIENT
Start: 2024-03-17 | End: 2024-03-17 | Stop reason: HOSPADM

## 2024-03-16 RX ORDER — OMEPRAZOLE 20 MG/1
20 CAPSULE, DELAYED RELEASE ORAL DAILY
Status: DISCONTINUED | OUTPATIENT
Start: 2024-03-17 | End: 2024-03-17 | Stop reason: HOSPADM

## 2024-03-16 RX ORDER — ONDANSETRON 4 MG/1
4 TABLET, ORALLY DISINTEGRATING ORAL EVERY 4 HOURS PRN
Status: DISCONTINUED | OUTPATIENT
Start: 2024-03-16 | End: 2024-03-17 | Stop reason: HOSPADM

## 2024-03-16 RX ORDER — ATOMOXETINE 60 MG/1
60 CAPSULE ORAL
Status: DISCONTINUED | OUTPATIENT
Start: 2024-03-16 | End: 2024-03-16

## 2024-03-16 RX ORDER — BUPROPION HYDROCHLORIDE 150 MG/1
150 TABLET, EXTENDED RELEASE ORAL
Status: DISCONTINUED | OUTPATIENT
Start: 2024-03-18 | End: 2024-03-17 | Stop reason: HOSPADM

## 2024-03-16 RX ORDER — ONDANSETRON 2 MG/ML
4 INJECTION INTRAMUSCULAR; INTRAVENOUS EVERY 4 HOURS PRN
Status: DISCONTINUED | OUTPATIENT
Start: 2024-03-16 | End: 2024-03-17 | Stop reason: HOSPADM

## 2024-03-16 RX ORDER — OXYCODONE HYDROCHLORIDE 5 MG/1
2.5 TABLET ORAL
Status: DISCONTINUED | OUTPATIENT
Start: 2024-03-16 | End: 2024-03-17 | Stop reason: HOSPADM

## 2024-03-16 RX ORDER — HYDRALAZINE HYDROCHLORIDE 20 MG/ML
10 INJECTION INTRAMUSCULAR; INTRAVENOUS ONCE
Status: DISCONTINUED | OUTPATIENT
Start: 2024-03-16 | End: 2024-03-17 | Stop reason: HOSPADM

## 2024-03-16 RX ORDER — AMOXICILLIN 250 MG
2 CAPSULE ORAL 2 TIMES DAILY
Status: DISCONTINUED | OUTPATIENT
Start: 2024-03-17 | End: 2024-03-17 | Stop reason: HOSPADM

## 2024-03-16 RX ORDER — NITROGLYCERIN 0.4 MG/1
0.4 TABLET SUBLINGUAL
Status: DISCONTINUED | OUTPATIENT
Start: 2024-03-16 | End: 2024-03-17 | Stop reason: HOSPADM

## 2024-03-16 RX ORDER — ALBUTEROL SULFATE 90 UG/1
2 AEROSOL, METERED RESPIRATORY (INHALATION) EVERY 6 HOURS PRN
Status: DISCONTINUED | OUTPATIENT
Start: 2024-03-16 | End: 2024-03-17 | Stop reason: HOSPADM

## 2024-03-16 RX ORDER — ACETAMINOPHEN 325 MG/1
650 TABLET ORAL EVERY 6 HOURS PRN
Status: DISCONTINUED | OUTPATIENT
Start: 2024-03-16 | End: 2024-03-17 | Stop reason: HOSPADM

## 2024-03-16 RX ADMIN — IOHEXOL 100 ML: 350 INJECTION, SOLUTION INTRAVENOUS at 18:55

## 2024-03-16 ASSESSMENT — HEART SCORE
TROPONIN: LESS THAN OR EQUAL TO NORMAL LIMIT
AGE: 65+
ECG: NORMAL
HISTORY: MODERATELY SUSPICIOUS
RISK FACTORS: 1-2 RISK FACTORS
HEART SCORE: 4

## 2024-03-16 ASSESSMENT — COGNITIVE AND FUNCTIONAL STATUS - GENERAL
SUGGESTED CMS G CODE MODIFIER MOBILITY: CH
DAILY ACTIVITIY SCORE: 24
MOBILITY SCORE: 24
SUGGESTED CMS G CODE MODIFIER DAILY ACTIVITY: CH

## 2024-03-16 ASSESSMENT — LIFESTYLE VARIABLES
TOTAL SCORE: 0
AVERAGE NUMBER OF DAYS PER WEEK YOU HAVE A DRINK CONTAINING ALCOHOL: 0
ALCOHOL_USE: NO
CONSUMPTION TOTAL: NEGATIVE
TOTAL SCORE: 0
HOW MANY TIMES IN THE PAST YEAR HAVE YOU HAD 5 OR MORE DRINKS IN A DAY: 0
EVER FELT BAD OR GUILTY ABOUT YOUR DRINKING: NO
ON A TYPICAL DAY WHEN YOU DRINK ALCOHOL HOW MANY DRINKS DO YOU HAVE: 0
TOTAL SCORE: 0
EVER HAD A DRINK FIRST THING IN THE MORNING TO STEADY YOUR NERVES TO GET RID OF A HANGOVER: NO
HAVE PEOPLE ANNOYED YOU BY CRITICIZING YOUR DRINKING: NO
HAVE YOU EVER FELT YOU SHOULD CUT DOWN ON YOUR DRINKING: NO

## 2024-03-16 ASSESSMENT — ENCOUNTER SYMPTOMS
ABDOMINAL PAIN: 0
CHILLS: 0
MYALGIAS: 0
SHORTNESS OF BREATH: 0
FEVER: 0
EYE DISCHARGE: 0
BRUISES/BLEEDS EASILY: 0
STRIDOR: 0
FLANK PAIN: 0
VOMITING: 0
FOCAL WEAKNESS: 0
COUGH: 0
EYE REDNESS: 0
NERVOUS/ANXIOUS: 1

## 2024-03-16 ASSESSMENT — PATIENT HEALTH QUESTIONNAIRE - PHQ9
1. LITTLE INTEREST OR PLEASURE IN DOING THINGS: NOT AT ALL
2. FEELING DOWN, DEPRESSED, IRRITABLE, OR HOPELESS: NOT AT ALL
SUM OF ALL RESPONSES TO PHQ9 QUESTIONS 1 AND 2: 0

## 2024-03-16 ASSESSMENT — PAIN DESCRIPTION - PAIN TYPE: TYPE: ACUTE PAIN

## 2024-03-16 ASSESSMENT — FIBROSIS 4 INDEX
FIB4 SCORE: 1.76
FIB4 SCORE: 1.26

## 2024-03-17 ENCOUNTER — APPOINTMENT (OUTPATIENT)
Dept: CARDIOLOGY | Facility: MEDICAL CENTER | Age: 74
End: 2024-03-17
Attending: HOSPITALIST
Payer: MEDICARE

## 2024-03-17 ENCOUNTER — APPOINTMENT (OUTPATIENT)
Dept: RADIOLOGY | Facility: MEDICAL CENTER | Age: 74
End: 2024-03-17
Attending: HOSPITALIST
Payer: MEDICARE

## 2024-03-17 VITALS
DIASTOLIC BLOOD PRESSURE: 65 MMHG | RESPIRATION RATE: 14 BRPM | TEMPERATURE: 97.8 F | HEART RATE: 87 BPM | HEIGHT: 63 IN | BODY MASS INDEX: 31.99 KG/M2 | WEIGHT: 180.56 LBS | OXYGEN SATURATION: 92 % | SYSTOLIC BLOOD PRESSURE: 130 MMHG

## 2024-03-17 PROBLEM — R07.9 CHEST PAIN: Status: RESOLVED | Noted: 2024-03-16 | Resolved: 2024-03-17

## 2024-03-17 PROBLEM — R59.9 ENLARGED LYMPH NODE: Status: ACTIVE | Noted: 2024-03-17

## 2024-03-17 LAB
ALBUMIN SERPL BCP-MCNC: 4 G/DL (ref 3.2–4.9)
ALBUMIN/GLOB SERPL: 1.5 G/DL
ALP SERPL-CCNC: 180 U/L (ref 30–99)
ALT SERPL-CCNC: 24 U/L (ref 2–50)
ANION GAP SERPL CALC-SCNC: 11 MMOL/L (ref 7–16)
AST SERPL-CCNC: 24 U/L (ref 12–45)
BILIRUB SERPL-MCNC: 0.6 MG/DL (ref 0.1–1.5)
BUN SERPL-MCNC: 17 MG/DL (ref 8–22)
CALCIUM ALBUM COR SERPL-MCNC: 10.6 MG/DL (ref 8.5–10.5)
CALCIUM SERPL-MCNC: 10.6 MG/DL (ref 8.4–10.2)
CHLORIDE SERPL-SCNC: 100 MMOL/L (ref 96–112)
CO2 SERPL-SCNC: 27 MMOL/L (ref 20–33)
CREAT SERPL-MCNC: 0.85 MG/DL (ref 0.5–1.4)
ERYTHROCYTE [DISTWIDTH] IN BLOOD BY AUTOMATED COUNT: 45.6 FL (ref 35.9–50)
GFR SERPLBLD CREATININE-BSD FMLA CKD-EPI: 72 ML/MIN/1.73 M 2
GLOBULIN SER CALC-MCNC: 2.7 G/DL (ref 1.9–3.5)
GLUCOSE SERPL-MCNC: 109 MG/DL (ref 65–99)
HCT VFR BLD AUTO: 40.8 % (ref 37–47)
HGB BLD-MCNC: 13.4 G/DL (ref 12–16)
LV EJECT FRACT  99904: 55
LV EJECT FRACT MOD 2C 99903: 61.4
LV EJECT FRACT MOD 4C 99902: 54.11
LV EJECT FRACT MOD BP 99901: 57.51
MAGNESIUM SERPL-MCNC: 2 MG/DL (ref 1.5–2.5)
MCH RBC QN AUTO: 30.7 PG (ref 27–33)
MCHC RBC AUTO-ENTMCNC: 32.8 G/DL (ref 32.2–35.5)
MCV RBC AUTO: 93.6 FL (ref 81.4–97.8)
PLATELET # BLD AUTO: 264 K/UL (ref 164–446)
PMV BLD AUTO: 9.9 FL (ref 9–12.9)
POTASSIUM SERPL-SCNC: 3.1 MMOL/L (ref 3.6–5.5)
PROT SERPL-MCNC: 6.7 G/DL (ref 6–8.2)
RBC # BLD AUTO: 4.36 M/UL (ref 4.2–5.4)
SODIUM SERPL-SCNC: 138 MMOL/L (ref 135–145)
TROPONIN T SERPL-MCNC: 10 NG/L (ref 6–19)
TROPONIN T SERPL-MCNC: 16 NG/L (ref 6–19)
WBC # BLD AUTO: 6.4 K/UL (ref 4.8–10.8)

## 2024-03-17 PROCEDURE — 80053 COMPREHEN METABOLIC PANEL: CPT

## 2024-03-17 PROCEDURE — 78452 HT MUSCLE IMAGE SPECT MULT: CPT | Mod: 26 | Performed by: INTERNAL MEDICINE

## 2024-03-17 PROCEDURE — 93306 TTE W/DOPPLER COMPLETE: CPT

## 2024-03-17 PROCEDURE — 85027 COMPLETE CBC AUTOMATED: CPT

## 2024-03-17 PROCEDURE — 99239 HOSP IP/OBS DSCHRG MGMT >30: CPT | Performed by: HOSPITALIST

## 2024-03-17 PROCEDURE — 36415 COLL VENOUS BLD VENIPUNCTURE: CPT

## 2024-03-17 PROCEDURE — 93018 CV STRESS TEST I&R ONLY: CPT | Performed by: INTERNAL MEDICINE

## 2024-03-17 PROCEDURE — 84484 ASSAY OF TROPONIN QUANT: CPT

## 2024-03-17 PROCEDURE — 93306 TTE W/DOPPLER COMPLETE: CPT | Mod: 26 | Performed by: INTERNAL MEDICINE

## 2024-03-17 PROCEDURE — A9270 NON-COVERED ITEM OR SERVICE: HCPCS | Mod: JZ | Performed by: HOSPITALIST

## 2024-03-17 PROCEDURE — 700111 HCHG RX REV CODE 636 W/ 250 OVERRIDE (IP): Performed by: HOSPITALIST

## 2024-03-17 PROCEDURE — A9502 TC99M TETROFOSMIN: HCPCS

## 2024-03-17 PROCEDURE — 83735 ASSAY OF MAGNESIUM: CPT

## 2024-03-17 PROCEDURE — 700102 HCHG RX REV CODE 250 W/ 637 OVERRIDE(OP): Mod: JZ | Performed by: HOSPITALIST

## 2024-03-17 PROCEDURE — 700102 HCHG RX REV CODE 250 W/ 637 OVERRIDE(OP): Performed by: HOSPITALIST

## 2024-03-17 PROCEDURE — G0378 HOSPITAL OBSERVATION PER HR: HCPCS

## 2024-03-17 PROCEDURE — A9270 NON-COVERED ITEM OR SERVICE: HCPCS | Performed by: HOSPITALIST

## 2024-03-17 PROCEDURE — 94760 N-INVAS EAR/PLS OXIMETRY 1: CPT

## 2024-03-17 RX ORDER — AMINOPHYLLINE 25 MG/ML
100 INJECTION, SOLUTION INTRAVENOUS
Status: DISCONTINUED | OUTPATIENT
Start: 2024-03-17 | End: 2024-03-17 | Stop reason: HOSPADM

## 2024-03-17 RX ORDER — POTASSIUM CHLORIDE 20 MEQ/1
40 TABLET, EXTENDED RELEASE ORAL ONCE
Status: COMPLETED | OUTPATIENT
Start: 2024-03-17 | End: 2024-03-17

## 2024-03-17 RX ORDER — REGADENOSON 0.08 MG/ML
0.4 INJECTION, SOLUTION INTRAVENOUS ONCE
Status: COMPLETED | OUTPATIENT
Start: 2024-03-17 | End: 2024-03-17

## 2024-03-17 RX ADMIN — OMEPRAZOLE 20 MG: 20 CAPSULE, DELAYED RELEASE ORAL at 05:59

## 2024-03-17 RX ADMIN — ATORVASTATIN CALCIUM 10 MG: 10 TABLET, FILM COATED ORAL at 05:58

## 2024-03-17 RX ADMIN — OXYBUTYNIN CHLORIDE 5 MG: 5 TABLET ORAL at 05:59

## 2024-03-17 RX ADMIN — DILTIAZEM HYDROCHLORIDE 120 MG: 120 CAPSULE, COATED, EXTENDED RELEASE ORAL at 05:59

## 2024-03-17 RX ADMIN — REGADENOSON 0.4 MG: 0.08 INJECTION, SOLUTION INTRAVENOUS at 10:25

## 2024-03-17 RX ADMIN — POTASSIUM CHLORIDE 40 MEQ: 1500 TABLET, EXTENDED RELEASE ORAL at 11:49

## 2024-03-17 ASSESSMENT — FIBROSIS 4 INDEX: FIB4 SCORE: 1.81

## 2024-03-17 ASSESSMENT — PAIN DESCRIPTION - PAIN TYPE: TYPE: ACUTE PAIN

## 2024-03-17 NOTE — ED NOTES
0730 Bedside shift change report given to Daniel Causey (oncoming nurse) by Yobany Damon (offgoing nurse). Report included the following information SBAR, Intake/Output, MAR, Recent Results and Cardiac Rhythm NSR.     0842 Levophed restarted  1040 Labs drawn  1051 Pt MARYANN to CT with RN, RT, and PCT  1121 Pt returned to CCU  1240 Dr. Justina Haines at bedside. Plan for Bronchoscopy at 1500  1300 Consent obtained for bedside Bronchoscopy    1430 Bedside shift change report given to Pati Frey (oncoming nurse) by Daniel Causey (offgoing nurse). Report included the following information SBAR, Intake/Output, MAR, Recent Results and Cardiac Rhythm NSR/SB.      18 Dr. Justina Haines and RT at bedside for [facility] Blood work collected and sent to lab;

## 2024-03-17 NOTE — ED NOTES
Medication history reviewed with patient at bedside. Med rec is complete     Allergies reviewed    Patient denied any outpatient antibiotics in the last 30 days.    Ok per patient to discuss medications with visitor present    Any anticoagulants taken in the last 14 days? NO            Concha [place], Pharm.D., [facility]  ER Clinical Pharmacist

## 2024-03-17 NOTE — ASSESSMENT & PLAN NOTE
I had a discussion with the patient and daughter present at bedside in the emergency room regarding goals of care, diagnoses, prognosis, and CODE STATUS. We discussed her prognosis and comorbidities.  The patient has advanced age and relatively few comorbid conditions including hyperlipidemia elevated BMI and obstructive sleep apnea.  She is presenting with chest pain.  However, she enjoys a very good cognitive and functional capacity.  At this point she wants to proceed with a full code.  She is open to all forms of invasive or noninvasive diagnostic and therapeutic interventions including stress test, angiography and CABG if needed.

## 2024-03-17 NOTE — DISCHARGE SUMMARY
Discharge Summary    CHIEF COMPLAINT ON ADMISSION  Chief Complaint   Patient presents with    Chest Pain     Pt BIB EMS for CP that radiates to Lt rib cage started today, Pt denied any trauma, Pt was gardening prior to symptoms starting; Hx of GERD, Pt stated that she took some 2-Tums and 324 of ASA prior to EMS arrival - Pt stated that the Tums helped;        Reason for Admission  Chest pain    Admission Date  3/16/2024    CODE STATUS  Full Code    HPI & HOSPITAL COURSE  This is a 73 y.o. female here with chest pain  Yovani Deluca has past medical history close elevated BMI hyperlipidemia and obstructive sleep apnea.  She presented to the emergency room on 3/16/2024 with chest pain and shortness of breath.  Patient was found to be hypotensive in the emergency room.  She was admitted to the hospital for blood pressure control and a nuclear medicine stress test.  Incidental finding of pathologic appearing right tracheoesophageal lymph node was identified on CTA of her aorta    The patient's chest pain has resolved, she is no longer having any symptoms.  Stress test and echocardiogram have been completed.  Stress test is normal with no signs of ischemia.  Echocardiogram shows no significant interval changes in 2019.    Results of CT showing tracheoesophageal lymph node recent reviewed with the patient and her .  I will refer them to pulmonary medicine for further evaluation and workup.  The patient follows closely with GI and is actually scheduled for endoscopy in approximately 5 weeks.    Therefore, she is discharged in good and stable condition to home with close outpatient follow-up.    Discharge Date  3/17/2024    FOLLOW UP ITEMS POST DISCHARGE  Follow-up with pulmonary, follow-up with primary care physician, follow-up with GI    DISCHARGE DIAGNOSES  Principal Problem (Resolved):    Chest pain (POA: Yes)  Active Problems:    Mixed stress and urge urinary incontinence (POA: Yes)      Overview: This is a  chronic condition.  She has been on a 24-hour tablet for some       time and notices that in the morning the tablet has worn off.  She is       hoping to go back to twice a day dosing so that she can take 1 prior to       bedtime.    Mild intermittent asthma without complication (POA: Yes)      Overview: Albuterol prn - uses it 3x/yr at the most. Stable, continue current plan       of care                  Formatting of this note might be different from the original.      Formatting of this note might be different from the original.      Albuterol prn - uses it 3x/yr at the most. Stable, continue current plan       of care            Last Assessment & Plan:       Formatting of this note might be different from the original.      Pt reports that this started 20 years ago. Pt is on Albuterol PRN. Pt       denies recent exacerbation. Stable. Follow up with PCP.    Obstructive sleep apnea (POA: Yes)      Overview: Uses cpap nightly    Hiatal hernia (POA: Yes)      Overview: occn sxs. Mild on CT 6/21.     PSVT (paroxysmal supraventricular tachycardia) (POA: Yes)    ACP (advance care planning) (POA: Yes)    Mixed hyperlipidemia (POA: Yes)    Enlarged lymph node (POA: Yes)      FOLLOW UP  Future Appointments   Date Time Provider Department [place]   3/19/2024 10:40 AM Hoda Sloan M.D. [facility]   7/3/2024 11:30 AM Ephraim Chavez M.D. [facility] [address] [facility] [address]     No follow-up provider specified.    MEDICATIONS ON DISCHARGE     Medication List        ASK your doctor about these medications        Instructions   albuterol 108 (90 Base) MCG/ACT Aers inhalation aerosol   Doctor's comments: Proair please  Inhale 2 Puffs every 6 hours as needed for Shortness of Breath.  Dose: 2 Puff     atomoxetine 60 MG capsule  Commonly known as: Strattera   Take 1 Capsule by mouth every day for 180 days.  Dose: 60 mg     atorvastatin 10 MG Tabs  Commonly known as: Lipitor   Take 1 Tablet by mouth every evening.  Dose: 10 mg      buPROPion  MG Tb12 sustained-release tablet  Commonly known as: Wellbutrin SR   Take 1 Tablet by mouth every day for 180 days.  Dose: 150 mg     CALCIUM PO   Take 1 Tablet by mouth every day. [employer] brand calcium  Dose: 1 Tablet     DILTIAZem  MG Cp24  Commonly known as: Cardizem CD   Doctor's comments: Replaces Verapamil.  Take 1 capsule by mouth every day.  Dose: 120 mg     EPINEPHrine 0.3 MG/0.3ML Soaj solution for injection  Commonly known as: Epipen   Inject 0.3 mg into the shoulder, thigh, or buttocks one time as needed (anaphylaxis).  Dose: 0.3 mg     Fish Oil 300 MG Caps   Take 1 Capful by mouth every day.  Dose: 1 Capful     MULTIVITAMIN ADULT PO   Take 1 Tablet by mouth every day.  Dose: 1 Tablet     Non Formulary Request   Calcium chewable once daily     omeprazole 20 MG delayed-release capsule  Commonly known as: PriLOSEC   Take 1 Capsule by mouth 2 times a day.  Dose: 20 mg     oxybutynin 5 MG Tabs  Commonly known as: Ditropan   Take 1 Tablet by mouth 2 times a day.  Dose: 5 mg     SUMAtriptan 25 MG Tabs tablet  Commonly known as: Imitrex   Take 1 Tablet by mouth one time as needed for Migraine for up to 1 dose. Do not exceed more than 10 tablets a month  Dose: 25 mg     VITAMIN C PO   Take 1 Tablet by mouth every day.  Dose: 1 Tablet     VITAMIN D PO   Take 1 Tablet by mouth every morning.  Dose: 1 Tablet              Allergies  Allergies   Allergen Reactions    Bee Venom Anaphylaxis     Will stop heart   Will stop heart     Crab     Magnesium Citrate Palpitations, Myalgia and Unspecified    Shellfish Allergy Anaphylaxis and Unspecified     Heart issues  Heart issues    Shrimp (Diagnostic)      Increased heart rate and headache    [place] Hives     Increase in heart rate and hives     Dust Mite Extract        DIET  Orders Placed This Encounter   Procedures    Diet Order Diet: Cardiac     Standing Status:   Standing     Number of Occurrences:   1     Order Specific Question:    Diet:     Answer:   Cardiac [6]       ACTIVITY  As tolerated.  Weight bearing as tolerated    CONSULTATIONS  None    PROCEDURES    Echocardiogram 3/17/2024:  Echocardiography Laboratory  CONCLUSIONS  Normal left and right ventricular systolic function.  Visually   estimated LVEF of 55 to 60%.  No obvious wall motion abnormalities.   Normal left and right atrial sizes.   Borderline aortic root size, measuring 3.9 cm.  Sclerotic trileaflet aortic valve without significant stenosis and   trace regurgitation.  Compared to the prior study on 8/1/2019, no significant interval   changes (Prior aortic root measured 3.8 cm)  Iincidential finding: Bright echo-density in liver (2.7 x 1.6 x 5.8   cm).  Clinical correlation and further evulation with dedicated   ultrasound if indicated.    Nuclear Medicine Stress Test 3/17/:   NUCLEAR IMAGING INTERPRETATION   No scintigraphic evidence of significant prior infarct or active ischemia.   Normal left ventricular systolic function.   Normal TID ratio.    CTA thoracoabdominal aorta:  1.  Mild ectasia of the ascending aorta measuring 4.2 cm.  2.  No significant aortic aneurysm or dissection.  3.  Scattered atherosclerosis.  4.  Moderate hiatal hernia and nonspecific distal esophageal wall thickening. This could represent esophagitis however consider direct visualization to further evaluate.  5.  Pathologic-appearing right tracheoesophageal probable lymph node. This is nonspecific but further evaluation is recommended as malignancy is not excluded.  6.  Nonobstructing small renal stones. No obstructive uropathy.    LABORATORY  Lab Results   Component Value Date    SODIUM 138 03/17/2024    POTASSIUM 3.1 (L) 03/17/2024    CHLORIDE 100 03/17/2024    CO2 27 03/17/2024    GLUCOSE 109 (H) 03/17/2024    BUN 17 03/17/2024    CREATININE 0.85 03/17/2024        Lab Results   Component Value Date    WBC 6.4 03/17/2024    HEMOGLOBIN 13.4 03/17/2024    HEMATOCRIT 40.8 03/17/2024    PLATELETCT 264  03/17/2024        Total time of the discharge process exceeds 35 minutes.

## 2024-03-17 NOTE — ED TRIAGE NOTES
"Chief Complaint   Patient presents with    Chest Pain     Pt BIB EMS for CP that radiates to Lt rib cage started today, Pt denied any trauma, Pt was gardening prior to symptoms starting; Hx of GERD, Pt stated that she took some 2-Tums and 324 of ASA prior to EMS arrival - Pt stated that the Tums helped;       ED Triage Vitals [03/16/24 1806]   Enc Vitals Group      Blood Pressure  (!) 169/80      Pulse 93      Respiration (!) 22      Temperature 36.6 °C (97.9 °F)      Temp src Temporal      Pulse Oximetry 99 %      Weight 81.6 kg (180 lb)      Height 1.6 m (5' 3\")      Head Circumference       Peak Flow       Pain Score       Pain Loc       Pain Edu?       Excl. in GC?       "

## 2024-03-17 NOTE — ASSESSMENT & PLAN NOTE
The 10-year ASCVD risk score (Sonya SANTOS, et al., 2019) is: 23.2%    Values used to calculate the score:      Age: 73 years      Sex: Female      Is Non- : No      Diabetic: No      Tobacco smoker: No      Systolic Blood Pressure: 182 mmHg      Is BP treated: No      HDL Cholesterol: 66 mg/dL      Total Cholesterol: 162 mg/dL  EKG shows sinus tachycardia with a rate of 93, there is no ST elevation.  There is flattening of T waves in lead III, aVF.  QTc is 449.  Initial troponin is within normal limits, I will trend  We will check a D-dimer   We will check a CTa chest to r/o aneurysm  I ordered Stat EKG and troponin for recurrence of chest pain.   I will place on continuous cardiac monitoring.  Plan for stress test in the morning [ordered] as long as there are no significant EKG changes or significant troponin elevation

## 2024-03-17 NOTE — H&P
Hospital Medicine History & Physical Note    Date of Service  3/16/2024    Primary Care Physician  Hoda Sloan M.D.    Consultants  None     Code Status  Full Code    Chief Complaint  Chief Complaint   Patient presents with    Chest Pain     Pt BIB EMS for CP that radiates to Lt rib cage started today, Pt denied any trauma, Pt was gardening prior to symptoms starting; Hx of GERD, Pt stated that she took some 2-Tums and 324 of ASA prior to EMS arrival - Pt stated that the Tums helped;      History of Presenting Illness  Denia Deluca is a 73 y.o. female with a past medical history of elevated BMI of 31, hyperlipidemia and obstructive sleep apnea on CPAP who presented 3/16/2024 with chest pain.  Pain started certainly the retrosternal and the left side of the chest.  Pain also radiates to the left flank.  The patient denies noticing any shortness of breath dizziness or syncope.  She denies noticing any lower extremity pain redness or swelling.      I discussed the plan of care with emergency physician, patient and patient family present bedside in the emergency room.    Review of Systems  Review of Systems   Constitutional:  Negative for chills and fever.   Eyes:  Negative for discharge and redness.   Respiratory:  Negative for cough, shortness of breath and stridor.    Cardiovascular:  Positive for chest pain. Negative for leg swelling.   Gastrointestinal:  Negative for abdominal pain and vomiting.   Genitourinary:  Negative for flank pain.   Musculoskeletal:  Negative for myalgias.   Skin: Negative.    Neurological:  Negative for focal weakness.   Endo/Heme/Allergies:  Does not bruise/bleed easily.   Psychiatric/Behavioral:  The patient is nervous/anxious.      Past Medical History   has a past medical history of ADHD, Allergic rhinitis, Asthma, Back pain, Basal cell carcinoma (BCC), BMI 31.0-31.9,adult (1/19/2022), Chickenpox, Depression, Fracture, Influenza, Kidney stone, Nasal drainage, and  Pneumonia.    Surgical History   has a past surgical history that includes open reduction (Right); abdominal hysterectomy total; carpal tunnel release; pr remv 2nd cataract,corn-scler sectn; and hysterectomy laparoscopy.     Family History  family history includes Breast Cancer in her sister; Cancer in her father, mother, and sister; Leukemia in her brother; Lupus in her sister.      Social History   reports that she has never smoked. She has never used smokeless tobacco. She reports that she does not currently use alcohol. She reports that she does not use drugs.    Allergies  Allergies   Allergen Reactions    Bee Venom Anaphylaxis     Will stop heart   Will stop heart     Crab     Magnesium Citrate Palpitations, Myalgia and Unspecified    Shellfish Allergy Anaphylaxis and Unspecified     Heart issues  Heart issues    Shrimp (Diagnostic)      Increased heart rate and headache    [place] Hives     Increase in heart rate and hives     Dust Mite Extract      Medications  Prior to Admission Medications   Prescriptions Last Dose Informant Patient Reported? Taking?   Ascorbic Acid (VITAMIN C PO)   Yes No   Sig: Take  by mouth.   CALCIUM PO   Yes No   Sig: Take  by mouth.   DILTIAZem CD (CARDIZEM CD) 120 MG CAPSULE SR 24 HR   No No   Sig: Take 1 capsule by mouth every day.   EPINEPHrine (EPIPEN) 0.3 MG/0.3ML Solution Auto-injector solution for injection   Yes No   Multiple Vitamin (MULTIVITAMIN ADULT PO)   Yes No   Sig: Take  by mouth.   Omega-3 Fatty Acids (FISH OIL) 300 MG Cap   Yes No   Sig: Take  by mouth.   SUMAtriptan (IMITREX) 25 MG Tab tablet   No No   Sig: Take 1 Tablet by mouth one time as needed for Migraine for up to 1 dose. Do not exceed more than 10 tablets a month   VITAMIN D PO   Yes No   Sig: Take  by mouth.   albuterol 108 (90 Base) MCG/ACT Aero Soln inhalation aerosol   No No   Sig: Inhale 2 Puffs every 6 hours as needed for Shortness of Breath.   atomoxetine (STRATTERA) 60 MG capsule   No No   Sig: Take  1 Capsule by mouth every day for 180 days.   atorvastatin (LIPITOR) 10 MG Tab   No No   Sig: Take 1 Tablet by mouth every evening.   buPROPion SR (WELLBUTRIN SR) 150 MG TABLET SR 12 HR sustained-release tablet   No No   Sig: Take 1 Tablet by mouth every day for 180 days.   omeprazole (PRILOSEC) 20 MG delayed-release capsule   No No   Sig: Take 1 Capsule by mouth 2 times a day.   oxybutynin (DITROPAN) 5 MG Tab   No No   Sig: Take 1 Tablet by mouth 2 times a day.      Facility-Administered Medications: None     Physical Exam  Temp:  [36.6 °C (97.9 °F)-36.7 °C (98.1 °F)] 36.7 °C (98.1 °F)  Pulse:  [86-99] 86  Resp:  [16-22] 16  BP: (138-191)/(73-91) 176/77  SpO2:  [91 %-99 %] 97 %  Blood Pressure : (!) 182/81   Temperature: 36.6 °C (97.9 °F)   Pulse: 99   Respiration: 20   Pulse Oximetry: 91 %     Physical Exam  Constitutional:       General: She is not in acute distress.  HENT:      Head: Normocephalic and atraumatic.      Right Ear: External ear normal.      Left Ear: External ear normal.      Nose: No congestion or rhinorrhea.      Mouth/Throat:      Mouth: Mucous membranes are moist.      Pharynx: No oropharyngeal exudate or posterior oropharyngeal erythema.   Eyes:      General: No scleral icterus.        Right eye: No discharge.         Left eye: No discharge.      Conjunctiva/sclera: Conjunctivae normal.      Pupils: Pupils are equal, round, and reactive to light.   Cardiovascular:      Rate and Rhythm: Normal rate and regular rhythm.      Heart sounds:      No friction rub. No gallop.   Pulmonary:      Effort: Pulmonary effort is normal.   Abdominal:      General: Abdomen is flat. There is no distension.      Tenderness: There is no guarding.   Musculoskeletal:         General: No swelling.      Cervical back: Neck supple. No rigidity. No muscular tenderness.      Right lower leg: No edema.      Left lower leg: No edema.   Skin:     Capillary Refill: Capillary refill takes 2 to 3 seconds.      Coloration: Skin  "is not jaundiced or pale.      Findings: No bruising or erythema.   Neurological:      Mental Status: She is alert and oriented to person, place, and time.   Psychiatric:         Judgment: Judgment normal.      Comments: Anxious mood.       Laboratory:  Recent Labs     03/16/24  1818   WBC 7.8   RBC 4.71   HEMOGLOBIN 14.6   HEMATOCRIT 42.9   MCV 91.1   MCH 31.0   MCHC 34.0   RDW 43.9   PLATELETCT 271   MPV 9.3     Recent Labs     03/16/24  1818   SODIUM 139   POTASSIUM 3.6   CHLORIDE 99   CO2 25   GLUCOSE 96   BUN 16   CREATININE 0.92   CALCIUM 11.7*     Recent Labs     03/16/24  1818   ALTSGPT 27   ASTSGOT 34   ALKPHOSPHAT 189*   TBILIRUBIN 0.4   GLUCOSE 96         No results for input(s): \"NTPROBNP\" in the last 72 hours.      Recent Labs     03/16/24  1818   TROPONINT 7     Imaging:  CT-CTA COMPLETE THORACOABDOMINAL AORTA   Final Result      1.  Mild ectasia of the ascending aorta measuring 4.2 cm.   2.  No significant aortic aneurysm or dissection.   3.  Scattered atherosclerosis.   4.  Moderate hiatal hernia and nonspecific distal esophageal wall thickening. This could represent esophagitis however consider direct visualization to further evaluate.   5.  Pathologic-appearing right tracheoesophageal probable lymph node. This is nonspecific but further evaluation is recommended as malignancy is not excluded.   6.  Nonobstructing small renal stones. No obstructive uropathy.            DX-CHEST-PORTABLE (1 VIEW)   Final Result         1. No acute cardiopulmonary abnormalities are identified.      EC-ECHOCARDIOGRAM COMPLETE W/O CONT    (Results Pending)   NM-CARDIAC STRESS TEST    (Results Pending)     I personally reviewed patient EKG shows sinus tachycardia with a rate of 93, there is no ST elevation.  There is flattening of T waves in lead III, aVF.  QTc is 449.    Assessment/Plan:  Justification for Admission Status  I anticipate this patient is appropriate for observation status at this time because patient has " chest pain.    Patient will need a Telemetry bed on MEDICAL service.  The patient has chest pain.    * Chest pain- (present on admission)  Assessment & Plan  The 10-year ASCVD risk score (Sonya SANTOS, et al., 2019) is: 23.2%    Values used to calculate the score:      Age: 73 years      Sex: Female      Is Non- : No      Diabetic: No      Tobacco smoker: No      Systolic Blood Pressure: 182 mmHg      Is BP treated: No      HDL Cholesterol: 66 mg/dL      Total Cholesterol: 162 mg/dL  EKG shows sinus tachycardia with a rate of 93, there is no ST elevation.  There is flattening of T waves in lead III, aVF.  QTc is 449.  Initial troponin is within normal limits, I will trend  We will check a D-dimer   We will check a CTa chest to r/o aneurysm  I ordered Stat EKG and troponin for recurrence of chest pain.   I will place on continuous cardiac monitoring.  Plan for stress test in the morning [ordered] as long as there are no significant EKG changes or significant troponin elevation     PSVT (paroxysmal supraventricular tachycardia)- (present on admission)  Assessment & Plan  I will resume diltiazem.  I will order continuous cardiac monitoring.    Hiatal hernia- (present on admission)  Assessment & Plan  May be causing or contributing to her chest pain.    Mixed hyperlipidemia- (present on admission)  Assessment & Plan  Cardiac diet  I am starting atorvastatin     ACP (advance care planning)- (present on admission)  Assessment & Plan  I had a discussion with the patient and daughter present at bedside in the emergency room regarding goals of care, diagnoses, prognosis, and CODE STATUS. We discussed her prognosis and comorbidities.  The patient has advanced age and relatively few comorbid conditions including hyperlipidemia elevated BMI and obstructive sleep apnea.  She is presenting with chest pain.  However, she enjoys a very good cognitive and functional capacity.  At this point she wants to proceed  with a full code.  She is open to all forms of invasive or noninvasive diagnostic and therapeutic interventions including stress test, angiography and CABG if needed.      Obstructive sleep apnea- (present on admission)  Assessment & Plan  CPAP.  Continuous pulse oximetry.    Mild intermittent asthma without complication- (present on admission)  Assessment & Plan  Not currently in exacerbation.  Continuous pulse oximetry.    Mixed stress and urge urinary incontinence- (present on admission)  Assessment & Plan  Resume oxybutynin       VTE prophylaxis: SCDs/TEDs and Xarelto 10 mg daily as prophylaxis    I had a discussion with the patient and daughter present at bedside in the emergency room regarding goals of care, diagnoses, prognosis, and CODE STATUS. We discussed her prognosis and comorbidities.  The patient has advanced age and relatively few comorbid conditions including hyperlipidemia elevated BMI and obstructive sleep apnea.  She is presenting with chest pain.  However, she enjoys a very good cognitive and functional capacity.  At this point she wants to proceed with a full code.  She is open to all forms of invasive or noninvasive diagnostic and therapeutic interventions including stress test, angiography and CABG if needed. I spent 16 minutes on advanced care planning in addition to the time spent managing the other medical problems.

## 2024-03-17 NOTE — PROGRESS NOTES
4 Eyes Skin Assessment Completed by DOLLY Romero and Yolanda RN.    Head WDL  Ears WDL  Nose WDL  Mouth WDL  Neck WDL  Breast/Chest Scar  Shoulder Blades WDL  Spine WDL  (R) Arm/Elbow/Hand WDL  (L) Arm/Elbow/Hand WDL  Abdomen WDL  Groin WDL  Scrotum/Coccyx/Buttocks WDL  (R) Leg WDL  (L) Leg WDL  (R) Heel/Foot/Toe WDL  (L) Heel/Foot/Toe WDL          Devices In Places Tele Box      Interventions In Place N/A    Possible Skin Injury No    Pictures Uploaded Into Epic N/A  Wound Consult Placed N/A  RN Wound Prevention Protocol Ordered No

## 2024-03-17 NOTE — PROGRESS NOTES
2020 patient was brought to the unit from the ED and ambulated self to the hospital bed. Vital signs taken and telemetry monitor applied. Admission profile and assessment completed. Medications, labs/diagnostic tests scheduled, diet, mobility, fall risk/status, and the plan of care for the evening reviewed. All patient's needs and questions addressed at this time.

## 2024-03-17 NOTE — PROGRESS NOTES
Telemetry Shift Summary    Rhythm SR  HR Range 79-88  Ectopy rPAC, rPVC, rBig  Measurements 0.16/0.08/0.38        Normal Values  Rhythm SR  HR Range    Measurements 0.12-0.20 / 0.06-0.10  / 0.30-0.52

## 2024-03-17 NOTE — PROGRESS NOTES
Discharge order written. IV removed, patient tolerated. All personal belongings are in possession. AVS printed, reviewed and copy signed and placed on the chart. Patient has no further questions. Discharged in satisfactory condition.Pt left unit with spouse via walking. Staff escort  declined.

## 2024-03-17 NOTE — HOSPITAL COURSE
Yovani Deluca has past medical history close elevated BMI hyperlipidemia and obstructive sleep apnea.  She presented to the emergency room on 3/16/2024 with chest pain and shortness of breath.  Patient was found to be hypotensive in the emergency room.  She was admitted to the hospital for blood pressure control and a nuclear medicine stress test.  Incidental finding of pathologic appearing right tracheoesophageal lymph node was identified on CTA of her aorta

## 2024-03-17 NOTE — ED PROVIDER NOTES
ED Provider Note    CHIEF COMPLAINT  Chief Complaint   Patient presents with    Chest Pain     Pt BIB EMS for CP that radiates to Lt rib cage started today, Pt denied any trauma, Pt was gardening prior to symptoms starting; Hx of GERD, Pt stated that she took some 2-Tums and 324 of ASA prior to EMS arrival - Pt stated that the Tums helped;        EXTERNAL RECORDS REVIEWED  Outpatient Notes patient saw her primary care provider 2/15/2024 for follow-up for paroxysmal supraventricular tachycardia and nonsustained ventricular tachycardia noted on a Zio patch from 2019 she is currently on diltiazem daily and follows with cardiology    HPI/ROS  LIMITATION TO HISTORY   Select: : None  OUTSIDE HISTORIAN(S):  none    Denia Deluca is a 73 y.o. female who presents planing of sudden onset pain in her left rib cage rating into her back and left flank area.  The patient states it started while she was gardening.  She did do some bending over.  She denies any numbness or ting down the legs or the groin area.  She states she has a history of GERD and did have some reflux symptoms and took 2 Tums and some aspirin prior to EMS bringing her here.  The patient has had abnormal heart rhythms but has never had a heart attack or stent placement she denies any history of stroke.  She does have a history of asthma but states she has not been coughing or wheezing lately.  She does have allergies and rhinitis.  She denies any fevers or chills she denies any swelling or weakness in her legs she has chronic abdominal pain.  The patient states that first initially she was so short of breath she could not catch her breath or talk but now she is feeling better and able to talk.  She denies any productive cough vomiting or diarrhea.  She cannot remember when her last stress test was.    PAST MEDICAL HISTORY   has a past medical history of ADHD, Allergic rhinitis, Asthma, Back pain, Basal cell carcinoma (BCC), BMI 31.0-31.9,adult  (1/19/2022), Chickenpox, Depression, Fracture, Influenza, Kidney stone, Nasal drainage, and Pneumonia.    SURGICAL HISTORY   has a past surgical history that includes open reduction (Right); abdominal hysterectomy total; carpal tunnel release; remv 2nd cataract,corn-scler sectn; and hysterectomy laparoscopy.    FAMILY HISTORY  Family History   Problem Relation Age of Onset    Cancer Mother     Cancer Father     Breast Cancer Sister     Cancer Sister         Breast cancer    Lupus Sister     Leukemia Brother     Colorectal Cancer Neg Hx     Peritoneal Cancer Neg Hx     Tubal Cancer Neg Hx     Ovarian Cancer Neg Hx        SOCIAL HISTORY  Social History     Tobacco Use    Smoking status: Never    Smokeless tobacco: Never   Vaping Use    Vaping Use: Never used   Substance and Sexual Activity    Alcohol use: Not Currently     Comment: Occasionally    Drug use: No    Sexual activity: Not Currently     Partners: Male       CURRENT MEDICATIONS  Home Medications       Reviewed by Abdias Delgado R.N. (Registered Nurse) on 03/16/24 at 1812  Med List Status: Not Addressed     Medication Last Dose Status   albuterol 108 (90 Base) MCG/ACT Aero Soln inhalation aerosol  Active   Ascorbic Acid (VITAMIN C PO)  Active   atomoxetine (STRATTERA) 60 MG capsule  Active   atorvastatin (LIPITOR) 10 MG Tab  Active   buPROPion SR (WELLBUTRIN SR) 150 MG TABLET SR 12 HR sustained-release tablet  Active   CALCIUM PO  Active   DILTIAZem CD (CARDIZEM CD) 120 MG CAPSULE SR 24 HR  Active   EPINEPHrine (EPIPEN) 0.3 MG/0.3ML Solution Auto-injector solution for injection  Active   Multiple Vitamin (MULTIVITAMIN ADULT PO)  Active   Omega-3 Fatty Acids (FISH OIL) 300 MG Cap  Active   omeprazole (PRILOSEC) 20 MG delayed-release capsule  Active   oxybutynin (DITROPAN) 5 MG Tab  Active   SUMAtriptan (IMITREX) 25 MG Tab tablet  Active   VITAMIN D PO  Active                    ALLERGIES  Allergies   Allergen Reactions    Bee Venom Anaphylaxis     Will stop  "heart   Will stop heart     Crab     Magnesium Citrate Palpitations, Myalgia and Unspecified    Shellfish Allergy Unspecified and Anaphylaxis     Heart issues  Heart issues    Shrimp (Diagnostic)     Dust Mite Extract     [place] Hives     Increase in heart rate  Increase in heart rate       PHYSICAL EXAM  VITAL SIGNS: BP (!) 169/80   Pulse 93   Temp 36.6 °C (97.9 °F) (Temporal)   Resp (!) 22   Ht 1.6 m (5' 3\")   Wt 81.6 kg (180 lb)   SpO2 99%   BMI 31.89 kg/m²      Constitutional: Well developed, Well nourished, No acute distress, Non-toxic appearance.   HEENT: Normocephalic, Atraumatic,  external ears normal, pharynx pink,  Mucous  Membranes moist, No rhinorrhea or mucosal edema  Eyes: PERRL, EOMI, Conjunctiva normal, No discharge.   Neck: Normal range of motion, No tenderness, Supple, No stridor.   Lymphatic: No lymphadenopathy    Cardiovascular: Regular Rate and Rhythm, No murmurs,  rubs, or gallops.   Thorax & Lungs: Lungs clear to auscultation bilaterally, No respiratory distress, No wheezes, rhales or rhonchi, No chest wall tenderness.   Abdomen: Bowel sounds normal, Soft, non tender, non distended,  No pulsatile masses., no rebound guarding or peritoneal signs.   Skin: Warm, Dry, No erythema, No rash,   Back:  No CVA tenderness,  No spinal tenderness, bony crepitance step offs or instability.   Extremities: Equal, intact distal pulses, No cyanosis, clubbing or edema,  No tenderness.   Musculoskeletal: Good range of motion in all major joints. No tenderness to palpation or major deformities noted.   Neurologic: Alert & oriented No focal deficits noted.  Psychiatric: Affect normal, Judgment normal, Mood normal.      DIAGNOSTIC STUDIES / PROCEDURES  EKG  I have independently interpreted this EKG  See below    LABS  Results for orders placed or performed during the hospital encounter of 03/16/24   CBC with Differential   Result Value Ref Range    WBC 7.8 4.8 - 10.8 K/uL    RBC 4.71 4.20 - 5.40 M/uL    " Hemoglobin 14.6 12.0 - 16.0 g/dL    Hematocrit 42.9 37.0 - 47.0 %    MCV 91.1 81.4 - 97.8 fL    MCH 31.0 27.0 - 33.0 pg    MCHC 34.0 32.2 - 35.5 g/dL    RDW 43.9 35.9 - 50.0 fL    Platelet Count 271 164 - 446 K/uL    MPV 9.3 9.0 - 12.9 fL    Neutrophils-Polys 63.00 44.00 - 72.00 %    Lymphocytes 26.50 22.00 - 41.00 %    Monocytes 8.70 0.00 - 13.40 %    Eosinophils 1.10 0.00 - 6.90 %    Basophils 0.40 0.00 - 1.80 %    Immature Granulocytes 0.30 0.00 - 0.90 %    Nucleated RBC 0.00 0.00 - 0.20 /100 WBC    Neutrophils (Absolute) 4.94 1.82 - 7.42 K/uL    Lymphs (Absolute) 2.08 1.00 - 4.80 K/uL    Monos (Absolute) 0.68 0.00 - 0.85 K/uL    Eos (Absolute) 0.09 0.00 - 0.51 K/uL    Baso (Absolute) 0.03 0.00 - 0.12 K/uL    Immature Granulocytes (abs) 0.02 0.00 - 0.11 K/uL    NRBC (Absolute) 0.00 K/uL   Complete Metabolic Panel (CMP)   Result Value Ref Range    Sodium 139 135 - 145 mmol/L    Potassium 3.6 3.6 - 5.5 mmol/L    Chloride 99 96 - 112 mmol/L    Co2 25 20 - 33 mmol/L    Anion Gap 15.0 7.0 - 16.0    Glucose 96 65 - 99 mg/dL    Bun 16 8 - 22 mg/dL    Creatinine 0.92 0.50 - 1.40 mg/dL    Calcium 11.7 (H) 8.4 - 10.2 mg/dL    Correct Calcium 11.5 (H) 8.5 - 10.5 mg/dL    AST(SGOT) 34 12 - 45 U/L    ALT(SGPT) 27 2 - 50 U/L    Alkaline Phosphatase 189 (H) 30 - 99 U/L    Total Bilirubin 0.4 0.1 - 1.5 mg/dL    Albumin 4.3 3.2 - 4.9 g/dL    Total Protein 7.2 6.0 - 8.2 g/dL    Globulin 2.9 1.9 - 3.5 g/dL    A-G Ratio 1.5 g/dL   Troponins NOW   Result Value Ref Range    Troponin T 7 6 - 19 ng/L   D-DIMER   Result Value Ref Range    D-Dimer <0.27 0.00 - 0.50 ug/mL (FEU)   ESTIMATED GFR   Result Value Ref Range    GFR (CKD-EPI) 66 >60 mL/min/1.73 m 2   EKG   Result Value Ref Range    Report       [facility] Dept[facility]    Test Date:  2024-03-16  Pt Name:    GILBERTO MÉNDEZ             Department: EDS  MRN:        [id number]                      Room:       -ROOM 10  Gender:     Female                        Technician: CLARIBEL  :        1950                   Requested By:ROMULO BRITO  Order #:    [id number]                    Reading MD: ROMULO BRITO MD    Measurements  Intervals                                Axis  Rate:       93                           P:          50  IN:         164                          QRS:        26  QRSD:       96                           T:          29  QT:         361  QTc:        449    Interpretive Statements  Sinus rhythm  Probable left atrial enlargement  Borderline low voltage, extremity leads  Artifact in lead(s) I,II,III,aVR,aVL,aVF  Compared to ECG 2023 15:29:13  Right ventricular hypertrophy no longer present  Electronically Signed On 2024 19:11:37 PDT by ROMULO BRITO MD           RADIOLOGY  I have independently interpreted the diagnostic imaging associated with this visit and am waiting the final reading from the radiologist.   My preliminary interpretation is as follows: cxr no infiltrate or pleural effusion.  Radiologist interpretation:   CT-CTA COMPLETE THORACOABDOMINAL AORTA   Final Result      1.  Mild ectasia of the ascending aorta measuring 4.2 cm.   2.  No significant aortic aneurysm or dissection.   3.  Scattered atherosclerosis.   4.  Moderate hiatal hernia and nonspecific distal esophageal wall thickening. This could represent esophagitis however consider direct visualization to further evaluate.   5.  Pathologic-appearing right tracheoesophageal probable lymph node. This is nonspecific but further evaluation is recommended as malignancy is not excluded.   6.  Nonobstructing small renal stones. No obstructive uropathy.            DX-CHEST-PORTABLE (1 VIEW)   Final Result         1. No acute cardiopulmonary abnormalities are identified.           COURSE & MEDICAL DECISION MAKING    ED Observation Status? No; Patient does not meet criteria for ED Observation.     INITIAL ASSESSMENT, COURSE AND PLAN  Care Narrative: Denia Deluca is a 73  y.o. female who presents planing of sudden onset pain in her left rib cage rating into her back and left flank area.  The patient states it started while she was gardening.  She did do some bending over.  She denies any numbness or ting down the legs or the groin area.  She states she has a history of GERD and did have some reflux symptoms and took 2 Tums and some aspirin prior to EMS bringing her here.  The patient has had abnormal heart rhythms but has never had a heart attack or stent placement she denies any history of stroke.  She does have a history of asthma but states she has not been coughing or wheezing lately.  She does have allergies and rhinitis.  She denies any fevers or chills she denies any swelling or weakness in her legs she has chronic abdominal pain.  The patient states that first initially she was so short of breath she could not catch her breath or talk but now she is feeling better and able to talk.  She denies any productive cough vomiting or diarrhea.  She cannot remember when her last stress test was.  On physical exam the patient is alert awake speaking full sentences heart is regular rate and rhythm no murmurs or gallops lungs are clear to auscultation bilaterally abdomen is soft she does have some posterior lower back pain near the LS spine on the left side she has no leg weakness or edema.  Patient's right arm has a blood pressure 191/91 and left arm 172/77.  This is concerning enough to obtain a CTA of aorta to make sure that there is no dissection      DDX: [place] dissection cardiac ischemia PE pneumonia asthma exacerbation GERD    ADDITIONAL PROBLEM LIST  Nonsustained ventricular tachycardia on diltiazem 120 mg daily with cardiology follow-up  Obstructive sleep apnea  Paroxysmal supraventricular tachycardia  High cholesterol  Depression  ADHD  DISPOSITION AND DISCUSSIONS    An IV was started and labs were drawn.  We took blood pressures in both arms and they are is a 20 point  difference between the right arm which is 191 systolic left arm which is 172 systolic.  I have ordered a CTA of the aorta for further evaluation to make sure she is not having a dissection.  Cardiac labs were ordered along with an EKG.    Patient's chest x-ray shows no infiltrate or pleural effusion.  D-dimer is less than 0.27.  Troponin is normal at 7.  Competence metabolic panel has an elevated calcium at 11.5 corrected electrolytes are otherwise normal her glucose is normal her kidney function is normal.  Her alk phos is elevated at 189.  Total bilirubin is 0.4.  Patient CBC is unremarkable with a normal white count of 7.8 hemoglobin 14.6 platelet count 271 with a normal differential.  EKG shows normal sinus rhythm with no acute ST elevation or changes. her heart score is 4.  Her D-dimer is less than 0.27.    CTA of the aorta does not show any dissection but she does have a 4.2 mm ectasia.  She also has a moderate hiatal hernia.    Upon recheck the patient's symptoms have improved.  She still is hypertensive.  I have ordered some IV hydralazine and she will be admitted to the hospitalist service for further evaluation of her cardiac status.      I have discussed management of the patient with the following physicians and LEVY's:  hospitalist Dr Bagley admit the patient for workup of her chest pain    Discussion of management with other Q or appropriate source(s): None     Escalation of care considered, and ultimately not performed: none    Barriers to care at this time, including but not limited to:  none.     Decision tools and prescription drugs considered including, but not limited to: D-dimer less than 0.27 and HEART Score 4 .    Pt will be admitted in guarded condition  CRITICAL CARE  The very real possibilty of a deterioration of this patient's condition required the highest level of my preparedness for sudden, emergent intervention.  I provided critical care services, which included medication orders,  frequent reevaluations of the patient's condition and response to treatment, ordering and reviewing test results, and discussing the case with various consultants.  The critical care time associated with the care of the patient was 35 minutes. Review chart for interventions. This time is exclusive of any other billable procedures.    FINAL DIAGNOSIS  1. Acute chest pain    2. Dyspnea, unspecified type           Electronically signed by: Rosalia Rojas M.D., 3/16/2024 6:12 PM

## 2024-03-17 NOTE — CARE PLAN
The patient is Stable - Low risk of patient condition declining or worsening    Shift Goals  Clinical Goals: NPO 0000, SBP less than 180  Patient Goals: get checked out    Progress made toward(s) clinical / shift goals:    Problem: Knowledge Deficit - Standard  Goal: Patient and family/care givers will demonstrate understanding of plan of care, disease process/condition, diagnostic tests and medications  Outcome: Progressing     Problem: Fall Risk  Goal: Patient will remain free from falls  Outcome: Progressing  Note: Strip alarm in place.

## 2024-03-17 NOTE — CARE PLAN
Problem: Pain - Standard  Goal: Alleviation of pain or a reduction in pain to the patient’s comfort goal  Outcome: Progressing     Problem: Knowledge Deficit - Standard  Goal: Patient and family/care givers will demonstrate understanding of plan of care, disease process/condition, diagnostic tests and medications  Outcome: Progressing   The patient is Stable - Low risk of patient condition declining or worsening    Shift Goals  Clinical Goals: stress test, echo, trop  Patient Goals: go home    Progress made toward(s) clinical / shift goals:  updated pt on plan of care, pending stress test and echo. Pt denies chest pain at this time.     Patient is not progressing towards the following goals:

## 2024-03-18 ENCOUNTER — PHARMACY VISIT (OUTPATIENT)
Dept: PHARMACY | Facility: MEDICAL CENTER | Age: 74
End: 2024-03-18
Payer: COMMERCIAL

## 2024-03-18 ENCOUNTER — PATIENT OUTREACH (OUTPATIENT)
Dept: MEDICAL GROUP | Facility: MEDICAL CENTER | Age: 74
End: 2024-03-18
Payer: MEDICARE

## 2024-03-18 RX ORDER — ATOMOXETINE 60 MG/1
60 CAPSULE ORAL DAILY
Qty: 100 CAPSULE | Refills: 2 | Status: SHIPPED | OUTPATIENT
Start: 2024-03-18

## 2024-03-18 NOTE — PROGRESS NOTES
3/18: Pt has an appointment scheduled with PCP within two business days of discharge on 3/19/24 at 1040 for TCM. Therefore, a call by the RN is not required and PCP may bill for TCM appt.

## 2024-03-19 ENCOUNTER — OFFICE VISIT (OUTPATIENT)
Dept: MEDICAL GROUP | Facility: MEDICAL CENTER | Age: 74
End: 2024-03-19
Payer: MEDICARE

## 2024-03-19 VITALS
OXYGEN SATURATION: 97 % | HEIGHT: 63 IN | RESPIRATION RATE: 16 BRPM | WEIGHT: 180.8 LBS | BODY MASS INDEX: 32.04 KG/M2 | HEART RATE: 91 BPM | SYSTOLIC BLOOD PRESSURE: 112 MMHG | TEMPERATURE: 96.7 F | DIASTOLIC BLOOD PRESSURE: 52 MMHG

## 2024-03-19 DIAGNOSIS — D22.9 ATYPICAL MOLE: ICD-10-CM

## 2024-03-19 DIAGNOSIS — Z09 HOSPITAL DISCHARGE FOLLOW-UP: ICD-10-CM

## 2024-03-19 DIAGNOSIS — R93.2 ABNORMAL FINDING ON IMAGING OF LIVER: ICD-10-CM

## 2024-03-19 DIAGNOSIS — R59.1 LYMPHADENOPATHY: ICD-10-CM

## 2024-03-19 DIAGNOSIS — I77.819 AORTIC ECTASIA (HCC): ICD-10-CM

## 2024-03-19 DIAGNOSIS — M85.80 OSTEOPENIA WITH HIGH RISK OF FRACTURE: ICD-10-CM

## 2024-03-19 PROBLEM — R59.9 ENLARGED LYMPH NODE: Status: RESOLVED | Noted: 2024-03-17 | Resolved: 2024-03-19

## 2024-03-19 PROCEDURE — RXMED WILLOW AMBULATORY MEDICATION CHARGE: Performed by: STUDENT IN AN ORGANIZED HEALTH CARE EDUCATION/TRAINING PROGRAM

## 2024-03-19 RX ORDER — ALENDRONATE SODIUM 70 MG/1
70 TABLET ORAL
Qty: 12 TABLET | Refills: 3 | Status: SHIPPED | OUTPATIENT
Start: 2024-03-19

## 2024-03-19 ASSESSMENT — FIBROSIS 4 INDEX: FIB4 SCORE: 1.35

## 2024-03-19 NOTE — PROGRESS NOTES
Subjective:     Denia Deluca is a 73 y.o. female who presents for Hospital Follow-up as well as to discuss the following    Problem   Aortic Ectasia (Hcc)    Seen during hospitalization, she has a heart doctor and will bring this up with them     Abnormal Finding On Imaging of Liver    Seen during hospitalization.  She already follows with GI and will bring this up with them     Atypical Mole    This is a chronic condition, worsening.  Patient does have a dermatologist     Osteopenia With High Risk of Fracture    This is a new condition seen on recent DEXA scan.  She has osteopenia with a high hip fracture risk.  She has been working on weightbearing exercise and taking calcium and vitamin D supplement.     Lymphadenopathy    This is a new condition, lymph node seen on CT chest.  She has a referral to pulmonology already         HPI:   Recently hospitalized for shortness of breath and chest pain.  She underwent CTA, nuclear stress test and echocardiogram.  There were incidental finding seen but ultimately was decided that chest pain is not related to her heart.  She has follow-up with cardiology, GI and pulmonology.    Current medicines (including reconciliation performed today)  Current Outpatient Medications   Medication Sig Dispense Refill    alendronate (FOSAMAX) 70 MG Tab Take 1 Tablet by mouth every 7 days. 12 Tablet 3    atomoxetine (STRATTERA) 60 MG capsule TAKE 1 CAPSULE BY MOUTH DAILY 100 Capsule 2    Non Formulary Request Calcium chewable once daily      Omega-3 Fatty Acids (FISH OIL) 300 MG Cap Take 1 Capful by mouth every day.      buPROPion SR (WELLBUTRIN SR) 150 MG TABLET SR 12 HR sustained-release tablet Take 1 Tablet by mouth every day for 180 days. 180 Tablet 0    DILTIAZem CD (CARDIZEM CD) 120 MG CAPSULE SR 24 HR Take 1 capsule by mouth every day. 90 Capsule 1    oxybutynin (DITROPAN) 5 MG Tab Take 1 Tablet by mouth 2 times a day. 180 Tablet 3    omeprazole (PRILOSEC) 20 MG delayed-release  "capsule Take 1 Capsule by mouth 2 times a day. (Patient taking differently: Take 20 mg by mouth every day.) 200 Capsule 3    atorvastatin (LIPITOR) 10 MG Tab Take 1 Tablet by mouth every evening. 100 Tablet 3    SUMAtriptan (IMITREX) 25 MG Tab tablet Take 1 Tablet by mouth one time as needed for Migraine for up to 1 dose. Do not exceed more than 10 tablets a month 30 Tablet 2    albuterol 108 (90 Base) MCG/ACT Aero Soln inhalation aerosol Inhale 2 Puffs every 6 hours as needed for Shortness of Breath. 8.5 g 3    Multiple Vitamin (MULTIVITAMIN ADULT PO) Take 1 Tablet by mouth every day.      CALCIUM PO Take 1 Tablet by mouth every day. Good[employer] brand calcium      VITAMIN D PO Take 1 Tablet by mouth every morning.      Ascorbic Acid (VITAMIN C PO) Take 1 Tablet by mouth every day.      EPINEPHrine (EPIPEN) 0.3 MG/0.3ML Solution Auto-injector solution for injection Inject 0.3 mg into the shoulder, thigh, or buttocks one time as needed (anaphylaxis).       No current facility-administered medications for this visit.       Allergies:   Bee venom, Crab, Magnesium citrate, Shellfish allergy, Shrimp (diagnostic), [place], and Dust mite extract    Social History     Tobacco Use    Smoking status: Never    Smokeless tobacco: Never   Vaping Use    Vaping Use: Never used   Substance Use Topics    Alcohol use: Not Currently     Comment: Occasionally    Drug use: No         Objective:     Vitals:    03/19/24 1020   BP: 112/52   BP Location: Left arm   Patient Position: Sitting   BP Cuff Size: Adult   Pulse: 91   Resp: 16   Temp: 35.9 °C (96.7 °F)   TempSrc: Temporal   SpO2: 97%   Weight: 82 kg (180 lb 12.8 oz)   Height: 1.6 m (5' 3\")     Body mass index is 32.03 kg/m².    Physical Exam:  Physical Exam  Vitals reviewed.   Constitutional:       General: She is not in acute distress.     Appearance: She is not toxic-appearing.   HENT:      Head: Normocephalic and atraumatic.      Right Ear: External ear normal.      Left Ear: " External ear normal.      Nose: Nose normal. No congestion or rhinorrhea.      Mouth/Throat:      Mouth: Mucous membranes are moist.      Pharynx: Oropharynx is clear. No oropharyngeal exudate or posterior oropharyngeal erythema.   Eyes:      General: No scleral icterus.        Right eye: No discharge.         Left eye: No discharge.   Cardiovascular:      Rate and Rhythm: Normal rate and regular rhythm.      Pulses: Normal pulses.      Heart sounds: Normal heart sounds. No murmur heard.  Pulmonary:      Effort: Pulmonary effort is normal. No respiratory distress.      Breath sounds: Normal breath sounds. No wheezing.   Musculoskeletal:         General: No swelling. Normal range of motion.      Cervical back: Normal range of motion and neck supple.   Skin:     General: Skin is warm and dry.      Capillary Refill: Capillary refill takes less than 2 seconds.   Neurological:      Mental Status: She is alert and oriented to person, place, and time.   Psychiatric:         Mood and Affect: Mood normal.         Behavior: Behavior normal.         Thought Content: Thought content normal.         Judgment: Judgment normal.             Assessment and Plan:   1. Hospital discharge follow-up  Reviewed her imaging from hospitalization.  We also reviewed that she needs to follow-up with cardiology, pulmonology and GI given incidental findings    2. Aortic ectasia (HCC)  Follow-up with cardiology    3. Abnormal finding on imaging of liver  Follow-up with GI    4. Osteopenia with high risk of fracture  This is a new condition not related to her hospitalization.  We discussed her high fracture risk.  I encouraged her to continue with weightbearing exercise, vitamin D and calcium supplementation.  We also discussed the risk and benefits of starting Fosamax including the risk of atypical fractures, jaw necrosis and calcium abnormalities.  Advised patient to take this with a full glass of water and not to lay down for several hours  after taking it  - alendronate (FOSAMAX) 70 MG Tab; Take 1 Tablet by mouth every 7 days.  Dispense: 12 Tablet; Refill: 3    5. Atypical mole  Counseled to follow-up with dermatology    6. Lymphadenopathy  She will follow-up with pulmonology      - Chart and discharge summary were reviewed.   - Hospitalization and results reviewed with patient.   - Medications reviewed including instructions regarding high risk medications, dosing and side effects.  - Recommended Services: No services needed at this time  - Advance directive/POLST on file?  No     Follow-up:No follow-ups on file.    Face-to-face transitional care management services with HIGH (today's visit is within days post discharge & LACE+ score 59+) medical decision complexity were provided.     Please note that this dictation was created using voice recognition software. I have made every reasonable attempt to correct obvious errors, but I expect that there are errors of grammar and possibly content that I did not discover before finalizing the note.

## 2024-03-21 ENCOUNTER — PHARMACY VISIT (OUTPATIENT)
Dept: PHARMACY | Facility: MEDICAL CENTER | Age: 74
End: 2024-03-21
Payer: COMMERCIAL

## 2024-03-21 ENCOUNTER — APPOINTMENT (OUTPATIENT)
Dept: ADMISSIONS | Facility: MEDICAL CENTER | Age: 74
End: 2024-03-21
Attending: INTERNAL MEDICINE
Payer: MEDICARE

## 2024-03-21 DIAGNOSIS — R59.1 LYMPHADENOPATHY: ICD-10-CM

## 2024-03-21 DIAGNOSIS — R91.8 OTHER NONSPECIFIC ABNORMAL FINDING OF LUNG FIELD: ICD-10-CM

## 2024-03-25 ENCOUNTER — PRE-ADMISSION TESTING (OUTPATIENT)
Dept: ADMISSIONS | Facility: MEDICAL CENTER | Age: 74
End: 2024-03-25
Attending: INTERNAL MEDICINE
Payer: MEDICARE

## 2024-03-25 VITALS — BODY MASS INDEX: 32.03 KG/M2 | HEIGHT: 63 IN

## 2024-03-25 RX ORDER — ACETAMINOPHEN 160 MG
2000 TABLET,DISINTEGRATING ORAL DAILY
COMMUNITY

## 2024-03-25 RX ORDER — CHLORAL HYDRATE 500 MG
1000 CAPSULE ORAL
COMMUNITY

## 2024-03-25 RX ORDER — ACETAMINOPHEN 500 MG
500-1000 TABLET ORAL EVERY 6 HOURS PRN
COMMUNITY

## 2024-03-25 NOTE — PREPROCEDURE INSTRUCTIONS
Pre-admit telephone appointment completed. Reviewed the Preparing for your procedure handout with patient over the phone.  Patient instructed per pharmacy guidelines regarding taking, holding, or contacting provider for instructions on regularly prescribed medications before surgery. Instructed to take the following medications the day of surgery with a sip of water per pharmacy medication guidelines: lipitor, cardizem, prilosec, ditropan, and if needed-tylenol, albuterol.    Pt to bring CPAP DOS.     Pt was recently in hospital for hiatal hernia. States aware K+ low and states it did go up prior to DC home and pt was instructed to eat bananas.

## 2024-04-01 PROCEDURE — RXMED WILLOW AMBULATORY MEDICATION CHARGE: Performed by: STUDENT IN AN ORGANIZED HEALTH CARE EDUCATION/TRAINING PROGRAM

## 2024-04-03 ENCOUNTER — PHARMACY VISIT (OUTPATIENT)
Dept: PHARMACY | Facility: MEDICAL CENTER | Age: 74
End: 2024-04-03
Payer: COMMERCIAL

## 2024-04-10 ENCOUNTER — PATIENT MESSAGE (OUTPATIENT)
Dept: MEDICAL GROUP | Facility: MEDICAL CENTER | Age: 74
End: 2024-04-10
Payer: MEDICARE

## 2024-04-10 DIAGNOSIS — M85.80 OSTEOPENIA WITH HIGH RISK OF FRACTURE: ICD-10-CM

## 2024-04-10 PROCEDURE — RXMED WILLOW AMBULATORY MEDICATION CHARGE: Performed by: STUDENT IN AN ORGANIZED HEALTH CARE EDUCATION/TRAINING PROGRAM

## 2024-04-10 RX ORDER — ALENDRONATE SODIUM 70 MG/1
70 TABLET ORAL
Qty: 12 TABLET | Refills: 3 | Status: SHIPPED | OUTPATIENT
Start: 2024-04-10

## 2024-04-11 ENCOUNTER — PHARMACY VISIT (OUTPATIENT)
Dept: PHARMACY | Facility: MEDICAL CENTER | Age: 74
End: 2024-04-11
Payer: COMMERCIAL

## 2024-04-15 ENCOUNTER — OFFICE VISIT (OUTPATIENT)
Dept: DERMATOLOGY | Facility: IMAGING CENTER | Age: 74
End: 2024-04-15
Payer: MEDICARE

## 2024-04-15 DIAGNOSIS — L82.1 SK (SEBORRHEIC KERATOSIS): ICD-10-CM

## 2024-04-15 PROCEDURE — 99212 OFFICE O/P EST SF 10 MIN: CPT | Performed by: NURSE PRACTITIONER

## 2024-04-15 NOTE — PROGRESS NOTES
DERMATOLOGY NOTE  FOLLOW UP VISIT       Chief complaint: Follow-Up  Pt has a spot in esophagus and provider wants her to get 2 moles checked.  Mole on cleavage and scalp        History of skin cancer: Yes, Details: BCC nose and lip 2015, 2016, BCC forehead 2010  History of precancers/actinic keratoses: Yes, Details: see above  History of biopsies:Yes, Details: yes, see above  History of blistering/severe sunburns:Yes, Details: young adult  Family history of skin cancer:Yes, Details: mother- SCC, BCC; father - BCC  Family history of atypical moles:No    Past Medical History:   Diagnosis Date    Abnormal LFTs 08/2023    ADHD     Allergic rhinitis     Aortic ectasia (HCC) 03/2024    Asthma     albuterol inhaler prn    Back pain     Basal cell carcinoma (BCC)     BMI 31.0-31.9,adult 01/19/2022    Cataract     bilateral IOL    Chickenpox     shingles vaccine    Depression     situational due to loss of child    Fracture     Multiple fractures    Heart burn 5 years    omeprazole    Hiatus hernia syndrome     per CT    High cholesterol     controlled with med    History of migraine     Influenza     Kidney stone     Nasal drainage     Pneumonia     approx 1985    PSVT (paroxysmal supraventricular tachycardia) (HCC) 12/28/2023    states due to not using CPAP for a period of time    Renal cyst     right side per CT    Shingles 2000    Sleep apnea 4 years    Snoring         Family History   Problem Relation Age of Onset    Cancer Mother     Cancer Father     Breast Cancer Sister         Breast cancer    Cancer Sister         Breast cancer    Lupus Sister     Leukemia Brother     Colorectal Cancer Neg Hx     Peritoneal Cancer Neg Hx     Tubal Cancer Neg Hx     Ovarian Cancer Neg Hx         Social History     Socioeconomic History    Marital status:      Spouse name: Not on file    Number of children: Not on file    Years of education: Not on file    Highest education level: Bachelor's degree (e.g., BA, AB, BS)    Occupational History    Not on file   Tobacco Use    Smoking status: Never    Smokeless tobacco: Never   Vaping Use    Vaping Use: Never used   Substance and Sexual Activity    Alcohol use: Not Currently     Comment: Maybe 2 glasses a year    Drug use: Never    Sexual activity: Not Currently     Partners: Male   Other Topics Concern    Not on file   Social History Narrative    , has grown children. 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     Her parents are .  She had 2 biological daughters 1  in a motor vehicle accident at age 17.  She had 2 brothers.  One is .  1 had bipolar disorder.  Her maternal great grand mother was bipolar.     Social Determinants of Health     Financial Resource Strain: Low Risk  (2/15/2024)    Overall Financial Resource Strain (CARDIA)     Difficulty of Paying Living Expenses: Not hard at all   Food Insecurity: No Food Insecurity (2/15/2024)    Hunger Vital Sign     Worried About Running Out of Food in the Last Year: Never true     Ran Out of Food in the Last Year: Never true   Transportation Needs: No Transportation Needs (2/15/2024)    PRAPARE - Transportation     Lack of Transportation (Medical): No     Lack of Transportation (Non-Medical): No   Physical Activity: Insufficiently Active (3/23/2023)    Exercise Vital Sign     Days of Exercise per Week: 4 days     Minutes of Exercise per Session: 30 min   Stress: No Stress Concern Present (2022)    [place] [employer] - Occupational Stress Questionnaire     Feeling of Stress : Only a little   Social Connections: Socially Integrated (2022)    Social Connection and Isolation Panel [NHANES]     Frequency of Communication with Friends and Family: More than three times a week     Frequency of Social Gatherings with Friends and Family: Twice a week     Attends Islam Services: 1 to 4 times per year     Active Member of Clubs or  Organizations: Yes     Attends Club or Organization Meetings: More than 4 times per year     Marital Status:    Intimate Partner Violence: Not At Risk (3/23/2023)    Humiliation, Afraid, Rape, and Kick questionnaire     Fear of Current or Ex-Partner: No     Emotionally Abused: No     Physically Abused: No     Sexually Abused: No   Housing Stability: Low Risk  (2/15/2024)    Housing Stability Vital Sign     Unable to Pay for Housing in the Last Year: No     Number of Places Lived in the Last Year: 1     Unstable Housing in the Last Year: No        Allergies   Allergen Reactions    Bee Venom Anaphylaxis     Will stop heart   Will stop heart     Crab      Positive allergy test  SHELLFISH    Magnesium Citrate Palpitations, Myalgia and Unspecified    Shellfish Allergy Anaphylaxis and Unspecified     Heart issues  Heart issues    Shrimp (Diagnostic) Shortness of Breath     Increased heart rate and headache  Opened sinus that drained like a faucet.  SHELLFISH    Dust Mite Extract      Positive allergy test    Pollen Extract      Positive allergy test    [place] Hives     Increase in heart rate and hives         MEDICATIONS:  Medications relevant to specialty reviewed.    Current Outpatient Medications:     alendronate (FOSAMAX) 70 MG Tab, Take 1 Tablet by mouth every 7 days., Disp: 12 Tablet, Rfl: 3    Ascorbic Acid (VITAMIN C) 500 MG Chew Tab, Chew 500 mg every day., Disp: , Rfl:     Omega-3 Fatty Acids (FISH OIL) 1000 MG Cap capsule, Take 1,000 mg by mouth every 48 hours. 300 MG OMEGA, Disp: , Rfl:     Cholecalciferol (VITAMIN D3) 2000 UNIT Cap, Take 2,000 Units by mouth every day., Disp: , Rfl:     acetaminophen (TYLENOL) 500 MG Tab, Take 500-1,000 mg by mouth every 6 hours as needed., Disp: , Rfl:     atomoxetine (STRATTERA) 60 MG capsule, TAKE 1 CAPSULE BY MOUTH DAILY (Patient taking differently: Take 60 mg by mouth every day. Indications: Attention Deficit Hyperactivity Disorder), Disp: 100 Capsule, Rfl: 2     buPROPion SR (WELLBUTRIN SR) 150 MG TABLET SR 12 HR sustained-release tablet, Take 1 Tablet by mouth every day for 180 days. (Patient taking differently: Take 150 mg by mouth every 48 hours.), Disp: 180 Tablet, Rfl: 0    DILTIAZem CD (CARDIZEM CD) 120 MG CAPSULE SR 24 HR, Take 1 capsule by mouth every day., Disp: 90 Capsule, Rfl: 1    oxybutynin (DITROPAN) 5 MG Tab, Take 1 Tablet by mouth 2 times a day., Disp: 180 Tablet, Rfl: 3    omeprazole (PRILOSEC) 20 MG delayed-release capsule, Take 1 Capsule by mouth 2 times a day., Disp: 200 Capsule, Rfl: 3    atorvastatin (LIPITOR) 10 MG Tab, Take 1 Tablet by mouth every evening., Disp: 100 Tablet, Rfl: 3    SUMAtriptan (IMITREX) 25 MG Tab tablet, Take 1 Tablet by mouth one time as needed for Migraine for up to 1 dose. Do not exceed more than 10 tablets a month, Disp: 30 Tablet, Rfl: 2    albuterol 108 (90 Base) MCG/ACT Aero Soln inhalation aerosol, Inhale 2 Puffs every 6 hours as needed for Shortness of Breath., Disp: 8.5 g, Rfl: 3    Multiple Vitamin (MULTIVITAMIN ADULT PO), Take 1 Tablet by mouth every day. CENTRUM WOMENS 50+, Disp: , Rfl:     CALCIUM PO, Take 60 mg by mouth every day. Pensqr brand calcium, Disp: , Rfl:     VITAMIN D PO, Take 1 Tablet by mouth every morning., Disp: , Rfl:     EPINEPHrine (EPIPEN) 0.3 MG/0.3ML Solution Auto-injector solution for injection, Inject 0.3 mg into the shoulder, thigh, or buttocks one time as needed (anaphylaxis)., Disp: , Rfl:      REVIEW OF SYSTEMS:   Positive for skin (see HPI)  Negative for fevers and chills  All other systems reviewed and are negative.     EXAM:      A focused skin exam was performed including the affected areas of the scalp and chest. Notable findings on exam today listed below and/or in assessment/plan.       Medium brown waxy stuck on appearing papule to mid chest between breasts  No visible lesion on L parietal scalp, pt states fell off    IMPRESSION / PLAN:    1. SK (seborrheic keratosis)  -  Benign-appearing nature of lesions discussed during exam.   - reassurance provided, likely lesion that fell off per patient  - Advised to continue to monitor for any return to clinic for new or concerning changes.         Return to clinic in: Return for PRN. and as needed for any new or changing skin lesions.

## 2024-04-22 ENCOUNTER — HOSPITAL ENCOUNTER (OUTPATIENT)
Facility: MEDICAL CENTER | Age: 74
End: 2024-04-22
Attending: INTERNAL MEDICINE | Admitting: INTERNAL MEDICINE
Payer: MEDICARE

## 2024-04-22 ENCOUNTER — ANESTHESIA EVENT (OUTPATIENT)
Dept: SURGERY | Facility: MEDICAL CENTER | Age: 74
End: 2024-04-22
Payer: MEDICARE

## 2024-04-22 ENCOUNTER — ANESTHESIA (OUTPATIENT)
Dept: SURGERY | Facility: MEDICAL CENTER | Age: 74
End: 2024-04-22
Payer: MEDICARE

## 2024-04-22 VITALS
BODY MASS INDEX: 31.35 KG/M2 | HEIGHT: 63 IN | SYSTOLIC BLOOD PRESSURE: 153 MMHG | HEART RATE: 68 BPM | OXYGEN SATURATION: 97 % | TEMPERATURE: 97 F | RESPIRATION RATE: 18 BRPM | DIASTOLIC BLOOD PRESSURE: 82 MMHG | WEIGHT: 176.92 LBS

## 2024-04-22 PROCEDURE — 160036 HCHG PACU - EA ADDL 30 MINS PHASE I: Performed by: INTERNAL MEDICINE

## 2024-04-22 PROCEDURE — 88305 TISSUE EXAM BY PATHOLOGIST: CPT

## 2024-04-22 PROCEDURE — 160203 HCHG ENDO MINUTES - 1ST 30 MINS LEVEL 4: Performed by: INTERNAL MEDICINE

## 2024-04-22 PROCEDURE — 700101 HCHG RX REV CODE 250: Performed by: ANESTHESIOLOGY

## 2024-04-22 PROCEDURE — 160048 HCHG OR STATISTICAL LEVEL 1-5: Performed by: INTERNAL MEDICINE

## 2024-04-22 PROCEDURE — 160035 HCHG PACU - 1ST 60 MINS PHASE I: Performed by: INTERNAL MEDICINE

## 2024-04-22 PROCEDURE — 160009 HCHG ANES TIME/MIN: Performed by: INTERNAL MEDICINE

## 2024-04-22 PROCEDURE — 160208 HCHG ENDO MINUTES - EA ADDL 1 MIN LEVEL 4: Performed by: INTERNAL MEDICINE

## 2024-04-22 PROCEDURE — 700111 HCHG RX REV CODE 636 W/ 250 OVERRIDE (IP): Performed by: ANESTHESIOLOGY

## 2024-04-22 PROCEDURE — 160025 RECOVERY II MINUTES (STATS): Performed by: INTERNAL MEDICINE

## 2024-04-22 PROCEDURE — 160002 HCHG RECOVERY MINUTES (STAT): Performed by: INTERNAL MEDICINE

## 2024-04-22 PROCEDURE — 700105 HCHG RX REV CODE 258: Performed by: INTERNAL MEDICINE

## 2024-04-22 PROCEDURE — 160046 HCHG PACU - 1ST 60 MINS PHASE II: Performed by: INTERNAL MEDICINE

## 2024-04-22 RX ORDER — CEFAZOLIN SODIUM 1 G/3ML
INJECTION, POWDER, FOR SOLUTION INTRAMUSCULAR; INTRAVENOUS PRN
Status: DISCONTINUED | OUTPATIENT
Start: 2024-04-22 | End: 2024-04-22 | Stop reason: SURG

## 2024-04-22 RX ORDER — ROCURONIUM BROMIDE 10 MG/ML
INJECTION, SOLUTION INTRAVENOUS PRN
Status: DISCONTINUED | OUTPATIENT
Start: 2024-04-22 | End: 2024-04-22 | Stop reason: SURG

## 2024-04-22 RX ORDER — LIDOCAINE HYDROCHLORIDE 20 MG/ML
INJECTION, SOLUTION EPIDURAL; INFILTRATION; INTRACAUDAL; PERINEURAL PRN
Status: DISCONTINUED | OUTPATIENT
Start: 2024-04-22 | End: 2024-04-22 | Stop reason: SURG

## 2024-04-22 RX ORDER — ONDANSETRON 2 MG/ML
4 INJECTION INTRAMUSCULAR; INTRAVENOUS
Status: DISCONTINUED | OUTPATIENT
Start: 2024-04-22 | End: 2024-04-22 | Stop reason: HOSPADM

## 2024-04-22 RX ORDER — HALOPERIDOL 5 MG/ML
1 INJECTION INTRAMUSCULAR
Status: DISCONTINUED | OUTPATIENT
Start: 2024-04-22 | End: 2024-04-22 | Stop reason: HOSPADM

## 2024-04-22 RX ORDER — DIPHENHYDRAMINE HYDROCHLORIDE 50 MG/ML
12.5 INJECTION INTRAMUSCULAR; INTRAVENOUS
Status: DISCONTINUED | OUTPATIENT
Start: 2024-04-22 | End: 2024-04-22 | Stop reason: HOSPADM

## 2024-04-22 RX ORDER — SODIUM CHLORIDE, SODIUM LACTATE, POTASSIUM CHLORIDE, CALCIUM CHLORIDE 600; 310; 30; 20 MG/100ML; MG/100ML; MG/100ML; MG/100ML
INJECTION, SOLUTION INTRAVENOUS CONTINUOUS
Status: DISCONTINUED | OUTPATIENT
Start: 2024-04-22 | End: 2024-04-22 | Stop reason: HOSPADM

## 2024-04-22 RX ADMIN — ROCURONIUM BROMIDE 40 MG: 50 INJECTION, SOLUTION INTRAVENOUS at 09:47

## 2024-04-22 RX ADMIN — PROPOFOL 150 MG: 10 INJECTION, EMULSION INTRAVENOUS at 09:47

## 2024-04-22 RX ADMIN — SODIUM CHLORIDE, POTASSIUM CHLORIDE, SODIUM LACTATE AND CALCIUM CHLORIDE: 600; 310; 30; 20 INJECTION, SOLUTION INTRAVENOUS at 09:39

## 2024-04-22 RX ADMIN — SUGAMMADEX 200 MG: 100 INJECTION, SOLUTION INTRAVENOUS at 10:12

## 2024-04-22 RX ADMIN — CEFAZOLIN 2 G: 1 INJECTION, POWDER, FOR SOLUTION INTRAMUSCULAR; INTRAVENOUS at 10:10

## 2024-04-22 RX ADMIN — LIDOCAINE HYDROCHLORIDE 100 MG: 20 INJECTION, SOLUTION EPIDURAL; INFILTRATION; INTRACAUDAL at 09:47

## 2024-04-22 ASSESSMENT — PAIN DESCRIPTION - PAIN TYPE
TYPE: SURGICAL PAIN
TYPE: OTHER (COMMENT)

## 2024-04-22 ASSESSMENT — FIBROSIS 4 INDEX: FIB4 SCORE: 1.35

## 2024-04-22 ASSESSMENT — PAIN SCALES - GENERAL: PAIN_LEVEL: 2

## 2024-04-22 NOTE — ANESTHESIA POSTPROCEDURE EVALUATION
Patient: Denia Deluca    Procedure Summary       Date: 04/22/24 Room / Location:  ENDOSCOPIC ULTRASOUND ROOM / SURGERY [facility]    Anesthesia Start: 0939 Anesthesia Stop: 1030    Procedures:       UPPER ENDOSCOPIC ULTRASOUND AND ESOPHAGOGASTRODUODENOSCOPY (Mouth)      EGD, WITH ENDOSCOPIC US (Mouth) Diagnosis: (HIATAL HERNIA)    Surgeons: Tomás Shaw M.D. Responsible Provider: Paulino Hutchison M.D.    Anesthesia Type: general ASA Status: 2            Final Anesthesia Type: general  Last vitals  BP   Blood Pressure : (!) 142/75    Temp   36.2 °C (97.2 °F)    Pulse   70   Resp   16    SpO2   98 %      Anesthesia Post Evaluation    Patient location during evaluation: PACU  Patient participation: complete - patient participated  Level of consciousness: awake and alert  Pain score: 2    Airway patency: patent  Anesthetic complications: no  Cardiovascular status: hemodynamically stable  Respiratory status: acceptable  Hydration status: euvolemic    PONV: none          There were no known notable events for this encounter.     Nurse Pain Score: 0 (NPRS)

## 2024-04-22 NOTE — OR NURSING
Patient allergies and NPO status verified, home medication reconciliation completed and belongings secured. Patient verbalizes understanding of pain scale, expected course of stay and plan of care. Surgical site verified with patient. IV access established.

## 2024-04-22 NOTE — ANESTHESIA PROCEDURE NOTES
Airway    Date/Time: 4/22/2024 9:47 AM    Performed by: Paulino Hutchison M.D.  Authorized by: Paulino Hutchison M.D.    Location:  OR  Urgency:  Elective  Indications for Airway Management:  Anesthesia      Spontaneous Ventilation: absent    Sedation Level:  Deep  Preoxygenated: Yes    Patient Position:  Sniffing  Final Airway Type:  Endotracheal airway  Final Endotracheal Airway:  ETT  Cuffed: Yes    Technique Used for Successful ETT Placement:  Direct laryngoscopy    Insertion Site:  Oral  Blade Type:  [place]  Laryngoscope Blade/Videolaryngoscope Blade Size:  2  ETT Size (mm):  7.0  Measured from:  Teeth  ETT to Teeth (cm):  22  Placement Verified by: auscultation and capnometry    Cormack-Lehane Classification:  Grade I - full view of glottis  Number of Attempts at Approach:  1        
82M PMHx AFib on eliquis, HTN, HFpEF, HLD, COPD, DM2 here with dizziness.    #Dizziness/ vertigo  lasted few minutes; awoke from sleep; no tinnitius, hearing loss  +mechanical fall; no syncope  cth neg  mri neg  suspected bppv, plan for vestibular rehab  appreciate ent, neuro  stable for d/c, needs outpt pmd, neuro, ent f/u one week  #Thyroid nodule  incidentally noted on ct  outpt us  #Lung thickening  incidentally noted on ct  outpt f/u with repeat ct in 3mos  #Pancreatic duct dilation  incidentally noted on ct  outpt f/u with mri/ mrcp  #AFib, chronic  eliquis  dilt 120  amio 200  #HTN  bp meds as below  #HFpEF, chronic  entresto 24/26 bid  hold bumex  #HLD  lipitor 40  #COPD  outpt f/u  #DM2  lantus 15  humalog 5 tid  ssi  #DVT ppx  eliquis

## 2024-04-22 NOTE — ANESTHESIA PREPROCEDURE EVALUATION
Case: [id number] Date/Time: 04/22/24 0930    Procedures:       UPPER ENDOSCOPIC ULTRASOUND AND ESOPHAGOGASTRODUODENOSCOPY      EGD, WITH ENDOSCOPIC US    Pre-op diagnosis: ABNORMAL CT SCAN    Location:  ENDOSCOPIC ULTRASOUND ROOM / SURGERY [facility]    Surgeons: Tomás Shaw M.D.            Relevant Problems   ANESTHESIA   (positive) Obstructive sleep apnea      PULMONARY   (positive) Mild intermittent asthma without complication      CARDIAC   (positive) Aortic ectasia (HCC)   (positive) PSVT (paroxysmal supraventricular tachycardia) (HCC)   (positive) PVC's (premature ventricular contractions)      GI   (positive) Hiatal hernia       Physical Exam    Airway   Mallampati: II  TM distance: >3 FB  Neck ROM: full       Cardiovascular - normal exam  Rhythm: regular  Rate: normal  (-) murmur     Dental - normal exam           Pulmonary - normal exam  Breath sounds clear to auscultation     Abdominal    Neurological - normal exam                   Anesthesia Plan    ASA 2       Plan - general       Airway plan will be ETT          Induction: intravenous    Postoperative Plan: Postoperative administration of opioids is intended.    Pertinent diagnostic labs and testing reviewed    Informed Consent:    Anesthetic plan and risks discussed with patient.    Use of blood products discussed with: patient whom consented to blood products.

## 2024-04-22 NOTE — OR NURSING
1148: Report from Chari ALBERTO. Patient A/OX4. VSS on RA. Denies pain. Family at bedside.     1205: Patient education completed, family denies further questions. DC'd to care of family post uneventful stay in PACU 2.

## 2024-04-22 NOTE — DISCHARGE INSTRUCTIONS
What to Expect Post Anesthesia    Rest and take it easy for the first 24 hours.  A responsible adult is recommended to remain with you during that time.  It is normal to feel sleepy.  We encourage you to not do anything that requires balance, judgment or coordination.    FOR 24 HOURS DO NOT:  Drive, operate machinery or run household appliances.  Drink beer or alcoholic beverages.  Make important decisions or sign legal documents.    To avoid nausea, slowly advance diet as tolerated, avoiding spicy or greasy foods for the first day.  Add more substantial food to your diet according to your provider's instructions.  Babies can be fed formula or breast milk as soon as they are hungry.  INCREASE FLUIDS AND FIBER TO AVOID CONSTIPATION.    MILD FLU-LIKE SYMPTOMS ARE NORMAL.  YOU MAY EXPERIENCE GENERALIZED MUSCLE ACHES, THROAT IRRITATION, HEADACHE AND/OR SOME NAUSEA.    If any questions arise, call your provider.  If your provider is not available, please feel free to call the Surgical Center at [phone].    MEDICATIONS: Resume taking daily medication.  Take prescribed pain medication with food.  If no medication is prescribed, you may take non-aspirin pain medication if needed.  PAIN MEDICATION CAN BE VERY CONSTIPATING.  Take a stool softener or laxative such as senokot, pericolace, or milk of magnesia if needed.    Last pain medication given at: N/A    ENDOSCOPY HOME CARE INSTRUCTIONS    GASTROSCOPY OR ERCP  1. Don't eat or drink anything for about an hour after the test. You can then resume your regular diet.  2. You will receive a letter in the mail in 1-2 weeks with biopsy results.   3. Don't take any coffee, tea, or aspirin products until after you see your doctor. These can harm the lining of your stomach.  4. If you begin to vomit bloody material, or develop black or bloody stools, call your doctor as soon as possible.  5. If you have any neck, chest, abdominal pain or temp of 100 degrees, call your  doctor.    Findings:              EGD                          Esophagus                                      Unremarkable mucosa                          Stomach                                      Hiatal hernia 38 to 41 cm                          Duodenum                                      Unremarkable mucosa                          Ampulla                                      Well-visualized and unremarkable                 EUS                          Celiac axis                                      No adenopathy                          Pancreas body/tail                                      Normal parenchyma throughout                          Pancreatic duct                                      Normal course                                      Generous caliber, 1.8 mm in tail of pancreas and 3.4 mm in body                          Ampulla                                      Unremarkable                          CBD                                      Normal course and caliber                                      No filling defects                          Head of pancreas                                      Normal dorsal ventral transition                                      Unremarkable parenchyma                          Mediastinum adenopathy                                      20 cm from incisors                                      Single oblong lymph node                                      Homogeneous                                      18.7 mm x 9.6 mm                                      FNA 22-gauge acquire needle     Plan:                Follow-up final pathology                          If nondiagnostic consider repeat CT chest in 3 months for surveillance    You should call 911 if you develop problems with breathing or chest pain.  If any questions arise, call your doctor. You can also call the HEALTH HOTLINE open 24 hours/day, 7 days/week and speak to a nurse at [phone], or  toll free [phone].

## 2024-04-22 NOTE — ANESTHESIA TIME REPORT
Anesthesia Start and Stop Event Times       Date Time Event    4/22/2024 0925 Ready for Procedure     0939 Anesthesia Start     1030 Anesthesia Stop          Responsible Staff  04/22/24      Name Role Begin End    Paulino Hutchison M.D. Anesth 0939 1030          Overtime Reason:  no overtime (within assigned shift)    Comments:

## 2024-04-22 NOTE — OR NURSING
1025: Patient arrived to PACU from [facility] via gurney. Report received from anesthesia and RN. Respirations are spontaneous and unlabored. VSS on 6L.     1035: Dr. Roman to bedside. Patient very drowsy still.     1045: Family updated via phone.    1100: Patient tolerating sips of water.    1115: No changes.    1131: Recovery completed at this time. Patient meets criteria for transfer to stage 2. Report given to RN.    1134: CNA at bedside to transfer patient to stage 2.

## 2024-04-22 NOTE — OR SURGEON
EGD operative note    PreOp Diagnosis: Abnormal CT scan with mediastinal adenopathy and pancreatic duct dilation      PostOp Diagnosis: Same      Procedure(s):  UPPER ENDOSCOPIC ULTRASOUND AND ESOPHAGOGASTRODUODENOSCOPY - Wound Class: Clean Contaminated  EGD, WITH ENDOSCOPIC US - Wound Class: Clean Contaminated    Surgeon(s):  Tomás Shaw M.D.    Anesthesiologist/Type of Anesthesia:  Anesthesiologist: Paulino Hutchison M.D./General    Surgical Staff:  Circulator: Gabi Narayanan R.N.  Endoscopy Technician: Arielle Norman; Gaby Hanna    Specimens removed if any:  ID Type Source Tests Collected by Time Destination   A : MEDIASTINAL LYMPH NODE Other Other PATHOLOGY SPECIMEN Tomás Shaw M.D. 4/22/2024 10:07 AM        Dr. Shaw  [facility]  [phone]    EGD    EUS with: Fine-needle aspiration    Indication: Abnormal CT scan with mediastinal adenopathy and pancreas duct dilation    Sedation: GA    Findings:   EGD    Esophagus     Unremarkable mucosa    Stomach     Hiatal hernia 38 to 41 cm    Duodenum     Unremarkable mucosa    Ampulla     Well-visualized and unremarkable     EUS    Celiac axis     No adenopathy    Pancreas body/tail     Normal parenchyma throughout    Pancreatic duct     Normal course     Generous caliber, 1.8 mm in tail of pancreas and 3.4 mm in body    Ampulla     Unremarkable    CBD     Normal course and caliber     No filling defects    Head of pancreas     Normal dorsal ventral transition     Unremarkable parenchyma    Mediastinum adenopathy     20 cm from incisors     Single oblong lymph node     Homogeneous     18.7 mm x 9.6 mm     FNA 22-gauge acquire needle    Plan:  Follow-up final pathology    If nondiagnostic consider repeat CT chest in 3 months for surveillance      Procedure detail:    Prior to procedure, informed consent was obtained.  Risks, benefits, alternatives, including but not limited to risk of bleeding, infection,  perforation, adverse reaction to sedating medicine, failure to identify pathology and death were explained to the patient who accepted all risks.    Patient was prepped in the left lateral position after intubation and sedation was provided by anesthesia.    EGD  Scope tip of the Olympus flexible gastroscope was passed in the proximal esophagus.  Proximal, middle and distal thirds of the esophagus were well-visualized and were unremarkable.  The GE junction was regular at 38 cm.  The stomach was entered and there was a hiatal hernia from 38 to 41 cm.  Gastric pool was suctioned and air was insufflated.  Scope was retroflexed view the body fundus and cardia then straightened to view the antrum and incisura.  The mucosa was otherwise unremarkable.  The pylorus was patent.  Duodenal bulb, sweep, second and third portion were unremarkable.  The biliary ampulla was seen while tangentially.  It had normal appearance and normal orifice.  The gastroscope was withdrawn and we proceeded with endoscopic ultrasound.    EUS  The flexible radial echoendoscope was passed in the proximal esophagus.  The mediastinum was closely examined.  There was a 9.6 mm x 18.7 mm homogeneous lymph node seen 20 centimeters from the incisors.  This had a benign appearance.  No other adenopathy was appreciated in the mediastinum.  The stomach was entered and the celiac axis was unremarkable.  Pancreatic body and tail demonstrated normal salt-and-pepper appearance of the parenchyma throughout.  The pancreatic duct had a normal course.  The caliber of the pancreatic duct was slightly generous measuring 3.4 mm in the body and 1.8 mm in the tail.  There was no evidence of obstruction.  The scope was advanced in the duodenum the biliary ampulla was endosonographically unremarkable and the common bile duct had no filling defects and a normal course and caliber.  The head of the pancreas had unremarkable parenchyma within normal dorsal ventral transition.   The radial echoendoscope was withdrawn and we proceeded with fine-needle aspiration.    The flexible linear echoendoscope was passed into the proximal esophagus and the mediastinum was once again visualized.  No other adenopathy was seen other than the lymph node at 20 cm from the incisors.  A 22-gauge [employer] acquire needle was utilized for 2 separate passes in multiple throws into the lymph node for tissue sampling.  Once this was complete the procedure was deemed complete.  Air and liquid resection.  The scope was withdrawn.  Patient tolerated the procedure well and was sent to recovery without immediate complications.      4/22/2024 10:14 AM Tomás Shaw M.D.

## 2024-04-22 NOTE — OR NURSING
1132 Received report from Gaby ALBERTO.     1134 Pt transferred to stage 2, assisted into clothing. Pt ambulated to bathroom, able to void. Pt had episode of incontinence, provided with clean brief underwear.     1148 Report to Shauna ALBERTO.

## 2024-04-23 LAB — PATHOLOGY CONSULT NOTE: NORMAL

## 2024-04-26 ENCOUNTER — PATIENT MESSAGE (OUTPATIENT)
Dept: MEDICAL GROUP | Facility: MEDICAL CENTER | Age: 74
End: 2024-04-26
Payer: MEDICARE

## 2024-04-29 ENCOUNTER — PATIENT MESSAGE (OUTPATIENT)
Dept: MEDICAL GROUP | Facility: MEDICAL CENTER | Age: 74
End: 2024-04-29
Payer: MEDICARE

## 2024-04-29 DIAGNOSIS — R19.7 DIARRHEA, UNSPECIFIED TYPE: ICD-10-CM

## 2024-05-02 ENCOUNTER — PATIENT MESSAGE (OUTPATIENT)
Dept: MEDICAL GROUP | Facility: MEDICAL CENTER | Age: 74
End: 2024-05-02
Payer: MEDICARE

## 2024-05-02 DIAGNOSIS — D35.1 PARATHYROID ADENOMA: ICD-10-CM

## 2024-05-03 ENCOUNTER — HOSPITAL ENCOUNTER (OUTPATIENT)
Dept: LAB | Facility: MEDICAL CENTER | Age: 74
End: 2024-05-03
Attending: STUDENT IN AN ORGANIZED HEALTH CARE EDUCATION/TRAINING PROGRAM
Payer: MEDICARE

## 2024-05-03 DIAGNOSIS — R19.7 DIARRHEA, UNSPECIFIED TYPE: ICD-10-CM

## 2024-05-04 LAB
T3 SERPL-MCNC: 116 NG/DL (ref 60–181)
T4 FREE SERPL-MCNC: 1.05 NG/DL (ref 0.93–1.7)
TSH SERPL DL<=0.005 MIU/L-ACNC: 3.3 UIU/ML (ref 0.38–5.33)

## 2024-05-06 ENCOUNTER — HOSPITAL ENCOUNTER (OUTPATIENT)
Dept: RADIOLOGY | Facility: MEDICAL CENTER | Age: 74
End: 2024-05-06
Attending: STUDENT IN AN ORGANIZED HEALTH CARE EDUCATION/TRAINING PROGRAM
Payer: MEDICARE

## 2024-05-06 DIAGNOSIS — R91.8 OTHER NONSPECIFIC ABNORMAL FINDING OF LUNG FIELD: ICD-10-CM

## 2024-05-06 DIAGNOSIS — R59.1 LYMPHADENOPATHY: ICD-10-CM

## 2024-05-06 LAB — GLUCOSE BLD-MCNC: 98 MG/DL (ref 65–99)

## 2024-05-07 ENCOUNTER — HOSPITAL ENCOUNTER (OUTPATIENT)
Dept: LAB | Facility: MEDICAL CENTER | Age: 74
End: 2024-05-07
Attending: INTERNAL MEDICINE
Payer: MEDICARE

## 2024-05-07 ENCOUNTER — OFFICE VISIT (OUTPATIENT)
Dept: CARDIOLOGY | Facility: MEDICAL CENTER | Age: 74
End: 2024-05-07
Attending: INTERNAL MEDICINE
Payer: MEDICARE

## 2024-05-07 ENCOUNTER — OFFICE VISIT (OUTPATIENT)
Dept: SLEEP MEDICINE | Facility: MEDICAL CENTER | Age: 74
End: 2024-05-07
Attending: INTERNAL MEDICINE
Payer: MEDICARE

## 2024-05-07 ENCOUNTER — PATIENT MESSAGE (OUTPATIENT)
Dept: MEDICAL GROUP | Facility: MEDICAL CENTER | Age: 74
End: 2024-05-07

## 2024-05-07 VITALS
OXYGEN SATURATION: 96 % | BODY MASS INDEX: 31.89 KG/M2 | RESPIRATION RATE: 16 BRPM | SYSTOLIC BLOOD PRESSURE: 110 MMHG | WEIGHT: 180 LBS | HEIGHT: 63 IN | HEART RATE: 83 BPM | DIASTOLIC BLOOD PRESSURE: 72 MMHG

## 2024-05-07 VITALS
OXYGEN SATURATION: 98 % | SYSTOLIC BLOOD PRESSURE: 120 MMHG | WEIGHT: 179 LBS | BODY MASS INDEX: 31.71 KG/M2 | HEART RATE: 75 BPM | DIASTOLIC BLOOD PRESSURE: 60 MMHG | HEIGHT: 63 IN

## 2024-05-07 DIAGNOSIS — I49.3 PVC'S (PREMATURE VENTRICULAR CONTRACTIONS): ICD-10-CM

## 2024-05-07 DIAGNOSIS — I47.10 PSVT (PAROXYSMAL SUPRAVENTRICULAR TACHYCARDIA) (HCC): ICD-10-CM

## 2024-05-07 DIAGNOSIS — E83.52 HYPERCALCEMIA: ICD-10-CM

## 2024-05-07 DIAGNOSIS — M85.80 OSTEOPENIA, UNSPECIFIED LOCATION: ICD-10-CM

## 2024-05-07 DIAGNOSIS — D35.1 PARATHYROID ADENOMA: ICD-10-CM

## 2024-05-07 DIAGNOSIS — E78.00 PURE HYPERCHOLESTEROLEMIA: ICD-10-CM

## 2024-05-07 DIAGNOSIS — R94.8 ABNORMAL PET SCAN OF MEDIASTINUM: ICD-10-CM

## 2024-05-07 DIAGNOSIS — R00.2 PALPITATIONS: ICD-10-CM

## 2024-05-07 LAB
25(OH)D3 SERPL-MCNC: 47 NG/ML (ref 30–100)
ALBUMIN SERPL BCP-MCNC: 4.3 G/DL (ref 3.2–4.9)
ALBUMIN/GLOB SERPL: 1.6 G/DL
ALP SERPL-CCNC: 159 U/L (ref 30–99)
ALT SERPL-CCNC: 21 U/L (ref 2–50)
ANION GAP SERPL CALC-SCNC: 10 MMOL/L (ref 7–16)
AST SERPL-CCNC: 21 U/L (ref 12–45)
BILIRUB SERPL-MCNC: 0.4 MG/DL (ref 0.1–1.5)
BUN SERPL-MCNC: 24 MG/DL (ref 8–22)
CALCIUM ALBUM COR SERPL-MCNC: 9.8 MG/DL (ref 8.5–10.5)
CALCIUM SERPL-MCNC: 10 MG/DL (ref 8.5–10.5)
CHLORIDE SERPL-SCNC: 102 MMOL/L (ref 96–112)
CO2 SERPL-SCNC: 25 MMOL/L (ref 20–33)
CREAT SERPL-MCNC: 0.78 MG/DL (ref 0.5–1.4)
GFR SERPLBLD CREATININE-BSD FMLA CKD-EPI: 80 ML/MIN/1.73 M 2
GLOBULIN SER CALC-MCNC: 2.7 G/DL (ref 1.9–3.5)
GLUCOSE SERPL-MCNC: 85 MG/DL (ref 65–99)
POTASSIUM SERPL-SCNC: 4 MMOL/L (ref 3.6–5.5)
PROT SERPL-MCNC: 7 G/DL (ref 6–8.2)
PTH-INTACT SERPL-MCNC: 120 PG/ML (ref 14–72)
SODIUM SERPL-SCNC: 137 MMOL/L (ref 135–145)

## 2024-05-07 PROCEDURE — 99214 OFFICE O/P EST MOD 30 MIN: CPT | Performed by: INTERNAL MEDICINE

## 2024-05-07 PROCEDURE — 3074F SYST BP LT 130 MM HG: CPT | Performed by: INTERNAL MEDICINE

## 2024-05-07 PROCEDURE — 3078F DIAST BP <80 MM HG: CPT | Performed by: INTERNAL MEDICINE

## 2024-05-07 RX ORDER — DILTIAZEM HYDROCHLORIDE 120 MG/1
120 CAPSULE, COATED, EXTENDED RELEASE ORAL DAILY
Qty: 90 CAPSULE | Refills: 3 | Status: SHIPPED | OUTPATIENT
Start: 2024-05-07

## 2024-05-07 ASSESSMENT — FIBROSIS 4 INDEX
FIB4 SCORE: 1.35
FIB4 SCORE: 1.35

## 2024-05-07 ASSESSMENT — ENCOUNTER SYMPTOMS
EYE DISCHARGE: 0
COUGH: 0
DIZZINESS: 0
WEAKNESS: 0
BACK PAIN: 0
EYE REDNESS: 0
SPEECH CHANGE: 0
WHEEZING: 0
FOCAL WEAKNESS: 0
BLURRED VISION: 0
SORE THROAT: 0
SINUS PAIN: 0
FEVER: 0
DOUBLE VISION: 0
NAUSEA: 0
SHORTNESS OF BREATH: 0
HEMOPTYSIS: 0
PHOTOPHOBIA: 0
FALLS: 0
CHILLS: 0
EYE PAIN: 0
STRIDOR: 0
DIARRHEA: 0
DEPRESSION: 0
SPUTUM PRODUCTION: 0
CONSTIPATION: 0
HEARTBURN: 0
VOMITING: 0
PND: 0
PALPITATIONS: 0
NECK PAIN: 0
WEIGHT LOSS: 0
HEADACHES: 0
MYALGIAS: 0
DIAPHORESIS: 0
TREMORS: 0
ORTHOPNEA: 0
CLAUDICATION: 0
ABDOMINAL PAIN: 0

## 2024-05-07 NOTE — Clinical Note
Hey all, Just wanted to let you know I updated her referral to endocrine to get her in sooner hopefully and told her to stop her Ca and her vit D and ordered some labs to get the work up started. Not sure if she needs oncology given no other FDG uptake outside of the adenoma on PET. Thanks for getting the biopsy Dr. Shaw. No need for pulmonary from here on out Allyssa

## 2024-05-07 NOTE — PROGRESS NOTES
Cardiology Follow Up Consultation Note    Date of note:    5/7/2024    Primary Care Provider: Hoda Sloan M.D.  Referring Provider: No ref. provider found    Patient Name: Denia Deluca   YOB: 1950  MRN:              [id number]    Chief Complaint   Patient presents with    Palpitations    Tachycardia    Hyperlipidemia       History of Present Illness: Ms. Denia Deluca is a 73 y.o. woman with past medical history significant for obstructive sleep apnea on CPAP, hyperlipidemia, ADHD, palpitations due to PVCs and paroxysmal SVT who presents to the cardiology office for follow-up.    She was last seen in the office on 12/28/2023.  No major complaints during that visit.  I was recently started on verapamil given her known history of palpitations due to PVCs and paroxysmal SVT.  Had improvement with verapamil dose.  She was transition over to long-acting diltiazem 120 mg daily    Interval history:  Since her last visit, she was admitted to the hospital on 3/16/2024 with complaints of chest pain and dyspnea. She underwent cardiac stress test and echo. Stress test was normal with ischemia. Incidentally found to have pathologic appear tracheoesophageal lymph node that was eventually biopsied in April.    Has been going back to the gym and doing water aerobics 4x week. Has not experienced any chest pain or dyspnea with exercise. No edema, No lightheadedness/dizziness or syncope. Has been taking diltiazem and palpitations have improved significantly.    Cardiovascular Risk Factors:  1. Smoking status: Denies  2. Type II Diabetes Mellitus: Denies   Lab Results   Component Value Date/Time    HBA1C 5.5 11/15/2022 08:17 AM    HBA1C 5.4 04/29/2022 09:40 AM     3. Hypertension: Denies  4. Dyslipidemia: Yes  Cholesterol,Tot   Date Value Ref Range Status   12/27/2023 162 100 - 199 mg/dL Final     LDL   Date Value Ref Range Status   12/27/2023 70 <100 mg/dL Final     HDL   Date Value Ref Range  "Status   12/27/2023 66 >=40 mg/dL Final     Triglycerides   Date Value Ref Range Status   12/27/2023 131 0 - 149 mg/dL Final     5. Family history of early Coronary Artery Disease in a first degree relative (Male less than 55 years of age; Female less than 65 years of age): Denies      Review of Systems:  As per HPI. All other systems reviewed and are negative.      Past Medical History:   Diagnosis Date    Abnormal LFTs 08/2023    ADHD     Allergic rhinitis     Aortic ectasia (HCC) 03/2024    Asthma     albuterol inhaler prn    Back pain     Basal cell carcinoma (BCC)     BMI 31.0-31.9,adult 01/19/2022    Cataract     bilateral IOL    Chickenpox     shingles vaccine    Depression     situational due to loss of child    Fracture     Multiple fractures    Heart burn 5 years    omeprazole    Hiatus hernia syndrome     per CT    High cholesterol     controlled with med    History of migraine     Influenza     Kidney stone     Nasal drainage     Pneumonia     approx 1985    PSVT (paroxysmal supraventricular tachycardia) ([facility]) 12/28/2023    states due to not using CPAP for a period of time    Renal cyst     right side per CT    Shingles 2000    Sleep apnea 4 years    Snoring          Past Surgical History:   Procedure Laterality Date    DE UPPER GI ENDOSCOPY,DIAGNOSIS  4/22/2024    Procedure: UPPER ENDOSCOPIC ULTRASOUND AND ESOPHAGOGASTRODUODENOSCOPY;  Surgeon: Tomás Shaw M.D.;  Location: SURGERY [facility];  Service: EUS    EGD W/ENDOSCOPIC ULTRASOUND  4/22/2024    Procedure: EGD, WITH ENDOSCOPIC US;  Surgeon: Tomás Shaw M.D.;  Location: SURGERY [facility];  Service: EUS    ABDOMINAL HYSTERECTOMY TOTAL      age 38 for \"pre-cancer\", ovaries not removed    CARPAL TUNNEL RELEASE Right     CATARACT EXTRACTION WITH IOL Bilateral     COLONOSCOPY      x2-twisted bowel.    HARDWARE REMOVAL LOWER EXTREMITY Right     foot    OPEN REDUCTION Right     R foot         Current Outpatient Medications "   Medication Sig Dispense Refill    DILTIAZem CD (CARDIZEM CD) 120 MG CAPSULE SR 24 HR Take 1 capsule by mouth every day. 90 Capsule 3    Ascorbic Acid (VITAMIN C) 500 MG Chew Tab Chew 500 mg every day.      Omega-3 Fatty Acids (FISH OIL) 1000 MG Cap capsule Take 1,000 mg by mouth every 48 hours. 300 MG OMEGA      Cholecalciferol (VITAMIN D3) 2000 UNIT Cap Take 2,000 Units by mouth every day.      acetaminophen (TYLENOL) 500 MG Tab Take 500-1,000 mg by mouth every 6 hours as needed.      atomoxetine (STRATTERA) 60 MG capsule TAKE 1 CAPSULE BY MOUTH DAILY (Patient taking differently: Take 60 mg by mouth every day. Indications: Attention Deficit Hyperactivity Disorder) 100 Capsule 2    buPROPion SR (WELLBUTRIN SR) 150 MG TABLET SR 12 HR sustained-release tablet Take 1 Tablet by mouth every day for 180 days. (Patient taking differently: Take 150 mg by mouth every 48 hours.) 180 Tablet 0    oxybutynin (DITROPAN) 5 MG Tab Take 1 Tablet by mouth 2 times a day. 180 Tablet 3    omeprazole (PRILOSEC) 20 MG delayed-release capsule Take 1 Capsule by mouth 2 times a day. 200 Capsule 3    atorvastatin (LIPITOR) 10 MG Tab Take 1 Tablet by mouth every evening. 100 Tablet 3    SUMAtriptan (IMITREX) 25 MG Tab tablet Take 1 Tablet by mouth one time as needed for Migraine for up to 1 dose. Do not exceed more than 10 tablets a month 30 Tablet 2    albuterol 108 (90 Base) MCG/ACT Aero Soln inhalation aerosol Inhale 2 Puffs every 6 hours as needed for Shortness of Breath. 8.5 g 3    Multiple Vitamin (MULTIVITAMIN ADULT PO) Take 1 Tablet by mouth every day. CENTRUM WOMENS 50+      CALCIUM PO Take 60 mg by mouth every day. Noquoand brand calcium      EPINEPHrine (EPIPEN) 0.3 MG/0.3ML Solution Auto-injector solution for injection Inject 0.3 mg into the shoulder, thigh, or buttocks one time as needed (anaphylaxis).      alendronate (FOSAMAX) 70 MG Tab Take 1 Tablet by mouth every 7 days. 12 Tablet 3     No current  facility-administered medications for this visit.         Allergies   Allergen Reactions    Bee Venom Anaphylaxis     Will stop heart   Will stop heart     Crab      Positive allergy test  SHELLFISH    Magnesium Citrate Palpitations, Myalgia and Unspecified    Shellfish Allergy Anaphylaxis and Unspecified     Heart issues  Heart issues    Shrimp (Diagnostic) Shortness of Breath     Increased heart rate and headache  Opened sinus that drained like a faucet.  SHELLFISH    Dust Mite Extract      Positive allergy test    Pollen Extract      Positive allergy test    Smyrna Hives     Increase in heart rate and hives          Family History   Problem Relation Age of Onset    Cancer Mother     Cancer Father     Breast Cancer Sister         Breast cancer    Cancer Sister         Breast cancer    Lupus Sister     Leukemia Brother     Colorectal Cancer Neg Hx     Peritoneal Cancer Neg Hx     Tubal Cancer Neg Hx     Ovarian Cancer Neg Hx          Social History     Socioeconomic History    Marital status:      Spouse name: Not on file    Number of children: Not on file    Years of education: Not on file    Highest education level: Bachelor's degree (e.g., BA, AB, BS)   Occupational History    Not on file   Tobacco Use    Smoking status: Never    Smokeless tobacco: Never   Vaping Use    Vaping Use: Never used   Substance and Sexual Activity    Alcohol use: Not Currently     Comment: Maybe 2 glasses a year    Drug use: Never    Sexual activity: Not Currently     Partners: Male   Other Topics Concern    Not on file   Social History Narrative    , has grown children. 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     Her parents are .  She had 2 biological daughters 1  in a motor vehicle accident at age 17.  She had 2 brothers.  One is .  1 had bipolar disorder.  Her maternal great grand mother was bipolar.     Social Determinants of Health     Financial  "Resource Strain: Low Risk  (2/15/2024)    Overall Financial Resource Strain (CARDIA)     Difficulty of Paying Living Expenses: Not hard at all   Food Insecurity: No Food Insecurity (2/15/2024)    Hunger Vital Sign     Worried About Running Out of Food in the Last Year: Never true     Ran Out of Food in the Last Year: Never true   Transportation Needs: No Transportation Needs (2/15/2024)    PRAPARE - Transportation     Lack of Transportation (Medical): No     Lack of Transportation (Non-Medical): No   Physical Activity: Insufficiently Active (3/23/2023)    Exercise Vital Sign     Days of Exercise per Week: 4 days     Minutes of Exercise per Session: 30 min   Stress: No Stress Concern Present (12/19/2022)    [employer] [place] [employer] - Occupational Stress Questionnaire     Feeling of Stress : Only a little   Social Connections: Socially Integrated (12/19/2022)    Social Connection and Isolation Panel [NHANES]     Frequency of Communication with Friends and Family: More than three times a week     Frequency of Social Gatherings with Friends and Family: Twice a week     Attends Zoroastrianism Services: 1 to 4 times per year     Active Member of Clubs or Organizations: Yes     Attends Club or Organization Meetings: More than 4 times per year     Marital Status:    Intimate Partner Violence: Not At Risk (3/23/2023)    Humiliation, Afraid, Rape, and Kick questionnaire     Fear of Current or Ex-Partner: No     Emotionally Abused: No     Physically Abused: No     Sexually Abused: No   Housing Stability: Low Risk  (2/15/2024)    Housing Stability Vital Sign     Unable to Pay for Housing in the Last Year: No     Number of Places Lived in the Last Year: 1     Unstable Housing in the Last Year: No         Physical Exam:  Ambulatory Vitals  /72 (BP Location: Left arm, Patient Position: Sitting, BP Cuff Size: Adult)   Pulse 83   Resp 16   Ht 1.6 m (5' 3\")   Wt 81.6 kg (180 lb)   SpO2 96%    Oxygen " Therapy:  Pulse Oximetry: 96 %  BP Readings from Last 4 Encounters:   05/07/24 110/72   05/07/24 120/60   04/22/24 (!) 153/82   03/19/24 112/52       Weight/BMI: Body mass index is 31.89 kg/m².  Wt Readings from Last 4 Encounters:   05/07/24 81.6 kg (180 lb)   05/07/24 81.2 kg (179 lb)   04/22/24 80.3 kg (176 lb 14.7 oz)   03/19/24 82 kg (180 lb 12.8 oz)       General: Not in acute distress  HEENT: OP clear   Neck:  No carotid bruits, No JVD appreciated  CVS:  RRR, Normal S1, S2. No murmurs, rubs or gallops  Resp: Normal respiratory effort, lungs CTA bilaterally. No rales or rhonchi  Abdomen: Soft, non-distended, non-tender to palpation  Neurological: Alert and oriented x3, moves all extremities, no focal neurologic deficits  Psychiatric: Appropriate affect  Extremities:   Extremities warm, 2+ bilateral radial pulses.  2+ bilateral dp pulses, no lower extremity edema bilaterally      Lab Data Review:  Lab Results   Component Value Date/Time    CHOLSTRLTOT 162 12/27/2023 11:09 AM    LDL 70 12/27/2023 11:09 AM    HDL 66 12/27/2023 11:09 AM    TRIGLYCERIDE 131 12/27/2023 11:09 AM       Lab Results   Component Value Date/Time    SODIUM 138 03/17/2024 02:45 AM    POTASSIUM 3.1 (L) 03/17/2024 02:45 AM    CHLORIDE 100 03/17/2024 02:45 AM    CO2 27 03/17/2024 02:45 AM    GLUCOSE 109 (H) 03/17/2024 02:45 AM    BUN 17 03/17/2024 02:45 AM    CREATININE 0.85 03/17/2024 02:45 AM     Lab Results   Component Value Date/Time    ALKPHOSPHAT 180 (H) 03/17/2024 02:45 AM    ASTSGOT 24 03/17/2024 02:45 AM    ALTSGPT 24 03/17/2024 02:45 AM    TBILIRUBIN 0.6 03/17/2024 02:45 AM      Lab Results   Component Value Date/Time    WBC 6.4 03/17/2024 02:45 AM    HEMOGLOBIN 13.4 03/17/2024 02:45 AM     Lab Results   Component Value Date/Time    HBA1C 5.5 11/15/2022 08:17 AM    HBA1C 5.4 04/29/2022 09:40 AM         Cardiac Imaging and Procedures Review:    EKG dated 2022:   My personal interpretation is sinus rhythm     Echo dated 2019:   Normal  left ventricular systolic function. Left ventricular ejection   fraction is visually estimated to be 65%.   Normal diastolic function.  Normal inferior vena cava size and inspiratory collapse.     Cardiac stress test 2019:   Negative for ischemia or infarction.    Stress test 3/16/2024:  No scintigraphic evidence of significant prior infarct or active ischemia.   Normal left ventricular systolic function.   Normal TID ratio.   ECG INTERPRETATION   Non-diagnostic due to use of pharmacological stress agent.        Assessment & Plan     1. Palpitations  DILTIAZem CD (CARDIZEM CD) 120 MG CAPSULE SR 24 HR      2. PSVT (paroxysmal supraventricular tachycardia) (HCC)  DILTIAZem CD (CARDIZEM CD) 120 MG CAPSULE SR 24 HR      3. PVC's (premature ventricular contractions)  DILTIAZem CD (CARDIZEM CD) 120 MG CAPSULE SR 24 HR      4. Pure hypercholesterolemia              Medical Decision Making:  Ms. Denia Deluca is a 73 y.o. woman with past medical history significant for obstructive sleep apnea on CPAP, hyperlipidemia, ADHD, palpitations due to PVCs and paroxysmal SVT who presents to the cardiology office for follow-up.    1. Palpitations  2. PSVT (paroxysmal supraventricular tachycardia) (HCC)  3. PVC's (premature ventricular contractions)  -Palpitations have significantly improved after initiating a long-acting diltiazem 120 mg daily.  She remains stable from a cardiovascular standpoint.  Cardiac stress test with MPI which shows no evidence of ischemia.  Refills of diltiazem will be sent to her pharmacy.    4. Pure hypercholesterolemia  -Regarding hyperlipidemia, she is currently stable and tolerating atorvastatin 10 mg daily.  No changes to dose of statin therapy.  LDL is currently well-controlled and at goal.    It was a pleasure seeing Ms. Denia Deluca in the office today. Return in about 1 year (around 5/7/2025). Patient is aware to call the cardiology clinic with any questions or concerns.      Hamzah Basurto MD,  [facility]  Cardiologist, [facility], [address]  [address], [place] [address]  [place] [address]  Phone: [phone]  Fax: [phone]    Please note that this dictation was created using voice recognition software. I have made every reasonable attempt to correct obvious errors, but it is possible there are errors of grammar and possibly content that I did not discover before finalizing the note.

## 2024-05-07 NOTE — PROGRESS NOTES
"Chief Complaint   Patient presents with    New Patient     REF BY DR. BLOOM FOR ENLARGED LYMPH NODE    Results     PET 5/6/24  CXR 3/16/24       HPI: This patient is a 73 y.o. female presenting for evaluation of paratracheal nodule. PMHx is significant for allergies in IT therapy with Dr. Reyes, mild allergic asthma controlled with prn YANICK, dyslipidemia on statin therapy, SVT on CCB, YAZAN and recent dx of osteopenia with decline in BMD from 2019 to 2/2024 after which she was started on fosamax in addition to Ca and Vit D she was already taking. She is a life-long non-smoker. She was admitted to the hospital March 16 with CP where CTA r/o PE but incidentally noted paratracheal nodule vs. LAD and diffuse pancreatic ductal dilation. Cardiac w/u was unremarkable. She underwent EGD with EUS by Dr. Shaw on 4/22/23 and the intraluminal evaluation was unremarkable as was evaluation of pancreas. FNA was performed via EUS of the R paratracheal nodule and pathology returned as \"fragments of hypercellular parathyroid tissue suggestive of parathyroid adenoma.\" Recommendations were appropriately made to f/u with PCP for work up and referral given not primary GI. For unknown reasons a PET was ordered but no work up of her hypercalcemia (both corrected and uncorrected based on labs from hospital stay). She has been referred to ? Oncology. Pt has no abnormal findings in her chest outside of adenoma. She has been referred to endocrinology but referral incomplete?    Past Medical History:   Diagnosis Date    Abnormal LFTs 08/2023    ADHD     Allergic rhinitis     Aortic ectasia (HCC) 03/2024    Asthma     albuterol inhaler prn    Back pain     Basal cell carcinoma (BCC)     BMI 31.0-31.9,adult 01/19/2022    Cataract     bilateral IOL    Chickenpox     shingles vaccine    Depression     situational due to loss of child    Fracture     Multiple fractures    Heart burn 5 years    omeprazole    Hiatus hernia syndrome     per CT    " High cholesterol     controlled with med    History of migraine     Influenza     Kidney stone     Nasal drainage     Pneumonia     approx     PSVT (paroxysmal supraventricular tachycardia) (HCC) 2023    states due to not using CPAP for a period of time    Renal cyst     right side per CT    Shingles 2000    Sleep apnea 4 years    Snoring        Social History     Socioeconomic History    Marital status:      Spouse name: Not on file    Number of children: Not on file    Years of education: Not on file    Highest education level: Bachelor's degree (e.g., BA, AB, BS)   Occupational History    Not on file   Tobacco Use    Smoking status: Never    Smokeless tobacco: Never   Vaping Use    Vaping Use: Never used   Substance and Sexual Activity    Alcohol use: Not Currently     Comment: Maybe 2 glasses a year    Drug use: Never    Sexual activity: Not Currently     Partners: Male   Other Topics Concern    Not on file   Social History Narrative    , has grown children. 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     Her parents are .  She had 2 biological daughters 1  in a motor vehicle accident at age 17.  She had 2 brothers.  One is .  1 had bipolar disorder.  Her maternal great grand mother was bipolar.     Social Determinants of Health     Financial Resource Strain: Low Risk  (2/15/2024)    Overall Financial Resource Strain (CARDIA)     Difficulty of Paying Living Expenses: Not hard at all   Food Insecurity: No Food Insecurity (2/15/2024)    Hunger Vital Sign     Worried About Running Out of Food in the Last Year: Never true     Ran Out of Food in the Last Year: Never true   Transportation Needs: No Transportation Needs (2/15/2024)    PRAPARE - Transportation     Lack of Transportation (Medical): No     Lack of Transportation (Non-Medical): No   Physical Activity: Insufficiently Active (3/23/2023)    Exercise Vital Sign     Days of  Exercise per Week: 4 days     Minutes of Exercise per Session: 30 min   Stress: No Stress Concern Present (12/19/2022)    [place] [employer] - Occupational Stress Questionnaire     Feeling of Stress : Only a little   Social Connections: Socially Integrated (12/19/2022)    Social Connection and Isolation Panel [NHANES]     Frequency of Communication with Friends and Family: More than three times a week     Frequency of Social Gatherings with Friends and Family: Twice a week     Attends [employer] Services: 1 to 4 times per year     Active Member of Clubs or Organizations: Yes     Attends Club or Organization Meetings: More than 4 times per year     Marital Status:    Intimate Partner Violence: Not At Risk (3/23/2023)    Humiliation, Afraid, Rape, and Kick questionnaire     Fear of Current or Ex-Partner: No     Emotionally Abused: No     Physically Abused: No     Sexually Abused: No   Housing Stability: Low Risk  (2/15/2024)    Housing Stability Vital Sign     Unable to Pay for Housing in the Last Year: No     Number of Places Lived in the Last Year: 1     Unstable Housing in the Last Year: No       Family History   Problem Relation Age of Onset    Cancer Mother     Cancer Father     Breast Cancer Sister         Breast cancer    Cancer Sister         Breast cancer    Lupus Sister     Leukemia Brother     Colorectal Cancer Neg Hx     Peritoneal Cancer Neg Hx     Tubal Cancer Neg Hx     Ovarian Cancer Neg Hx        Current Outpatient Medications on File Prior to Visit   Medication Sig Dispense Refill    albuterol 108 (90 Base) MCG/ACT Aero Soln inhalation aerosol Inhale 2 Puffs every 6 hours as needed for Shortness of Breath. 8.5 g 3    alendronate (FOSAMAX) 70 MG Tab Take 1 Tablet by mouth every 7 days. 12 Tablet 3    Ascorbic Acid (VITAMIN C) 500 MG Chew Tab Chew 500 mg every day.      Omega-3 Fatty Acids (FISH OIL) 1000 MG Cap capsule Take 1,000 mg by mouth every 48 hours. 300 MG OMEGA       Cholecalciferol (VITAMIN D3) 2000 UNIT Cap Take 2,000 Units by mouth every day.      acetaminophen (TYLENOL) 500 MG Tab Take 500-1,000 mg by mouth every 6 hours as needed.      atomoxetine (STRATTERA) 60 MG capsule TAKE 1 CAPSULE BY MOUTH DAILY (Patient taking differently: Take 60 mg by mouth every day. Indications: Attention Deficit Hyperactivity Disorder) 100 Capsule 2    buPROPion SR (WELLBUTRIN SR) 150 MG TABLET SR 12 HR sustained-release tablet Take 1 Tablet by mouth every day for 180 days. (Patient taking differently: Take 150 mg by mouth every 48 hours.) 180 Tablet 0    DILTIAZem CD (CARDIZEM CD) 120 MG CAPSULE SR 24 HR Take 1 capsule by mouth every day. 90 Capsule 1    oxybutynin (DITROPAN) 5 MG Tab Take 1 Tablet by mouth 2 times a day. 180 Tablet 3    omeprazole (PRILOSEC) 20 MG delayed-release capsule Take 1 Capsule by mouth 2 times a day. 200 Capsule 3    atorvastatin (LIPITOR) 10 MG Tab Take 1 Tablet by mouth every evening. 100 Tablet 3    SUMAtriptan (IMITREX) 25 MG Tab tablet Take 1 Tablet by mouth one time as needed for Migraine for up to 1 dose. Do not exceed more than 10 tablets a month 30 Tablet 2    Multiple Vitamin (MULTIVITAMIN ADULT PO) Take 1 Tablet by mouth every day. CENTRUM WOMENS 50+      CALCIUM PO Take 60 mg by mouth every day. [employer] brand calcium      VITAMIN D PO Take 1 Tablet by mouth every morning.      EPINEPHrine (EPIPEN) 0.3 MG/0.3ML Solution Auto-injector solution for injection Inject 0.3 mg into the shoulder, thigh, or buttocks one time as needed (anaphylaxis).       No current facility-administered medications on file prior to visit.       Allergies: Bee venom, Crab, Magnesium citrate, Shellfish allergy, Shrimp (diagnostic), Dust mite extract, Pollen extract, and [place]    ROS:   Review of Systems   Constitutional:  Negative for chills, diaphoresis, fever, malaise/fatigue and weight loss.   HENT:  Negative for congestion, ear discharge, ear pain, hearing loss,  "nosebleeds, sinus pain, sore throat and tinnitus.    Eyes:  Negative for blurred vision, double vision, photophobia, pain, discharge and redness.   Respiratory:  Negative for cough, hemoptysis, sputum production, shortness of breath, wheezing and stridor.    Cardiovascular:  Negative for chest pain, palpitations, orthopnea, claudication, leg swelling and PND.   Gastrointestinal:  Negative for abdominal pain, constipation, diarrhea, heartburn, nausea and vomiting.   Genitourinary:  Negative for dysuria and urgency.   Musculoskeletal:  Negative for back pain, falls, joint pain, myalgias and neck pain.   Skin:  Negative for itching and rash.   Neurological:  Negative for dizziness, tremors, speech change, focal weakness, weakness and headaches.   Endo/Heme/Allergies:  Negative for environmental allergies.   Psychiatric/Behavioral:  Negative for depression.        /60 (BP Location: Right arm, Patient Position: Sitting, BP Cuff Size: Large adult)   Pulse 75   Ht 1.6 m (5' 3\")   Wt 81.2 kg (179 lb)   SpO2 98%     Physical Exam:  Physical Exam  Constitutional:       General: She is not in acute distress.     Appearance: Normal appearance. She is well-developed and normal weight.   HENT:      Head: Normocephalic and atraumatic.      Right Ear: External ear normal.      Left Ear: External ear normal.      Nose: Nose normal. No congestion.      Mouth/Throat:      Mouth: Mucous membranes are moist.      Pharynx: Oropharynx is clear. No oropharyngeal exudate.   Eyes:      General: No scleral icterus.     Extraocular Movements: Extraocular movements intact.      Conjunctiva/sclera: Conjunctivae normal.      Pupils: Pupils are equal, round, and reactive to light.   Neck:      Vascular: No JVD.      Trachea: No tracheal deviation.   Cardiovascular:      Rate and Rhythm: Normal rate and regular rhythm.      Heart sounds: Normal heart sounds. No murmur heard.     No friction rub. No gallop.   Pulmonary:      Effort: " Pulmonary effort is normal. No accessory muscle usage or respiratory distress.      Breath sounds: Normal breath sounds. No wheezing or rales.   Abdominal:      General: There is no distension.      Palpations: Abdomen is soft.      Tenderness: There is no abdominal tenderness.   Musculoskeletal:         General: No tenderness or deformity. Normal range of motion.      Cervical back: Normal range of motion and neck supple.      Right lower leg: No edema.      Left lower leg: No edema.   Lymphadenopathy:      Cervical: No cervical adenopathy.   Skin:     General: Skin is warm and dry.      Findings: No rash.      Nails: There is no clubbing.   Neurological:      Mental Status: She is alert and oriented to person, place, and time.      Cranial Nerves: No cranial nerve deficit.      Gait: Gait normal.   Psychiatric:         Behavior: Behavior normal.       PFTs as reviewed by me personally: none    Imaging as reviewed by me personally: as per hPI    Assessment:  1. Parathyroid adenoma  Comp Metabolic Panel    CORRECTED CALCIUM    PTH INTACT (PTH ONLY)    VITAMIN D,25 HYDROXY (DEFICIENCY)    Referral to Endocrinology      2. Hypercalcemia  Referral to Endocrinology      3. Osteopenia, unspecified location  Referral to Endocrinology          Plan:  This pt needs work up of her hypercalcemia, decreased BMD and associated adenoma all highly suggestive of primary hyperparathyroidism. Ca is only mildly elevated with no clear e/o malignancy. I will order repeat CMP for Ca with corrected Ca and vit D levels. I will also order PTH intact and update urgent referral to endocrinology.  See above.  Likely 2/2 hyperparathyroidism. Told pt to hold exogenous Ca and vit D pending labs and endocrine evaluation.   Return if symptoms worsen or fail to improve.

## 2024-05-10 ENCOUNTER — HOSPITAL ENCOUNTER (OUTPATIENT)
Facility: MEDICAL CENTER | Age: 74
End: 2024-05-10
Attending: STUDENT IN AN ORGANIZED HEALTH CARE EDUCATION/TRAINING PROGRAM
Payer: MEDICARE

## 2024-05-10 DIAGNOSIS — R19.7 DIARRHEA, UNSPECIFIED TYPE: ICD-10-CM

## 2024-05-11 LAB
E COLI SXT1+2 STL IA: NORMAL
SIGNIFICANT IND 70042: NORMAL
SITE SITE: NORMAL
SOURCE SOURCE: NORMAL

## 2024-05-13 LAB
BACTERIA STL CULT: NORMAL
E COLI SXT1+2 STL IA: NORMAL
SIGNIFICANT IND 70042: NORMAL
SITE SITE: NORMAL
SOURCE SOURCE: NORMAL

## 2024-05-20 ENCOUNTER — PATIENT MESSAGE (OUTPATIENT)
Dept: MEDICAL GROUP | Facility: MEDICAL CENTER | Age: 74
End: 2024-05-20
Payer: MEDICARE

## 2024-05-20 DIAGNOSIS — Z83.49 FAMILY HISTORY OF HEMOCHROMATOSIS: ICD-10-CM

## 2024-05-21 ENCOUNTER — HOSPITAL ENCOUNTER (OUTPATIENT)
Dept: LAB | Facility: MEDICAL CENTER | Age: 74
End: 2024-05-21
Attending: STUDENT IN AN ORGANIZED HEALTH CARE EDUCATION/TRAINING PROGRAM
Payer: MEDICARE

## 2024-05-21 DIAGNOSIS — Z83.49 FAMILY HISTORY OF HEMOCHROMATOSIS: ICD-10-CM

## 2024-05-22 PROCEDURE — RXMED WILLOW AMBULATORY MEDICATION CHARGE: Performed by: INTERNAL MEDICINE

## 2024-05-24 ENCOUNTER — PHARMACY VISIT (OUTPATIENT)
Dept: PHARMACY | Facility: MEDICAL CENTER | Age: 74
End: 2024-05-24
Payer: COMMERCIAL

## 2024-05-25 LAB
HFE GENE MUT ANL BLD/T: NORMAL
HFE P.C282Y BLD/T QL: NEGATIVE
HFE P.H63D BLD/T QL: NEGATIVE
HFE P.S65C BLD/T QL: NEGATIVE

## 2024-05-31 ENCOUNTER — PATIENT MESSAGE (OUTPATIENT)
Dept: MEDICAL GROUP | Facility: MEDICAL CENTER | Age: 74
End: 2024-05-31
Payer: MEDICARE

## 2024-05-31 DIAGNOSIS — R94.8 ABNORMAL PET SCAN OF MEDIASTINUM: ICD-10-CM

## 2024-05-31 DIAGNOSIS — D35.1 PARATHYROID ADENOMA: ICD-10-CM

## 2024-06-03 ENCOUNTER — PATIENT MESSAGE (OUTPATIENT)
Dept: MEDICAL GROUP | Facility: MEDICAL CENTER | Age: 74
End: 2024-06-03
Payer: MEDICARE

## 2024-06-04 ENCOUNTER — PATIENT MESSAGE (OUTPATIENT)
Dept: MEDICAL GROUP | Facility: MEDICAL CENTER | Age: 74
End: 2024-06-04
Payer: MEDICARE

## 2024-06-05 ENCOUNTER — PATIENT MESSAGE (OUTPATIENT)
Dept: MEDICAL GROUP | Facility: MEDICAL CENTER | Age: 74
End: 2024-06-05
Payer: MEDICARE

## 2024-06-05 DIAGNOSIS — R94.8 ABNORMAL PET SCAN OF MEDIASTINUM: ICD-10-CM

## 2024-06-05 DIAGNOSIS — D35.1 PARATHYROID ADENOMA: ICD-10-CM

## 2024-06-06 DIAGNOSIS — D35.1 PARATHYROID ADENOMA: ICD-10-CM

## 2024-06-06 DIAGNOSIS — R94.8 ABNORMAL PET SCAN OF MEDIASTINUM: ICD-10-CM

## 2024-06-09 ENCOUNTER — PATIENT MESSAGE (OUTPATIENT)
Dept: MEDICAL GROUP | Facility: MEDICAL CENTER | Age: 74
End: 2024-06-09
Payer: MEDICARE

## 2024-06-09 DIAGNOSIS — D35.1 PARATHYROID ADENOMA: ICD-10-CM

## 2024-06-09 DIAGNOSIS — R94.8 ABNORMAL PET SCAN OF MEDIASTINUM: ICD-10-CM

## 2024-06-24 PROCEDURE — RXMED WILLOW AMBULATORY MEDICATION CHARGE: Performed by: STUDENT IN AN ORGANIZED HEALTH CARE EDUCATION/TRAINING PROGRAM

## 2024-06-26 ENCOUNTER — HOSPITAL ENCOUNTER (OUTPATIENT)
Dept: LAB | Facility: MEDICAL CENTER | Age: 74
End: 2024-06-26
Payer: MEDICARE

## 2024-06-26 ENCOUNTER — HOSPITAL ENCOUNTER (OUTPATIENT)
Dept: RADIOLOGY | Facility: MEDICAL CENTER | Age: 74
End: 2024-06-26
Attending: STUDENT IN AN ORGANIZED HEALTH CARE EDUCATION/TRAINING PROGRAM
Payer: MEDICARE

## 2024-06-26 ENCOUNTER — PATIENT MESSAGE (OUTPATIENT)
Dept: MEDICAL GROUP | Facility: MEDICAL CENTER | Age: 74
End: 2024-06-26
Payer: MEDICARE

## 2024-06-26 ENCOUNTER — PHARMACY VISIT (OUTPATIENT)
Dept: PHARMACY | Facility: MEDICAL CENTER | Age: 74
End: 2024-06-26
Payer: COMMERCIAL

## 2024-06-26 DIAGNOSIS — R94.8 ABNORMAL PET SCAN OF MEDIASTINUM: ICD-10-CM

## 2024-06-26 DIAGNOSIS — D35.1 PARATHYROID ADENOMA: ICD-10-CM

## 2024-06-26 LAB
25(OH)D3 SERPL-MCNC: 38 NG/ML (ref 30–100)
CA-I SERPL-SCNC: 1.2 MMOL/L (ref 1.1–1.3)
CORTIS SERPL-MCNC: 15.7 UG/DL (ref 0–23)
PTH-INTACT SERPL-MCNC: 134 PG/ML (ref 14–72)
T3 SERPL-MCNC: 119 NG/DL (ref 60–181)
T3FREE SERPL-MCNC: 3.23 PG/ML (ref 2–4.4)
T4 FREE SERPL-MCNC: 1.16 NG/DL (ref 0.93–1.7)
THYROPEROXIDASE AB SERPL-ACNC: 9 IU/ML (ref 0–9)
TSH SERPL DL<=0.005 MIU/L-ACNC: 3.05 UIU/ML (ref 0.38–5.33)
VIT B12 SERPL-MCNC: 780 PG/ML (ref 211–911)

## 2024-06-26 PROCEDURE — 82607 VITAMIN B-12: CPT

## 2024-06-26 PROCEDURE — 83970 ASSAY OF PARATHORMONE: CPT

## 2024-06-26 PROCEDURE — 84480 ASSAY TRIIODOTHYRONINE (T3): CPT

## 2024-06-26 PROCEDURE — 84481 FREE ASSAY (FT-3): CPT

## 2024-06-26 PROCEDURE — 84443 ASSAY THYROID STIM HORMONE: CPT

## 2024-06-26 PROCEDURE — 82533 TOTAL CORTISOL: CPT

## 2024-06-26 PROCEDURE — 84439 ASSAY OF FREE THYROXINE: CPT

## 2024-06-26 PROCEDURE — 82306 VITAMIN D 25 HYDROXY: CPT

## 2024-06-26 PROCEDURE — 82330 ASSAY OF CALCIUM: CPT

## 2024-06-26 PROCEDURE — 36415 COLL VENOUS BLD VENIPUNCTURE: CPT

## 2024-06-26 PROCEDURE — A9500 TC99M SESTAMIBI: HCPCS

## 2024-06-26 PROCEDURE — 80053 COMPREHEN METABOLIC PANEL: CPT

## 2024-06-26 PROCEDURE — 82024 ASSAY OF ACTH: CPT

## 2024-06-26 PROCEDURE — 86376 MICROSOMAL ANTIBODY EACH: CPT

## 2024-06-26 PROCEDURE — 83735 ASSAY OF MAGNESIUM: CPT

## 2024-06-26 PROCEDURE — 84100 ASSAY OF PHOSPHORUS: CPT

## 2024-06-27 ENCOUNTER — PATIENT MESSAGE (OUTPATIENT)
Dept: MEDICAL GROUP | Facility: MEDICAL CENTER | Age: 74
End: 2024-06-27
Payer: MEDICARE

## 2024-06-27 DIAGNOSIS — R94.8 ABNORMAL PET SCAN OF MEDIASTINUM: ICD-10-CM

## 2024-06-27 LAB
ALBUMIN SERPL BCP-MCNC: 4.1 G/DL (ref 3.2–4.9)
ALBUMIN/GLOB SERPL: 1.4 G/DL
ALP SERPL-CCNC: 174 U/L (ref 30–99)
ALT SERPL-CCNC: 27 U/L (ref 2–50)
ANION GAP SERPL CALC-SCNC: 11 MMOL/L (ref 7–16)
AST SERPL-CCNC: 21 U/L (ref 12–45)
BILIRUB SERPL-MCNC: 0.4 MG/DL (ref 0.1–1.5)
BUN SERPL-MCNC: 14 MG/DL (ref 8–22)
CALCIUM ALBUM COR SERPL-MCNC: 9.5 MG/DL (ref 8.5–10.5)
CALCIUM SERPL-MCNC: 9.6 MG/DL (ref 8.5–10.5)
CHLORIDE SERPL-SCNC: 102 MMOL/L (ref 96–112)
CO2 SERPL-SCNC: 25 MMOL/L (ref 20–33)
CREAT SERPL-MCNC: 0.82 MG/DL (ref 0.5–1.4)
GFR SERPLBLD CREATININE-BSD FMLA CKD-EPI: 75 ML/MIN/1.73 M 2
GLOBULIN SER CALC-MCNC: 2.9 G/DL (ref 1.9–3.5)
GLUCOSE SERPL-MCNC: 95 MG/DL (ref 65–99)
MAGNESIUM SERPL-MCNC: 2 MG/DL (ref 1.5–2.5)
PHOSPHATE SERPL-MCNC: 2.8 MG/DL (ref 2.5–4.5)
POTASSIUM SERPL-SCNC: 4 MMOL/L (ref 3.6–5.5)
PROT SERPL-MCNC: 7 G/DL (ref 6–8.2)
SODIUM SERPL-SCNC: 138 MMOL/L (ref 135–145)

## 2024-06-28 ENCOUNTER — HOSPITAL ENCOUNTER (OUTPATIENT)
Facility: MEDICAL CENTER | Age: 74
End: 2024-06-28
Payer: MEDICARE

## 2024-06-28 ENCOUNTER — PATIENT MESSAGE (OUTPATIENT)
Dept: MEDICAL GROUP | Facility: MEDICAL CENTER | Age: 74
End: 2024-06-28
Payer: MEDICARE

## 2024-06-28 ENCOUNTER — APPOINTMENT (OUTPATIENT)
Dept: RADIOLOGY | Facility: MEDICAL CENTER | Age: 74
End: 2024-06-28
Attending: STUDENT IN AN ORGANIZED HEALTH CARE EDUCATION/TRAINING PROGRAM
Payer: MEDICARE

## 2024-06-28 DIAGNOSIS — R94.8 ABNORMAL PET SCAN OF MEDIASTINUM: ICD-10-CM

## 2024-06-28 LAB — ACTH PLAS-MCNC: 16.7 PG/ML (ref 7.2–63.3)

## 2024-06-28 PROCEDURE — 84156 ASSAY OF PROTEIN URINE: CPT | Mod: 91

## 2024-06-28 PROCEDURE — 82340 ASSAY OF CALCIUM IN URINE: CPT

## 2024-06-28 PROCEDURE — 84166 PROTEIN E-PHORESIS/URINE/CSF: CPT

## 2024-06-28 PROCEDURE — 76536 US EXAM OF HEAD AND NECK: CPT

## 2024-06-28 PROCEDURE — 86335 IMMUNFIX E-PHORSIS/URINE/CSF: CPT

## 2024-06-30 LAB
CALCIUM 24H UR-MCNC: 20.9 MG/DL
CALCIUM 24H UR-MRATE: 366 MG/D (ref 100–250)
CALCIUM/CREAT 24H UR: 282 MG/G (ref 20–300)
COLLECT DURATION TIME SPEC: 24 HRS
CREAT 24H UR-MCNC: 74 MG/DL
PROT 24H UR-MRATE: 70 MG/D (ref 40–150)
PROT UR-MCNC: 4 MG/DL
PROT/CREAT 24H UR: 54 MG/G (ref 10–107)
SPECIMEN VOL ?TM UR: 1750 ML

## 2024-07-02 ENCOUNTER — APPOINTMENT (OUTPATIENT)
Dept: BEHAVIORAL HEALTH | Facility: CLINIC | Age: 74
End: 2024-07-02
Payer: MEDICARE

## 2024-07-02 DIAGNOSIS — F43.10 POSTTRAUMATIC STRESS DISORDER: ICD-10-CM

## 2024-07-02 DIAGNOSIS — F90.1 ATTENTION DEFICIT HYPERACTIVITY DISORDER (ADHD), PREDOMINANTLY HYPERACTIVE TYPE: ICD-10-CM

## 2024-07-02 DIAGNOSIS — F33.1 MAJOR DEPRESSIVE DISORDER, RECURRENT EPISODE, MODERATE (HCC): ICD-10-CM

## 2024-07-02 PROCEDURE — 99214 OFFICE O/P EST MOD 30 MIN: CPT | Performed by: PSYCHIATRY & NEUROLOGY

## 2024-07-02 RX ORDER — BUPROPION HYDROCHLORIDE 150 MG/1
150 TABLET, EXTENDED RELEASE ORAL DAILY
Qty: 90 TABLET | Refills: 0 | Status: SHIPPED | OUTPATIENT
Start: 2024-07-02 | End: 2024-09-30

## 2024-07-02 RX ORDER — ATOMOXETINE 60 MG/1
60 CAPSULE ORAL DAILY
Qty: 90 CAPSULE | Refills: 0 | Status: SHIPPED | OUTPATIENT
Start: 2024-07-02 | End: 2024-09-30

## 2024-07-04 LAB
ALBUMIN 24H MFR UR ELPH: 100 %
ALPHA1 GLOB 24H MFR UR ELPH: 0 %
ALPHA2 GLOB 24H MFR UR ELPH: 0 %
B-GLOBULIN 24H MFR UR ELPH: 0 %
EER MONOCLONAL PROTEIN STUDY, 24 HOUR U Q5964: NORMAL
GAMMA GLOB 24H MFR UR ELPH: 0 %
INTERPRETATION UR IFE-IMP: NORMAL
M PROTEIN 24H MFR UR ELPH: 0 %
M PROTEIN 24H UR ELPH-MRATE: 0 MG/24 HRS

## 2024-07-04 PROCEDURE — RXMED WILLOW AMBULATORY MEDICATION CHARGE: Performed by: STUDENT IN AN ORGANIZED HEALTH CARE EDUCATION/TRAINING PROGRAM

## 2024-07-05 ENCOUNTER — PHARMACY VISIT (OUTPATIENT)
Dept: PHARMACY | Facility: MEDICAL CENTER | Age: 74
End: 2024-07-05
Payer: COMMERCIAL

## 2024-07-05 PROCEDURE — RXMED WILLOW AMBULATORY MEDICATION CHARGE: Performed by: STUDENT IN AN ORGANIZED HEALTH CARE EDUCATION/TRAINING PROGRAM

## 2024-07-17 ENCOUNTER — TELEPHONE (OUTPATIENT)
Dept: CARDIOLOGY | Facility: MEDICAL CENTER | Age: 74
End: 2024-07-17
Payer: MEDICARE

## 2024-07-29 PROCEDURE — RXMED WILLOW AMBULATORY MEDICATION CHARGE: Performed by: INTERNAL MEDICINE

## 2024-08-01 ENCOUNTER — PHARMACY VISIT (OUTPATIENT)
Dept: PHARMACY | Facility: MEDICAL CENTER | Age: 74
End: 2024-08-01
Payer: COMMERCIAL

## 2024-08-01 PROCEDURE — RXMED WILLOW AMBULATORY MEDICATION CHARGE: Performed by: STUDENT IN AN ORGANIZED HEALTH CARE EDUCATION/TRAINING PROGRAM

## 2024-08-10 ENCOUNTER — PATIENT MESSAGE (OUTPATIENT)
Dept: MEDICAL GROUP | Facility: MEDICAL CENTER | Age: 74
End: 2024-08-10
Payer: MEDICARE

## 2024-08-10 DIAGNOSIS — R26.89 BALANCE PROBLEM: ICD-10-CM

## 2024-08-16 ENCOUNTER — PATIENT MESSAGE (OUTPATIENT)
Dept: MEDICAL GROUP | Facility: MEDICAL CENTER | Age: 74
End: 2024-08-16
Payer: MEDICARE

## 2024-08-19 ENCOUNTER — PATIENT MESSAGE (OUTPATIENT)
Dept: MEDICAL GROUP | Facility: MEDICAL CENTER | Age: 74
End: 2024-08-19
Payer: MEDICARE

## 2024-08-19 DIAGNOSIS — Z97.3 WEARS CONTACT LENSES: ICD-10-CM

## 2024-08-19 NOTE — PROGRESS NOTES
Would you be able to call the [facility] and figure out if this is something I can order?  I do not see any way to order it through the chart    Thank You,  Dr. Sloan

## 2024-08-21 ENCOUNTER — PATIENT MESSAGE (OUTPATIENT)
Dept: MEDICAL GROUP | Facility: MEDICAL CENTER | Age: 74
End: 2024-08-21
Payer: MEDICARE

## 2024-08-21 DIAGNOSIS — Z97.3 WEARS CONTACT LENSES: ICD-10-CM

## 2024-08-21 RX ORDER — SODIUM CHLORIDE 0.9 % (FLUSH) 0.9 %
3 SYRINGE (ML) INJECTION PRN
Qty: 300 ML | Refills: 1 | Status: SHIPPED | OUTPATIENT
Start: 2024-08-21 | End: 2024-08-22 | Stop reason: SDUPTHER

## 2024-08-22 DIAGNOSIS — Z97.3 WEARS CONTACT LENSES: ICD-10-CM

## 2024-08-22 RX ORDER — SODIUM CHLORIDE 0.9 % (FLUSH) 0.9 %
3 SYRINGE (ML) INJECTION PRN
Qty: 300 ML | Refills: 1 | Status: SHIPPED
Start: 2024-08-22 | End: 2024-08-22

## 2024-08-22 RX ORDER — SODIUM CHLORIDE FOR INHALATION 0.9 %
VIAL, NEBULIZER (ML) INHALATION
Qty: 270 ML | Refills: 1 | Status: SHIPPED | OUTPATIENT
Start: 2024-08-22 | End: 2024-08-27

## 2024-08-24 ENCOUNTER — PATIENT MESSAGE (OUTPATIENT)
Dept: MEDICAL GROUP | Facility: MEDICAL CENTER | Age: 74
End: 2024-08-24
Payer: MEDICARE

## 2024-08-24 ENCOUNTER — PATIENT MESSAGE (OUTPATIENT)
Dept: BEHAVIORAL HEALTH | Facility: CLINIC | Age: 74
End: 2024-08-24
Payer: MEDICARE

## 2024-08-26 ENCOUNTER — PATIENT MESSAGE (OUTPATIENT)
Dept: HEALTH INFORMATION MANAGEMENT | Facility: OTHER | Age: 74
End: 2024-08-26

## 2024-08-26 ENCOUNTER — TELEPHONE (OUTPATIENT)
Dept: HEALTH INFORMATION MANAGEMENT | Facility: OTHER | Age: 74
End: 2024-08-26
Payer: MEDICARE

## 2024-08-26 PROCEDURE — RXMED WILLOW AMBULATORY MEDICATION CHARGE: Performed by: STUDENT IN AN ORGANIZED HEALTH CARE EDUCATION/TRAINING PROGRAM

## 2024-08-26 RX ORDER — SUMATRIPTAN 25 MG/1
25 TABLET, FILM COATED ORAL
Qty: 30 TABLET | Refills: 2 | Status: SHIPPED | OUTPATIENT
Start: 2024-08-26

## 2024-08-26 RX ORDER — ATOMOXETINE 60 MG/1
60 CAPSULE ORAL DAILY
Qty: 90 CAPSULE | Refills: 0 | Status: SHIPPED | OUTPATIENT
Start: 2024-08-26 | End: 2024-11-28

## 2024-08-26 NOTE — PATIENT COMMUNICATION
Received request via: Patient    Was the patient seen in the last year in this department? Yes    Does the patient have an active prescription (recently filled or refills available) for medication(s) requested? No    Pharmacy Name: [facility]

## 2024-08-27 ASSESSMENT — PATIENT HEALTH QUESTIONNAIRE - PHQ9
1. LITTLE INTEREST OR PLEASURE IN DOING THINGS: SEVERAL DAYS
2. FEELING DOWN, DEPRESSED, IRRITABLE, OR HOPELESS: SEVERAL DAYS
CLINICAL INTERPRETATION OF PHQ2 SCORE: 0

## 2024-08-27 ASSESSMENT — ENCOUNTER SYMPTOMS: GENERAL WELL-BEING: GOOD

## 2024-08-27 ASSESSMENT — ACTIVITIES OF DAILY LIVING (ADL): BATHING_REQUIRES_ASSISTANCE: 0

## 2024-08-28 ENCOUNTER — PHARMACY VISIT (OUTPATIENT)
Dept: PHARMACY | Facility: MEDICAL CENTER | Age: 74
End: 2024-08-28
Payer: COMMERCIAL

## 2024-08-28 ENCOUNTER — APPOINTMENT (OUTPATIENT)
Dept: FAMILY PLANNING/WOMEN'S HEALTH CLINIC | Facility: PHYSICIAN GROUP | Age: 74
End: 2024-08-28
Payer: MEDICARE

## 2024-08-28 DIAGNOSIS — J45.20 MILD INTERMITTENT ASTHMA WITHOUT COMPLICATION: ICD-10-CM

## 2024-08-28 DIAGNOSIS — E66.09 CLASS 1 OBESITY DUE TO EXCESS CALORIES WITH SERIOUS COMORBIDITY AND BODY MASS INDEX (BMI) OF 33.0 TO 33.9 IN ADULT: ICD-10-CM

## 2024-08-28 DIAGNOSIS — I47.10 PSVT (PAROXYSMAL SUPRAVENTRICULAR TACHYCARDIA) (HCC): ICD-10-CM

## 2024-08-28 DIAGNOSIS — F90.1 ATTENTION DEFICIT HYPERACTIVITY DISORDER (ADHD), PREDOMINANTLY HYPERACTIVE TYPE: ICD-10-CM

## 2024-08-28 DIAGNOSIS — E78.2 MIXED HYPERLIPIDEMIA: ICD-10-CM

## 2024-08-28 DIAGNOSIS — Z91.81 RISK FOR FALLS: ICD-10-CM

## 2024-08-28 DIAGNOSIS — I77.819 AORTIC ECTASIA (HCC): ICD-10-CM

## 2024-08-28 DIAGNOSIS — F33.1 MAJOR DEPRESSIVE DISORDER, RECURRENT EPISODE, MODERATE (HCC): ICD-10-CM

## 2024-08-28 DIAGNOSIS — G47.33 OBSTRUCTIVE SLEEP APNEA: ICD-10-CM

## 2024-08-28 DIAGNOSIS — I47.29 NSVT (NONSUSTAINED VENTRICULAR TACHYCARDIA) (HCC): ICD-10-CM

## 2024-08-30 PROCEDURE — RXMED WILLOW AMBULATORY MEDICATION CHARGE: Performed by: PSYCHIATRY & NEUROLOGY

## 2024-09-06 ENCOUNTER — PHARMACY VISIT (OUTPATIENT)
Dept: PHARMACY | Facility: MEDICAL CENTER | Age: 74
End: 2024-09-06
Payer: COMMERCIAL

## 2024-09-06 PROCEDURE — RXMED WILLOW AMBULATORY MEDICATION CHARGE: Performed by: STUDENT IN AN ORGANIZED HEALTH CARE EDUCATION/TRAINING PROGRAM

## 2024-09-18 PROCEDURE — RXMED WILLOW AMBULATORY MEDICATION CHARGE: Performed by: STUDENT IN AN ORGANIZED HEALTH CARE EDUCATION/TRAINING PROGRAM

## 2024-09-19 ENCOUNTER — PHARMACY VISIT (OUTPATIENT)
Dept: PHARMACY | Facility: MEDICAL CENTER | Age: 74
End: 2024-09-19
Payer: COMMERCIAL

## 2024-09-20 NOTE — PROGRESS NOTES
Surgical Endocrinology New Patient Visit    This is a 74 y.o. female who was referred to my office by Dr. Cook for a surgical evaluation of hyperparathyroidism. She is well known to me from my prior practice.     The patients symptoms are constipation, kidney stones, fatigue, depression, confusion/memory loss, excess thirst.     Her past medical history includes kidney stones, osteopenia.     Recent laboratory studies are consistent with a normal calcium of 10.0 and elevated PTH of 120.0 on 05/07/2024.     The DEXA scan performed on 02/20/2024 revealed osteopenia.     The PET-CT performed on 05/06/2024 revealed a hypermetabolic tracheoesophageal lymph node, may represent a solitary nodule metastasis. This was biopsied endoscopically which revealed a parathyroid.     Family history is negative for hyperparathyroidism.     F/U 7/24: thyroid U/S, no masses.     Cardiac clearance: Cleared by cardiology for surgery.     24h urine ca 366     Review of Systems   Constitutional:  Positive for malaise/fatigue.   Gastrointestinal:  Positive for abdominal pain.   Genitourinary:  Positive for frequency and urgency.   Neurological:  Positive for headaches.   Psychiatric/Behavioral:  The patient is nervous/anxious.      The patient doesn’t have a family history of parathyroid disease.    The patient doesn’t have a history of radiation to their head or neck.    Past Medical History:   Diagnosis Date    Abnormal LFTs 08/2023    ADHD     Allergic rhinitis     Aortic ectasia (HCC) 03/2024    Asthma     albuterol inhaler prn    Back pain     Basal cell carcinoma (BCC)     BMI 31.0-31.9,adult 01/19/2022    Cataract     bilateral IOL    Chickenpox     shingles vaccine    Depression     situational due to loss of child    Fracture     Multiple fractures    Heart burn 5 years    omeprazole    Hiatus hernia syndrome     per CT    High cholesterol     controlled with med    History of migraine     Influenza     Kidney stone     Nasal  "drainage     Pneumonia     approx 1985    PSVT (paroxysmal supraventricular tachycardia) (HCC) 12/28/2023    states due to not using CPAP for a period of time    Renal cyst     right side per CT    Shingles 2000    Sleep apnea 4 years    Snoring        Past Surgical History:   Procedure Laterality Date    IA UPPER GI ENDOSCOPY,DIAGNOSIS  4/22/2024    Procedure: UPPER ENDOSCOPIC ULTRASOUND AND ESOPHAGOGASTRODUODENOSCOPY;  Surgeon: Tomás Shaw M.D.;  Location: SURGERY [facility];  Service: EUS    EGD W/ENDOSCOPIC ULTRASOUND  4/22/2024    Procedure: EGD, WITH ENDOSCOPIC US;  Surgeon: Tomás Shaw M.D.;  Location: SURGERY [facility];  Service: EUS    ABDOMINAL HYSTERECTOMY TOTAL      age 38 for \"pre-cancer\", ovaries not removed    CARPAL TUNNEL RELEASE Right     CATARACT EXTRACTION WITH IOL Bilateral     COLONOSCOPY      x2-twisted bowel.    HARDWARE REMOVAL LOWER EXTREMITY Right     foot    OPEN REDUCTION Right     R foot       Home Medications    Medication Sig Taking? Last Dose Authorizing Provider   SUMAtriptan (IMITREX) 25 MG Tab tablet Take 1 Tablet by mouth one time as needed for Migraine for up to 1 dose. Do not exceed more than 10 tablets a month   Hoda Sloan M.D.   atomoxetine (STRATTERA) 60 MG capsule Take 1 Capsule by mouth every day for 90 days.   Ephraim Chavez M.D.   buPROPion SR (WELLBUTRIN SR) 150 MG TABLET SR 12 HR sustained-release tablet Take 1 Tablet by mouth every day for 90 days.   Ephraim Chavez M.D.   DILTIAZem CD (CARDIZEM CD) 120 MG CAPSULE SR 24 HR Take 1 capsule by mouth every day.   Hamzah INIGUEZ M.D.   alendronate (FOSAMAX) 70 MG Tab Take 1 Tablet by mouth every 7 days.   Hoda Sloan M.D.   Ascorbic Acid (VITAMIN C) 500 MG Chew Tab Chew 500 mg every day.   Physician Outpatient   Omega-3 Fatty Acids (FISH OIL) 1000 MG Cap capsule Take 1,000 mg by mouth every 48 hours. 300 MG OMEGA   Physician Outpatient   Cholecalciferol (VITAMIN D3) 2000 UNIT " Cap Take 2,000 Units by mouth every day.   Physician Outpatient   acetaminophen (TYLENOL) 500 MG Tab Take 500-1,000 mg by mouth every 6 hours as needed.   Physician Outpatient   oxybutynin (DITROPAN) 5 MG Tab Take 1 Tablet by mouth 2 times a day.   Hoda Sloan M.D.   omeprazole (PRILOSEC) 20 MG delayed-release capsule Take 1 Capsule by mouth 2 times a day.   Hoda Sloan M.D.   atorvastatin (LIPITOR) 10 MG Tab Take 1 Tablet by mouth every evening.   Hoda Sloan M.D.   albuterol 108 (90 Base) MCG/ACT Aero Soln inhalation aerosol Inhale 2 Puffs every 6 hours as needed for Shortness of Breath.   Hoda Sloan M.D.   Multiple Vitamin (MULTIVITAMIN ADULT PO) Take 1 Tablet by mouth every day. CENTRUM WOMENS 50+   Physician Outpatient   CALCIUM PO Take 60 mg by mouth every day. [employer] brand calcium   Physician Outpatient   EPINEPHrine (EPIPEN) 0.3 MG/0.3ML Solution Auto-injector solution for injection Inject 0.3 mg into the shoulder, thigh, or buttocks one time as needed (anaphylaxis).   Physician Outpatient       Physical Exam  Constitutional:       Appearance: Normal appearance.   HENT:      Head: Normocephalic and atraumatic.      Mouth/Throat:      Pharynx: Posterior oropharyngeal erythema: general surgery.   Cardiovascular:      Rate and Rhythm: Normal rate.   Pulmonary:      Effort: Pulmonary effort is normal.   Abdominal:      General: Abdomen is flat.      Palpations: Abdomen is soft.   Musculoskeletal:         General: Normal range of motion.      Cervical back: Normal range of motion and neck supple.   Skin:     General: Skin is warm and dry.   Neurological:      General: No focal deficit present.      Mental Status: She is alert.   Psychiatric:         Mood and Affect: Mood normal.         Imagin2024 7:20 AM     HISTORY/REASON FOR EXAM:  Abnormal thoracic lymph node detected on the recent CT scan, concerning for cancer and metastases     TECHNIQUE/EXAM DESCRIPTION AND  NUMBER OF VIEWS:  PET body imaging.     Initially, 10.29 mCi F-18 FDG was administered intravenously under standardized conditions. Approximately 45 minutes after FDG administration, the patient was placed in the supine position on the PET CT table. Blood glucose level was 98 mg/dL.     Low dose spiral CT imaging was performed from the skull base to the mid thighs.     PET imaging was then performed from the skull base to the mid thighs. CT images, PET images, and PET/CT fused images were reviewed on a PACS 3D workstation.     The limited non-contrast CT data are used primarily for attenuation correction and anatomic correlation.  Evaluation of solid organs and bowel are especially limited utilizing this technique.     A low dose CT was obtained of the same area without IV contrast for attenuation correction and coregistration, not for a diagnostic scan.     COMPARISON: CT dated 3/16/2024.     FINDINGS:     VISUALIZED BRAIN: Normal metabolic activity.     HEAD AND NECK: Normal physiologic uptake.     CHEST: Lungs show no hypermetabolic pulmonary nodules.     Tracheoesophageal node is unchanged in size and morphology compared to prior CT study. It measures 1.6 cm. It has an SUV max of 5.76.     Atherosclerosis. Small to moderate hiatal hernia. Mild uptake in the esophagus, likely inflammatory given a negative EGD.     ABDOMEN AND PELVIS: There is normal, uniform metabolic activity in the liver, spleen, adrenal glands, kidneys.     There is no hypermetabolic mesenteric, retroperitoneal, iliac, or inguinal lymphadenopathy.     Nonspecific physiologic activity in the colon and intestine is noted.     Nonobstructing right nephrolithiasis. Benign renal cysts, which do not require imaging follow-up. Small distal colonic diverticula. Prior hysterectomy.     Small focus of uptake in the anal canal, with an SUV max of 9.69.     VISUALIZED MUSCULOSKELETAL SYSTEM: No hypermetabolic osseous lesions.     IMPRESSION:     1.   Tracheoesophageal lymph node of interest is hypermetabolic, and may represent a solitary johanna metastasis.  2.  Mild uptake in the esophagus, likely inflammatory given a negative EGD.  3.  No other FDG avid lesions in the chest.  4.  Incidental focal uptake in the anal canal, nonspecific. Please correlate with physical examination/anoscopy to exclude an underlying lesion.    3/16/2024 6:42 PM     HISTORY/REASON FOR EXAM:  CHEST PAIN radiating to back  Chest and back pain     TECHNIQUE/EXAM DESCRIPTION:  CT angiogram of the chest and abdomen with and without reconstructions.     Initial precontrast images were obtained from the lung apices through the diaphragmatic domes. Following this, 100 mL of Omnipaque 350 nonionic contrast was administered at 5 mL/sec and helical scanning was obtained from the lung apices thru the iliac   crest and bifurcation. Thick and thin section multiplanar volume reformats were generated from the axial data set in the sagittal and coronal planes.     3D angiographic images were reviewed on PACS. Maximum intensity projection (MIP) images were generated and reviewed.     Low dose optimization technique was utilized for this CT exam including automated exposure control and adjustment of the mA and/or kV according to patient size.     COMPARISON:  CT abdomen 2/8/2023     FINDINGS:     Noncontrast images:  There is no intramural hematoma.    Contrast images:     Aorta and vasculature: Ascending aorta is ectatic measuring up to 4.2 cm. No significant aortic aneurysm or dissection. There are scattered atherosclerosis including coronary artery calcification.     There is a pathologic-appearing probable lymph node in the right tracheoesophageal region and superior mediastinum measuring 1.7 x 1.2 cm on series 3 image 38. Differential consideration would include less likely parathyroid adenoma.  There is no pleural effusion or pericardial effusion.  The lungs are clear.     Liver is  unremarkable.  Spleen is normal in size.     Diffuse pancreatic ductal dilatation is again noted.  Gallbladder and biliary tree are normal.     Adrenal glands are normal.  Bilateral nonobstructing small renal stones, largest measuring 4 mm within the right kidney no ureteral stone is seen. No significant hydronephrosis. Right renal cyst does not require imaging follow-up are     Moderate hiatal hernia. There is esophageal wall thickening particularly in the distal aspect which could be esophagitis but EGD may be considered to further characterize and exclude malignancy.  There is no adenopathy or free fluid.     Hysterectomy.     3D angiographic/MIP images of the vasculature confirm the vascular findings as described above.     IMPRESSION:     1.  Mild ectasia of the ascending aorta measuring 4.2 cm.  2.  No significant aortic aneurysm or dissection.  3.  Scattered atherosclerosis.  4.  Moderate hiatal hernia and nonspecific distal esophageal wall thickening. This could represent esophagitis however consider direct visualization to further evaluate.  5.  Pathologic-appearing right tracheoesophageal probable lymph node. This is nonspecific but further evaluation is recommended as malignancy is not excluded.  6.  Nonobstructing small renal stones. No obstructive uropathy.    6/26/2024 12:35 PM     HISTORY/REASON FOR EXAM:  Parathyroid adenoma     TECHNIQUE/EXAM DESCRIPTION AND NUMBER OF VIEWS:  Radionuclide parathyroid scan.     COMPARISON: CT chest 3/16/2024     PROCEDURE:  26.4 mCi technetium 99m sestamibi was injected.  Planar and SPECT imaging of the neck and chest was performed.  Three hours later, images were repeated.     FINDINGS:  Uptake in the thyroid gland is mildly heterogeneous without focal increased activity.  No persistent activity gland.  Focal activity is seen in the RIGHT superior mediastinum on early and delayed images.     IMPRESSION:     Findings suggest parathyroid adenoma in the RIGHT superior  mediastinum.    A/P: Primary hyperparathyroidism, h/o nephrolithiasis, neuropsychiatric sx. Localizing to the right tracheoesophageal area, deep in the chest.  I have recommended a thoracic surgical consultation, will coordinate. She is pleased with this plan.    Caroline aMrtin M.D., FACS  Department of Surgical Oncology  [facility]  [phone]

## 2024-09-23 ENCOUNTER — OFFICE VISIT (OUTPATIENT)
Dept: SURGICAL ONCOLOGY | Facility: MEDICAL CENTER | Age: 74
End: 2024-09-23
Payer: MEDICARE

## 2024-09-23 VITALS
BODY MASS INDEX: 32.07 KG/M2 | SYSTOLIC BLOOD PRESSURE: 116 MMHG | OXYGEN SATURATION: 98 % | HEIGHT: 63 IN | HEART RATE: 89 BPM | WEIGHT: 181 LBS | TEMPERATURE: 97.7 F | DIASTOLIC BLOOD PRESSURE: 60 MMHG

## 2024-09-23 DIAGNOSIS — J45.20 MILD INTERMITTENT ASTHMA WITHOUT COMPLICATION: ICD-10-CM

## 2024-09-23 DIAGNOSIS — E21.0 PRIMARY HYPERPARATHYROIDISM (HCC): ICD-10-CM

## 2024-09-23 DIAGNOSIS — Z91.030 BEE ALLERGY STATUS: ICD-10-CM

## 2024-09-23 PROCEDURE — 3074F SYST BP LT 130 MM HG: CPT | Performed by: SURGERY

## 2024-09-23 PROCEDURE — 3078F DIAST BP <80 MM HG: CPT | Performed by: SURGERY

## 2024-09-23 PROCEDURE — 99215 OFFICE O/P EST HI 40 MIN: CPT | Performed by: SURGERY

## 2024-09-23 RX ORDER — ALBUTEROL SULFATE 90 UG/1
2 INHALANT RESPIRATORY (INHALATION) EVERY 6 HOURS PRN
Qty: 8.5 G | Refills: 3 | Status: SHIPPED | OUTPATIENT
Start: 2024-09-23

## 2024-09-23 ASSESSMENT — ENCOUNTER SYMPTOMS
HEADACHES: 1
ABDOMINAL PAIN: 1
NERVOUS/ANXIOUS: 1

## 2024-09-23 ASSESSMENT — FIBROSIS 4 INDEX: FIB4 SCORE: 1.13

## 2024-09-26 ENCOUNTER — APPOINTMENT (OUTPATIENT)
Dept: ADMISSIONS | Facility: MEDICAL CENTER | Age: 74
DRG: 630 | End: 2024-09-26
Attending: STUDENT IN AN ORGANIZED HEALTH CARE EDUCATION/TRAINING PROGRAM
Payer: MEDICARE

## 2024-09-28 PROCEDURE — RXMED WILLOW AMBULATORY MEDICATION CHARGE: Performed by: STUDENT IN AN ORGANIZED HEALTH CARE EDUCATION/TRAINING PROGRAM

## 2024-09-30 DIAGNOSIS — J45.20 MILD INTERMITTENT ASTHMA WITHOUT COMPLICATION: ICD-10-CM

## 2024-09-30 DIAGNOSIS — Z91.030 BEE ALLERGY STATUS: ICD-10-CM

## 2024-10-02 ENCOUNTER — TELEMEDICINE (OUTPATIENT)
Dept: SLEEP MEDICINE | Facility: MEDICAL CENTER | Age: 74
End: 2024-10-02
Attending: PHYSICIAN ASSISTANT
Payer: MEDICARE

## 2024-10-02 ENCOUNTER — APPOINTMENT (OUTPATIENT)
Dept: ADMISSIONS | Facility: MEDICAL CENTER | Age: 74
End: 2024-10-02
Attending: STUDENT IN AN ORGANIZED HEALTH CARE EDUCATION/TRAINING PROGRAM
Payer: MEDICARE

## 2024-10-02 VITALS — WEIGHT: 180 LBS | OXYGEN SATURATION: 93 % | HEIGHT: 63 IN | BODY MASS INDEX: 31.89 KG/M2

## 2024-10-02 DIAGNOSIS — G47.33 OSA (OBSTRUCTIVE SLEEP APNEA): ICD-10-CM

## 2024-10-02 PROCEDURE — 99213 OFFICE O/P EST LOW 20 MIN: CPT | Mod: 95 | Performed by: PHYSICIAN ASSISTANT

## 2024-10-02 ASSESSMENT — ENCOUNTER SYMPTOMS
SPUTUM PRODUCTION: 0
WHEEZING: 0
SHORTNESS OF BREATH: 0
WEIGHT LOSS: 0
DIZZINESS: 0
HEARTBURN: 1
COUGH: 0
CHILLS: 0
INSOMNIA: 1
PALPITATIONS: 0
TREMORS: 0
SORE THROAT: 0
HEADACHES: 0
ORTHOPNEA: 0
FEVER: 0
SINUS PAIN: 0

## 2024-10-02 ASSESSMENT — FIBROSIS 4 INDEX: FIB4 SCORE: 1.13

## 2024-10-03 ENCOUNTER — PRE-ADMISSION TESTING (OUTPATIENT)
Dept: ADMISSIONS | Facility: MEDICAL CENTER | Age: 74
DRG: 630 | End: 2024-10-03
Attending: STUDENT IN AN ORGANIZED HEALTH CARE EDUCATION/TRAINING PROGRAM
Payer: MEDICARE

## 2024-10-03 DIAGNOSIS — Z01.810 PRE-OPERATIVE CARDIOVASCULAR EXAMINATION: ICD-10-CM

## 2024-10-03 DIAGNOSIS — Z01.812 PRE-OPERATIVE LABORATORY EXAMINATION: ICD-10-CM

## 2024-10-03 LAB
ANION GAP SERPL CALC-SCNC: 8 MMOL/L (ref 7–16)
BUN SERPL-MCNC: 13 MG/DL (ref 8–22)
CALCIUM SERPL-MCNC: 9.5 MG/DL (ref 8.5–10.5)
CHLORIDE SERPL-SCNC: 104 MMOL/L (ref 96–112)
CO2 SERPL-SCNC: 27 MMOL/L (ref 20–33)
CREAT SERPL-MCNC: 0.86 MG/DL (ref 0.5–1.4)
EKG IMPRESSION: NORMAL
ERYTHROCYTE [DISTWIDTH] IN BLOOD BY AUTOMATED COUNT: 47.6 FL (ref 35.9–50)
GFR SERPLBLD CREATININE-BSD FMLA CKD-EPI: 71 ML/MIN/1.73 M 2
GLUCOSE SERPL-MCNC: 83 MG/DL (ref 65–99)
HCT VFR BLD AUTO: 44.3 % (ref 37–47)
HGB BLD-MCNC: 14.2 G/DL (ref 12–16)
MCH RBC QN AUTO: 30.3 PG (ref 27–33)
MCHC RBC AUTO-ENTMCNC: 32.1 G/DL (ref 32.2–35.5)
MCV RBC AUTO: 94.7 FL (ref 81.4–97.8)
PLATELET # BLD AUTO: 253 K/UL (ref 164–446)
PMV BLD AUTO: 9.4 FL (ref 9–12.9)
POTASSIUM SERPL-SCNC: 3.5 MMOL/L (ref 3.6–5.5)
RBC # BLD AUTO: 4.68 M/UL (ref 4.2–5.4)
SODIUM SERPL-SCNC: 139 MMOL/L (ref 135–145)
WBC # BLD AUTO: 4.9 K/UL (ref 4.8–10.8)

## 2024-10-03 PROCEDURE — 85027 COMPLETE CBC AUTOMATED: CPT

## 2024-10-03 PROCEDURE — 80048 BASIC METABOLIC PNL TOTAL CA: CPT

## 2024-10-03 PROCEDURE — 36415 COLL VENOUS BLD VENIPUNCTURE: CPT

## 2024-10-03 PROCEDURE — 93010 ELECTROCARDIOGRAM REPORT: CPT | Performed by: INTERNAL MEDICINE

## 2024-10-03 PROCEDURE — 93005 ELECTROCARDIOGRAM TRACING: CPT

## 2024-10-03 RX ORDER — ALBUTEROL SULFATE 90 UG/1
2 INHALANT RESPIRATORY (INHALATION) EVERY 6 HOURS PRN
Qty: 8.5 G | Refills: 3 | OUTPATIENT
Start: 2024-10-03

## 2024-10-07 ENCOUNTER — ANESTHESIA EVENT (OUTPATIENT)
Dept: SURGERY | Facility: MEDICAL CENTER | Age: 74
DRG: 630 | End: 2024-10-07
Payer: MEDICARE

## 2024-10-07 PROCEDURE — RXMED WILLOW AMBULATORY MEDICATION CHARGE: Performed by: FAMILY MEDICINE

## 2024-10-07 PROCEDURE — RXMED WILLOW AMBULATORY MEDICATION CHARGE: Performed by: INTERNAL MEDICINE

## 2024-10-08 ENCOUNTER — APPOINTMENT (OUTPATIENT)
Dept: RADIOLOGY | Facility: MEDICAL CENTER | Age: 74
DRG: 630 | End: 2024-10-08
Attending: STUDENT IN AN ORGANIZED HEALTH CARE EDUCATION/TRAINING PROGRAM
Payer: MEDICARE

## 2024-10-08 ENCOUNTER — ANESTHESIA (OUTPATIENT)
Dept: SURGERY | Facility: MEDICAL CENTER | Age: 74
DRG: 630 | End: 2024-10-08
Payer: MEDICARE

## 2024-10-08 ENCOUNTER — HOSPITAL ENCOUNTER (INPATIENT)
Facility: MEDICAL CENTER | Age: 74
LOS: 1 days | DRG: 630 | End: 2024-10-09
Attending: STUDENT IN AN ORGANIZED HEALTH CARE EDUCATION/TRAINING PROGRAM | Admitting: STUDENT IN AN ORGANIZED HEALTH CARE EDUCATION/TRAINING PROGRAM
Payer: MEDICARE

## 2024-10-08 DIAGNOSIS — G89.18 POSTOPERATIVE PAIN: ICD-10-CM

## 2024-10-08 LAB — PATHOLOGY CONSULT NOTE: NORMAL

## 2024-10-08 PROCEDURE — 0WBC4ZZ EXCISION OF MEDIASTINUM, PERCUTANEOUS ENDOSCOPIC APPROACH: ICD-10-PCS | Performed by: STUDENT IN AN ORGANIZED HEALTH CARE EDUCATION/TRAINING PROGRAM

## 2024-10-08 PROCEDURE — 160028 HCHG SURGERY MINUTES - 1ST 30 MINS LEVEL 3: Performed by: STUDENT IN AN ORGANIZED HEALTH CARE EDUCATION/TRAINING PROGRAM

## 2024-10-08 PROCEDURE — 160048 HCHG OR STATISTICAL LEVEL 1-5: Performed by: STUDENT IN AN ORGANIZED HEALTH CARE EDUCATION/TRAINING PROGRAM

## 2024-10-08 PROCEDURE — 700105 HCHG RX REV CODE 258: Performed by: STUDENT IN AN ORGANIZED HEALTH CARE EDUCATION/TRAINING PROGRAM

## 2024-10-08 PROCEDURE — 700111 HCHG RX REV CODE 636 W/ 250 OVERRIDE (IP): Mod: JZ | Performed by: ANESTHESIOLOGY

## 2024-10-08 PROCEDURE — 160002 HCHG RECOVERY MINUTES (STAT): Performed by: STUDENT IN AN ORGANIZED HEALTH CARE EDUCATION/TRAINING PROGRAM

## 2024-10-08 PROCEDURE — 700111 HCHG RX REV CODE 636 W/ 250 OVERRIDE (IP): Performed by: ANESTHESIOLOGY

## 2024-10-08 PROCEDURE — 71045 X-RAY EXAM CHEST 1 VIEW: CPT

## 2024-10-08 PROCEDURE — 160039 HCHG SURGERY MINUTES - EA ADDL 1 MIN LEVEL 3: Performed by: STUDENT IN AN ORGANIZED HEALTH CARE EDUCATION/TRAINING PROGRAM

## 2024-10-08 PROCEDURE — C1729 CATH, DRAINAGE: HCPCS | Performed by: STUDENT IN AN ORGANIZED HEALTH CARE EDUCATION/TRAINING PROGRAM

## 2024-10-08 PROCEDURE — A9270 NON-COVERED ITEM OR SERVICE: HCPCS | Performed by: STUDENT IN AN ORGANIZED HEALTH CARE EDUCATION/TRAINING PROGRAM

## 2024-10-08 PROCEDURE — 700102 HCHG RX REV CODE 250 W/ 637 OVERRIDE(OP): Performed by: ANESTHESIOLOGY

## 2024-10-08 PROCEDURE — A9270 NON-COVERED ITEM OR SERVICE: HCPCS | Performed by: ANESTHESIOLOGY

## 2024-10-08 PROCEDURE — 160035 HCHG PACU - 1ST 60 MINS PHASE I: Performed by: STUDENT IN AN ORGANIZED HEALTH CARE EDUCATION/TRAINING PROGRAM

## 2024-10-08 PROCEDURE — 700101 HCHG RX REV CODE 250: Performed by: ANESTHESIOLOGY

## 2024-10-08 PROCEDURE — 770001 HCHG ROOM/CARE - MED/SURG/GYN PRIV*

## 2024-10-08 PROCEDURE — 88331 PATH CONSLTJ SURG 1 BLK 1SPC: CPT

## 2024-10-08 PROCEDURE — 160009 HCHG ANES TIME/MIN: Performed by: STUDENT IN AN ORGANIZED HEALTH CARE EDUCATION/TRAINING PROGRAM

## 2024-10-08 PROCEDURE — 700111 HCHG RX REV CODE 636 W/ 250 OVERRIDE (IP): Mod: JZ | Performed by: STUDENT IN AN ORGANIZED HEALTH CARE EDUCATION/TRAINING PROGRAM

## 2024-10-08 PROCEDURE — 700102 HCHG RX REV CODE 250 W/ 637 OVERRIDE(OP): Performed by: STUDENT IN AN ORGANIZED HEALTH CARE EDUCATION/TRAINING PROGRAM

## 2024-10-08 PROCEDURE — 88305 TISSUE EXAM BY PATHOLOGIST: CPT

## 2024-10-08 PROCEDURE — 700101 HCHG RX REV CODE 250: Performed by: STUDENT IN AN ORGANIZED HEALTH CARE EDUCATION/TRAINING PROGRAM

## 2024-10-08 RX ORDER — HALOPERIDOL 5 MG/ML
1 INJECTION INTRAMUSCULAR
Status: DISCONTINUED | OUTPATIENT
Start: 2024-10-08 | End: 2024-10-08 | Stop reason: HOSPADM

## 2024-10-08 RX ORDER — OXYCODONE HYDROCHLORIDE 10 MG/1
10 TABLET ORAL
Status: DISCONTINUED | OUTPATIENT
Start: 2024-10-08 | End: 2024-10-09 | Stop reason: HOSPADM

## 2024-10-08 RX ORDER — MIDAZOLAM HYDROCHLORIDE 1 MG/ML
INJECTION INTRAMUSCULAR; INTRAVENOUS PRN
Status: DISCONTINUED | OUTPATIENT
Start: 2024-10-08 | End: 2024-10-08 | Stop reason: SURG

## 2024-10-08 RX ORDER — HYDROMORPHONE HYDROCHLORIDE 1 MG/ML
0.5 INJECTION, SOLUTION INTRAMUSCULAR; INTRAVENOUS; SUBCUTANEOUS
Status: DISCONTINUED | OUTPATIENT
Start: 2024-10-08 | End: 2024-10-09 | Stop reason: HOSPADM

## 2024-10-08 RX ORDER — HYDROMORPHONE HYDROCHLORIDE 1 MG/ML
0.4 INJECTION, SOLUTION INTRAMUSCULAR; INTRAVENOUS; SUBCUTANEOUS
Status: DISCONTINUED | OUTPATIENT
Start: 2024-10-08 | End: 2024-10-08 | Stop reason: HOSPADM

## 2024-10-08 RX ORDER — ONDANSETRON 2 MG/ML
4 INJECTION INTRAMUSCULAR; INTRAVENOUS
Status: DISCONTINUED | OUTPATIENT
Start: 2024-10-08 | End: 2024-10-08 | Stop reason: HOSPADM

## 2024-10-08 RX ORDER — DIPHENHYDRAMINE HCL 25 MG
25 TABLET ORAL EVERY 6 HOURS PRN
Status: DISCONTINUED | OUTPATIENT
Start: 2024-10-08 | End: 2024-10-09 | Stop reason: HOSPADM

## 2024-10-08 RX ORDER — ONDANSETRON 2 MG/ML
4 INJECTION INTRAMUSCULAR; INTRAVENOUS EVERY 4 HOURS PRN
Status: DISCONTINUED | OUTPATIENT
Start: 2024-10-08 | End: 2024-10-09 | Stop reason: HOSPADM

## 2024-10-08 RX ORDER — ROCURONIUM BROMIDE 10 MG/ML
INJECTION, SOLUTION INTRAVENOUS PRN
Status: DISCONTINUED | OUTPATIENT
Start: 2024-10-08 | End: 2024-10-08 | Stop reason: SURG

## 2024-10-08 RX ORDER — DIPHENHYDRAMINE HYDROCHLORIDE 50 MG/ML
25 INJECTION INTRAMUSCULAR; INTRAVENOUS EVERY 6 HOURS PRN
Status: DISCONTINUED | OUTPATIENT
Start: 2024-10-08 | End: 2024-10-09 | Stop reason: HOSPADM

## 2024-10-08 RX ORDER — HYDROMORPHONE HYDROCHLORIDE 1 MG/ML
0.2 INJECTION, SOLUTION INTRAMUSCULAR; INTRAVENOUS; SUBCUTANEOUS
Status: DISCONTINUED | OUTPATIENT
Start: 2024-10-08 | End: 2024-10-08 | Stop reason: HOSPADM

## 2024-10-08 RX ORDER — AMOXICILLIN 250 MG
1 CAPSULE ORAL 2 TIMES DAILY
Status: DISCONTINUED | OUTPATIENT
Start: 2024-10-08 | End: 2024-10-09 | Stop reason: HOSPADM

## 2024-10-08 RX ORDER — ENOXAPARIN SODIUM 100 MG/ML
40 INJECTION SUBCUTANEOUS DAILY
Status: DISCONTINUED | OUTPATIENT
Start: 2024-10-09 | End: 2024-10-09 | Stop reason: HOSPADM

## 2024-10-08 RX ORDER — SODIUM CHLORIDE, SODIUM LACTATE, POTASSIUM CHLORIDE, CALCIUM CHLORIDE 600; 310; 30; 20 MG/100ML; MG/100ML; MG/100ML; MG/100ML
INJECTION, SOLUTION INTRAVENOUS CONTINUOUS
Status: ACTIVE | OUTPATIENT
Start: 2024-10-08 | End: 2024-10-08

## 2024-10-08 RX ORDER — SODIUM CHLORIDE, SODIUM LACTATE, POTASSIUM CHLORIDE, CALCIUM CHLORIDE 600; 310; 30; 20 MG/100ML; MG/100ML; MG/100ML; MG/100ML
INJECTION, SOLUTION INTRAVENOUS CONTINUOUS
Status: DISCONTINUED | OUTPATIENT
Start: 2024-10-08 | End: 2024-10-09 | Stop reason: HOSPADM

## 2024-10-08 RX ORDER — BUPIVACAINE HYDROCHLORIDE AND EPINEPHRINE 5; 5 MG/ML; UG/ML
INJECTION, SOLUTION EPIDURAL; INTRACAUDAL; PERINEURAL
Status: DISCONTINUED | OUTPATIENT
Start: 2024-10-08 | End: 2024-10-08 | Stop reason: HOSPADM

## 2024-10-08 RX ORDER — CEFAZOLIN SODIUM 1 G/3ML
INJECTION, POWDER, FOR SOLUTION INTRAMUSCULAR; INTRAVENOUS PRN
Status: DISCONTINUED | OUTPATIENT
Start: 2024-10-08 | End: 2024-10-08 | Stop reason: SURG

## 2024-10-08 RX ORDER — KETOROLAC TROMETHAMINE 15 MG/ML
15 INJECTION, SOLUTION INTRAMUSCULAR; INTRAVENOUS EVERY 6 HOURS
Status: DISCONTINUED | OUTPATIENT
Start: 2024-10-08 | End: 2024-10-09 | Stop reason: HOSPADM

## 2024-10-08 RX ORDER — OXYCODONE HYDROCHLORIDE 5 MG/1
5 TABLET ORAL
Status: DISCONTINUED | OUTPATIENT
Start: 2024-10-08 | End: 2024-10-09 | Stop reason: HOSPADM

## 2024-10-08 RX ORDER — IBUPROFEN 800 MG/1
800 TABLET, FILM COATED ORAL 3 TIMES DAILY PRN
Status: DISCONTINUED | OUTPATIENT
Start: 2024-10-11 | End: 2024-10-09 | Stop reason: HOSPADM

## 2024-10-08 RX ORDER — OXYCODONE HCL 5 MG/5 ML
10 SOLUTION, ORAL ORAL
Status: COMPLETED | OUTPATIENT
Start: 2024-10-08 | End: 2024-10-08

## 2024-10-08 RX ORDER — SODIUM CHLORIDE, SODIUM LACTATE, POTASSIUM CHLORIDE, CALCIUM CHLORIDE 600; 310; 30; 20 MG/100ML; MG/100ML; MG/100ML; MG/100ML
INJECTION, SOLUTION INTRAVENOUS CONTINUOUS
Status: DISCONTINUED | OUTPATIENT
Start: 2024-10-08 | End: 2024-10-08 | Stop reason: HOSPADM

## 2024-10-08 RX ORDER — MEPERIDINE HYDROCHLORIDE 25 MG/ML
6.25 INJECTION INTRAMUSCULAR; INTRAVENOUS; SUBCUTANEOUS
Status: DISCONTINUED | OUTPATIENT
Start: 2024-10-08 | End: 2024-10-08 | Stop reason: HOSPADM

## 2024-10-08 RX ORDER — DIPHENHYDRAMINE HYDROCHLORIDE 50 MG/ML
12.5 INJECTION INTRAMUSCULAR; INTRAVENOUS
Status: DISCONTINUED | OUTPATIENT
Start: 2024-10-08 | End: 2024-10-08 | Stop reason: HOSPADM

## 2024-10-08 RX ORDER — OXYCODONE HCL 5 MG/5 ML
5 SOLUTION, ORAL ORAL
Status: COMPLETED | OUTPATIENT
Start: 2024-10-08 | End: 2024-10-08

## 2024-10-08 RX ORDER — LIDOCAINE HYDROCHLORIDE 20 MG/ML
INJECTION, SOLUTION EPIDURAL; INFILTRATION; INTRACAUDAL; PERINEURAL PRN
Status: DISCONTINUED | OUTPATIENT
Start: 2024-10-08 | End: 2024-10-08 | Stop reason: SURG

## 2024-10-08 RX ORDER — DEXMEDETOMIDINE HYDROCHLORIDE 100 UG/ML
INJECTION, SOLUTION INTRAVENOUS PRN
Status: DISCONTINUED | OUTPATIENT
Start: 2024-10-08 | End: 2024-10-08 | Stop reason: SURG

## 2024-10-08 RX ORDER — HYDROMORPHONE HYDROCHLORIDE 1 MG/ML
0.1 INJECTION, SOLUTION INTRAMUSCULAR; INTRAVENOUS; SUBCUTANEOUS
Status: DISCONTINUED | OUTPATIENT
Start: 2024-10-08 | End: 2024-10-08 | Stop reason: HOSPADM

## 2024-10-08 RX ADMIN — DEXMEDETOMIDINE HYDROCHLORIDE 40 MCG: 100 INJECTION, SOLUTION INTRAVENOUS at 13:00

## 2024-10-08 RX ADMIN — LIDOCAINE HYDROCHLORIDE 100 MG: 20 INJECTION, SOLUTION EPIDURAL; INFILTRATION; INTRACAUDAL at 13:00

## 2024-10-08 RX ADMIN — OXYCODONE HYDROCHLORIDE 10 MG: 5 SOLUTION ORAL at 14:40

## 2024-10-08 RX ADMIN — FENTANYL CITRATE 50 MCG: 50 INJECTION, SOLUTION INTRAMUSCULAR; INTRAVENOUS at 14:50

## 2024-10-08 RX ADMIN — ROCURONIUM BROMIDE 50 MG: 50 INJECTION, SOLUTION INTRAVENOUS at 13:00

## 2024-10-08 RX ADMIN — FENTANYL CITRATE 50 MCG: 50 INJECTION, SOLUTION INTRAMUSCULAR; INTRAVENOUS at 13:58

## 2024-10-08 RX ADMIN — FENTANYL CITRATE 50 MCG: 50 INJECTION, SOLUTION INTRAMUSCULAR; INTRAVENOUS at 15:20

## 2024-10-08 RX ADMIN — KETOROLAC TROMETHAMINE 15 MG: 15 INJECTION, SOLUTION INTRAMUSCULAR; INTRAVENOUS at 18:08

## 2024-10-08 RX ADMIN — PROPOFOL 150 MG: 10 INJECTION, EMULSION INTRAVENOUS at 13:00

## 2024-10-08 RX ADMIN — SODIUM CHLORIDE, POTASSIUM CHLORIDE, SODIUM LACTATE AND CALCIUM CHLORIDE: 600; 310; 30; 20 INJECTION, SOLUTION INTRAVENOUS at 12:35

## 2024-10-08 RX ADMIN — SUGAMMADEX 200 MG: 100 INJECTION, SOLUTION INTRAVENOUS at 14:09

## 2024-10-08 RX ADMIN — CEFAZOLIN 2 G: 1 INJECTION, POWDER, FOR SOLUTION INTRAMUSCULAR; INTRAVENOUS at 13:14

## 2024-10-08 RX ADMIN — FENTANYL CITRATE 50 MCG: 50 INJECTION, SOLUTION INTRAMUSCULAR; INTRAVENOUS at 14:40

## 2024-10-08 RX ADMIN — MIDAZOLAM HYDROCHLORIDE 2 MG: 2 INJECTION, SOLUTION INTRAMUSCULAR; INTRAVENOUS at 12:57

## 2024-10-08 RX ADMIN — SENNOSIDES AND DOCUSATE SODIUM 1 TABLET: 50; 8.6 TABLET ORAL at 18:08

## 2024-10-08 RX ADMIN — FENTANYL CITRATE 50 MCG: 50 INJECTION, SOLUTION INTRAMUSCULAR; INTRAVENOUS at 13:00

## 2024-10-08 RX ADMIN — SODIUM CHLORIDE, POTASSIUM CHLORIDE, SODIUM LACTATE AND CALCIUM CHLORIDE: 600; 310; 30; 20 INJECTION, SOLUTION INTRAVENOUS at 17:04

## 2024-10-08 ASSESSMENT — PAIN DESCRIPTION - PAIN TYPE
TYPE: SURGICAL PAIN

## 2024-10-08 ASSESSMENT — FIBROSIS 4 INDEX: FIB4 SCORE: 1.18

## 2024-10-08 ASSESSMENT — PAIN SCALES - GENERAL: PAIN_LEVEL: 2

## 2024-10-09 ENCOUNTER — PHARMACY VISIT (OUTPATIENT)
Dept: PHARMACY | Facility: MEDICAL CENTER | Age: 74
End: 2024-10-09
Payer: COMMERCIAL

## 2024-10-09 ENCOUNTER — APPOINTMENT (OUTPATIENT)
Dept: RADIOLOGY | Facility: MEDICAL CENTER | Age: 74
DRG: 630 | End: 2024-10-09
Attending: STUDENT IN AN ORGANIZED HEALTH CARE EDUCATION/TRAINING PROGRAM
Payer: MEDICARE

## 2024-10-09 VITALS
SYSTOLIC BLOOD PRESSURE: 116 MMHG | BODY MASS INDEX: 31.48 KG/M2 | TEMPERATURE: 97 F | HEART RATE: 69 BPM | WEIGHT: 177.69 LBS | HEIGHT: 63 IN | DIASTOLIC BLOOD PRESSURE: 58 MMHG | RESPIRATION RATE: 17 BRPM | OXYGEN SATURATION: 92 %

## 2024-10-09 LAB
ANION GAP SERPL CALC-SCNC: 8 MMOL/L (ref 7–16)
BUN SERPL-MCNC: 16 MG/DL (ref 8–22)
CALCIUM SERPL-MCNC: 8.8 MG/DL (ref 8.5–10.5)
CHLORIDE SERPL-SCNC: 99 MMOL/L (ref 96–112)
CO2 SERPL-SCNC: 27 MMOL/L (ref 20–33)
CREAT SERPL-MCNC: 0.73 MG/DL (ref 0.5–1.4)
ERYTHROCYTE [DISTWIDTH] IN BLOOD BY AUTOMATED COUNT: 44.9 FL (ref 35.9–50)
GFR SERPLBLD CREATININE-BSD FMLA CKD-EPI: 86 ML/MIN/1.73 M 2
GLUCOSE SERPL-MCNC: 135 MG/DL (ref 65–99)
HCT VFR BLD AUTO: 38.1 % (ref 37–47)
HGB BLD-MCNC: 12.8 G/DL (ref 12–16)
MCH RBC QN AUTO: 30.8 PG (ref 27–33)
MCHC RBC AUTO-ENTMCNC: 33.6 G/DL (ref 32.2–35.5)
MCV RBC AUTO: 91.6 FL (ref 81.4–97.8)
PLATELET # BLD AUTO: 233 K/UL (ref 164–446)
PMV BLD AUTO: 9.4 FL (ref 9–12.9)
POTASSIUM SERPL-SCNC: 4.1 MMOL/L (ref 3.6–5.5)
RBC # BLD AUTO: 4.16 M/UL (ref 4.2–5.4)
SODIUM SERPL-SCNC: 134 MMOL/L (ref 135–145)
WBC # BLD AUTO: 8.5 K/UL (ref 4.8–10.8)

## 2024-10-09 PROCEDURE — RXMED WILLOW AMBULATORY MEDICATION CHARGE: Performed by: PHYSICIAN ASSISTANT

## 2024-10-09 PROCEDURE — 80048 BASIC METABOLIC PNL TOTAL CA: CPT

## 2024-10-09 PROCEDURE — 700102 HCHG RX REV CODE 250 W/ 637 OVERRIDE(OP): Performed by: STUDENT IN AN ORGANIZED HEALTH CARE EDUCATION/TRAINING PROGRAM

## 2024-10-09 PROCEDURE — A9270 NON-COVERED ITEM OR SERVICE: HCPCS | Performed by: STUDENT IN AN ORGANIZED HEALTH CARE EDUCATION/TRAINING PROGRAM

## 2024-10-09 PROCEDURE — 71045 X-RAY EXAM CHEST 1 VIEW: CPT

## 2024-10-09 PROCEDURE — 85027 COMPLETE CBC AUTOMATED: CPT

## 2024-10-09 PROCEDURE — 700111 HCHG RX REV CODE 636 W/ 250 OVERRIDE (IP): Mod: JZ | Performed by: STUDENT IN AN ORGANIZED HEALTH CARE EDUCATION/TRAINING PROGRAM

## 2024-10-09 RX ORDER — OXYCODONE HYDROCHLORIDE 5 MG/1
5 TABLET ORAL EVERY 6 HOURS PRN
Qty: 8 TABLET | Refills: 0 | Status: SHIPPED | OUTPATIENT
Start: 2024-10-09 | End: 2024-10-11

## 2024-10-09 RX ADMIN — SENNOSIDES AND DOCUSATE SODIUM 1 TABLET: 50; 8.6 TABLET ORAL at 05:51

## 2024-10-09 RX ADMIN — KETOROLAC TROMETHAMINE 15 MG: 15 INJECTION, SOLUTION INTRAMUSCULAR; INTRAVENOUS at 05:51

## 2024-10-09 RX ADMIN — KETOROLAC TROMETHAMINE 15 MG: 15 INJECTION, SOLUTION INTRAMUSCULAR; INTRAVENOUS at 00:41

## 2024-10-09 ASSESSMENT — ENCOUNTER SYMPTOMS
COUGH: 0
SHORTNESS OF BREATH: 0
VOMITING: 0
CHILLS: 0
FEVER: 0
NAUSEA: 0
HEARTBURN: 0

## 2024-10-09 ASSESSMENT — SOCIAL DETERMINANTS OF HEALTH (SDOH)
WITHIN THE PAST 12 MONTHS, YOU WORRIED THAT YOUR FOOD WOULD RUN OUT BEFORE YOU GOT THE MONEY TO BUY MORE: NEVER TRUE
WITHIN THE LAST YEAR, HAVE TO BEEN RAPED OR FORCED TO HAVE ANY KIND OF SEXUAL ACTIVITY BY YOUR PARTNER OR EX-PARTNER?: NO
WITHIN THE LAST YEAR, HAVE YOU BEEN KICKED, HIT, SLAPPED, OR OTHERWISE PHYSICALLY HURT BY YOUR PARTNER OR EX-PARTNER?: NO
IN THE PAST 12 MONTHS, HAS THE ELECTRIC, GAS, OIL, OR WATER COMPANY THREATENED TO SHUT OFF SERVICE IN YOUR HOME?: NO
WITHIN THE PAST 12 MONTHS, THE FOOD YOU BOUGHT JUST DIDN'T LAST AND YOU DIDN'T HAVE MONEY TO GET MORE: NEVER TRUE
WITHIN THE LAST YEAR, HAVE YOU BEEN HUMILIATED OR EMOTIONALLY ABUSED IN OTHER WAYS BY YOUR PARTNER OR EX-PARTNER?: NO
WITHIN THE LAST YEAR, HAVE YOU BEEN AFRAID OF YOUR PARTNER OR EX-PARTNER?: NO

## 2024-10-09 ASSESSMENT — PAIN DESCRIPTION - PAIN TYPE
TYPE: SURGICAL PAIN
TYPE: SURGICAL PAIN
TYPE: SURGICAL PAIN;ACUTE PAIN
TYPE: SURGICAL PAIN

## 2024-10-09 ASSESSMENT — COGNITIVE AND FUNCTIONAL STATUS - GENERAL
CLIMB 3 TO 5 STEPS WITH RAILING: A LITTLE
MOBILITY SCORE: 22
MOVING FROM LYING ON BACK TO SITTING ON SIDE OF FLAT BED: A LITTLE
SUGGESTED CMS G CODE MODIFIER MOBILITY: CJ
SUGGESTED CMS G CODE MODIFIER DAILY ACTIVITY: CI
DRESSING REGULAR LOWER BODY CLOTHING: A LITTLE
DAILY ACTIVITIY SCORE: 23

## 2024-10-09 ASSESSMENT — LIFESTYLE VARIABLES
EVER FELT BAD OR GUILTY ABOUT YOUR DRINKING: NO
AVERAGE NUMBER OF DAYS PER WEEK YOU HAVE A DRINK CONTAINING ALCOHOL: 0
ON A TYPICAL DAY WHEN YOU DRINK ALCOHOL HOW MANY DRINKS DO YOU HAVE: 1
HAVE YOU EVER FELT YOU SHOULD CUT DOWN ON YOUR DRINKING: NO
EVER HAD A DRINK FIRST THING IN THE MORNING TO STEADY YOUR NERVES TO GET RID OF A HANGOVER: NO
ALCOHOL_USE: YES
TOTAL SCORE: 0
TOTAL SCORE: 0
DOES PATIENT WANT TO STOP DRINKING: NO
HAVE PEOPLE ANNOYED YOU BY CRITICIZING YOUR DRINKING: NO
CONSUMPTION TOTAL: NEGATIVE
TOTAL SCORE: 0
HOW MANY TIMES IN THE PAST YEAR HAVE YOU HAD 5 OR MORE DRINKS IN A DAY: 0

## 2024-10-09 ASSESSMENT — PATIENT HEALTH QUESTIONNAIRE - PHQ9
SUM OF ALL RESPONSES TO PHQ9 QUESTIONS 1 AND 2: 0
1. LITTLE INTEREST OR PLEASURE IN DOING THINGS: NOT AT ALL
2. FEELING DOWN, DEPRESSED, IRRITABLE, OR HOPELESS: NOT AT ALL

## 2024-10-10 ENCOUNTER — TELEPHONE (OUTPATIENT)
Dept: SURGICAL ONCOLOGY | Facility: MEDICAL CENTER | Age: 74
End: 2024-10-10
Payer: MEDICARE

## 2024-10-11 ENCOUNTER — TELEPHONE (OUTPATIENT)
Dept: HEALTH INFORMATION MANAGEMENT | Facility: OTHER | Age: 74
End: 2024-10-11
Payer: MEDICARE

## 2024-10-11 ENCOUNTER — PHARMACY VISIT (OUTPATIENT)
Dept: PHARMACY | Facility: MEDICAL CENTER | Age: 74
End: 2024-10-11
Payer: COMMERCIAL

## 2024-10-17 DIAGNOSIS — E83.51 IATROGENIC HYPOCALCEMIA: ICD-10-CM

## 2024-10-18 ENCOUNTER — HOSPITAL ENCOUNTER (OUTPATIENT)
Dept: LAB | Facility: MEDICAL CENTER | Age: 74
End: 2024-10-18
Attending: SURGERY
Payer: MEDICARE

## 2024-10-18 DIAGNOSIS — E83.51 IATROGENIC HYPOCALCEMIA: ICD-10-CM

## 2024-10-18 LAB
CALCIUM SERPL-MCNC: 9.2 MG/DL (ref 8.5–10.5)
PTH-INTACT SERPL-MCNC: 90 PG/ML (ref 14–72)

## 2024-10-18 PROCEDURE — 83970 ASSAY OF PARATHORMONE: CPT

## 2024-10-18 PROCEDURE — 82310 ASSAY OF CALCIUM: CPT

## 2024-10-18 PROCEDURE — 36415 COLL VENOUS BLD VENIPUNCTURE: CPT

## 2024-10-21 ENCOUNTER — OFFICE VISIT (OUTPATIENT)
Dept: SURGICAL ONCOLOGY | Facility: MEDICAL CENTER | Age: 74
End: 2024-10-21
Payer: MEDICARE

## 2024-10-21 VITALS
DIASTOLIC BLOOD PRESSURE: 60 MMHG | BODY MASS INDEX: 31.36 KG/M2 | HEART RATE: 90 BPM | TEMPERATURE: 96.5 F | SYSTOLIC BLOOD PRESSURE: 102 MMHG | WEIGHT: 177 LBS | HEIGHT: 63 IN | OXYGEN SATURATION: 96 %

## 2024-10-21 DIAGNOSIS — E21.0 PRIMARY HYPERPARATHYROIDISM (HCC): ICD-10-CM

## 2024-10-21 PROCEDURE — 3074F SYST BP LT 130 MM HG: CPT | Performed by: SURGERY

## 2024-10-21 PROCEDURE — 99213 OFFICE O/P EST LOW 20 MIN: CPT | Performed by: SURGERY

## 2024-10-21 PROCEDURE — 3078F DIAST BP <80 MM HG: CPT | Performed by: SURGERY

## 2024-10-21 ASSESSMENT — FIBROSIS 4 INDEX: FIB4 SCORE: 1.28

## 2024-11-06 ENCOUNTER — HOSPITAL ENCOUNTER (OUTPATIENT)
Dept: LAB | Facility: MEDICAL CENTER | Age: 74
End: 2024-11-06
Attending: SURGERY
Payer: MEDICARE

## 2024-11-06 PROCEDURE — 36415 COLL VENOUS BLD VENIPUNCTURE: CPT

## 2024-11-06 PROCEDURE — 83970 ASSAY OF PARATHORMONE: CPT

## 2024-11-07 ENCOUNTER — APPOINTMENT (OUTPATIENT)
Dept: BEHAVIORAL HEALTH | Facility: CLINIC | Age: 74
End: 2024-11-07
Payer: MEDICARE

## 2024-11-07 DIAGNOSIS — F33.1 MAJOR DEPRESSIVE DISORDER, RECURRENT EPISODE, MODERATE (HCC): ICD-10-CM

## 2024-11-07 DIAGNOSIS — F90.1 ATTENTION DEFICIT HYPERACTIVITY DISORDER (ADHD), PREDOMINANTLY HYPERACTIVE TYPE: ICD-10-CM

## 2024-11-07 LAB
CALCIUM SERPL-MCNC: 9.6 MG/DL (ref 8.5–10.5)
PTH-INTACT SERPL-MCNC: 44.4 PG/ML (ref 14–72)

## 2024-11-07 PROCEDURE — 99214 OFFICE O/P EST MOD 30 MIN: CPT | Performed by: PSYCHIATRY & NEUROLOGY

## 2024-11-07 RX ORDER — ATOMOXETINE 60 MG/1
60 CAPSULE ORAL DAILY
Qty: 90 CAPSULE | Refills: 0 | Status: SHIPPED | OUTPATIENT
Start: 2024-11-07 | End: 2025-02-05

## 2024-11-07 NOTE — PROGRESS NOTES
[facility]   Follow Up Assessment     This provider informed the patient their medical records are totally confidential except for the use by other providers involved in their care, or if the patient signs a release, or to report instances of child or elder abuse, or if it is determined they are an immediate risk to harm themselves or others.    Name: Denia Deluca  MRN: [id number]  : 1950  Age: 74 y.o.  Date of assessment: 2024  PCP: Hoda Sloan M.D.      Subjective:  Chart reviewed prior to seeing her in my office.  She was last seen on .  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  She is slowly recuperating.  We reviewed her current medications.  She had decreased Wellbutrin 150 mg a.m. to every other morning and this coming January she would like to discontinue it.  Strattera 60 mg a.m. is working well for her ADHD.  She and her  are considering relocating to a town near [place].    Objective:  She is alert, oriented, and cooperative.  Relatedness is good.  Talkative.  Grooming is good.  Speech is normal rate.  Anxious.  Memory is good.  Insight and judgment are good.  No indication of psychotic thinking.    Current Risk:       Suicidal: Not suicidal       Homicidal: Not homicidal       Self-Harm: No plan to harm self       Relapse: (Low/Moderate/High): Moderate       Crisis Safety Plan Reviewed: Discussed with patient    Diagnosis:   Major depressive disorder, recurrent  ADHD  PTSD    Treatment Plan:  The current treatment plan consists of quarterly psychiatric sessions designed to evaluate her depression, ADHD, and PTSD.    Duration will be for a minimum of 12 months and will be reviewed at each visit.    Goals: Remission of depression with improvement in ADHD symptoms in order to prevent relapse due to the chronic nature of her behavioral health problems and  mental illness.  Continue Strattera 60 mg a.m.  She will contact us in January if she wishes to taper off completely the Wellbutrin  mg which she is taking every other day.    Ephraim Chavez M.D.      This note was created using voice recognition software (Dragon). The accuracy of the dictation is limited by the abilities of the software. I have reviewed the note prior to signing, however some errors in grammar and context are still possible. If you have any questions related to this note please do not hesitate to contact our office.

## 2024-11-29 DIAGNOSIS — N39.46 MIXED STRESS AND URGE URINARY INCONTINENCE: ICD-10-CM

## 2024-11-29 PROCEDURE — RXMED WILLOW AMBULATORY MEDICATION CHARGE: Performed by: STUDENT IN AN ORGANIZED HEALTH CARE EDUCATION/TRAINING PROGRAM

## 2024-11-29 RX ORDER — OXYBUTYNIN CHLORIDE 5 MG/1
5 TABLET ORAL 2 TIMES DAILY
Qty: 180 TABLET | Refills: 3 | Status: SHIPPED | OUTPATIENT
Start: 2024-11-29

## 2024-11-30 DIAGNOSIS — M85.80 OSTEOPENIA WITH HIGH RISK OF FRACTURE: ICD-10-CM

## 2024-11-30 NOTE — TELEPHONE ENCOUNTER
Received request via: Pharmacy    Was the patient seen in the last year in this department? Yes    Does the patient have an active prescription (recently filled or refills available) for medication(s) requested? No    Pharmacy Name: [facility]    Does the patient have correction Plus and need 100-day supply? (This applies to ALL medications) Yes, quantity updated to 100 days

## 2024-12-02 PROCEDURE — RXMED WILLOW AMBULATORY MEDICATION CHARGE: Performed by: STUDENT IN AN ORGANIZED HEALTH CARE EDUCATION/TRAINING PROGRAM

## 2024-12-02 RX ORDER — ALENDRONATE SODIUM 70 MG/1
70 TABLET ORAL
Qty: 14 TABLET | Refills: 0 | Status: SHIPPED | OUTPATIENT
Start: 2024-12-02 | End: 2024-12-03 | Stop reason: SDUPTHER

## 2024-12-03 ENCOUNTER — PATIENT MESSAGE (OUTPATIENT)
Dept: BEHAVIORAL HEALTH | Facility: CLINIC | Age: 74
End: 2024-12-03
Payer: MEDICARE

## 2024-12-03 ENCOUNTER — PHARMACY VISIT (OUTPATIENT)
Dept: PHARMACY | Facility: MEDICAL CENTER | Age: 74
End: 2024-12-03
Payer: COMMERCIAL

## 2024-12-03 DIAGNOSIS — M85.80 OSTEOPENIA WITH HIGH RISK OF FRACTURE: ICD-10-CM

## 2024-12-03 PROCEDURE — RXMED WILLOW AMBULATORY MEDICATION CHARGE: Performed by: STUDENT IN AN ORGANIZED HEALTH CARE EDUCATION/TRAINING PROGRAM

## 2024-12-03 PROCEDURE — RXMED WILLOW AMBULATORY MEDICATION CHARGE: Performed by: FAMILY MEDICINE

## 2024-12-03 RX ORDER — ATOMOXETINE 60 MG/1
60 CAPSULE ORAL DAILY
Qty: 90 CAPSULE | Refills: 0 | Status: SHIPPED | OUTPATIENT
Start: 2024-12-03 | End: 2025-03-06

## 2024-12-03 RX ORDER — BUPROPION HYDROCHLORIDE 150 MG/1
150 TABLET, EXTENDED RELEASE ORAL DAILY
Qty: 90 TABLET | Refills: 0 | Status: SHIPPED | OUTPATIENT
Start: 2024-12-03 | End: 2025-03-06

## 2024-12-04 PROCEDURE — RXMED WILLOW AMBULATORY MEDICATION CHARGE: Performed by: PSYCHIATRY & NEUROLOGY

## 2024-12-04 RX ORDER — ALENDRONATE SODIUM 70 MG/1
70 TABLET ORAL
Qty: 14 TABLET | Refills: 0 | Status: SHIPPED | OUTPATIENT
Start: 2024-12-04 | End: 2025-03-14

## 2024-12-05 PROCEDURE — RXMED WILLOW AMBULATORY MEDICATION CHARGE: Performed by: PHYSICIAN ASSISTANT

## 2024-12-06 ENCOUNTER — PHARMACY VISIT (OUTPATIENT)
Dept: PHARMACY | Facility: MEDICAL CENTER | Age: 74
End: 2024-12-06
Payer: COMMERCIAL

## 2024-12-16 PROCEDURE — RXMED WILLOW AMBULATORY MEDICATION CHARGE: Performed by: INTERNAL MEDICINE

## 2024-12-20 ENCOUNTER — PHARMACY VISIT (OUTPATIENT)
Dept: PHARMACY | Facility: MEDICAL CENTER | Age: 74
End: 2024-12-20
Payer: COMMERCIAL

## 2024-12-26 ENCOUNTER — PATIENT MESSAGE (OUTPATIENT)
Dept: MEDICAL GROUP | Facility: MEDICAL CENTER | Age: 74
End: 2024-12-26
Payer: MEDICARE

## 2025-01-30 ENCOUNTER — APPOINTMENT (OUTPATIENT)
Dept: MEDICAL GROUP | Facility: MEDICAL CENTER | Age: 75
End: 2025-01-30
Payer: MEDICARE

## 2025-02-07 ENCOUNTER — TELEPHONE (OUTPATIENT)
Dept: MEDICAL GROUP | Facility: MEDICAL CENTER | Age: 75
End: 2025-02-07
Payer: MEDICARE

## 2025-02-07 NOTE — TELEPHONE ENCOUNTER
Tdap covers pertussis and was last done in 2021.  This is up-to-date as it is an every 10-year booster    Thank You,  Dr. Sloan

## 2025-02-07 NOTE — TELEPHONE ENCOUNTER
VOICEMAIL  1. Caller Name: florentin                      Call Back Number: [phone]    2. Message: She called and stated that her grandson and daughter both have whooping cough and she is waiting on results from her test. She called to ask if her immunizations are up to date for whooping cough     3. Patient approves office to leave a detailed voicemail/Pointhart message: N\A

## 2025-02-16 SDOH — ECONOMIC STABILITY: INCOME INSECURITY: HOW HARD IS IT FOR YOU TO PAY FOR THE VERY BASICS LIKE FOOD, HOUSING, MEDICAL CARE, AND HEATING?: NOT HARD AT ALL

## 2025-02-16 SDOH — ECONOMIC STABILITY: FOOD INSECURITY: WITHIN THE PAST 12 MONTHS, YOU WORRIED THAT YOUR FOOD WOULD RUN OUT BEFORE YOU GOT MONEY TO BUY MORE.: NEVER TRUE

## 2025-02-16 SDOH — HEALTH STABILITY: PHYSICAL HEALTH: ON AVERAGE, HOW MANY DAYS PER WEEK DO YOU ENGAGE IN MODERATE TO STRENUOUS EXERCISE (LIKE A BRISK WALK)?: 1 DAY

## 2025-02-16 SDOH — ECONOMIC STABILITY: INCOME INSECURITY: IN THE LAST 12 MONTHS, WAS THERE A TIME WHEN YOU WERE NOT ABLE TO PAY THE MORTGAGE OR RENT ON TIME?: NO

## 2025-02-16 SDOH — ECONOMIC STABILITY: FOOD INSECURITY: WITHIN THE PAST 12 MONTHS, THE FOOD YOU BOUGHT JUST DIDN'T LAST AND YOU DIDN'T HAVE MONEY TO GET MORE.: NEVER TRUE

## 2025-02-16 SDOH — HEALTH STABILITY: PHYSICAL HEALTH: ON AVERAGE, HOW MANY MINUTES DO YOU ENGAGE IN EXERCISE AT THIS LEVEL?: 10 MIN

## 2025-02-16 SDOH — HEALTH STABILITY: MENTAL HEALTH
STRESS IS WHEN SOMEONE FEELS TENSE, NERVOUS, ANXIOUS, OR CAN'T SLEEP AT NIGHT BECAUSE THEIR MIND IS TROUBLED. HOW STRESSED ARE YOU?: TO SOME EXTENT

## 2025-02-16 ASSESSMENT — SOCIAL DETERMINANTS OF HEALTH (SDOH)
DO YOU BELONG TO ANY CLUBS OR ORGANIZATIONS SUCH AS CHURCH GROUPS UNIONS, FRATERNAL OR ATHLETIC GROUPS, OR SCHOOL GROUPS?: YES
DO YOU BELONG TO ANY CLUBS OR ORGANIZATIONS SUCH AS CHURCH GROUPS UNIONS, FRATERNAL OR ATHLETIC GROUPS, OR SCHOOL GROUPS?: YES
HOW OFTEN DO YOU GET TOGETHER WITH FRIENDS OR RELATIVES?: ONCE A WEEK
HOW OFTEN DO YOU ATTEND CHURCH OR RELIGIOUS SERVICES?: NEVER
IN A TYPICAL WEEK, HOW MANY TIMES DO YOU TALK ON THE PHONE WITH FAMILY, FRIENDS, OR NEIGHBORS?: MORE THAN THREE TIMES A WEEK
IN A TYPICAL WEEK, HOW MANY TIMES DO YOU TALK ON THE PHONE WITH FAMILY, FRIENDS, OR NEIGHBORS?: MORE THAN THREE TIMES A WEEK
HOW OFTEN DO YOU HAVE A DRINK CONTAINING ALCOHOL: NEVER
WITHIN THE PAST 12 MONTHS, YOU WORRIED THAT YOUR FOOD WOULD RUN OUT BEFORE YOU GOT THE MONEY TO BUY MORE: NEVER TRUE
HOW OFTEN DO YOU GET TOGETHER WITH FRIENDS OR RELATIVES?: ONCE A WEEK
HOW OFTEN DO YOU ATTENT MEETINGS OF THE CLUB OR ORGANIZATION YOU BELONG TO?: MORE THAN 4 TIMES PER YEAR
HOW HARD IS IT FOR YOU TO PAY FOR THE VERY BASICS LIKE FOOD, HOUSING, MEDICAL CARE, AND HEATING?: NOT HARD AT ALL
HOW OFTEN DO YOU ATTENT MEETINGS OF THE CLUB OR ORGANIZATION YOU BELONG TO?: MORE THAN 4 TIMES PER YEAR
HOW OFTEN DO YOU HAVE SIX OR MORE DRINKS ON ONE OCCASION: NEVER
HOW MANY DRINKS CONTAINING ALCOHOL DO YOU HAVE ON A TYPICAL DAY WHEN YOU ARE DRINKING: PATIENT DOES NOT DRINK
HOW OFTEN DO YOU ATTEND CHURCH OR RELIGIOUS SERVICES?: NEVER
IN THE PAST 12 MONTHS, HAS THE ELECTRIC, GAS, OIL, OR WATER COMPANY THREATENED TO SHUT OFF SERVICE IN YOUR HOME?: NO

## 2025-02-16 ASSESSMENT — LIFESTYLE VARIABLES
AUDIT-C TOTAL SCORE: 0
SKIP TO QUESTIONS 9-10: 1
HOW OFTEN DO YOU HAVE SIX OR MORE DRINKS ON ONE OCCASION: NEVER
HOW MANY STANDARD DRINKS CONTAINING ALCOHOL DO YOU HAVE ON A TYPICAL DAY: PATIENT DOES NOT DRINK
HOW OFTEN DO YOU HAVE A DRINK CONTAINING ALCOHOL: NEVER

## 2025-02-17 ENCOUNTER — PATIENT MESSAGE (OUTPATIENT)
Dept: HEALTH INFORMATION MANAGEMENT | Facility: OTHER | Age: 75
End: 2025-02-17

## 2025-02-18 ENCOUNTER — APPOINTMENT (OUTPATIENT)
Dept: BEHAVIORAL HEALTH | Facility: CLINIC | Age: 75
End: 2025-02-18
Payer: MEDICARE

## 2025-02-18 DIAGNOSIS — J45.20 MILD INTERMITTENT ASTHMA WITHOUT COMPLICATION: ICD-10-CM

## 2025-02-18 DIAGNOSIS — I49.3 PVC'S (PREMATURE VENTRICULAR CONTRACTIONS): ICD-10-CM

## 2025-02-18 DIAGNOSIS — I47.10 PSVT (PAROXYSMAL SUPRAVENTRICULAR TACHYCARDIA) (HCC): ICD-10-CM

## 2025-02-18 DIAGNOSIS — Z91.030 BEE ALLERGY STATUS: ICD-10-CM

## 2025-02-18 DIAGNOSIS — R00.2 PALPITATIONS: ICD-10-CM

## 2025-02-18 DIAGNOSIS — E78.00 PURE HYPERCHOLESTEROLEMIA: ICD-10-CM

## 2025-02-18 DIAGNOSIS — F33.1 MAJOR DEPRESSIVE DISORDER, RECURRENT EPISODE, MODERATE (HCC): ICD-10-CM

## 2025-02-18 DIAGNOSIS — F90.1 ATTENTION DEFICIT HYPERACTIVITY DISORDER (ADHD), PREDOMINANTLY HYPERACTIVE TYPE: ICD-10-CM

## 2025-02-18 PROCEDURE — RXMED WILLOW AMBULATORY MEDICATION CHARGE: Performed by: STUDENT IN AN ORGANIZED HEALTH CARE EDUCATION/TRAINING PROGRAM

## 2025-02-18 RX ORDER — ATOMOXETINE 60 MG/1
60 CAPSULE ORAL DAILY
Qty: 90 CAPSULE | Refills: 0 | Status: SHIPPED | OUTPATIENT
Start: 2025-02-18 | End: 2025-02-18 | Stop reason: SDUPTHER

## 2025-02-18 RX ORDER — ATOMOXETINE 60 MG/1
60 CAPSULE ORAL DAILY
Qty: 180 CAPSULE | Refills: 0 | Status: SHIPPED | OUTPATIENT
Start: 2025-02-18 | End: 2025-08-17

## 2025-02-18 RX ORDER — ALBUTEROL SULFATE 90 UG/1
2 INHALANT RESPIRATORY (INHALATION) EVERY 6 HOURS PRN
Qty: 25.5 G | Refills: 1 | Status: SHIPPED | OUTPATIENT
Start: 2025-02-18

## 2025-02-18 RX ORDER — ATORVASTATIN CALCIUM 10 MG/1
10 TABLET, FILM COATED ORAL NIGHTLY
Qty: 100 TABLET | Refills: 3 | Status: SHIPPED | OUTPATIENT
Start: 2025-02-18

## 2025-02-18 RX ORDER — OMEPRAZOLE 20 MG/1
20 CAPSULE, DELAYED RELEASE ORAL 2 TIMES DAILY
Qty: 200 CAPSULE | Refills: 3 | Status: SHIPPED | OUTPATIENT
Start: 2025-02-18

## 2025-02-18 RX ORDER — BUPROPION HYDROCHLORIDE 150 MG/1
150 TABLET, EXTENDED RELEASE ORAL DAILY
Qty: 90 TABLET | Refills: 0 | Status: SHIPPED | OUTPATIENT
Start: 2025-02-18 | End: 2025-02-18

## 2025-02-18 NOTE — TELEPHONE ENCOUNTER
Is the patient due for a refill? Yes    Was the patient seen the last 15 months? Yes    Date of last office visit: 05.07.2024    Does the patient have an upcoming appointment?  No    Provider to refill:RAMIREZ    Does the patient have [facility] Plus and need 100-day supply? (This applies to ALL medications) Yes, quantity updated to 100 days

## 2025-02-18 NOTE — TELEPHONE ENCOUNTER
Received request via: Pharmacy    Was the patient seen in the last year in this department? Yes    Does the patient have an active prescription (recently filled or refills available) for medication(s) requested? No    Pharmacy Name: [facility]     Does the patient have retirement Plus and need 100-day supply? (This applies to ALL medications) Yes, quantity updated to 100 days

## 2025-02-18 NOTE — PROGRESS NOTES
[facility]   Follow Up Assessment     This provider informed the patient their medical records are totally confidential except for the use by other providers involved in their care, or if the patient signs a release, or to report instances of child or elder abuse, or if it is determined they are an immediate risk to harm themselves or others.    Name: Denia Deluca  MRN: [id number]  : 1950  Age: 74 y.o.  Date of assessment: 2025  PCP: Hoda Sloan M.D.      Subjective:  Chart reviewed prior to seeing her in my office.  She was seen most recently on .  Strattera 60 mg a.m. is working well for her ADHD.  She has been feeling much better since having parathyroid surgery in 2024.  She denies feeling depressed and would like to stop Wellbutrin  mg which she has been taking every other day.  She will ask her PCP if the PCP could prescribe the Strattera 60 mg a.m. for her.    Objective:  He is alert, oriented, and cooperative.  Relatedness is good.  Grooming is good.  Speech is rapid.  Anxious.  Memory is good.  Insight and judgment are good.  No indication of psychotic thinking.    Current Risk:       Suicidal: Not suicidal       Homicidal: Not homicidal       Self-Harm: No plan to harm self       Relapse: (Low/Moderate/High): Moderate       Crisis Safety Plan Reviewed: Discussed with patient    Diagnosis:   ADHD  Major depressive disorder, recurrent    Treatment Plan:  The current treatment plan consists follow-up visits every 6 months designed to evaluate her ADHD and depression.    Duration will be for a minimum of 12 months and will be reviewed at each visit.    Goals: Improvement in ADHD symptoms with remission of depression in order to prevent relapse due to the chronic nature of her behavioral health problems and mental illness.  Improvement in ADHD symptoms in order to prevent relapse due to the chronic nature of her behavioral health problems and mental  illness.  Continue Strattera 60 mg daily.  She will discontinue Wellbutrin  but will contact our office if she were to start to feel depressed after doing so.  She will ask her PCP about prescribing Strattera 60 mg a.m. for ADHD.        Ephraim Chavez M.D.      This note was created using voice recognition software (Dragon). The accuracy of the dictation is limited by the abilities of the software. I have reviewed the note prior to signing, however some errors in grammar and context are still possible. If you have any questions related to this note please do not hesitate to contact our office.

## 2025-02-19 ENCOUNTER — APPOINTMENT (OUTPATIENT)
Dept: MEDICAL GROUP | Facility: MEDICAL CENTER | Age: 75
End: 2025-02-19
Payer: MEDICARE

## 2025-02-19 ENCOUNTER — PHARMACY VISIT (OUTPATIENT)
Dept: PHARMACY | Facility: MEDICAL CENTER | Age: 75
End: 2025-02-19
Payer: COMMERCIAL

## 2025-02-19 VITALS
OXYGEN SATURATION: 97 % | BODY MASS INDEX: 32.74 KG/M2 | WEIGHT: 184.8 LBS | SYSTOLIC BLOOD PRESSURE: 112 MMHG | DIASTOLIC BLOOD PRESSURE: 64 MMHG | HEART RATE: 84 BPM | HEIGHT: 63 IN | TEMPERATURE: 96.9 F

## 2025-02-19 DIAGNOSIS — Z00.00 MEDICARE ANNUAL WELLNESS VISIT, SUBSEQUENT: ICD-10-CM

## 2025-02-19 DIAGNOSIS — Z12.31 ENCOUNTER FOR SCREENING MAMMOGRAM FOR MALIGNANT NEOPLASM OF BREAST: ICD-10-CM

## 2025-02-19 DIAGNOSIS — H57.9 EYE PROBLEM: ICD-10-CM

## 2025-02-19 DIAGNOSIS — E66.09 CLASS 1 OBESITY DUE TO EXCESS CALORIES WITH SERIOUS COMORBIDITY AND BODY MASS INDEX (BMI) OF 32.0 TO 32.9 IN ADULT: ICD-10-CM

## 2025-02-19 DIAGNOSIS — E66.811 CLASS 1 OBESITY DUE TO EXCESS CALORIES WITH SERIOUS COMORBIDITY AND BODY MASS INDEX (BMI) OF 32.0 TO 32.9 IN ADULT: ICD-10-CM

## 2025-02-19 DIAGNOSIS — H61.21 IMPACTED CERUMEN OF RIGHT EAR: ICD-10-CM

## 2025-02-19 DIAGNOSIS — E78.00 PURE HYPERCHOLESTEROLEMIA: ICD-10-CM

## 2025-02-19 PROCEDURE — 3074F SYST BP LT 130 MM HG: CPT | Performed by: STUDENT IN AN ORGANIZED HEALTH CARE EDUCATION/TRAINING PROGRAM

## 2025-02-19 PROCEDURE — G0439 PPPS, SUBSEQ VISIT: HCPCS | Performed by: STUDENT IN AN ORGANIZED HEALTH CARE EDUCATION/TRAINING PROGRAM

## 2025-02-19 PROCEDURE — 99214 OFFICE O/P EST MOD 30 MIN: CPT | Mod: 25 | Performed by: STUDENT IN AN ORGANIZED HEALTH CARE EDUCATION/TRAINING PROGRAM

## 2025-02-19 PROCEDURE — 3078F DIAST BP <80 MM HG: CPT | Performed by: STUDENT IN AN ORGANIZED HEALTH CARE EDUCATION/TRAINING PROGRAM

## 2025-02-19 RX ORDER — DILTIAZEM HYDROCHLORIDE 120 MG/1
120 CAPSULE, COATED, EXTENDED RELEASE ORAL DAILY
Qty: 100 CAPSULE | Refills: 0 | Status: SHIPPED | OUTPATIENT
Start: 2025-02-19

## 2025-02-19 ASSESSMENT — ACTIVITIES OF DAILY LIVING (ADL): BATHING_REQUIRES_ASSISTANCE: 0

## 2025-02-19 ASSESSMENT — ENCOUNTER SYMPTOMS: GENERAL WELL-BEING: GOOD

## 2025-02-19 ASSESSMENT — FIBROSIS 4 INDEX: FIB4 SCORE: 1.28

## 2025-02-19 ASSESSMENT — PATIENT HEALTH QUESTIONNAIRE - PHQ9: CLINICAL INTERPRETATION OF PHQ2 SCORE: 0
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
## 2025-02-24 PROCEDURE — RXMED WILLOW AMBULATORY MEDICATION CHARGE: Performed by: INTERNAL MEDICINE

## 2025-02-25 ENCOUNTER — NON-PROVIDER VISIT (OUTPATIENT)
Dept: MEDICAL GROUP | Facility: MEDICAL CENTER | Age: 75
End: 2025-02-25
Payer: MEDICARE

## 2025-02-25 DIAGNOSIS — Z23 IMMUNIZATION DUE: ICD-10-CM

## 2025-02-25 PROCEDURE — G0010 ADMIN HEPATITIS B VACCINE: HCPCS | Performed by: STUDENT IN AN ORGANIZED HEALTH CARE EDUCATION/TRAINING PROGRAM

## 2025-02-25 PROCEDURE — 90746 HEPB VACCINE 3 DOSE ADULT IM: CPT | Performed by: STUDENT IN AN ORGANIZED HEALTH CARE EDUCATION/TRAINING PROGRAM

## 2025-02-25 NOTE — PROGRESS NOTES
"Yovani Deluca is a 74 y.o. female here for a non-provider visit for:   HEPATITIS B 2 of 3    Reason for immunization: continue or complete series started at the office  Immunization records indicate need for vaccine: Yes, confirmed with Epic  Minimum interval has been met for this vaccine: Yes  ABN completed: Yes    VIS Dated  2021 was given to patient: Yes  All IAC Questionnaire questions were answered \"No.\"    Patient tolerated injection and no adverse effects were observed or reported: Yes    Pt scheduled for next dose in series: Yes  "

## 2025-02-27 ENCOUNTER — PHARMACY VISIT (OUTPATIENT)
Dept: PHARMACY | Facility: MEDICAL CENTER | Age: 75
End: 2025-02-27
Payer: COMMERCIAL

## 2025-03-05 ENCOUNTER — HOSPITAL ENCOUNTER (OUTPATIENT)
Dept: RADIOLOGY | Facility: MEDICAL CENTER | Age: 75
End: 2025-03-05
Attending: STUDENT IN AN ORGANIZED HEALTH CARE EDUCATION/TRAINING PROGRAM
Payer: MEDICARE

## 2025-03-05 DIAGNOSIS — Z12.31 ENCOUNTER FOR SCREENING MAMMOGRAM FOR MALIGNANT NEOPLASM OF BREAST: ICD-10-CM

## 2025-03-05 PROCEDURE — 77067 SCR MAMMO BI INCL CAD: CPT

## 2025-03-07 ENCOUNTER — RESULTS FOLLOW-UP (OUTPATIENT)
Dept: MEDICAL GROUP | Facility: MEDICAL CENTER | Age: 75
End: 2025-03-07
Payer: MEDICARE

## 2025-03-07 PROCEDURE — RXMED WILLOW AMBULATORY MEDICATION CHARGE: Performed by: PHYSICIAN ASSISTANT

## 2025-03-09 ENCOUNTER — PHARMACY VISIT (OUTPATIENT)
Dept: PHARMACY | Facility: MEDICAL CENTER | Age: 75
End: 2025-03-09
Payer: COMMERCIAL

## 2025-04-02 PROCEDURE — RXMED WILLOW AMBULATORY MEDICATION CHARGE: Performed by: PHYSICIAN ASSISTANT

## 2025-04-04 ENCOUNTER — PHARMACY VISIT (OUTPATIENT)
Dept: PHARMACY | Facility: MEDICAL CENTER | Age: 75
End: 2025-04-04
Payer: COMMERCIAL

## 2025-04-28 PROCEDURE — RXMED WILLOW AMBULATORY MEDICATION CHARGE: Performed by: PSYCHIATRY & NEUROLOGY

## 2025-05-02 ENCOUNTER — PHARMACY VISIT (OUTPATIENT)
Dept: PHARMACY | Facility: MEDICAL CENTER | Age: 75
End: 2025-05-02
Payer: COMMERCIAL

## 2025-05-08 ENCOUNTER — OFFICE VISIT (OUTPATIENT)
Dept: CARDIOLOGY | Facility: MEDICAL CENTER | Age: 75
End: 2025-05-08
Attending: INTERNAL MEDICINE
Payer: MEDICARE

## 2025-05-08 VITALS
HEIGHT: 63 IN | WEIGHT: 183 LBS | BODY MASS INDEX: 32.43 KG/M2 | OXYGEN SATURATION: 97 % | HEART RATE: 86 BPM | DIASTOLIC BLOOD PRESSURE: 66 MMHG | RESPIRATION RATE: 18 BRPM | SYSTOLIC BLOOD PRESSURE: 118 MMHG

## 2025-05-08 DIAGNOSIS — R00.2 PALPITATIONS: ICD-10-CM

## 2025-05-08 DIAGNOSIS — E78.00 PURE HYPERCHOLESTEROLEMIA: ICD-10-CM

## 2025-05-08 DIAGNOSIS — I47.10 PSVT (PAROXYSMAL SUPRAVENTRICULAR TACHYCARDIA) (HCC): ICD-10-CM

## 2025-05-08 DIAGNOSIS — G47.33 OSA ON CPAP: ICD-10-CM

## 2025-05-08 DIAGNOSIS — I49.3 PVC'S (PREMATURE VENTRICULAR CONTRACTIONS): ICD-10-CM

## 2025-05-08 PROCEDURE — 99214 OFFICE O/P EST MOD 30 MIN: CPT | Performed by: INTERNAL MEDICINE

## 2025-05-08 PROCEDURE — 99213 OFFICE O/P EST LOW 20 MIN: CPT | Performed by: INTERNAL MEDICINE

## 2025-05-08 PROCEDURE — 3074F SYST BP LT 130 MM HG: CPT | Performed by: INTERNAL MEDICINE

## 2025-05-08 PROCEDURE — 3078F DIAST BP <80 MM HG: CPT | Performed by: INTERNAL MEDICINE

## 2025-05-08 RX ORDER — DILTIAZEM HYDROCHLORIDE 120 MG/1
120 CAPSULE, COATED, EXTENDED RELEASE ORAL DAILY
Qty: 100 CAPSULE | Refills: 3 | Status: SHIPPED | OUTPATIENT
Start: 2025-05-08

## 2025-05-08 ASSESSMENT — FIBROSIS 4 INDEX: FIB4 SCORE: 1.28

## 2025-05-08 NOTE — PROGRESS NOTES
Cardiology Follow Up Consultation Note    Date of note:    5/8/2025  Primary Care Provider: Hoda Sloan M.D.  Referring Provider: No ref. provider found    Patient Name: Denia Deluca   YOB: 1950  MRN:              [id number]    Chief Complaint   Patient presents with    Palpitations    Tachycardia    Hyperlipidemia       History of Present Illness: Ms. Denia Deluca is a 74 y.o. woman with past medical history significant for obstructive sleep apnea on CPAP, hyperlipidemia, ADHD, palpitations due to PVCs and paroxysmal SVT who presents to the cardiology office for follow-up.    Has been maintained on Diltiazem 120mg daily for palpitations and ectopy suppression.    Interval history:  Since her last office visit in 2024, she has been doing well. Continues to stay active. Tries to walk 5,000-10,000 steps a day. No symptoms of chest pain. Palpitations are under good control.      Cardiovascular Risk Factors:  1. Smoking status: Denies  2. Type II Diabetes Mellitus: Denies   Lab Results   Component Value Date/Time    HBA1C 5.5 11/15/2022 08:17 AM    HBA1C 5.4 04/29/2022 09:40 AM     3. Hypertension: Denies  4. Dyslipidemia: Yes  Cholesterol,Tot   Date Value Ref Range Status   12/27/2023 162 100 - 199 mg/dL Final     LDL   Date Value Ref Range Status   12/27/2023 70 <100 mg/dL Final     HDL   Date Value Ref Range Status   12/27/2023 66 >=40 mg/dL Final     Triglycerides   Date Value Ref Range Status   12/27/2023 131 0 - 149 mg/dL Final     5. Family history of early Coronary Artery Disease in a first degree relative (Male less than 55 years of age; Female less than 65 years of age): Denies      Review of Systems:  As per HPI.  Review of systems assessed and are negative except as stated above.        Past Medical History:   Diagnosis Date    Abnormal LFTs 08/2023    ADHD 10/02/2024    medicated    Allergic rhinitis     Aortic ectasia (HCC) 03/2024    Asthma 10/02/2024    albuterol  "inhaler prn    Back pain     Basal cell carcinoma (BCC)     BMI 31.0-31.9,adult 01/19/2022    Cataract 10/02/2024    bilateral IOL    Chickenpox     shingles vaccine    Depression     situational due to loss of child    Fracture     Multiple fractures    Heart burn 10/02/2024    omeprazole, times 5 years    Hiatus hernia syndrome     per CT    High cholesterol 10/02/2024    controlled with med    History of migraine     Influenza     Kidney stone     Migraines 10/02/2024    medicated prn    Nasal drainage     Pneumonia     approx 1985    PSVT (paroxysmal supraventricular tachycardia) (HCC) 12/28/2023    states due to not using CPAP for a period of time    Renal cyst     right side per CT    Shingles 2000    Sleep apnea 10/02/2024    times 4 years, uses CPAP    Snoring          Past Surgical History:   Procedure Laterality Date    ID THORACOSCOPY SURG W/PLEURODESIS Right 10/8/2024    Procedure: THORACOSCOPIC EXCISION OF MEDIASTINAL MASS RIGHT SIDE;  Surgeon: Alfonso Franco M.D.;  Location: SURGERY [facility];  Service: Thoracic    ID UPPER GI ENDOSCOPY,DIAGNOSIS  4/22/2024    Procedure: UPPER ENDOSCOPIC ULTRASOUND AND ESOPHAGOGASTRODUODENOSCOPY;  Surgeon: Tomás Shaw M.D.;  Location: SURGERY [facility];  Service: EUS    EGD W/ENDOSCOPIC ULTRASOUND  4/22/2024    Procedure: EGD, WITH ENDOSCOPIC US;  Surgeon: Tomás Shaw M.D.;  Location: SURGERY [facility];  Service: EUS    ABDOMINAL HYSTERECTOMY TOTAL      age 38 for \"pre-cancer\", ovaries not removed    CARPAL TUNNEL RELEASE Right     CATARACT EXTRACTION WITH IOL Bilateral     COLONOSCOPY      x2-twisted bowel.    HARDWARE REMOVAL LOWER EXTREMITY Right     foot    OPEN REDUCTION Right     R foot         Current Outpatient Medications   Medication Sig Dispense Refill    DILTIAZem CD (CARDIZEM CD) 120 MG CAPSULE SR 24 HR Take 1 capsule by mouth every day. 100 Capsule 3    omeprazole (PRILOSEC) 20 MG delayed-release capsule Take 1 Capsule by " mouth 2 times a day. (Patient taking differently: Take 20 mg by mouth 2 times a day. Pt states she just takes once everyday) 200 Capsule 3    atorvastatin (LIPITOR) 10 MG Tab Take 1 Tablet by mouth every evening. 100 Tablet 3    albuterol 108 (90 Base) MCG/ACT Aero Soln inhalation aerosol Inhale 2 Puffs every 6 hours as needed for Shortness of Breath. 25.5 g 1    atomoxetine (STRATTERA) 60 MG capsule Take 1 Capsule by mouth every day for 180 days. 180 Capsule 0    oxybutynin (DITROPAN) 5 MG Tab Take 1 tablet by mouth 2 times a day. (Patient taking differently: Take 5 mg by mouth 2 times a day. Pt states she only takes once a day) 180 Tablet 3    SUMAtriptan (IMITREX) 25 MG Tab tablet Take 1 Tablet by mouth one time as needed for Migraine for up to 1 dose. Do not exceed more than 10 tablets a month (Patient taking differently: Take 25 mg by mouth one time as needed for Migraine. Do not exceed more than 10 tablets a month    MEDICATION INSTRUCTIONS FOR SURGERY/PROCEDURE SCHEDULED FOR 10/8/24   CONTINUE TAKING MED PRIOR TO SURGERY, BUT DO NOT TAKE MORNING OF PROCEDURE) 30 Tablet 2    Ascorbic Acid (VITAMIN C) 500 MG Chew Tab Chew 500 mg every day.     MEDICATION INSTRUCTIONS FOR SURGERY/PROCEDURE SCHEDULED FOR 10/8/24   DO NOT TAKE 7 DAYS PRIOR TO SURGERY      Omega-3 Fatty Acids (FISH OIL) 1000 MG Cap capsule Take 1,000 mg by mouth every 48 hours. 300 MG OMEGA    MEDICATION INSTRUCTIONS FOR SURGERY/PROCEDURE SCHEDULED FOR 10/8/24   DO NOT TAKE 7 DAYS PRIOR TO SURGERY      Cholecalciferol (VITAMIN D3) 2000 UNIT Cap Take 2,000 Units by mouth every day.     MEDICATION INSTRUCTIONS FOR SURGERY/PROCEDURE SCHEDULED FOR 10/8/24   DO NOT TAKE 7 DAYS PRIOR TO SURGERY      acetaminophen (TYLENOL) 500 MG Tab Take 500-1,000 mg by mouth every 6 hours as needed.     MEDICATION INSTRUCTIONS FOR SURGERY/PROCEDURE SCHEDULED FOR 10/8/24   OK TO CONTINUE TAKING PRIOR TO SURGERY AND DAY OF SURGERY IF NEEDED      Multiple Vitamin  (MULTIVITAMIN ADULT PO) Take 1 Tablet by mouth every day. CENTRUM WOMENS 50+    MEDICATION INSTRUCTIONS FOR SURGERY/PROCEDURE SCHEDULED FOR 10/8/24   DO NOT TAKE 7 DAYS PRIOR TO SURGERY      EPINEPHrine (EPIPEN) 0.3 MG/0.3ML Solution Auto-injector solution for injection Inject 0.3 mg into the shoulder, thigh, or buttocks one time as needed (anaphylaxis).     MEDICATION INSTRUCTIONS FOR SURGERY/PROCEDURE SCHEDULED FOR 10/8/24   OK TO CONTINUE TAKING PRIOR TO SURGERY AND DAY OF SURGERY IF NEEDED       No current facility-administered medications for this visit.         Allergies   Allergen Reactions    Bee Venom Anaphylaxis     Will stop heart   Will stop heart     Crab      Positive allergy test  SHELLFISH    Magnesium Citrate Palpitations, Myalgia and Unspecified    Shellfish Allergy Anaphylaxis and Unspecified     Heart issues  Heart issues    Shrimp (Diagnostic) Shortness of Breath     Increased heart rate and headache  Opened sinus that drained like a faucet.  SHELLFISH    Dust Mite Extract      Positive allergy test    Pollen Extract      Positive allergy test    Antimony Hives     Increase in heart rate and hives          Family History   Problem Relation Age of Onset    Cancer Mother     Cancer Father     Breast Cancer Sister         Breast cancer    Cancer Sister         Breast cancer    Lupus Sister     Leukemia Brother     Colorectal Cancer Neg Hx     Peritoneal Cancer Neg Hx     Tubal Cancer Neg Hx     Ovarian Cancer Neg Hx          Social History     Socioeconomic History    Marital status:      Spouse name: Not on file    Number of children: Not on file    Years of education: Not on file    Highest education level: Bachelor's degree (e.g., BA, AB, BS)   Occupational History    Not on file   Tobacco Use    Smoking status: Never     Passive exposure: Past    Smokeless tobacco: Never   Vaping Use    Vaping status: Never Used   Substance and Sexual Activity    Alcohol use: Not Currently     Comment: 2  glasses a year    Drug use: Never    Sexual activity: Not Currently     Partners: Male   Other Topics Concern    Not on file   Social History Narrative    , has grown children. She is retired, had worked for a [employer] company in the past then as .     Her parents are .  She had 2 biological daughters 1  in a motor vehicle accident at age 17.  She had 2 brothers.  One is .  1 had bipolar disorder.  Her maternal great grand mother was bipolar.     Social Drivers of Health     Financial Resource Strain: Low Risk  (2025)    Overall Financial Resource Strain (CARDIA)     Difficulty of Paying Living Expenses: Not hard at all   Food Insecurity: No Food Insecurity (2025)    Hunger Vital Sign     Worried About Running Out of Food in the Last Year: Never true     Ran Out of Food in the Last Year: Never true   Transportation Needs: No Transportation Needs (2025)    PRAPARE - Transportation     Lack of Transportation (Medical): No     Lack of Transportation (Non-Medical): No   Physical Activity: Insufficiently Active (2025)    Exercise Vital Sign     Days of Exercise per Week: 1 day     Minutes of Exercise per Session: 10 min   Stress: Stress Concern Present (2025)    [place] [employer] - Occupational Stress Questionnaire     Feeling of Stress : To some extent   Social Connections: Moderately Integrated (2025)    Social Connection and Isolation Panel [NHANES]     Frequency of Communication with Friends and Family: More than three times a week     Frequency of Social Gatherings with Friends and Family: Once a week     Attends Nondenominational Services: Never     Active Member of Clubs or Organizations: Yes     Attends Club or Organization Meetings: More than 4 times per year     Marital Status:    Intimate Partner Violence: Not At Risk (10/9/2024)    Humiliation, Afraid, Rape, and Kick questionnaire     Fear of Current or Ex-Partner: No  "    Emotionally Abused: No     Physically Abused: No     Sexually Abused: No   Housing Stability: Low Risk  (2/16/2025)    Housing Stability Vital Sign     Unable to Pay for Housing in the Last Year: No     Number of Times Moved in the Last Year: 0     Homeless in the Last Year: No         Physical Exam:  Ambulatory Vitals  /66 (BP Location: Left arm, Patient Position: Sitting, BP Cuff Size: Adult)   Pulse 86   Resp 18   Ht 1.6 m (5' 3\")   Wt 83 kg (183 lb)   SpO2 97%    Oxygen Therapy:  Pulse Oximetry: 97 %  BP Readings from Last 4 Encounters:   05/08/25 118/66   02/19/25 112/64   10/21/24 102/60   10/09/24 116/58       Weight/BMI: Body mass index is 32.42 kg/m².  Wt Readings from Last 4 Encounters:   05/08/25 83 kg (183 lb)   02/19/25 83.8 kg (184 lb 12.8 oz)   10/21/24 80.3 kg (177 lb)   10/08/24 80.6 kg (177 lb 11.1 oz)       General: Not in acute distress  HEENT: OP clear   Neck:  No carotid bruits, No JVD appreciated  CVS:  RRR, Normal S1, S2. No murmurs  Resp: Normal respiratory effort, lungs CTA bilaterally.  Abdomen: Soft, non-distended, non-tender to palpation  Neurological: Alert and oriented x3, moves all extremities, no focal neurologic deficits  Psychiatric: Appropriate affect  Extremities:   Extremities warm, 2+ bilateral radial pulses.  2+ bilateral dp pulses, no lower extremity edema bilaterally      Lab Data Review:  Lab Results   Component Value Date/Time    CHOLSTRLTOT 162 12/27/2023 11:09 AM    LDL 70 12/27/2023 11:09 AM    HDL 66 12/27/2023 11:09 AM    TRIGLYCERIDE 131 12/27/2023 11:09 AM       Lab Results   Component Value Date/Time    SODIUM 134 (L) 10/09/2024 01:18 AM    POTASSIUM 4.1 10/09/2024 01:18 AM    CHLORIDE 99 10/09/2024 01:18 AM    CO2 27 10/09/2024 01:18 AM    GLUCOSE 135 (H) 10/09/2024 01:18 AM    BUN 16 10/09/2024 01:18 AM    CREATININE 0.73 10/09/2024 01:18 AM     Lab Results   Component Value Date/Time    ALKPHOSPHAT 174 (H) 06/26/2024 06:51 AM    ASTSGOT 21 " 06/26/2024 06:51 AM    ALTSGPT 27 06/26/2024 06:51 AM    TBILIRUBIN 0.4 06/26/2024 06:51 AM      Lab Results   Component Value Date/Time    WBC 8.5 10/09/2024 01:18 AM    HEMOGLOBIN 12.8 10/09/2024 01:18 AM     Lab Results   Component Value Date/Time    HBA1C 5.5 11/15/2022 08:17 AM    HBA1C 5.4 04/29/2022 09:40 AM         Cardiac Imaging and Procedures Review:    EKG dated 2022:   My personal interpretation is sinus rhythm     Echo dated 2019:   Normal left ventricular systolic function. Left ventricular ejection   fraction is visually estimated to be 65%.   Normal diastolic function.  Normal inferior vena cava size and inspiratory collapse.     Cardiac stress test 2019:   Negative for ischemia or infarction.    Stress test 3/16/2024:  No scintigraphic evidence of significant prior infarct or active ischemia.   Normal left ventricular systolic function.   Normal TID ratio.   ECG INTERPRETATION   Non-diagnostic due to use of pharmacological stress agent.        Assessment & Plan     1. PSVT (paroxysmal supraventricular tachycardia) (HCC)  DILTIAZem CD (CARDIZEM CD) 120 MG CAPSULE SR 24 HR      2. PVC's (premature ventricular contractions)  DILTIAZem CD (CARDIZEM CD) 120 MG CAPSULE SR 24 HR      3. Palpitations  DILTIAZem CD (CARDIZEM CD) 120 MG CAPSULE SR 24 HR      4. YAZAN on CPAP        5. Pure hypercholesterolemia  Lipid Profile            Medical Decision Making:  Ms. Denia Deluca is a 74 y.o. woman with past medical history significant for obstructive sleep apnea on CPAP, hyperlipidemia, ADHD, palpitations due to PVCs and paroxysmal SVT who presents to the cardiology office for follow-up.    1. PSVT (paroxysmal supraventricular tachycardia) (HCC)  2. PVC's (premature ventricular contractions)  3. Palpitations  - Stable.  Doing well from CV standpoint.  No significant palpitations.  Continue diltiazem 120 mg daily for ectopy suppression.  Refills will be sent to pharmacy.    4. YAZAN on CPAP  -CPAP  nightly    5. Pure hypercholesterolemia  -Plan on repeat lipid panel to ensure no adjustments are needed to atorvastatin therapy.  For now continue atorvastatin 10 mg daily.      It was a pleasure seeing Ms. Denia Deluca in the office today. Return in about 1 year (around 5/8/2026). Patient is aware to call the cardiology clinic with any questions or concerns.      Hamzah Basurto MD, [facility]  Cardiologist, [facility], [facility]  [address] [address]  Phone: [phone]  Fax: [phone]    Please note that this dictation was created using voice recognition software. I have made every reasonable attempt to correct obvious errors, but it is possible there are errors of grammar and possibly content that I did not discover before finalizing the note.

## 2025-06-05 NOTE — ASSESSMENT & PLAN NOTE
Chronic, stable. PHQ-9 in office today is 0.  States [place] time is when she feels it the most due to her daughter's death which happened over 20 years ago. She no longer maintains on Wellbutrin, just Strattera. She states if this upcoming winter is bad for her she will resume use. Follow up with PCP per routine

## 2025-06-05 NOTE — ASSESSMENT & PLAN NOTE
Chronic, stable. She states she had her Strattera increased to 60mg daily and d/c Wellbutrin thereafter. Continue with current defined treatment plan: Strattera 60 mg. She states this helps her hyperactivity and no longer follows with psychiatry. Follow up with PCP per routine

## 2025-06-06 ENCOUNTER — OFFICE VISIT (OUTPATIENT)
Dept: FAMILY PLANNING/WOMEN'S HEALTH CLINIC | Facility: PHYSICIAN GROUP | Age: 75
End: 2025-06-06
Attending: FAMILY MEDICINE
Payer: MEDICARE

## 2025-06-06 VITALS
SYSTOLIC BLOOD PRESSURE: 108 MMHG | OXYGEN SATURATION: 94 % | WEIGHT: 183 LBS | DIASTOLIC BLOOD PRESSURE: 60 MMHG | HEIGHT: 63 IN | BODY MASS INDEX: 32.43 KG/M2 | HEART RATE: 84 BPM

## 2025-06-06 DIAGNOSIS — J45.20 MILD INTERMITTENT ASTHMA WITHOUT COMPLICATION: ICD-10-CM

## 2025-06-06 DIAGNOSIS — F33.1 MAJOR DEPRESSIVE DISORDER, RECURRENT EPISODE, MODERATE (HCC): ICD-10-CM

## 2025-06-06 DIAGNOSIS — M85.80 OSTEOPENIA WITH HIGH RISK OF FRACTURE: ICD-10-CM

## 2025-06-06 DIAGNOSIS — F90.1 ATTENTION DEFICIT HYPERACTIVITY DISORDER (ADHD), PREDOMINANTLY HYPERACTIVE TYPE: ICD-10-CM

## 2025-06-06 DIAGNOSIS — I47.10 PSVT (PAROXYSMAL SUPRAVENTRICULAR TACHYCARDIA) (HCC): Primary | ICD-10-CM

## 2025-06-06 DIAGNOSIS — E78.2 MIXED HYPERLIPIDEMIA: ICD-10-CM

## 2025-06-06 DIAGNOSIS — G47.33 OBSTRUCTIVE SLEEP APNEA: ICD-10-CM

## 2025-06-06 DIAGNOSIS — I47.29 NSVT (NONSUSTAINED VENTRICULAR TACHYCARDIA) (HCC): ICD-10-CM

## 2025-06-06 PROCEDURE — G0439 PPPS, SUBSEQ VISIT: HCPCS | Performed by: PHYSICIAN ASSISTANT

## 2025-06-06 PROCEDURE — 3074F SYST BP LT 130 MM HG: CPT | Performed by: PHYSICIAN ASSISTANT

## 2025-06-06 PROCEDURE — 3078F DIAST BP <80 MM HG: CPT | Performed by: PHYSICIAN ASSISTANT

## 2025-06-06 PROCEDURE — 1126F AMNT PAIN NOTED NONE PRSNT: CPT | Performed by: PHYSICIAN ASSISTANT

## 2025-06-06 RX ORDER — ALENDRONATE SODIUM 70 MG/1
70 TABLET ORAL
COMMUNITY

## 2025-06-06 SDOH — ECONOMIC STABILITY: TRANSPORTATION INSECURITY: IN THE PAST 12 MONTHS, HAS LACK OF TRANSPORTATION KEPT YOU FROM MEDICAL APPOINTMENTS OR FROM GETTING MEDICATIONS?: NO

## 2025-06-06 SDOH — ECONOMIC STABILITY: FOOD INSECURITY: HOW HARD IS IT FOR YOU TO PAY FOR THE VERY BASICS LIKE FOOD, HOUSING, MEDICAL CARE, AND HEATING?: NOT HARD AT ALL

## 2025-06-06 SDOH — ECONOMIC STABILITY: FOOD INSECURITY: WITHIN THE PAST 12 MONTHS, THE FOOD YOU BOUGHT JUST DIDN'T LAST AND YOU DIDN'T HAVE MONEY TO GET MORE.: NEVER TRUE

## 2025-06-06 SDOH — ECONOMIC STABILITY: FOOD INSECURITY: WITHIN THE PAST 12 MONTHS, YOU WORRIED THAT YOUR FOOD WOULD RUN OUT BEFORE YOU GOT THE MONEY TO BUY MORE.: NEVER TRUE

## 2025-06-06 ASSESSMENT — FIBROSIS 4 INDEX: FIB4 SCORE: 1.28

## 2025-06-06 ASSESSMENT — ACTIVITIES OF DAILY LIVING (ADL)
BATHING_REQUIRES_ASSISTANCE: 0
LACK_OF_TRANSPORTATION: NO

## 2025-06-06 ASSESSMENT — PATIENT HEALTH QUESTIONNAIRE - PHQ9
1. LITTLE INTEREST OR PLEASURE IN DOING THINGS: NOT AT ALL
CLINICAL INTERPRETATION OF PHQ2 SCORE: 0
2. FEELING DOWN, DEPRESSED, IRRITABLE, OR HOPELESS: NOT AT ALL

## 2025-06-06 ASSESSMENT — PAIN SCALES - GENERAL: PAINLEVEL_OUTOF10: NO PAIN

## 2025-06-06 ASSESSMENT — ENCOUNTER SYMPTOMS: GENERAL WELL-BEING: GOOD

## 2025-06-06 NOTE — ASSESSMENT & PLAN NOTE
Chronic, ongoing. Pt is compliant with nightly CPAP use. Follow up with sleep medicine per routine for continued monitoring and management.

## 2025-06-06 NOTE — ASSESSMENT & PLAN NOTE
Chronic, stable, seasonal depending on allergies. Pt maintains on albuterol PRN. Denies wheezing, dyspnea. Follow up with PCP at least annually for continued monitoring and management.

## 2025-06-06 NOTE — ASSESSMENT & PLAN NOTE
Chronic, ongoing. Last DEXA 2024 with osteopenia of the proximal left femur with T score of -1.3. Denies hx of fragility fracture. She maintains on Fosamax 70mg weekly. Advise calcium and vitamin D supplementation with weightbearing exercises as tolerated. Discussed fall risk reduction methods. Follow up with PCP at least annually for continued monitoring and management.

## 2025-06-06 NOTE — PROGRESS NOTES
Comprehensive Health Assessment Program     Denia Deluca is a 74 y.o. here for her comprehensive health assessment.    Patient Active Problem List    Diagnosis Date Noted    Primary hyperparathyroidism (HCC) 10/21/2024    Aortic ectasia (HCC) 03/19/2024    Abnormal finding on imaging of liver 03/19/2024    Atypical mole 03/19/2024    Osteopenia with high risk of fracture 03/19/2024    Lymphadenopathy 03/17/2024    ACP (advance care planning) 03/16/2024    Mixed hyperlipidemia 03/16/2024    Risk for falls 02/15/2024    Palpitations 12/28/2023    PSVT (paroxysmal supraventricular tachycardia) (HCC) 12/28/2023    Abnormal LFTs 08/03/2023    PVC's (premature ventricular contractions) 06/26/2023    Slow transit constipation 02/17/2023    Class 1 obesity due to excess calories with body mass index (BMI) of 33.0 to 33.9 in adult 01/23/2023    Abnormal bowel habits 08/22/2022    BMI 33.0-33.9,adult 01/19/2022    Dilated pancreatic duct 06/24/2021    Hiatal hernia 06/24/2021    Elevated alkaline phosphatase level 06/10/2021    Aortic valve sclerosis 04/19/2021    NSVT (nonsustained ventricular tachycardia) (HCC) 04/19/2021    Major depressive disorder, recurrent episode, moderate (HCC) 03/02/2020    Posttraumatic stress disorder 11/04/2019    Epiretinal membrane (ERM) of left eye 05/10/2019    Obstructive sleep apnea 05/09/2019    Pure hypercholesterolemia 11/09/2018    Attention deficit hyperactivity disorder (ADHD), predominantly hyperactive type 05/11/2018    Bee allergy status 05/11/2018    Mixed stress and urge urinary incontinence 05/11/2018    Mild intermittent asthma without complication 05/11/2018       Current Medications[1]       Current supplements as per medication list.     Allergies:   Bee venom, Crab, Magnesium citrate, Shellfish allergy, Shrimp (diagnostic), Dust mite extract, Pollen extract, and [place]  Social History[2]  Family History   Problem Relation Age of Onset    Cancer Mother     Cancer  Father     Breast Cancer Sister         Breast cancer    Cancer Sister         Breast cancer    Lupus Sister     Leukemia Brother     Colorectal Cancer Neg Hx     Peritoneal Cancer Neg Hx     Tubal Cancer Neg Hx     Ovarian Cancer Neg Hx      Denia  has a past medical history of Abnormal LFTs (08/2023), ADHD (10/02/2024), Allergic rhinitis, Aortic ectasia (HCC) (03/2024), Asthma (10/02/2024), Back pain, Basal cell carcinoma (BCC), BMI 31.0-31.9,adult (01/19/2022), Cataract (10/02/2024), Chickenpox, Depression, Fracture, Heart burn (10/02/2024), Hiatus hernia syndrome, High cholesterol (10/02/2024), History of migraine, Influenza, Kidney stone, Migraines (10/02/2024), Nasal drainage, Pneumonia, PSVT (paroxysmal supraventricular tachycardia) (HCC) (12/28/2023), Renal cyst, Shingles (2000), Sleep apnea (10/02/2024), and Snoring.    She has no past medical history of Acute nasopharyngitis, Anesthesia, Anginal syndrome (HCC), Arrhythmia, Arthritis, Blood clotting disorder (HCC), Breath shortness, Bronchitis, Cancer (HCC), Carcinoma in situ of respiratory system, Congestive heart failure (HCC), Continuous ambulatory peritoneal dialysis status (HCC), Coughing blood, Dental disorder, Diabetes (HCC), Dialysis patient (HCC), Disorder of thyroid, Emphysema of lung (HCC), Glaucoma, Gynecological disorder, Heart murmur, Heart valve disease, Hemorrhagic disorder (HCC), Hepatitis A, Hepatitis B, Hepatitis C, Hypertension, Indigestion, Jaundice, Myocardial infarct (HCC), Pacemaker, Pregnant, Rheumatic fever, Seizure (HCC), Stroke (HCC), Tuberculosis, Urinary bladder disorder, or Urinary incontinence.   Past Surgical History[3]    Screening:  In the last six months have you experienced any leakage of urine? Yes    Depression Screening  Little interest or pleasure in doing things?  0 - not at all  Feeling down, depressed , or hopeless? 0 - not at all  Patient Health Questionnaire Score: 0     If depressive symptoms identified  deferred to follow up visit unless specifically addressed in assessment and plan.    Interpretation of PHQ-9 Total Score   Score Severity   1-4 No Depression   5-9 Mild Depression   10-14 Moderate Depression   15-19 Moderately Severe Depression   20-27 Severe Depression    Screening for Cognitive Impairment  Do you or any of your friends or family members have any concern about your memory? No  Three Minute Recall (Village, Kitchen, Baby) 3/3    [place] clock face with all 12 numbers and set the hands to show 10 minutes past 11.  Yes 5  Cognitive concerns identified deferred for follow up unless specifically addressed in assessment and plan.    Fall Risk Assessment  Has the patient had two or more falls in the last year or any fall with injury in the last year?  No    Safety Assessment  Do you always wear your seatbelt?  Yes  Any changes to home needed to function safely? No  Difficulty hearing.  No  Patient counseled about all safety risks that were identified.    Functional Assessment ADLs  Are there any barriers preventing you from cooking for yourself or meeting nutritional needs?  No.    Are there any barriers preventing you from driving safely or obtaining transportation?  No.    Are there any barriers preventing you from using a telephone or calling for help?  No    Are there any barriers preventing you from shopping?  No.    Are there any barriers preventing you from taking care of your own finances?  No    Are there any barriers preventing you from managing your medications?  No    Are there any barriers preventing you from showering, bathing or dressing yourself? No    Are there any barriers preventing you from doing housework or laundry? No  Are there any barriers preventing you from using the toilet?No  Are you currently engaging in any exercise or physical activity?  No. Gardening in the summertime     Self-Assessment of Health  What is your perception of your health? Good    Do you sleep more than six  hours a night? Yes    In the past 7 days, how much did pain keep you from doing your normal work? Some    Do you spend quality time with family or friends (virtually or in person)? Yes    Do you usually eat a heart healthy diet that constists of a variety of fruits, vegetables, whole grains and fiber? Yes    Do you eat foods high in fat and/or Fast Food more than three times per week? No    How concerned are you that your medical conditions are not being well managed? Not at all    Are you worried that in the next 2 months, you may not have stable housing that you own, rent, or stay in as part of a household? No      Advance Care Planning  Do you have an Advance Directive, Living Will, Durable Power of , or POLST? Yes  Advance Directive       is not on file - instructed patient to bring in a copy to scan into their chart      Health Maintenance Summary            Current Care Gaps       COVID-19 Vaccine (7 - 2024-25 season) Overdue since 3/4/2025      2024  Imm Admin: COVID-19, mRNA, LNP-S, PF, mj-sucrose, 30 mcg/0.3 mL    10/02/2023  Imm Admin: COVID-19, mRNA, LNP-S, PF, mj-sucrose, 30 mcg/0.3 mL    10/24/2022  Imm Admin: PFIZER BIVALENT SARS-COV-2 VACCINE (12+)    10/18/2021  Imm Admin: PFIZER PURPLE CAP SARS-COV-2 VACCINATION (12+)    2021  Imm Admin: PFIZER PURPLE CAP SARS-COV-2 VACCINATION (12+)     Only the first 5 history entries have been loaded, but more history exists.            Hepatitis B Vaccine (Hep B) (3 of 3 - 19+ 3-dose series) Overdue since 2025  Imm Admin: Hepatitis B Vaccine (Adol/Adult)    2001  Outside Immunization: Hepatitis B                      Needs Review       Hepatitis C Screening  Tentatively Complete      10/27/2020  Hepatitis C Antibody component of HCV Scrn ( [id number] 1xLife)                      Upcoming       Bone Density Scan (Every 2 Years) Next due on 2024  DS-BONE DENSITY STUDY (DEXA)    2019   DS-BONE DENSITY STUDY (DEXA)    12/07/2015  DS-BONE DENSITY STUDY (DEXA)              Mammogram (Yearly) Next due on 3/5/2026      03/05/2025  MA-SCREENING MAMMO BILAT W/TOMOSYNTHESIS W/CAD    09/29/2022  MA-SCREENING MAMMO BILAT W/TOMOSYNTHESIS W/CAD    04/02/2021  MA-SCREENING MAMMO BILAT W/TOMOSYNTHESIS W/CAD    02/06/2020  MA-SCREENING MAMMO BILAT W/TOMOSYNTHESIS W/CAD    10/31/2018  MA-SCREENING MAMMO BILAT W/TOMOSYNTHESIS W/CAD      Only the first 5 history entries have been loaded, but more history exists.              Annual Wellness Visit (Yearly) Next due on 6/6/2026 06/06/2025  Level of Service: MI ANNUAL WELLNESS VISIT-INCLUDES PPPS SUBSEQUE*    02/19/2025  Level of Service: MI ANNUAL WELLNESS VISIT-INCLUDES PPPS SUBSEQUE*    02/19/2025  Visit Dx: Medicare annual wellness visit, subsequent    02/15/2024  Level of Service: ANNUAL WELLNESS VISIT-INCLUDES PPPS SUBSEQUE*    02/17/2023  Level of Service: MI PREVENTIVE VISIT,EST,65 & OVER      Only the first 5 history entries have been loaded, but more history exists.              IMM DTaP/Tdap/Td Vaccine (4 - Td or Tdap) Next due on 3/8/2031      03/08/2021  Imm Admin: Tdap Vaccine    09/24/2016  Imm Admin: Tdap Vaccine    01/01/2011  Imm Admin: Tdap Vaccine                      Completed or No Longer Recommended       Influenza Vaccine (Series Information) Completed      10/15/2024  Outside Immunization: Influenza Tri, Adj    09/21/2023  Imm Admin: Influenza Vaccine Adult HD    09/13/2022  Imm Admin: Influenza, unspecified formulation    09/25/2021  Imm Admin: Influenza Vaccine Adult HD    10/26/2020  Imm Admin: Influenza Vaccine Adult HD      Only the first 5 history entries have been loaded, but more history exists.              Zoster (Shingles) Vaccines (Series Information) Completed      03/08/2021  Imm Admin: Zoster Vaccine Recombinant (RZV) (SHINGRIX)    12/31/2020  Imm Admin: Zoster Vaccine Recombinant (RZV) (SHINGRIX)    07/12/2010  Outside  Immunization: Zostavax (Live Zoster Vaccine) 0.65mL Age 60yrs and older SQ              Pneumococcal Vaccine: 50+ Years (Series Information) Completed      09/29/2016  Imm Admin: Pneumococcal polysaccharide vaccine (PPSV-23)    09/09/2015  Imm Admin: Pneumococcal Conjugate Vaccine (Prevnar/PCV-13)    01/01/2011  Imm Admin: Pneumococcal polysaccharide vaccine (PPSV-23)              Hepatitis A Vaccine (Hep A) (Series Information) Aged Out      No completion history exists for this topic.              HPV Vaccines (Series Information) Aged Out     No completion history exists for this topic.              Polio Vaccine (Inactivated Polio) (Series Information) Aged Out     No completion history exists for this topic.              Meningococcal Immunization (Series Information) Aged Out     No completion history exists for this topic.              Meningococcal B Vaccine (Series Information) Aged Out     No completion history exists for this topic.              Colorectal Cancer Screening  Discontinued      03/17/2023  OCCULT BLOOD FECES IMMUNOASSAY    02/24/2023  Colon Cancer Screening Annual FIT (Done)    04/30/2022  OCCULT BLOOD FECES IMMUNOASSAY    02/22/2022  REFERRAL TO GI FOR COLONOSCOPY    05/17/2021  OCCULT BLOOD FECES IMMUNOASSAY     Only the first 5 history entries have been loaded, but more history exists.                          Patient Care Team:  Hoda Sloan M.D. as PCP - General (Family Medicine)  Roselyn Porras M.D. (Inactive) as PCP - [facility] Paneled  Maria R Reyes, M.D. as Consulting Physician (Allergy and Immunology)  Tawnya Cee M.D. (Inactive) as Consulting Physician (Cardiovascular Disease (Cardiology))  Alfonso Denton M.D. (Inactive) as Consulting Physician (Pulmonary Medicine)  Gustavo Villegas Ass't as    [facility] (DME Supplier)      Financial Resource Strain: Low Risk  (6/6/2025)    Overall Financial Resource Strain (CARDIA)     Difficulty of Paying  "Living Expenses: Not hard at all      Transportation Needs: No Transportation Needs (6/6/2025)    PRAPARE - Transportation     Lack of Transportation (Medical): No     Lack of Transportation (Non-Medical): No      Food Insecurity: No Food Insecurity (6/6/2025)    Hunger Vital Sign     Worried About Running Out of Food in the Last Year: Never true     Ran Out of Food in the Last Year: Never true        Encounter Vitals  Blood Pressure : 108/60  Pulse: 84  Pulse Oximetry: 94 %  Weight: 83 kg (183 lb)  Height: 160 cm (5' 3\")  BMI (Calculated): 32.42  Pain Score: No pain     Physical Exam:  Constitutional: NAD  HENMT: NC/AT, EOMI,   Cardiovascular: RRR, No m/r/g  Lungs: CTAB, no w/r/r  Extremities: No c/c/e  Skin: No lesions notes  Neurologic: Alert & oriented x3, CN II-XII grossly intact    Assessment and Plan. The following treatment and monitoring plan is recommended:  Attention deficit hyperactivity disorder (ADHD), predominantly hyperactive type  Chronic, stable. She states she had her Strattera increased to 60mg daily and d/c Wellbutrin. Continue with current defined treatment plan: Strattera 60 mg. She states this controls her hyperactivity but states she still talks a lot. Follow up with PCP    Major depressive disorder, recurrent episode, moderate (HCC)  Chronic, stable. PHQ-9 in office today is 0.  States Ravi time is when she feels it the most due to her daughter's death which happened over 20 years ago. She sees psych for medication management only. Discussed benefit of a therapist during that time of year. She states she will think about it. Follow up with PCP as needed.     Mild intermittent asthma without complication  Chronic, stable, seasonal depending on allergies. Pt maintains on albuterol PRN. Denies wheezing, dyspnea. Follow up with PCP at least annually for continued monitoring and management.    Mixed hyperlipidemia  Chronic, stable. Maintains on statin therapy without issue. Most recent " lipid panel from 12/2023 WNL. Continue atorvastatin 10mg daily. Recommend Mediterranean diet and regular physical activity. Follow up with PCP at least annually for continued monitoring and management.   Lab Results   Component Value Date/Time    CHOLSTRLTOT 162 12/27/2023 11:09 AM    LDL 70 12/27/2023 11:09 AM    HDL 66 12/27/2023 11:09 AM    TRIGLYCERIDE 131 12/27/2023 11:09 AM     NSVT (nonsustained ventricular tachycardia) (HCC)  PSVT (paroxysmal supraventricular tachycardia)  Chronic, stable. Noted on ZioPatch results from 2019. She maintains on diltiazem 120mg daily. She will experience bouts of heart racing particularly when she's outside or with exercise. She does have bouts that wake her up at night but this quickly resolves. Follow up with cardiology per routine.     Obstructive sleep apnea  Chronic, ongoing. Pt is compliant with nightly CPAP use. Follow up with sleep medicine per routine for continued monitoring and management.    Osteopenia with high risk of fracture  Chronic, ongoing. Last DEXA 2024 with osteopenia of the proximal left femur with T score of -1.3. Denies hx of fragility fracture. She maintains on Fosamax 70mg weekly. Advise calcium and vitamin D supplementation with weightbearing exercises as tolerated. Follow up with PCP at least annually for continued monitoring and management.    Services suggested: No services needed at this time  Health Care Screening: Age-appropriate preventive services recommended by USPTF and ACIP covered by Medicare were discussed today. Services ordered if indicated and agreed upon by the patient.  Referrals offered: Community-based lifestyle interventions to reduce health risks and promote self-management and wellness, fall prevention, nutrition, physical activity, tobacco-use cessation, weight loss, and mental health services as per orders if indicated.    Discussion today about general wellness and lifestyle habits:    Prevent falls and reduce trip hazards;  Cautioned about securing or removing rugs.  Have a working fire alarm and carbon monoxide detector.  Engage in regular physical activity and social activities.    Follow-up: No follow-ups on file.              [1]   Current Outpatient Medications   Medication Sig Dispense Refill    alendronate (FOSAMAX) 70 MG Tab Take 70 mg by mouth every 7 days.      DILTIAZem CD (CARDIZEM CD) 120 MG CAPSULE SR 24 HR Take 1 capsule by mouth every day. 100 Capsule 3    omeprazole (PRILOSEC) 20 MG delayed-release capsule Take 1 Capsule by mouth 2 times a day. (Patient taking differently: Take 20 mg by mouth every day.) 200 Capsule 3    atorvastatin (LIPITOR) 10 MG Tab Take 1 Tablet by mouth every evening. 100 Tablet 3    albuterol 108 (90 Base) MCG/ACT Aero Soln inhalation aerosol Inhale 2 Puffs every 6 hours as needed for Shortness of Breath. 25.5 g 1    atomoxetine (STRATTERA) 60 MG capsule Take 1 Capsule by mouth every day for 180 days. 180 Capsule 0    oxybutynin (DITROPAN) 5 MG Tab Take 1 tablet by mouth 2 times a day. (Patient taking differently: Take 5 mg by mouth every day. Pt states she only takes once a day) 180 Tablet 3    Omega-3 Fatty Acids (FISH OIL) 1000 MG Cap capsule Take 1,000 mg by mouth every 48 hours. 300 MG OMEGA    MEDICATION INSTRUCTIONS FOR SURGERY/PROCEDURE SCHEDULED FOR 10/8/24   DO NOT TAKE 7 DAYS PRIOR TO SURGERY      Cholecalciferol (VITAMIN D3) 2000 UNIT Cap Take 2,000 Units by mouth every day.     MEDICATION INSTRUCTIONS FOR SURGERY/PROCEDURE SCHEDULED FOR 10/8/24   DO NOT TAKE 7 DAYS PRIOR TO SURGERY      acetaminophen (TYLENOL) 500 MG Tab Take 500-1,000 mg by mouth every 6 hours as needed.     MEDICATION INSTRUCTIONS FOR SURGERY/PROCEDURE SCHEDULED FOR 10/8/24   OK TO CONTINUE TAKING PRIOR TO SURGERY AND DAY OF SURGERY IF NEEDED      Multiple Vitamin (MULTIVITAMIN ADULT PO) Take 1 Tablet by mouth every day. CENTRUM WOMENS 50+    MEDICATION INSTRUCTIONS FOR SURGERY/PROCEDURE SCHEDULED FOR 10/8/24   DO  "NOT TAKE 7 DAYS PRIOR TO SURGERY      SUMAtriptan (IMITREX) 25 MG Tab tablet Take 1 Tablet by mouth one time as needed for Migraine for up to 1 dose. Do not exceed more than 10 tablets a month (Patient not taking: Reported on 6/6/2025) 30 Tablet 2    Ascorbic Acid (VITAMIN C) 500 MG Chew Tab Chew 500 mg every day.     MEDICATION INSTRUCTIONS FOR SURGERY/PROCEDURE SCHEDULED FOR 10/8/24   DO NOT TAKE 7 DAYS PRIOR TO SURGERY      EPINEPHrine (EPIPEN) 0.3 MG/0.3ML Solution Auto-injector solution for injection Inject 0.3 mg into the shoulder, thigh, or buttocks one time as needed (anaphylaxis).     MEDICATION INSTRUCTIONS FOR SURGERY/PROCEDURE SCHEDULED FOR 10/8/24   OK TO CONTINUE TAKING PRIOR TO SURGERY AND DAY OF SURGERY IF NEEDED       No current facility-administered medications for this visit.   [2]   Social History  Tobacco Use    Smoking status: Never     Passive exposure: Past    Smokeless tobacco: Never   Vaping Use    Vaping status: Never Used   Substance Use Topics    Alcohol use: Not Currently     Comment: 2 glasses a year    Drug use: Never   [3]   Past Surgical History:  Procedure Laterality Date    [place] THORACOSCOPY SURG W/PLEURODESIS Right 10/8/2024    Procedure: THORACOSCOPIC EXCISION OF MEDIASTINAL MASS RIGHT SIDE;  Surgeon: Alfonso Franco M.D.;  Location: SURGERY [facility];  Service: Thoracic    [place] UPPER GI ENDOSCOPY,DIAGNOSIS  4/22/2024    Procedure: UPPER ENDOSCOPIC ULTRASOUND AND ESOPHAGOGASTRODUODENOSCOPY;  Surgeon: Tomás Shaw M.D.;  Location: [facility];  Service: EUS    EGD W/ENDOSCOPIC ULTRASOUND  4/22/2024    Procedure: EGD, WITH ENDOSCOPIC US;  Surgeon: Tomás Shaw M.D.;  Location: [facility];  Service: EUS    ABDOMINAL HYSTERECTOMY TOTAL      age 38 for \"pre-cancer\", ovaries not removed    CARPAL TUNNEL RELEASE Right     CATARACT EXTRACTION WITH IOL Bilateral     COLONOSCOPY      x2-twisted bowel.    HARDWARE REMOVAL LOWER EXTREMITY Right     " foot    OPEN REDUCTION Right     R foot

## 2025-06-06 NOTE — ASSESSMENT & PLAN NOTE
Chronic, stable. Noted on ZioPatch results from 2019. She maintains on diltiazem 120mg daily. She will experience bouts of heart racing particularly when she's outside or with exercise. She does have bouts that wake her up at night but this quickly resolves. Follow up with cardiology per routine.

## 2025-06-06 NOTE — ASSESSMENT & PLAN NOTE
Chronic, stable. Maintains on statin therapy without issue. Most recent lipid panel from 12/2023 WNL. Continue atorvastatin 10mg daily. Recommend Mediterranean diet and regular physical activity. Follow up with PCP at least annually for continued monitoring and management.   Lab Results   Component Value Date/Time    CHOLSTRLTOT 162 12/27/2023 11:09 AM    LDL 70 12/27/2023 11:09 AM    HDL 66 12/27/2023 11:09 AM    TRIGLYCERIDE 131 12/27/2023 11:09 AM

## 2025-06-06 NOTE — ASSESSMENT & PLAN NOTE
Patient fall-risk assessment in office is positive. She endorses clumsiness in part due to her ADHD/distractability. She denies any significant injuries secondary to these falls. She states she does not pick her feet up when she walks and that causes her to trip over many things. Educated on removing hazards in the home, keeping walkways clear, as well as wearing appropriate non-slip footwear around the house.

## 2025-06-18 PROCEDURE — RXMED WILLOW AMBULATORY MEDICATION CHARGE: Performed by: INTERNAL MEDICINE

## 2025-06-20 ENCOUNTER — PHARMACY VISIT (OUTPATIENT)
Dept: PHARMACY | Facility: MEDICAL CENTER | Age: 75
End: 2025-06-20
Payer: COMMERCIAL

## 2025-07-01 PROCEDURE — RXMED WILLOW AMBULATORY MEDICATION CHARGE: Performed by: PHYSICIAN ASSISTANT

## 2025-07-14 ENCOUNTER — PHARMACY VISIT (OUTPATIENT)
Dept: PHARMACY | Facility: MEDICAL CENTER | Age: 75
End: 2025-07-14
Payer: COMMERCIAL

## 2025-07-21 ENCOUNTER — HOSPITAL ENCOUNTER (OUTPATIENT)
Dept: LAB | Facility: MEDICAL CENTER | Age: 75
End: 2025-07-21
Attending: INTERNAL MEDICINE
Payer: MEDICARE

## 2025-07-21 DIAGNOSIS — E78.00 PURE HYPERCHOLESTEROLEMIA: ICD-10-CM

## 2025-07-21 LAB
CHOLEST SERPL-MCNC: 152 MG/DL (ref 100–199)
FASTING STATUS PATIENT QL REPORTED: NORMAL
HDLC SERPL-MCNC: 60 MG/DL
LDLC SERPL CALC-MCNC: 69 MG/DL
TRIGL SERPL-MCNC: 117 MG/DL (ref 0–149)

## 2025-07-21 PROCEDURE — 80061 LIPID PANEL: CPT

## 2025-07-21 PROCEDURE — 36415 COLL VENOUS BLD VENIPUNCTURE: CPT

## 2025-07-22 ENCOUNTER — RESULTS FOLLOW-UP (OUTPATIENT)
Dept: CARDIOLOGY | Facility: MEDICAL CENTER | Age: 75
End: 2025-07-22
Payer: MEDICARE

## 2025-07-24 PROCEDURE — RXMED WILLOW AMBULATORY MEDICATION CHARGE: Performed by: PSYCHIATRY & NEUROLOGY

## 2025-07-24 PROCEDURE — RXMED WILLOW AMBULATORY MEDICATION CHARGE: Performed by: STUDENT IN AN ORGANIZED HEALTH CARE EDUCATION/TRAINING PROGRAM

## 2025-07-25 ENCOUNTER — PHARMACY VISIT (OUTPATIENT)
Dept: PHARMACY | Facility: MEDICAL CENTER | Age: 75
End: 2025-07-25
Payer: COMMERCIAL

## 2025-08-19 ENCOUNTER — OFFICE VISIT (OUTPATIENT)
Dept: MEDICAL GROUP | Facility: MEDICAL CENTER | Age: 75
End: 2025-08-19
Payer: MEDICARE

## 2025-08-19 VITALS
BODY MASS INDEX: 31.89 KG/M2 | HEIGHT: 63 IN | DIASTOLIC BLOOD PRESSURE: 68 MMHG | SYSTOLIC BLOOD PRESSURE: 104 MMHG | OXYGEN SATURATION: 97 % | TEMPERATURE: 97.1 F | WEIGHT: 180 LBS | HEART RATE: 101 BPM

## 2025-08-19 DIAGNOSIS — M76.62 LEFT ACHILLES TENDINITIS: ICD-10-CM

## 2025-08-19 DIAGNOSIS — M85.80 OSTEOPENIA WITH HIGH RISK OF FRACTURE: ICD-10-CM

## 2025-08-19 DIAGNOSIS — Z12.11 COLON CANCER SCREENING: ICD-10-CM

## 2025-08-19 DIAGNOSIS — H61.21 IMPACTED CERUMEN OF RIGHT EAR: ICD-10-CM

## 2025-08-19 DIAGNOSIS — Z23 IMMUNIZATION DUE: Primary | ICD-10-CM

## 2025-08-19 DIAGNOSIS — E78.2 MIXED HYPERLIPIDEMIA: ICD-10-CM

## 2025-08-19 DIAGNOSIS — M79.644 PAIN OF FINGER OF RIGHT HAND: ICD-10-CM

## 2025-08-19 PROCEDURE — G0010 ADMIN HEPATITIS B VACCINE: HCPCS | Performed by: STUDENT IN AN ORGANIZED HEALTH CARE EDUCATION/TRAINING PROGRAM

## 2025-08-19 PROCEDURE — 3074F SYST BP LT 130 MM HG: CPT | Performed by: STUDENT IN AN ORGANIZED HEALTH CARE EDUCATION/TRAINING PROGRAM

## 2025-08-19 PROCEDURE — 99214 OFFICE O/P EST MOD 30 MIN: CPT | Mod: 25 | Performed by: STUDENT IN AN ORGANIZED HEALTH CARE EDUCATION/TRAINING PROGRAM

## 2025-08-19 PROCEDURE — 3078F DIAST BP <80 MM HG: CPT | Performed by: STUDENT IN AN ORGANIZED HEALTH CARE EDUCATION/TRAINING PROGRAM

## 2025-08-19 PROCEDURE — 90746 HEPB VACCINE 3 DOSE ADULT IM: CPT | Mod: JZ | Performed by: STUDENT IN AN ORGANIZED HEALTH CARE EDUCATION/TRAINING PROGRAM

## 2025-08-19 ASSESSMENT — FIBROSIS 4 INDEX: FIB4 SCORE: 1.28

## 2025-08-22 ENCOUNTER — OFFICE VISIT (OUTPATIENT)
Dept: DERMATOLOGY | Facility: IMAGING CENTER | Age: 75
End: 2025-08-22
Payer: MEDICARE

## 2025-08-22 DIAGNOSIS — L82.1 SEBORRHEIC KERATOSIS: Primary | ICD-10-CM

## 2025-08-22 DIAGNOSIS — D22.9 NEVUS: ICD-10-CM

## 2025-08-22 PROCEDURE — 99213 OFFICE O/P EST LOW 20 MIN: CPT | Performed by: DERMATOLOGY

## (undated) DEVICE — CONNECTOR Y TBG CRTY 5 IN 1 STERILE (50EA/CA)

## (undated) DEVICE — BITE BLOCK ADULT 60FR (100EA/CA)

## (undated) DEVICE — BOVIE BLADE COATED &INSULATED - 25/PK

## (undated) DEVICE — FORCEP RADIAL JAW 4 STANDARD CAPACITY W/NEEDLE 240CM (40EA/BX)

## (undated) DEVICE — MASK AIRWAY SIZE 2 LMA WITH LUBE & SYRINGE (10/BX)

## (undated) DEVICE — PACK LAP CHOLE OR - (2EA/CA)

## (undated) DEVICE — SUTURE 0 SILK CT-1 (36PK/BX)

## (undated) DEVICE — SENSOR OXIMETER ADULT SPO2 RD SET (20EA/BX)

## (undated) DEVICE — HEMOSTAT SURG ABSORBABLE - 4 X 8 IN SURGICEL (24EA/CA)

## (undated) DEVICE — STAPLE 35MM WHITE ECHELON FLEX (12/BX)

## (undated) DEVICE — CANISTER SUCTION 3000ML MECHANICAL FILTER AUTO SHUTOFF MEDI-VAC NONSTERILE LF DISP (40EA/CA)

## (undated) DEVICE — TUBING CLEARLINK DUO-VENT - C-FLO (48EA/CA)

## (undated) DEVICE — SYRINGE SAFETY 3 ML 18 GA X 1 1/2 BLUNT LL (100/BX 8BX/CA)

## (undated) DEVICE — GLOVE BIOGEL SZ 7 SURGICAL PF LTX - (50PR/BX 4BX/CA)

## (undated) DEVICE — SPONGE GAUZE NON-STERILE 4X4 - (2000/CA 10PK/CA)

## (undated) DEVICE — TUBE CONNECTING SUCTION - CLEAR PLASTIC STERILE 72 IN (50EA/CA)

## (undated) DEVICE — LACTATED RINGERS INJ 1000 ML - (14EA/CA 60CA/PF)

## (undated) DEVICE — MASK AIRWAY SIZE 3 UNIQUE SILICON (10/BX)

## (undated) DEVICE — COVER LIGHT HANDLE FLEXIBLE - SOFT (2EA/PK 80PK/CA)

## (undated) DEVICE — BLOCK BITE ENDOSCOPIC 2809 - (100/BX) INTERMEDIATE

## (undated) DEVICE — SODIUM CHL IRRIGATION 0.9% 1000ML (12EA/CA)

## (undated) DEVICE — SET SUCTION/IRRIGATION WITH DISPOSABLE TIP (6/CA )PART #0250-070-520 IS A SUB

## (undated) DEVICE — TUBE E-T HI-LO CUFF 8.0MM (10EA/PK)

## (undated) DEVICE — TUBE E-T HI-LO CUFF 7.5MM (10EA/PK)

## (undated) DEVICE — MASK AIRWAY SIZE 4 UNIQUE SILICON (10EA/BX)

## (undated) DEVICE — TUBE SUCTION YANKAUER  1/4 X 6FT (20EA/CA)"

## (undated) DEVICE — ELECTRODE DUAL RETURN W/ CORD - (50/PK)

## (undated) DEVICE — MASK O2 VNYL ADLT RBRTH HI - (50/CS)

## (undated) DEVICE — DRAPE IOBAN II INCISE 23X17 - (10EA/BX 4BX/CA)

## (undated) DEVICE — SUTURE GENERAL

## (undated) DEVICE — ACQUIRE 22G FNB NEEDLE

## (undated) DEVICE — SUTURE 2-0 VICRYL PLUS CT-2 - 27 INCH (36/BX)

## (undated) DEVICE — SYSTEM CLEARIFY VISUALIZATION (10EA/PK)

## (undated) DEVICE — SET LEADWIRE 5 LEAD BEDSIDE DISPOSABLE ECG (1SET OF 5/EA)

## (undated) DEVICE — DERMABOND ADVANCED - (12EA/BX)

## (undated) DEVICE — SLEEVE, VASO, THIGH, MED

## (undated) DEVICE — CANISTER SUCTION RIGID RED 1500CC (40EA/CA)

## (undated) DEVICE — COVER LIGHT HANDLE ALC PLUS DISP (18EA/BX)

## (undated) DEVICE — KIT CUSTOM PROCEDURE SINGLE FOR ENDO  (15/CA)

## (undated) DEVICE — SOD. CHL. INJ. 0.9% 1000 ML - (14EA/CA 60CA/PF)

## (undated) DEVICE — TOWEL STOP TIMEOUT SAFETY FLAG (40EA/CA)

## (undated) DEVICE — CATHETER IV SAFETY 20 GA X 1-1/4 (50/BX)

## (undated) DEVICE — CUFF BP ADULT LARGE DISPOSABLE (20EA/CA)

## (undated) DEVICE — TUBE E-T HI-LO CUFF 6.5MM (10EA/BX)

## (undated) DEVICE — TROCAR THORACOPORT SOFT 12MM (12EA/CA)

## (undated) DEVICE — TUBE CHEST SOFT STRAIGHT 24FR (10EA/BX)

## (undated) DEVICE — MASK WITH FACE SHIELD (25/BX 4BX/CA)

## (undated) DEVICE — DRAPE LARGE 3 QUARTER - (20/CA)

## (undated) DEVICE — SUCTION INSTRUMENT YANKAUER BULBOUS TIP W/O VENT (50EA/CA)

## (undated) DEVICE — CONTAINER SPECIMEN BAG OR - STERILE 4 OZ W/LID (100EA/CA)

## (undated) DEVICE — CATHETER IV SAFETY 22 GA X 1 (50EA/BX)

## (undated) DEVICE — SET EXTENSION WITH 2 PORTS (48EA/CA) ***PART #2C8610 IS A SUBSTITUTE*****

## (undated) DEVICE — WATER IRRIGATION STERILE 1000ML (12EA/CA)

## (undated) DEVICE — CHLORAPREP 26 ML APPLICATOR - ORANGE TINT(25/CA)

## (undated) DEVICE — SYRINGE DISP. 60 CC LL - (30/BX, 12BX/CA)**WHEN THESE ARE GONE ORDER #500206**

## (undated) DEVICE — GOWN WARMING STANDARD FLEX - (30/CA)

## (undated) DEVICE — GOWN SURGEONS LARGE - (32/CA)

## (undated) DEVICE — TUBE E-T HI-LO CUFF 7.0MM (10EA/PK)

## (undated) DEVICE — KIT  I.V. START (100EA/CA)

## (undated) DEVICE — SUTURE 4-0 MONOCRYL PLUS PS-2 - 27 INCH (36/BX)

## (undated) DEVICE — SYRINGE SAFETY 5 ML 18 GA X 1-1/2 BLUNT LL (100/BX 4BX/CA)

## (undated) DEVICE — SYRINGE SAFETY 10 ML 18 GA X 1 1/2 BLUNT LL (100/BX 4BX/CA)

## (undated) DEVICE — MASK  AIRWAY SIZE 5 UNIQUE SILICON (10EA/BX)

## (undated) DEVICE — TUBE E-T HI-LO CUFF 8.5MM (10EA/PK)

## (undated) DEVICE — CATHETER TROCAR 24FR - (10/CA)